# Patient Record
Sex: FEMALE | Race: WHITE | NOT HISPANIC OR LATINO | Employment: OTHER | URBAN - NONMETROPOLITAN AREA
[De-identification: names, ages, dates, MRNs, and addresses within clinical notes are randomized per-mention and may not be internally consistent; named-entity substitution may affect disease eponyms.]

---

## 2017-03-23 ENCOUNTER — OFFICE VISIT (OUTPATIENT)
Dept: FAMILY MEDICINE CLINIC | Facility: CLINIC | Age: 62
End: 2017-03-23

## 2017-03-23 VITALS
SYSTOLIC BLOOD PRESSURE: 140 MMHG | TEMPERATURE: 98.1 F | BODY MASS INDEX: 25.69 KG/M2 | WEIGHT: 145 LBS | OXYGEN SATURATION: 96 % | DIASTOLIC BLOOD PRESSURE: 70 MMHG | RESPIRATION RATE: 12 BRPM | HEART RATE: 50 BPM | HEIGHT: 63 IN

## 2017-03-23 DIAGNOSIS — I87.2 VENOUS INSUFFICIENCY, PERIPHERAL: ICD-10-CM

## 2017-03-23 DIAGNOSIS — Z72.0 DIPS TOBACCO: ICD-10-CM

## 2017-03-23 DIAGNOSIS — F41.8 DEPRESSION WITH ANXIETY: ICD-10-CM

## 2017-03-23 DIAGNOSIS — E78.2 MIXED HYPERLIPIDEMIA: ICD-10-CM

## 2017-03-23 DIAGNOSIS — K21.9 GASTROESOPHAGEAL REFLUX DISEASE WITHOUT ESOPHAGITIS: ICD-10-CM

## 2017-03-23 DIAGNOSIS — J44.9 CHRONIC OBSTRUCTIVE PULMONARY DISEASE, UNSPECIFIED COPD TYPE (HCC): ICD-10-CM

## 2017-03-23 DIAGNOSIS — J30.9 CHRONIC ALLERGIC RHINITIS: ICD-10-CM

## 2017-03-23 DIAGNOSIS — E11.9 TYPE 2 DIABETES MELLITUS WITHOUT COMPLICATION, WITHOUT LONG-TERM CURRENT USE OF INSULIN (HCC): ICD-10-CM

## 2017-03-23 DIAGNOSIS — E55.9 VITAMIN D DEFICIENCY DISEASE: ICD-10-CM

## 2017-03-23 DIAGNOSIS — I25.10 CORONARY ARTERY DISEASE INVOLVING NATIVE CORONARY ARTERY OF NATIVE HEART WITHOUT ANGINA PECTORIS: ICD-10-CM

## 2017-03-23 DIAGNOSIS — I10 ESSENTIAL HYPERTENSION: Primary | ICD-10-CM

## 2017-03-23 DIAGNOSIS — M85.80 OSTEOPENIA: ICD-10-CM

## 2017-03-23 DIAGNOSIS — Z00.00 HEALTHCARE MAINTENANCE: ICD-10-CM

## 2017-03-23 PROCEDURE — 99214 OFFICE O/P EST MOD 30 MIN: CPT | Performed by: GENERAL PRACTICE

## 2017-03-23 RX ORDER — ISOSORBIDE MONONITRATE 30 MG/1
30 TABLET, EXTENDED RELEASE ORAL EVERY MORNING
Qty: 30 TABLET | Refills: 5 | Status: SHIPPED | OUTPATIENT
Start: 2017-03-23 | End: 2017-09-25 | Stop reason: SDUPTHER

## 2017-03-23 RX ORDER — LISINOPRIL 10 MG/1
10 TABLET ORAL EVERY MORNING
Qty: 30 TABLET | Refills: 5 | Status: SHIPPED | OUTPATIENT
Start: 2017-03-23 | End: 2017-09-25 | Stop reason: SDUPTHER

## 2017-03-23 RX ORDER — ATORVASTATIN CALCIUM 80 MG/1
80 TABLET, FILM COATED ORAL EVERY EVENING
Qty: 30 TABLET | Refills: 5 | Status: SHIPPED | OUTPATIENT
Start: 2017-03-23 | End: 2017-09-25 | Stop reason: SDUPTHER

## 2017-03-23 RX ORDER — METOPROLOL SUCCINATE 25 MG/1
25 TABLET, EXTENDED RELEASE ORAL 2 TIMES DAILY
Qty: 60 TABLET | Refills: 5 | Status: SHIPPED | OUTPATIENT
Start: 2017-03-23 | End: 2017-09-25 | Stop reason: SDUPTHER

## 2017-03-23 NOTE — PROGRESS NOTES
Subjective   Ethel Trotter is a 61 y.o. female.     History of Present Illness     Coronary artery disease  She has a history of CABG and previous M.I. Recent history: Hospitalized at [facility] since last here with chest pain after discontinuing her medication for several months.  Nuclear stress testing revealed no evidence of inducible ischemia and an echocardiogram was reported as showing an ejection fraction of 55-60%.  Was discharged home on isosorbide mononitrate 30 every morning with a reduction in the dosages of lisinopril, metoprolol, and atorvastatin.  She has felt well since with no further chest pain.  She has a cardiology follow-up appointment with Dr. Green within the next month    Diabetes  Current symptoms include none. Patient denies paresthesia of the feet, visual disturbances, polydipsia, polyuria, hypoglycemia and foot ulcerations. Evaluation to date has been: hemoglobin A1C. Home sugars: patient does not check sugars. Current treatments: metformin, DPP inhibitor - Januvia and SGLT2 inhibitor - Jardiance. Last dilated eye exam more than one year ago. Most recent hemoglobin A1c   Lab Results   Component Value Date    HGBA1C 8.90 (H) 12/20/2016    HGBA1C 8.4 (H) 03/10/2016    HGBA1C 7.4 (H) 12/07/2015    HGBA1C 8.2 (H) 08/06/2015      Dyslipidemia  Compliance with treatment has been fair. The patient exercises intermittently. She is currently being prescribed the following medication for her dyslipidemia - atorvastatin, ezetimibe. Patient denies side effects associated with her medications. Most recent lipids include  Lab Results   Component Value Date    TRIG 688 (H) 12/20/2016    TRIG 439 (H) 03/10/2016    TRIG 323 (H) 12/07/2015    HDL 56 (L) 12/20/2016    HDL 39 (L) 03/10/2016    HDL 48 (L) 12/07/2015    LDLCALC  12/20/2016      Comment:      Unable to calculate    LDL No Calculation 03/10/2016     (H) 12/07/2015     Hypertension  Home blood pressure readings: not doing.  Associated signs and symptoms: none. Patient denies: chest pain, palpitations, dyspnea, orthopnea, paroxysmal nocturnal dyspnea and peripheral edema. Current antihypertensive medications includes lisinopril, metoprolol and amlodipine. Medication compliance: taking as prescribed. Most recent creatinine   Lab Results   Component Value Date    CREATININE 1.13 12/20/2016     Depression  Onset was several years ago. Symptoms have been gradually improving since last here. Current symptoms include: fatigue. Patient denies depressed mood, anhedonia, insomnia, recurrent thoughts of death and suicidal thoughts. Risk factors: negative life event mothers death and previous episode of depression. Treatment has included medication citalopram. She complains of the following side effects from the treatment: none.    The following portions of the patient's history were reviewed and updated as appropriate: allergies, current medications, past medical history, past social history, past surgical history and problem list.    Review of Systems   Constitutional: Positive for fatigue. Negative for appetite change, chills, fever and unexpected weight change.   HENT: Negative for congestion, ear pain, rhinorrhea, sneezing, sore throat and voice change.    Eyes: Negative for visual disturbance.   Respiratory: Negative for cough, shortness of breath and wheezing.    Cardiovascular: Negative for chest pain, palpitations and leg swelling.   Gastrointestinal: Negative for abdominal pain, blood in stool, constipation, diarrhea, nausea and vomiting.   Endocrine: Negative for polydipsia.   Genitourinary: Negative for difficulty urinating, dysuria, frequency, hematuria, menstrual problem, pelvic pain, urgency, vaginal bleeding and vaginal discharge.   Musculoskeletal: Negative for arthralgias, back pain, joint swelling, myalgias and neck pain.   Skin: Negative for color change.   Neurological: Negative for tremors, weakness, numbness and headaches.    Psychiatric/Behavioral: Negative for dysphoric mood, sleep disturbance and suicidal ideas. The patient is not nervous/anxious.      Objective   Physical Exam   Constitutional: She is oriented to person, place, and time. No distress.   HENT:   Head: Atraumatic.   Right Ear: Tympanic membrane, external ear and ear canal normal.   Left Ear: Tympanic membrane, external ear and ear canal normal.   Nose: Nose normal.   Mouth/Throat: Oropharynx is clear and moist. Mucous membranes are not pale and not cyanotic.   Eyes: EOM are normal. Pupils are equal, round, and reactive to light. No scleral icterus.   Neck: No JVD present. Carotid bruit is not present. No tracheal deviation present. No thyromegaly present.   Cardiovascular: Normal rate, regular rhythm, S1 normal, S2 normal and intact distal pulses.  Exam reveals no gallop, no S3 and no S4.    No murmur heard.  Pulmonary/Chest: Breath sounds normal. No stridor. No respiratory distress.   Abdominal: Soft. Normal aorta and bowel sounds are normal. She exhibits no distension, no abdominal bruit and no mass. There is no hepatosplenomegaly. There is no tenderness. No hernia.   Musculoskeletal: She exhibits no tenderness or deformity.       Vascular Status -  Her exam exhibits right foot vasculature normal. Her exam exhibits no right foot edema. Her exam exhibits left foot vasculature normal. Her exam exhibits no left foot edema.  Lymphadenopathy:        Head (right side): No submandibular adenopathy present.        Head (left side): No submandibular adenopathy present.     She has no cervical adenopathy.   Neurological: She is alert and oriented to person, place, and time. She has normal reflexes. She displays normal reflexes. No cranial nerve deficit. She exhibits normal muscle tone. Coordination normal.   Skin: Skin is warm and dry. No rash noted. She is not diaphoretic. No cyanosis. No pallor. Nails show no clubbing.   Psychiatric: She has a normal mood and affect.      Assessment/Plan   Problems Addressed this Visit        Cardiovascular and Mediastinum    Essential hypertension  Hypertension: improved. Evidence of target organ damage: angina/ prior myocardial infarction and prior coronary revascularization.  Encouraged to continue to work on diet and exercise plan.   Reminded of the importance of salt avoidance.  Continue current medication    Relevant Medications    lisinopril (PRINIVIL,ZESTRIL) 10 MG tablet    metoprolol succinate XL (TOPROL-XL) 25 MG 24 hr tablet    Mixed hyperlipidemia  As above. Continue current medication.  Updated lab work will be drawn at her return     Relevant Medications    atorvastatin (LIPITOR) 80 MG tablet    Coronary artery disease involving native coronary artery  Coronary artery disease is stable.  Reminded regarding the importance of lifestyle modification.  Continue current treatment.  Reminded to follow up with cardiology.    Relevant Medications    metoprolol succinate XL (TOPROL-XL) 25 MG 24 hr tablet    aspirin 81 MG tablet    isosorbide mononitrate (IMDUR) 30 MG 24 hr tablet    Venous insufficiency, peripheral       Respiratory    COPD (chronic obstructive pulmonary disease)    Chronic allergic rhinitis       Digestive    Vitamin D deficiency disease    Gastroesophageal reflux disease without esophagitis       Endocrine    Type 2 diabetes mellitus without complication, without long-term current use of insulin  Diabetes mellitus Type II, under inadequate control.   Encouraged to continue to pursue ADA diet  Encouraged aerobic exercise.       Musculoskeletal and Integument    Osteopenia       Other    Depression with anxiety  Stable. Supportive therapy. Will continue current medication.    Dips tobacco  Reminded of the importance of cessation     Healthcare maintenance  Due for a zostavax, mammogram, DEXA, and screening colonoscopy - patient would like to consider further

## 2017-05-03 RX ORDER — NITROGLYCERIN 0.4 MG/1
TABLET SUBLINGUAL
Qty: 25 TABLET | Refills: 5 | Status: SHIPPED | OUTPATIENT
Start: 2017-05-03 | End: 2017-09-25 | Stop reason: SDUPTHER

## 2017-06-23 ENCOUNTER — OFFICE VISIT (OUTPATIENT)
Dept: FAMILY MEDICINE CLINIC | Facility: CLINIC | Age: 62
End: 2017-06-23

## 2017-06-23 DIAGNOSIS — J44.9 CHRONIC OBSTRUCTIVE PULMONARY DISEASE, UNSPECIFIED COPD TYPE (HCC): ICD-10-CM

## 2017-06-23 DIAGNOSIS — F41.8 DEPRESSION WITH ANXIETY: ICD-10-CM

## 2017-06-23 DIAGNOSIS — M85.80 OSTEOPENIA: ICD-10-CM

## 2017-06-23 DIAGNOSIS — K21.9 GASTROESOPHAGEAL REFLUX DISEASE WITHOUT ESOPHAGITIS: ICD-10-CM

## 2017-06-23 DIAGNOSIS — J30.9 CHRONIC ALLERGIC RHINITIS: ICD-10-CM

## 2017-06-23 DIAGNOSIS — E78.2 MIXED HYPERLIPIDEMIA: ICD-10-CM

## 2017-06-23 DIAGNOSIS — Z72.0 DIPS TOBACCO: ICD-10-CM

## 2017-06-23 DIAGNOSIS — I10 ESSENTIAL HYPERTENSION: Primary | ICD-10-CM

## 2017-06-23 DIAGNOSIS — I25.10 CORONARY ARTERY DISEASE INVOLVING NATIVE CORONARY ARTERY OF NATIVE HEART WITHOUT ANGINA PECTORIS: ICD-10-CM

## 2017-06-23 DIAGNOSIS — E11.9 TYPE 2 DIABETES MELLITUS WITHOUT COMPLICATION, WITHOUT LONG-TERM CURRENT USE OF INSULIN (HCC): ICD-10-CM

## 2017-06-23 DIAGNOSIS — H66.002 ACUTE SUPPURATIVE OTITIS MEDIA OF LEFT EAR WITHOUT SPONTANEOUS RUPTURE OF TYMPANIC MEMBRANE, RECURRENCE NOT SPECIFIED: ICD-10-CM

## 2017-06-23 DIAGNOSIS — E55.9 VITAMIN D DEFICIENCY DISEASE: ICD-10-CM

## 2017-06-23 DIAGNOSIS — Z00.00 HEALTHCARE MAINTENANCE: ICD-10-CM

## 2017-06-23 PROCEDURE — 99214 OFFICE O/P EST MOD 30 MIN: CPT | Performed by: GENERAL PRACTICE

## 2017-06-23 RX ORDER — FLUTICASONE PROPIONATE 50 MCG
SPRAY, SUSPENSION (ML) NASAL
Qty: 1 BOTTLE | Refills: 5 | Status: SHIPPED | OUTPATIENT
Start: 2017-06-23 | End: 2017-09-25 | Stop reason: SDUPTHER

## 2017-06-23 RX ORDER — CIPROFLOXACIN 250 MG/1
250 TABLET, FILM COATED ORAL 2 TIMES DAILY
Qty: 20 TABLET | Refills: 0 | Status: SHIPPED | OUTPATIENT
Start: 2017-06-23 | End: 2017-07-03

## 2017-06-23 RX ORDER — NEOMYCIN SULFATE, POLYMYXIN B SULFATE AND HYDROCORTISONE 10; 3.5; 1 MG/ML; MG/ML; [USP'U]/ML
SUSPENSION/ DROPS AURICULAR (OTIC)
Qty: 10 ML | Refills: 0 | Status: SHIPPED | OUTPATIENT
Start: 2017-06-23 | End: 2017-06-30

## 2017-06-23 NOTE — PROGRESS NOTES
Subjective   Ethel Trotter is a 61 y.o. female.     History of Present Illness     Left Ear Pain  Since last here she has had increasing left ear pain. This has been associated with intermittent nasal congestion and post nasal drip. She denies any other upper respiratory symptoms and has had no fever or chills.     Coronary artery disease  She has a history of CABG and previous M.I.. Previous diagnostic testing includes: cardiac catheterization Recent history: taking medications as instructed, no medication side effects noted, no TIA's, no chest pain on exertion, no dyspnea on exertion and no swelling of ankles. Patient's symptoms have been unchanged. Medication side effects include: none. She continues to be followed by cardiology    Diabetes  Current symptoms include none. Patient denies paresthesia of the feet, visual disturbances, polydipsia, polyuria, hypoglycemia and foot ulcerations. Evaluation to date has been: hemoglobin A1C. Home sugars: patient does not check sugars. Current treatments: metformin, DPP inhibitor - Januvia and SGLT2 inhibitor - Jardiance. She ran out each over a month ago - unclear why. Last dilated eye exam more than one year ago. She has had no recent labs     Dyslipidemia  Compliance with treatment has been fair. The patient exercises intermittently. She is currently being prescribed the following medication for her dyslipidemia - atorvastatin, ezetimibe. Patient denies side effects associated with her medications.     Hypertension  Home blood pressure readings: not doing. Associated signs and symptoms: none. Patient denies: chest pain, palpitations, dyspnea, orthopnea, paroxysmal nocturnal dyspnea and peripheral edema. Current antihypertensive medications includes lisinopril, metoprolol and amlodipine. Medication compliance: taking as prescribed.     Depression  Onset was several years ago. Symptoms have been gradually improving since last here. Current symptoms include: fatigue.  Patient denies depressed mood, anhedonia, insomnia, recurrent thoughts of death and suicidal thoughts. Risk factors: negative life event mothers death and previous episode of depression. Treatment has included medication citalopram. She complains of the following side effects from the treatment: none.    The following portions of the patient's history were reviewed and updated as appropriate: allergies, current medications, past medical history, past social history, past surgical history and problem list.    Review of Systems   Constitutional: Positive for fatigue. Negative for appetite change, chills, fever and unexpected weight change.   HENT: Positive for congestion, ear pain (left), postnasal drip and rhinorrhea. Negative for sneezing, sore throat and voice change.    Eyes: Negative for visual disturbance.   Respiratory: Negative for cough, shortness of breath and wheezing.    Cardiovascular: Negative for chest pain, palpitations and leg swelling.   Gastrointestinal: Negative for abdominal pain, blood in stool, constipation, diarrhea, nausea and vomiting.   Endocrine: Negative for polydipsia.   Genitourinary: Negative for difficulty urinating, dysuria, frequency, hematuria, menstrual problem, pelvic pain, urgency, vaginal bleeding and vaginal discharge.   Musculoskeletal: Negative for arthralgias, back pain, joint swelling, myalgias and neck pain.   Skin: Negative for color change.   Neurological: Negative for tremors, weakness, numbness and headaches.   Psychiatric/Behavioral: Negative for dysphoric mood, sleep disturbance and suicidal ideas. The patient is not nervous/anxious.      Objective   Physical Exam   Constitutional: She is oriented to person, place, and time. No distress.   Bright and in fair spirits. No apparent distress. No pallor, jaundice, diaphoresis, or cyanosis.     HENT:   Head: Atraumatic.   Right Ear: Tympanic membrane, external ear and ear canal normal.   Left Ear: External ear normal.  Tympanic membrane is injected and erythematous.   Nose: Nose normal.   Mouth/Throat: Oropharynx is clear and moist. Mucous membranes are not pale and not cyanotic.   Left EAM also somewhat erythematous   Eyes: EOM are normal. Pupils are equal, round, and reactive to light. No scleral icterus.   Neck: No JVD present. Carotid bruit is not present. No tracheal deviation present. No thyromegaly present.   Cardiovascular: Normal rate, regular rhythm, S1 normal, S2 normal and intact distal pulses.  Exam reveals no gallop, no S3 and no S4.    No murmur heard.  Pulmonary/Chest: Breath sounds normal. No stridor. No respiratory distress.   Abdominal: Soft. Normal aorta and bowel sounds are normal. She exhibits no distension, no abdominal bruit and no mass. There is no hepatosplenomegaly. There is no tenderness. No hernia.   Musculoskeletal: She exhibits no tenderness or deformity.       Vascular Status -  Her exam exhibits right foot vasculature normal. Her exam exhibits no right foot edema. Her exam exhibits left foot vasculature normal. Her exam exhibits no left foot edema.  Lymphadenopathy:        Head (right side): No submandibular adenopathy present.        Head (left side): No submandibular adenopathy present.     She has no cervical adenopathy.   Neurological: She is alert and oriented to person, place, and time. She has normal reflexes. She displays normal reflexes. No cranial nerve deficit. She exhibits normal muscle tone. Coordination normal.   Skin: Skin is warm and dry. No rash noted. She is not diaphoretic. No cyanosis. No pallor. Nails show no clubbing.   Psychiatric: She has a normal mood and affect.     Assessment/Plan   Problems Addressed this Visit        Cardiovascular and Mediastinum    Essential hypertension   Hypertension: elevated today. Evidence of target organ damage: coronary artery disease and prior coronary revascularization.  Encouraged to continue to work on diet and exercise plan.   Reminded of  the importance of salt avoidance.  Continue current medication for now. If elevated at return will modify her antihypertensive regimen    Mixed hyperlipidemia  Continue current medication.    Coronary artery disease involving native coronary artery  Coronary artery disease is stable.  Reminded regarding the importance of lifestyle modification.  Continue current treatment.  Reminded to follow up with cardiology.       Respiratory    COPD (chronic obstructive pulmonary disease)    Relevant Medications    fluticasone (FLONASE) 50 MCG/ACT nasal spray    Chronic allergic rhinitis  Add prescription nasal corticosteroid - fluticasone    Relevant Medications    fluticasone (FLONASE) 50 MCG/ACT nasal spray       Digestive    Vitamin D deficiency disease    Gastroesophageal reflux disease without esophagitis       Endocrine    Type 2 diabetes mellitus without complication, without long-term current use of insulin  Diabetes mellitus Type II, under unknown control.   Encouraged to continue to pursue ADA diet  Encouraged aerobic exercise.  Will resume medication and draw labs at her return    Relevant Medications    SITagliptin-MetFORMIN HCl ER (JANUMET XR)  MG tablet sustained-release 24 hour    Empagliflozin (JARDIANCE) 25 MG tablet       Nervous and Auditory    Acute suppurative otitis media of left ear without spontaneous rupture of tympanic membrane  Advised regarding ear care  Encouraged to report if any worse, any new symptoms, or if not significantly better within the next week    Relevant Medications    ciprofloxacin (CIPRO) 250 MG tablet    neomycin-polymyxin-hydrocortisone (CORTISPORIN) 3.5-42965-6 otic suspension       Musculoskeletal and Integument    Osteopenia       Other    Depression with anxiety  Significant situational component. Supportive therapy. Will continue current medication.    Dips tobacco    Healthcare maintenance  Due for a zostavax, mammogram, DEXA, and screening colonoscopy - patient  remains uninterested but will consider

## 2017-06-24 VITALS
HEART RATE: 64 BPM | SYSTOLIC BLOOD PRESSURE: 165 MMHG | BODY MASS INDEX: 25.16 KG/M2 | RESPIRATION RATE: 12 BRPM | HEIGHT: 63 IN | WEIGHT: 142 LBS | DIASTOLIC BLOOD PRESSURE: 80 MMHG | TEMPERATURE: 97.6 F | OXYGEN SATURATION: 96 %

## 2017-06-29 ENCOUNTER — TELEPHONE (OUTPATIENT)
Dept: FAMILY MEDICINE CLINIC | Facility: CLINIC | Age: 62
End: 2017-06-29

## 2017-06-30 ENCOUNTER — TELEPHONE (OUTPATIENT)
Dept: FAMILY MEDICINE CLINIC | Facility: CLINIC | Age: 62
End: 2017-06-30

## 2017-09-25 ENCOUNTER — OFFICE VISIT (OUTPATIENT)
Dept: FAMILY MEDICINE CLINIC | Facility: CLINIC | Age: 62
End: 2017-09-25

## 2017-09-25 VITALS
BODY MASS INDEX: 23.39 KG/M2 | OXYGEN SATURATION: 96 % | RESPIRATION RATE: 12 BRPM | HEART RATE: 60 BPM | DIASTOLIC BLOOD PRESSURE: 70 MMHG | HEIGHT: 63 IN | WEIGHT: 132 LBS | TEMPERATURE: 97.4 F | SYSTOLIC BLOOD PRESSURE: 150 MMHG

## 2017-09-25 DIAGNOSIS — E78.2 MIXED HYPERLIPIDEMIA: ICD-10-CM

## 2017-09-25 DIAGNOSIS — I25.10 CORONARY ARTERY DISEASE INVOLVING NATIVE CORONARY ARTERY OF NATIVE HEART WITHOUT ANGINA PECTORIS: ICD-10-CM

## 2017-09-25 DIAGNOSIS — E11.9 TYPE 2 DIABETES MELLITUS WITHOUT COMPLICATION, WITHOUT LONG-TERM CURRENT USE OF INSULIN (HCC): ICD-10-CM

## 2017-09-25 DIAGNOSIS — E55.9 VITAMIN D DEFICIENCY DISEASE: ICD-10-CM

## 2017-09-25 DIAGNOSIS — Z00.00 HEALTHCARE MAINTENANCE: ICD-10-CM

## 2017-09-25 DIAGNOSIS — J30.9 CHRONIC ALLERGIC RHINITIS: ICD-10-CM

## 2017-09-25 DIAGNOSIS — M85.80 OSTEOPENIA: ICD-10-CM

## 2017-09-25 DIAGNOSIS — J44.9 CHRONIC OBSTRUCTIVE PULMONARY DISEASE, UNSPECIFIED COPD TYPE (HCC): ICD-10-CM

## 2017-09-25 DIAGNOSIS — I10 ESSENTIAL HYPERTENSION: Primary | ICD-10-CM

## 2017-09-25 DIAGNOSIS — I87.2 VENOUS INSUFFICIENCY, PERIPHERAL: ICD-10-CM

## 2017-09-25 DIAGNOSIS — F41.8 DEPRESSION WITH ANXIETY: ICD-10-CM

## 2017-09-25 DIAGNOSIS — Z72.0 DIPS TOBACCO: ICD-10-CM

## 2017-09-25 DIAGNOSIS — K21.9 GASTROESOPHAGEAL REFLUX DISEASE WITHOUT ESOPHAGITIS: ICD-10-CM

## 2017-09-25 DIAGNOSIS — Z23 ENCOUNTER FOR IMMUNIZATION: ICD-10-CM

## 2017-09-25 PROBLEM — H66.002 ACUTE SUPPURATIVE OTITIS MEDIA OF LEFT EAR WITHOUT SPONTANEOUS RUPTURE OF TYMPANIC MEMBRANE: Status: RESOLVED | Noted: 2017-06-23 | Resolved: 2017-09-25

## 2017-09-25 PROCEDURE — 99214 OFFICE O/P EST MOD 30 MIN: CPT | Performed by: GENERAL PRACTICE

## 2017-09-25 PROCEDURE — 90471 IMMUNIZATION ADMIN: CPT | Performed by: GENERAL PRACTICE

## 2017-09-25 RX ORDER — NITROGLYCERIN 0.4 MG/1
0.4 TABLET SUBLINGUAL
Qty: 25 TABLET | Refills: 5 | Status: SHIPPED | OUTPATIENT
Start: 2017-09-25 | End: 2018-11-05 | Stop reason: SDUPTHER

## 2017-09-25 RX ORDER — EZETIMIBE 10 MG/1
10 TABLET ORAL NIGHTLY
Qty: 30 TABLET | Refills: 5 | Status: SHIPPED | OUTPATIENT
Start: 2017-09-25 | End: 2018-11-05 | Stop reason: SDUPTHER

## 2017-09-25 RX ORDER — METOPROLOL SUCCINATE 25 MG/1
25 TABLET, EXTENDED RELEASE ORAL 2 TIMES DAILY
Qty: 60 TABLET | Refills: 5 | Status: SHIPPED | OUTPATIENT
Start: 2017-09-25 | End: 2018-07-13

## 2017-09-25 RX ORDER — ERGOCALCIFEROL 1.25 MG/1
50000 CAPSULE ORAL WEEKLY
Qty: 4 CAPSULE | Refills: 5 | Status: SHIPPED | OUTPATIENT
Start: 2017-09-25 | End: 2018-11-05 | Stop reason: SDUPTHER

## 2017-09-25 RX ORDER — ISOSORBIDE MONONITRATE 30 MG/1
30 TABLET, EXTENDED RELEASE ORAL EVERY MORNING
Qty: 30 TABLET | Refills: 5 | Status: SHIPPED | OUTPATIENT
Start: 2017-09-25 | End: 2018-11-05 | Stop reason: SDUPTHER

## 2017-09-25 RX ORDER — CITALOPRAM 20 MG/1
20 TABLET ORAL DAILY
Qty: 30 TABLET | Refills: 5 | Status: SHIPPED | OUTPATIENT
Start: 2017-09-25 | End: 2018-11-05 | Stop reason: SDUPTHER

## 2017-09-25 RX ORDER — FLUTICASONE PROPIONATE 50 MCG
SPRAY, SUSPENSION (ML) NASAL
Qty: 1 BOTTLE | Refills: 5 | Status: SHIPPED | OUTPATIENT
Start: 2017-09-25 | End: 2018-01-04 | Stop reason: SDUPTHER

## 2017-09-25 RX ORDER — ATORVASTATIN CALCIUM 80 MG/1
80 TABLET, FILM COATED ORAL EVERY EVENING
Qty: 30 TABLET | Refills: 5 | Status: SHIPPED | OUTPATIENT
Start: 2017-09-25 | End: 2018-11-05 | Stop reason: SDUPTHER

## 2017-09-25 RX ORDER — AMLODIPINE BESYLATE 10 MG/1
10 TABLET ORAL EVERY EVENING
Qty: 30 TABLET | Refills: 5 | Status: SHIPPED | OUTPATIENT
Start: 2017-09-25 | End: 2019-09-19 | Stop reason: SDUPTHER

## 2017-09-25 RX ORDER — LISINOPRIL 10 MG/1
10 TABLET ORAL EVERY MORNING
Qty: 30 TABLET | Refills: 5 | Status: SHIPPED | OUTPATIENT
Start: 2017-09-25 | End: 2018-07-13

## 2017-09-25 NOTE — PROGRESS NOTES
Subjective   Ethel Trotter is a 62 y.o. female.     History of Present Illness     Coronary artery disease  She has a history of CABG and previous M.I.. Previous diagnostic testing includes: cardiac catheterization Recent history: taking medications as instructed, no medication side effects noted, no TIA's, no chest pain on exertion, no dyspnea on exertion and no swelling of ankles. Patient's symptoms have been unchanged. Medication side effects include: none. She continues to be followed by cardiology and underwent a reassessment with Dr. Green since last here with no apparent changes made in her management    Diabetes  Current symptoms include none. Patient denies paresthesia of the feet, visual disturbances, polydipsia, polyuria, hypoglycemia and foot ulcerations. Evaluation to date has been: hemoglobin A1C. Home sugars: patient does not check sugars. Current treatments: metformin, DPP inhibitor - Januvia and SGLT2 inhibitor - Jardiance. Last dilated eye exam more than one year ago. She has had no recent labs     Dyslipidemia  Compliance with treatment has been excellent. The patient has been exercising on a daily basis through summer. She is currently being prescribed the following medication for her dyslipidemia - atorvastatin, ezetimibe. Patient denies side effects associated with her medications.     Hypertension  Home blood pressure readings: not doing. Associated signs and symptoms: none. Patient denies: chest pain, palpitations, dyspnea, orthopnea, paroxysmal nocturnal dyspnea and peripheral edema. Current antihypertensive medications includes lisinopril, metoprolol and amlodipine. Medication compliance: taking as prescribed.     Depression  Onset was several years ago. Symptoms have been gradually improving since last here. Current symptoms include: fatigue. Patient denies depressed mood, anhedonia, insomnia, recurrent thoughts of death and suicidal thoughts. Risk factors: negative life event mothers  death and previous episode of depression. Treatment has included medication citalopram. She complains of the following side effects from the treatment: none.    The following portions of the patient's history were reviewed and updated as appropriate: allergies, current medications, past medical history, past social history and problem list.    Review of Systems   Constitutional: Positive for fatigue. Negative for appetite change, chills, fever and unexpected weight change.   HENT: Positive for postnasal drip and rhinorrhea. Negative for congestion, ear pain, sneezing, sore throat and voice change.    Eyes: Negative for visual disturbance.   Respiratory: Negative for cough, shortness of breath and wheezing.    Cardiovascular: Negative for chest pain, palpitations and leg swelling.   Gastrointestinal: Negative for abdominal pain, blood in stool, constipation, diarrhea, nausea and vomiting.   Endocrine: Negative for polydipsia.   Genitourinary: Negative for difficulty urinating, dysuria, frequency, hematuria, menstrual problem, pelvic pain, urgency, vaginal bleeding and vaginal discharge.   Musculoskeletal: Negative for arthralgias, back pain, joint swelling, myalgias and neck pain.   Skin: Negative for color change.   Neurological: Negative for tremors, weakness, numbness and headaches.   Psychiatric/Behavioral: Negative for dysphoric mood, sleep disturbance and suicidal ideas. The patient is not nervous/anxious.      Objective   Physical Exam   Constitutional: She is oriented to person, place, and time. No distress.   Bright and in fair spirits. No apparent distress. No pallor, jaundice, diaphoresis, or cyanosis.     HENT:   Head: Atraumatic.   Right Ear: Tympanic membrane, external ear and ear canal normal.   Left Ear: Tympanic membrane, external ear and ear canal normal.   Nose: Nose normal.   Mouth/Throat: Oropharynx is clear and moist. Mucous membranes are not pale and not cyanotic.   Eyes: EOM are normal. Pupils  are equal, round, and reactive to light. No scleral icterus.   Neck: No JVD present. Carotid bruit is not present. No tracheal deviation present. No thyromegaly present.   Cardiovascular: Normal rate, regular rhythm, S1 normal, S2 normal and intact distal pulses.  Exam reveals no gallop, no S3 and no S4.    No murmur heard.  Pulmonary/Chest: Breath sounds normal. No stridor. No respiratory distress.   Abdominal: Soft. Normal aorta and bowel sounds are normal. She exhibits no distension, no abdominal bruit and no mass. There is no hepatosplenomegaly. There is no tenderness. No hernia.   Musculoskeletal: She exhibits no tenderness or deformity.    Ethel had a diabetic foot exam performed today.    Vascular Status -  Her exam exhibits right foot vasculature normal. Her exam exhibits no right foot edema. Her exam exhibits left foot vasculature normal. Her exam exhibits no left foot edema.  Lymphadenopathy:        Head (right side): No submandibular adenopathy present.        Head (left side): No submandibular adenopathy present.     She has no cervical adenopathy.   Neurological: She is alert and oriented to person, place, and time. She has normal reflexes. She displays normal reflexes. No cranial nerve deficit. She exhibits normal muscle tone. Coordination normal.   Skin: Skin is warm and dry. No rash noted. She is not diaphoretic. No cyanosis. No pallor. Nails show no clubbing.   Psychiatric: She has a normal mood and affect.     Assessment/Plan   Problems Addressed this Visit        Cardiovascular and Mediastinum    Essential hypertension   Hypertension: much better today. Evidence of target organ damage: coronary artery disease and prior coronary revascularization.  Encouraged to continue to work on diet and exercise plan.   Continue current medication  Updated lab work arranged    Relevant Medications    metoprolol succinate XL (TOPROL-XL) 25 MG 24 hr tablet    lisinopril (PRINIVIL,ZESTRIL) 10 MG tablet     amLODIPine (NORVASC) 10 MG tablet    Other Relevant Orders    CBC & Differential    Comprehensive Metabolic Panel    CBC Auto Differential    Mixed hyperlipidemia  As above.   Continue current medication.    Relevant Medications    ezetimibe (ZETIA) 10 MG tablet    atorvastatin (LIPITOR) 80 MG tablet    Other Relevant Orders    Lipid Panel    TSH    Coronary artery disease involving native coronary artery  Coronary artery disease is stable.  Reminded regarding the importance of lifestyle modification.  Stop tobacco.  Reminded to follow up with cardiology.    Relevant Medications    nitroglycerin (NITROSTAT) 0.4 MG SL tablet    metoprolol succinate XL (TOPROL-XL) 25 MG 24 hr tablet    isosorbide mononitrate (IMDUR) 30 MG 24 hr tablet    amLODIPine (NORVASC) 10 MG tablet    Venous insufficiency, peripheral       Respiratory    COPD (chronic obstructive pulmonary disease)    Relevant Medications    fluticasone (FLONASE) 50 MCG/ACT nasal spray    Chronic allergic rhinitis    Relevant Medications    fluticasone (FLONASE) 50 MCG/ACT nasal spray       Digestive    Vitamin D deficiency disease  Will continue to monitor    Relevant Medications    vitamin D (ERGOCALCIFEROL) 70797 units capsule capsule    Other Relevant Orders    Vitamin D 25 Hydroxy    Gastroesophageal reflux disease without esophagitis       Endocrine    Type 2 diabetes mellitus without complication, without long-term current use of insulin  Diabetes mellitus Type II, under unknown control.   Encouraged to continue to pursue ADA diet  Encouraged aerobic exercise.  Reminded to get yearly retinal exam.    Relevant Medications    SITagliptin-MetFORMIN HCl ER (JANUMET XR)  MG tablet sustained-release 24 hour    Empagliflozin (JARDIANCE) 25 MG tablet    Other Relevant Orders    Hemoglobin A1c    MicroAlbumin, Urine, Random       Musculoskeletal and Integument    Osteopenia       Other    Depression with anxiety  Significant situational component. Supportive  therapy.   Continue current medication.    Relevant Medications    citalopram (CeleXA) 20 MG tablet    Dips tobacco    Encounter for immunization    Relevant Orders    Flu Vaccine Quad PF 3YR+    Healthcare maintenance  Recommended a flu shot   Patient remains uninterested in a screening colonoscopy.  We'll discuss this along with a DEXA scan and mammogram again at her return     Relevant Orders    Flu Vaccine Quad PF 3YR+

## 2017-09-26 PROCEDURE — 90686 IIV4 VACC NO PRSV 0.5 ML IM: CPT | Performed by: GENERAL PRACTICE

## 2018-01-01 ENCOUNTER — HOSPITAL ENCOUNTER (EMERGENCY)
Facility: HOSPITAL | Age: 63
Discharge: HOME | End: 2018-01-01
Attending: EMERGENCY MEDICINE | Admitting: EMERGENCY MEDICINE

## 2018-01-01 ENCOUNTER — APPOINTMENT (OUTPATIENT)
Dept: GENERAL RADIOLOGY | Facility: HOSPITAL | Age: 63
End: 2018-01-01

## 2018-01-01 VITALS
WEIGHT: 133 LBS | RESPIRATION RATE: 20 BRPM | HEART RATE: 55 BPM | BODY MASS INDEX: 23.57 KG/M2 | TEMPERATURE: 98.7 F | DIASTOLIC BLOOD PRESSURE: 103 MMHG | SYSTOLIC BLOOD PRESSURE: 198 MMHG | OXYGEN SATURATION: 98 % | HEIGHT: 63 IN

## 2018-01-01 DIAGNOSIS — S52.501A CLOSED FRACTURE OF DISTAL END OF RIGHT RADIUS, UNSPECIFIED FRACTURE MORPHOLOGY, INITIAL ENCOUNTER: Primary | ICD-10-CM

## 2018-01-01 PROCEDURE — 73110 X-RAY EXAM OF WRIST: CPT | Performed by: RADIOLOGY

## 2018-01-01 PROCEDURE — 73110 X-RAY EXAM OF WRIST: CPT

## 2018-01-01 PROCEDURE — 99283 EMERGENCY DEPT VISIT LOW MDM: CPT

## 2018-01-01 PROCEDURE — 73120 X-RAY EXAM OF HAND: CPT

## 2018-01-01 PROCEDURE — 73130 X-RAY EXAM OF HAND: CPT | Performed by: RADIOLOGY

## 2018-01-01 RX ORDER — HYDROCODONE BITARTRATE AND ACETAMINOPHEN 5; 325 MG/1; MG/1
1 TABLET ORAL ONCE
Status: COMPLETED | OUTPATIENT
Start: 2018-01-01 | End: 2018-01-01

## 2018-01-01 RX ORDER — HYDROCODONE BITARTRATE AND ACETAMINOPHEN 5; 325 MG/1; MG/1
1 TABLET ORAL EVERY 6 HOURS PRN
Qty: 15 TABLET | Refills: 0 | Status: SHIPPED | OUTPATIENT
Start: 2018-01-01 | End: 2018-04-05

## 2018-01-01 RX ADMIN — HYDROCODONE BITARTRATE AND ACETAMINOPHEN 1 TABLET: 5; 325 TABLET ORAL at 16:39
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
## 2018-01-02 ENCOUNTER — OFFICE VISIT (OUTPATIENT)
Dept: ORTHOPEDIC SURGERY | Facility: CLINIC | Age: 63
End: 2018-01-02

## 2018-01-02 VITALS
HEIGHT: 63 IN | WEIGHT: 133 LBS | DIASTOLIC BLOOD PRESSURE: 98 MMHG | SYSTOLIC BLOOD PRESSURE: 153 MMHG | OXYGEN SATURATION: 97 % | BODY MASS INDEX: 23.57 KG/M2 | HEART RATE: 56 BPM

## 2018-01-02 DIAGNOSIS — S52.591A OTHER CLOSED FRACTURE OF DISTAL END OF RIGHT RADIUS, INITIAL ENCOUNTER: Primary | ICD-10-CM

## 2018-01-02 PROCEDURE — 25600 CLTX DST RDL FX/EPHYS SEP WO: CPT | Performed by: PHYSICIAN ASSISTANT
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
## 2018-01-04 ENCOUNTER — OFFICE VISIT (OUTPATIENT)
Dept: FAMILY MEDICINE CLINIC | Facility: CLINIC | Age: 63
End: 2018-01-04

## 2018-01-04 VITALS
TEMPERATURE: 97.8 F | BODY MASS INDEX: 23.74 KG/M2 | HEART RATE: 59 BPM | HEIGHT: 63 IN | OXYGEN SATURATION: 99 % | WEIGHT: 134 LBS | DIASTOLIC BLOOD PRESSURE: 90 MMHG | RESPIRATION RATE: 12 BRPM | SYSTOLIC BLOOD PRESSURE: 165 MMHG

## 2018-01-04 DIAGNOSIS — H66.002 ACUTE SUPPURATIVE OTITIS MEDIA OF LEFT EAR WITHOUT SPONTANEOUS RUPTURE OF TYMPANIC MEMBRANE, RECURRENCE NOT SPECIFIED: ICD-10-CM

## 2018-01-04 DIAGNOSIS — F41.8 DEPRESSION WITH ANXIETY: ICD-10-CM

## 2018-01-04 DIAGNOSIS — S52.531A CLOSED COLLES' FRACTURE OF RIGHT RADIUS, INITIAL ENCOUNTER: ICD-10-CM

## 2018-01-04 DIAGNOSIS — E11.9 TYPE 2 DIABETES MELLITUS WITHOUT COMPLICATION, WITHOUT LONG-TERM CURRENT USE OF INSULIN (HCC): ICD-10-CM

## 2018-01-04 DIAGNOSIS — Z00.00 HEALTHCARE MAINTENANCE: ICD-10-CM

## 2018-01-04 DIAGNOSIS — J30.2 CHRONIC SEASONAL ALLERGIC RHINITIS, UNSPECIFIED TRIGGER: ICD-10-CM

## 2018-01-04 DIAGNOSIS — K21.9 GASTROESOPHAGEAL REFLUX DISEASE WITHOUT ESOPHAGITIS: ICD-10-CM

## 2018-01-04 DIAGNOSIS — Z72.0 DIPS TOBACCO: ICD-10-CM

## 2018-01-04 DIAGNOSIS — J44.9 CHRONIC OBSTRUCTIVE PULMONARY DISEASE, UNSPECIFIED COPD TYPE (HCC): ICD-10-CM

## 2018-01-04 DIAGNOSIS — E78.2 MIXED HYPERLIPIDEMIA: ICD-10-CM

## 2018-01-04 DIAGNOSIS — M85.80 OSTEOPENIA, UNSPECIFIED LOCATION: ICD-10-CM

## 2018-01-04 DIAGNOSIS — I10 ESSENTIAL HYPERTENSION: Primary | ICD-10-CM

## 2018-01-04 DIAGNOSIS — E55.9 VITAMIN D DEFICIENCY DISEASE: ICD-10-CM

## 2018-01-04 DIAGNOSIS — I25.10 CORONARY ARTERY DISEASE INVOLVING NATIVE CORONARY ARTERY OF NATIVE HEART WITHOUT ANGINA PECTORIS: ICD-10-CM

## 2018-01-04 DIAGNOSIS — Z12.31 ENCOUNTER FOR SCREENING MAMMOGRAM FOR BREAST CANCER: ICD-10-CM

## 2018-01-04 LAB
25(OH)D3 SERPL-MCNC: 49 NG/ML
ALBUMIN SERPL-MCNC: 4.3 G/DL (ref 3.4–4.8)
ALBUMIN/GLOB SERPL: 1.3 G/DL (ref 1.5–2.5)
ALP SERPL-CCNC: 94 U/L (ref 35–104)
ALT SERPL W P-5'-P-CCNC: 31 U/L (ref 10–36)
ANION GAP SERPL CALCULATED.3IONS-SCNC: 4.3 MMOL/L (ref 3.6–11.2)
AST SERPL-CCNC: 41 U/L (ref 10–30)
BASOPHILS # BLD AUTO: 0.05 10*3/MM3 (ref 0–0.3)
BASOPHILS NFR BLD AUTO: 0.9 % (ref 0–2)
BILIRUB SERPL-MCNC: 0.5 MG/DL (ref 0.2–1.8)
BUN BLD-MCNC: 17 MG/DL (ref 7–21)
BUN/CREAT SERPL: 15 (ref 7–25)
CALCIUM SPEC-SCNC: 10 MG/DL (ref 7.7–10)
CHLORIDE SERPL-SCNC: 101 MMOL/L (ref 99–112)
CHOLEST SERPL-MCNC: 184 MG/DL (ref 0–200)
CO2 SERPL-SCNC: 28.7 MMOL/L (ref 24.3–31.9)
CREAT BLD-MCNC: 1.13 MG/DL (ref 0.43–1.29)
DEPRECATED RDW RBC AUTO: 44.9 FL (ref 37–54)
EOSINOPHIL # BLD AUTO: 0.4 10*3/MM3 (ref 0–0.7)
EOSINOPHIL NFR BLD AUTO: 7.3 % (ref 0–5)
ERYTHROCYTE [DISTWIDTH] IN BLOOD BY AUTOMATED COUNT: 13.7 % (ref 11.5–14.5)
GFR SERPL CREATININE-BSD FRML MDRD: 48 ML/MIN/1.73
GLOBULIN UR ELPH-MCNC: 3.2 GM/DL
GLUCOSE BLD-MCNC: 98 MG/DL (ref 70–110)
HBA1C MFR BLD: 6.8 % (ref 4.5–5.7)
HCT VFR BLD AUTO: 45 % (ref 37–47)
HDLC SERPL-MCNC: 49 MG/DL (ref 60–100)
HGB BLD-MCNC: 14.2 G/DL (ref 12–16)
IMM GRANULOCYTES # BLD: 0.01 10*3/MM3 (ref 0–0.03)
IMM GRANULOCYTES NFR BLD: 0.2 % (ref 0–0.5)
LDLC SERPL CALC-MCNC: 103 MG/DL (ref 0–100)
LDLC/HDLC SERPL: 2.09 {RATIO}
LYMPHOCYTES # BLD AUTO: 0.7 10*3/MM3 (ref 1–3)
LYMPHOCYTES NFR BLD AUTO: 12.8 % (ref 21–51)
MCH RBC QN AUTO: 29.2 PG (ref 27–33)
MCHC RBC AUTO-ENTMCNC: 31.6 G/DL (ref 33–37)
MCV RBC AUTO: 92.6 FL (ref 80–94)
MONOCYTES # BLD AUTO: 0.95 10*3/MM3 (ref 0.1–0.9)
MONOCYTES NFR BLD AUTO: 17.4 % (ref 0–10)
NEUTROPHILS # BLD AUTO: 3.34 10*3/MM3 (ref 1.4–6.5)
NEUTROPHILS NFR BLD AUTO: 61.4 % (ref 30–70)
OSMOLALITY SERPL CALC.SUM OF ELEC: 269.8 MOSM/KG (ref 273–305)
PLATELET # BLD AUTO: 120 10*3/MM3 (ref 130–400)
PMV BLD AUTO: 11.2 FL (ref 6–10)
POTASSIUM BLD-SCNC: 4.8 MMOL/L (ref 3.5–5.3)
PROT SERPL-MCNC: 7.5 G/DL (ref 6–8)
RBC # BLD AUTO: 4.86 10*6/MM3 (ref 4.2–5.4)
SODIUM BLD-SCNC: 134 MMOL/L (ref 135–153)
TRIGL SERPL-MCNC: 162 MG/DL (ref 0–150)
TSH SERPL DL<=0.05 MIU/L-ACNC: 0.59 MIU/ML (ref 0.55–4.78)
VLDLC SERPL-MCNC: 32.4 MG/DL
WBC NRBC COR # BLD: 5.45 10*3/MM3 (ref 4.5–12.5)

## 2018-01-04 PROCEDURE — 99214 OFFICE O/P EST MOD 30 MIN: CPT | Performed by: GENERAL PRACTICE

## 2018-01-04 PROCEDURE — 80061 LIPID PANEL: CPT | Performed by: GENERAL PRACTICE

## 2018-01-04 PROCEDURE — 83036 HEMOGLOBIN GLYCOSYLATED A1C: CPT | Performed by: GENERAL PRACTICE

## 2018-01-04 PROCEDURE — 82306 VITAMIN D 25 HYDROXY: CPT | Performed by: GENERAL PRACTICE

## 2018-01-04 PROCEDURE — 80050 GENERAL HEALTH PANEL: CPT | Performed by: GENERAL PRACTICE

## 2018-01-04 RX ORDER — CIPROFLOXACIN 250 MG/1
250 TABLET, FILM COATED ORAL 2 TIMES DAILY
Qty: 14 TABLET | Refills: 0 | Status: SHIPPED | OUTPATIENT
Start: 2018-01-04 | End: 2018-01-11

## 2018-01-04 RX ORDER — FLUTICASONE PROPIONATE 50 MCG
SPRAY, SUSPENSION (ML) NASAL
Qty: 1 BOTTLE | Refills: 5 | Status: SHIPPED | OUTPATIENT
Start: 2018-01-04 | End: 2019-09-19 | Stop reason: SDUPTHER

## 2018-01-04 NOTE — PROGRESS NOTES
Subjective   Ethel Trotter is a 62 y.o. female.     History of Present Illness     Right Wrist Fracture  Seen at [facility] [place] ER on 1/1/18 after a fall onto her right outstretched arm.  Plain films confirmed a comminuted fracture of the distal radius.  Underwent an orthopedic assessment the following day at which time a short arm cast was applied.  She has been more comfortable since and denies any pain with movement of her thumb or fingers nor any numbness or tingling.  She is right-hand dominant and gives no history of any previous injuries to that joint    Coronary artery disease  She has a history of CABG and previous M.I.. Previous diagnostic testing includes: cardiac catheterization Recent history: taking medications as instructed, no medication side effects noted, no TIA's, no chest pain on exertion, no dyspnea on exertion and no swelling of ankles. Patient's symptoms have been unchanged. Medication side effects include: none. She continues to be followed by cardiology     Diabetes  Current symptoms include none. Patient denies paresthesia of the feet, visual disturbances, polydipsia, polyuria, hypoglycemia and foot ulcerations. Evaluation to date has been: hemoglobin A1C. Home sugars: patient does not check sugars. Current treatments: metformin, DPP inhibitor - Januvia and SGLT2 inhibitor - Jardiance. Last dilated eye exam more than one year ago. She has yet to undergo the labs arranged at her last several visits     Dyslipidemia  Compliance with treatment has been excellent. The patient has been exercising on a daily basis through summer. She is currently being prescribed the following medication for her dyslipidemia - atorvastatin, ezetimibe. Patient denies side effects associated with her medications.     Hypertension  Home blood pressure readings: not doing. Associated signs and symptoms: none. Patient denies: chest pain, palpitations, dyspnea, orthopnea, paroxysmal nocturnal dyspnea and peripheral edema.  Current antihypertensive medications includes lisinopril, metoprolol and amlodipine. Medication compliance: taking as prescribed.     Depression  Onset was several years ago. Symptoms have been gradually improving since last here. Current symptoms include: fatigue. Patient denies depressed mood, anhedonia, insomnia, recurrent thoughts of death and suicidal thoughts. Risk factors: negative life event mothers death and previous episode of depression. Treatment has included medication citalopram. She complains of the following side effects from the treatment: none.    The following portions of the patient's history were reviewed and updated as appropriate: allergies, current medications, past medical history, past social history and problem list.    Review of Systems   Constitutional: Positive for fatigue. Negative for appetite change, chills, fever and unexpected weight change.   HENT: Negative for congestion, ear pain, rhinorrhea, sneezing, sore throat and voice change.    Eyes: Negative for visual disturbance.   Respiratory: Negative for cough, shortness of breath and wheezing.    Cardiovascular: Negative for chest pain, palpitations and leg swelling.   Gastrointestinal: Negative for abdominal pain, blood in stool, constipation, diarrhea, nausea and vomiting.   Endocrine: Negative for polydipsia.   Genitourinary: Negative for difficulty urinating, dysuria, frequency, hematuria, menstrual problem, pelvic pain, urgency, vaginal bleeding and vaginal discharge.   Musculoskeletal: Positive for arthralgias (right wrist). Negative for back pain, joint swelling, myalgias and neck pain.   Skin: Negative for color change.   Neurological: Negative for tremors, weakness, numbness and headaches.   Psychiatric/Behavioral: Negative for dysphoric mood, sleep disturbance and suicidal ideas. The patient is not nervous/anxious.      Objective   Physical Exam   Constitutional: She is oriented to person, place, and time. No distress.    Bright and in fair spirits. Right short arm cast. No apparent distress. No pallor, jaundice, diaphoresis, or cyanosis.     HENT:   Head: Atraumatic.   Right Ear: Tympanic membrane, external ear and ear canal normal.   Left Ear: External ear and ear canal normal. Tympanic membrane is erythematous.   Nose: Nose normal.   Mouth/Throat: Oropharynx is clear and moist. Mucous membranes are not pale and not cyanotic.   Eyes: EOM are normal. Pupils are equal, round, and reactive to light. No scleral icterus.   Neck: No JVD present. Carotid bruit is not present. No tracheal deviation present. No thyromegaly present.   Cardiovascular: Normal rate, regular rhythm, S1 normal, S2 normal and intact distal pulses.  Exam reveals no gallop, no S3 and no S4.    No murmur heard.  Pulmonary/Chest: Breath sounds normal. No stridor. No respiratory distress.   Abdominal: Soft. Normal aorta and bowel sounds are normal. She exhibits no distension, no abdominal bruit and no mass. There is no hepatosplenomegaly. There is no tenderness. No hernia.   Musculoskeletal: She exhibits no tenderness or deformity.   No swelling of the right thumb and fingers.  Normal range of motion with no discomfort    Ethel had a diabetic foot exam performed today.    Vascular Status -  Her exam exhibits right foot vasculature normal. Her exam exhibits no right foot edema. Her exam exhibits left foot vasculature normal. Her exam exhibits no left foot edema.  Lymphadenopathy:        Head (right side): No submandibular adenopathy present.        Head (left side): No submandibular adenopathy present.     She has no cervical adenopathy.   Neurological: She is alert and oriented to person, place, and time. She has normal reflexes. She displays normal reflexes. No cranial nerve deficit. She exhibits normal muscle tone. Coordination normal.   Skin: Skin is warm and dry. No rash noted. She is not diaphoretic. No cyanosis. No pallor. Nails show no clubbing.    Psychiatric: She has a normal mood and affect.     Assessment/Plan   Problems Addressed this Visit        Cardiovascular and Mediastinum    Essential hypertension  Hypertension: elevated today. Evidence of target organ damage: coronary artery disease and prior coronary revascularization.  Encouraged to continue to work on diet and exercise plan.   Continue current medication   Previously scheduled labs drawn     Mixed hyperlipidemia  As above.   Continue current medication.    Coronary artery disease involving native coronary artery  Coronary artery disease is stable.  Reminded regarding the importance of lifestyle modification with an emphasis on tobacco cessation.  Follow up with cardiology        Respiratory    COPD (chronic obstructive pulmonary disease)    Relevant Medications    fluticasone (FLONASE) 50 MCG/ACT nasal spray    Chronic seasonal allergic rhinitis    Relevant Medications    fluticasone (FLONASE) 50 MCG/ACT nasal spray       Digestive    Vitamin D deficiency disease    Gastroesophageal reflux disease without esophagitis       Endocrine    Type 2 diabetes mellitus without complication, without long-term current use of insulin  Diabetes mellitus Type II, under unknown control.   Encouraged to continue to pursue ADA diet  Encouraged aerobic exercise.  Reminded to get yearly retinal exam.       Nervous and Auditory    Acute suppurative otitis media of left ear without spontaneous rupture of tympanic membrane    Relevant Medications    ciprofloxacin (CIPRO) 250 MG tablet       Musculoskeletal and Integument    Osteopenia  We'll arrange an updated DEXA scan    Relevant Orders    DEXA Bone Density Axial    Closed Colles' fracture of right radius  With short arm cast  Follow up with orthopedic surgery        Other    Depression with anxiety  Significant situational component. Supportive therapy.   Continue current medication    Dips tobacco    Healthcare maintenance  We will also arrange an updated  mammogram  Colon cancer screening will be discussed again at her return     Relevant Orders    Mammo Screening Digital Tomosynthesis Bilateral With CAD    DEXA Bone Density Axial

## 2018-01-05 DIAGNOSIS — M25.531 RIGHT WRIST PAIN: Primary | ICD-10-CM

## 2018-01-09 ENCOUNTER — APPOINTMENT (OUTPATIENT)
Dept: GENERAL RADIOLOGY | Facility: HOSPITAL | Age: 63
End: 2018-01-09

## 2018-01-10 ENCOUNTER — OFFICE VISIT (OUTPATIENT)
Dept: ORTHOPEDIC SURGERY | Facility: CLINIC | Age: 63
End: 2018-01-10

## 2018-01-10 ENCOUNTER — HOSPITAL ENCOUNTER (OUTPATIENT)
Dept: GENERAL RADIOLOGY | Facility: HOSPITAL | Age: 63
Discharge: HOME | End: 2018-01-10
Admitting: PHYSICIAN ASSISTANT

## 2018-01-10 DIAGNOSIS — M25.531 RIGHT WRIST PAIN: ICD-10-CM

## 2018-01-10 DIAGNOSIS — S52.591D OTHER CLOSED FRACTURE OF DISTAL END OF RIGHT RADIUS WITH ROUTINE HEALING, SUBSEQUENT ENCOUNTER: Primary | ICD-10-CM

## 2018-01-10 PROCEDURE — 99024 POSTOP FOLLOW-UP VISIT: CPT | Performed by: PHYSICIAN ASSISTANT

## 2018-01-10 PROCEDURE — 73110 X-RAY EXAM OF WRIST: CPT | Performed by: RADIOLOGY

## 2018-01-10 PROCEDURE — 73110 X-RAY EXAM OF WRIST: CPT

## 2018-01-10 NOTE — PROGRESS NOTES
Ethel Trotter   :1955    Date of encounter:01/10/2018        HPI:  Ethel Trotter is a 62 y.o.  female who returns here today for follow-up of a right distal radius fracture.  She presents here today for 1 week x-rays to check alignment.  She states she's tolerated the cast well without any significant complaints.  She denies paresthesias.  She still complains of some mild pain in the wrist.    PMH:   Patient Active Problem List   Diagnosis   • Depression with anxiety   • COPD (chronic obstructive pulmonary disease)   • Essential hypertension   • Mixed hyperlipidemia   • Type 2 diabetes mellitus without complication, without long-term current use of insulin   • Coronary artery disease involving native coronary artery   • Vitamin D deficiency disease   • Osteopenia   • Hepatitis C   • H/O acute pancreatitis   • Gastroesophageal reflux disease without esophagitis   • Venous insufficiency, peripheral   • Chronic seasonal allergic rhinitis   • Dips tobacco   • Encounter for immunization   • Healthcare maintenance   • Acute suppurative otitis media of left ear without spontaneous rupture of tympanic membrane   • Closed Colles' fracture of right radius       Exam:  General Appearance:    62 y.o. female  cooperative, in no acute distress.  Alert and oriented x 3,                 There were no vitals filed for this visit.      Examination of the right wrist reveals short arm fiberglass cast in place.  She has no significant swelling and good mobility of the digits.  Her neurovascular status is intact.    Radiology:   3 views of the right wrist were reviewed revealing a comminuted fracture the distal radius with unchanged alignment since previous exam.    Assessment    ICD-10-CM ICD-9-CM   1. Other closed fracture of distal end of right radius with routine healing, subsequent encounter S52.591D V54.12       Plan:   A 62-year-old female with a comminuted distal radius fracture.  X-rays today reveal no change in  alignment.  We'll continue with the short arm cast.  She'll return back in one week for repeat x-rays to check alignment.    Jenni RIVERA    CC Dr. Sacha Flores

## 2018-01-19 DIAGNOSIS — M25.531 RIGHT WRIST PAIN: Primary | ICD-10-CM

## 2018-01-22 ENCOUNTER — APPOINTMENT (OUTPATIENT)
Dept: GENERAL RADIOLOGY | Facility: HOSPITAL | Age: 63
End: 2018-01-22

## 2018-01-26 ENCOUNTER — APPOINTMENT (OUTPATIENT)
Dept: GENERAL RADIOLOGY | Facility: HOSPITAL | Age: 63
End: 2018-01-26

## 2018-01-26 DIAGNOSIS — M25.531 RIGHT WRIST PAIN: Primary | ICD-10-CM

## 2018-02-01 DIAGNOSIS — S52.591D OTHER CLOSED FRACTURE OF DISTAL END OF RIGHT RADIUS WITH ROUTINE HEALING, SUBSEQUENT ENCOUNTER: Primary | ICD-10-CM

## 2018-02-02 ENCOUNTER — OFFICE VISIT (OUTPATIENT)
Dept: ORTHOPEDIC SURGERY | Facility: CLINIC | Age: 63
End: 2018-02-02

## 2018-02-02 ENCOUNTER — HOSPITAL ENCOUNTER (OUTPATIENT)
Dept: MAMMOGRAPHY | Facility: HOSPITAL | Age: 63
Discharge: HOME | End: 2018-02-02

## 2018-02-02 ENCOUNTER — HOSPITAL ENCOUNTER (OUTPATIENT)
Dept: BONE DENSITY | Facility: HOSPITAL | Age: 63
Discharge: HOME | End: 2018-02-02
Admitting: GENERAL PRACTICE

## 2018-02-02 ENCOUNTER — HOSPITAL ENCOUNTER (OUTPATIENT)
Dept: GENERAL RADIOLOGY | Facility: HOSPITAL | Age: 63
Discharge: HOME | End: 2018-02-02
Admitting: PHYSICIAN ASSISTANT

## 2018-02-02 DIAGNOSIS — Z00.00 HEALTHCARE MAINTENANCE: ICD-10-CM

## 2018-02-02 DIAGNOSIS — Z12.31 ENCOUNTER FOR SCREENING MAMMOGRAM FOR BREAST CANCER: ICD-10-CM

## 2018-02-02 DIAGNOSIS — S52.591D OTHER CLOSED FRACTURE OF DISTAL END OF RIGHT RADIUS WITH ROUTINE HEALING, SUBSEQUENT ENCOUNTER: ICD-10-CM

## 2018-02-02 DIAGNOSIS — S52.591D OTHER CLOSED FRACTURE OF DISTAL END OF RIGHT RADIUS WITH ROUTINE HEALING, SUBSEQUENT ENCOUNTER: Primary | ICD-10-CM

## 2018-02-02 DIAGNOSIS — M85.80 OSTEOPENIA, UNSPECIFIED LOCATION: ICD-10-CM

## 2018-02-02 PROCEDURE — 77063 BREAST TOMOSYNTHESIS BI: CPT

## 2018-02-02 PROCEDURE — 77067 SCR MAMMO BI INCL CAD: CPT | Performed by: RADIOLOGY

## 2018-02-02 PROCEDURE — 73110 X-RAY EXAM OF WRIST: CPT

## 2018-02-02 PROCEDURE — 77067 SCR MAMMO BI INCL CAD: CPT

## 2018-02-02 PROCEDURE — 99024 POSTOP FOLLOW-UP VISIT: CPT | Performed by: PHYSICIAN ASSISTANT

## 2018-02-02 PROCEDURE — 77080 DXA BONE DENSITY AXIAL: CPT

## 2018-02-02 PROCEDURE — 73110 X-RAY EXAM OF WRIST: CPT | Performed by: RADIOLOGY

## 2018-02-02 PROCEDURE — 77063 BREAST TOMOSYNTHESIS BI: CPT | Performed by: RADIOLOGY

## 2018-02-02 PROCEDURE — 77080 DXA BONE DENSITY AXIAL: CPT | Performed by: RADIOLOGY

## 2018-02-02 RX ORDER — ASPIRIN 81 MG/1
TABLET ORAL
COMMUNITY
Start: 2017-12-06 | End: 2020-02-11

## 2018-02-02 RX ORDER — METOPROLOL SUCCINATE 50 MG/1
TABLET, EXTENDED RELEASE ORAL
COMMUNITY
Start: 2017-12-06 | End: 2019-03-21

## 2018-02-02 NOTE — PROGRESS NOTES
Ethel Trotter   :1955    Date of encounter:2018        HPI:  Ethel Trotter is a 62 y.o. female who was returns here today for follow-up of a right distal radius fracture.  Date of injury was 18.  She's been 4 weeks post injury.  She is tolerating her cast well.  She states she has very little pain and she notices that the swelling has gone down.  She's continuing to use it in the cast.  She denies paresthesias.    PMH:   Patient Active Problem List   Diagnosis   • Depression with anxiety   • COPD (chronic obstructive pulmonary disease)   • Essential hypertension   • Mixed hyperlipidemia   • Type 2 diabetes mellitus without complication, without long-term current use of insulin   • Coronary artery disease involving native coronary artery   • Vitamin D deficiency disease   • Osteopenia   • Hepatitis C   • H/O acute pancreatitis   • Gastroesophageal reflux disease without esophagitis   • Venous insufficiency, peripheral   • Chronic seasonal allergic rhinitis   • Dips tobacco   • Encounter for immunization   • Healthcare maintenance   • Acute suppurative otitis media of left ear without spontaneous rupture of tympanic membrane   • Closed Colles' fracture of right radius       Exam:  General Appearance:    62 y.o. female  cooperative, in no acute distress.  Alert and oriented x 3,                 There were no vitals filed for this visit.       There is no height or weight on file to calculate BMI.   Examination of the right wrist reveal short arm fiberglass cast intact.  She has no swelling in the digits.  She is good mobility.  Her neurovascular status is intact.    Radiology:   3 views of the right wrist were reviewed revealing the distal radius fracture with unchanged alignment.    Assessment    ICD-10-CM ICD-9-CM   1. Other closed fracture of distal end of right radius with routine healing, subsequent encounter S52.591D V54.12       Plan:   A 62-year-old female with a right distal radius  fracture.  She is now 4 weeks post injury.  Repeat x-rays today reveal no change in alignment.  I'll continue the cast for an additional 2 weeks.  She'll return back in 2 weeks for x-rays out of cast and we'll transition him to the forearm brace.    Jenni HUSAIN          Patient's BMI is within normal parameters. No follow-up required.

## 2018-02-13 DIAGNOSIS — S52.591D OTHER CLOSED FRACTURE OF DISTAL END OF RIGHT RADIUS WITH ROUTINE HEALING, SUBSEQUENT ENCOUNTER: Primary | ICD-10-CM

## 2018-02-14 ENCOUNTER — HOSPITAL ENCOUNTER (OUTPATIENT)
Dept: GENERAL RADIOLOGY | Facility: HOSPITAL | Age: 63
Discharge: HOME | End: 2018-02-14
Admitting: PHYSICIAN ASSISTANT

## 2018-02-14 ENCOUNTER — OFFICE VISIT (OUTPATIENT)
Dept: ORTHOPEDIC SURGERY | Facility: CLINIC | Age: 63
End: 2018-02-14

## 2018-02-14 VITALS — HEIGHT: 63 IN

## 2018-02-14 DIAGNOSIS — S52.591D OTHER CLOSED FRACTURE OF DISTAL END OF RIGHT RADIUS WITH ROUTINE HEALING, SUBSEQUENT ENCOUNTER: Primary | ICD-10-CM

## 2018-02-14 DIAGNOSIS — S52.591D OTHER CLOSED FRACTURE OF DISTAL END OF RIGHT RADIUS WITH ROUTINE HEALING, SUBSEQUENT ENCOUNTER: ICD-10-CM

## 2018-02-14 PROCEDURE — 73110 X-RAY EXAM OF WRIST: CPT | Performed by: RADIOLOGY

## 2018-02-14 PROCEDURE — 73110 X-RAY EXAM OF WRIST: CPT

## 2018-02-14 PROCEDURE — 99024 POSTOP FOLLOW-UP VISIT: CPT | Performed by: PHYSICIAN ASSISTANT

## 2018-02-14 NOTE — PROGRESS NOTES
"Ethel Trotter   :1955    Date of encounter:2018        HPI:  Ethel Trotter is a 62 y.o. female who returns here today for follow-up of a right distal radius fracture.  Date of injury was 18.  She is now 6 weeks post injury.  She's been doing well and tolerating the cast.  She states she has very little pain and no longer has any swelling.  She denies paresthesias.    PMH:   Patient Active Problem List   Diagnosis   • Depression with anxiety   • COPD (chronic obstructive pulmonary disease)   • Essential hypertension   • Mixed hyperlipidemia   • Type 2 diabetes mellitus without complication, without long-term current use of insulin   • Coronary artery disease involving native coronary artery   • Vitamin D deficiency disease   • Osteopenia   • Hepatitis C   • H/O acute pancreatitis   • Gastroesophageal reflux disease without esophagitis   • Venous insufficiency, peripheral   • Chronic seasonal allergic rhinitis   • Dips tobacco   • Encounter for immunization   • Healthcare maintenance   • Acute suppurative otitis media of left ear without spontaneous rupture of tympanic membrane   • Closed Colles' fracture of right radius       Exam:  General Appearance:    62 y.o. female  cooperative, in no acute distress.  Alert and oriented x 3,                   Vitals:    18 0940   Height: 160 cm (63\")          There is no height or weight on file to calculate BMI.     Today we removed her short arm fiberglass cast.  Her skin was intact throughout.  There is no significant swelling or ecchymosis.  She still has mild tenderness along the distal radius.  She has mild stiffness.  Her neurovascular status is intact.    Radiology:   3 views of the right wrist were reviewed revealing the distal radius fracture with unchanged alignment and evidence of early callus formation    Assessment    ICD-10-CM ICD-9-CM   1. Other closed fracture of distal end of right radius with routine healing, subsequent encounter " S52.591D V54.12       Plan:   A 62-year-old female 6 weeks out from a right distal radius fracture.  X-rays today reveal no change in alignment with evidence of healing.  Clinically she is doing well with little complaints of pain.  They have provided her with a forearm brace to wear with activities.  She can remove it for bathing purposes and gentle range of motion however return back in 4 weeks for repeat x-rays and evaluation.    Jenni RIVERA            Patient's BMI is within normal parameters. No follow-up required.

## 2018-03-08 DIAGNOSIS — S52.591D OTHER CLOSED FRACTURE OF DISTAL END OF RIGHT RADIUS WITH ROUTINE HEALING, SUBSEQUENT ENCOUNTER: Primary | ICD-10-CM

## 2018-03-12 ENCOUNTER — HOSPITAL ENCOUNTER (OUTPATIENT)
Dept: GENERAL RADIOLOGY | Facility: HOSPITAL | Age: 63
Discharge: HOME | End: 2018-03-12
Admitting: PHYSICIAN ASSISTANT

## 2018-03-12 ENCOUNTER — OFFICE VISIT (OUTPATIENT)
Dept: ORTHOPEDIC SURGERY | Facility: CLINIC | Age: 63
End: 2018-03-12

## 2018-03-12 VITALS — WEIGHT: 133 LBS | HEIGHT: 63 IN | BODY MASS INDEX: 23.57 KG/M2

## 2018-03-12 DIAGNOSIS — S52.591D OTHER CLOSED FRACTURE OF DISTAL END OF RIGHT RADIUS WITH ROUTINE HEALING, SUBSEQUENT ENCOUNTER: Primary | ICD-10-CM

## 2018-03-12 DIAGNOSIS — S52.591D OTHER CLOSED FRACTURE OF DISTAL END OF RIGHT RADIUS WITH ROUTINE HEALING, SUBSEQUENT ENCOUNTER: ICD-10-CM

## 2018-03-12 PROCEDURE — 73110 X-RAY EXAM OF WRIST: CPT

## 2018-03-12 PROCEDURE — 99024 POSTOP FOLLOW-UP VISIT: CPT | Performed by: PHYSICIAN ASSISTANT

## 2018-03-12 PROCEDURE — 73110 X-RAY EXAM OF WRIST: CPT | Performed by: RADIOLOGY

## 2018-03-12 NOTE — PROGRESS NOTES
"Ethel Trotter   :1955    Date of encounter:2018        HPI:  Ethel Trotter is a 62 y.o.  female who returns here today for follow-up of a right distal radius fracture.  She is now approximately 3 months post injury.  She states she's been going without her brace the majority of the time.  She has no complaints of pain or stiffness.  She denies paresthesias.     PMH:   Patient Active Problem List   Diagnosis   • Depression with anxiety   • COPD (chronic obstructive pulmonary disease)   • Essential hypertension   • Mixed hyperlipidemia   • Type 2 diabetes mellitus without complication, without long-term current use of insulin   • Coronary artery disease involving native coronary artery   • Vitamin D deficiency disease   • Osteopenia   • Hepatitis C   • H/O acute pancreatitis   • Gastroesophageal reflux disease without esophagitis   • Venous insufficiency, peripheral   • Chronic seasonal allergic rhinitis   • Dips tobacco   • Encounter for immunization   • Healthcare maintenance   • Acute suppurative otitis media of left ear without spontaneous rupture of tympanic membrane   • Closed Colles' fracture of right radius       Exam:  General Appearance:    62 y.o. female  cooperative, in no acute distress.  Alert and oriented x 3,                   Vitals:    18 1311   Weight: 60.3 kg (133 lb)   Height: 160 cm (62.99\")          Body mass index is 23.57 kg/m².   Examination of the right wrist reveals no significant swelling or ecchymosis.  She has normal flexion and extension as well as pronation of the wrist.  She lacks still a few degrees of full supination.  There is no gross instability.  No tenderness on palpation.  Her neurovascular status is intact.    Radiology:   3 views of the right wrist were reviewed revealing the fracture through the distal radius with unchanged alignment and continued evidence of healing.    Assessment    ICD-10-CM ICD-9-CM   1. Other closed fracture of distal end of " right radius with routine healing, subsequent encounter S52.591D V54.12       Plan:   A 62-year-old female with a healing right distal radius fracture.  Clinically she is doing well without any further complaints of pain or stiffness.  Have advised that she can continue to wean out of the brace and ease back into activities as tolerated.  She'll return back here on an as-needed basis with any further difficulties.    Jenni HUSAIN            Discussed the patient's BMI with her. BMI is within normal parameters. No follow-up required.

## 2018-04-05 ENCOUNTER — OFFICE VISIT (OUTPATIENT)
Dept: FAMILY MEDICINE CLINIC | Facility: CLINIC | Age: 63
End: 2018-04-05

## 2018-04-05 VITALS
OXYGEN SATURATION: 96 % | BODY MASS INDEX: 23.74 KG/M2 | TEMPERATURE: 97.4 F | WEIGHT: 134 LBS | HEART RATE: 53 BPM | DIASTOLIC BLOOD PRESSURE: 85 MMHG | SYSTOLIC BLOOD PRESSURE: 155 MMHG | HEIGHT: 63 IN | RESPIRATION RATE: 12 BRPM

## 2018-04-05 DIAGNOSIS — I25.10 CORONARY ARTERY DISEASE INVOLVING NATIVE CORONARY ARTERY OF NATIVE HEART WITHOUT ANGINA PECTORIS: ICD-10-CM

## 2018-04-05 DIAGNOSIS — F41.8 DEPRESSION WITH ANXIETY: ICD-10-CM

## 2018-04-05 DIAGNOSIS — E55.9 VITAMIN D DEFICIENCY DISEASE: ICD-10-CM

## 2018-04-05 DIAGNOSIS — J44.9 CHRONIC OBSTRUCTIVE PULMONARY DISEASE, UNSPECIFIED COPD TYPE (HCC): ICD-10-CM

## 2018-04-05 DIAGNOSIS — S52.531S CLOSED COLLES' FRACTURE OF RIGHT RADIUS, SEQUELA: ICD-10-CM

## 2018-04-05 DIAGNOSIS — E78.2 MIXED HYPERLIPIDEMIA: ICD-10-CM

## 2018-04-05 DIAGNOSIS — I10 ESSENTIAL HYPERTENSION: Primary | ICD-10-CM

## 2018-04-05 DIAGNOSIS — Z00.00 HEALTHCARE MAINTENANCE: ICD-10-CM

## 2018-04-05 DIAGNOSIS — E11.9 TYPE 2 DIABETES MELLITUS WITHOUT COMPLICATION, WITHOUT LONG-TERM CURRENT USE OF INSULIN (HCC): ICD-10-CM

## 2018-04-05 DIAGNOSIS — J30.2 CHRONIC SEASONAL ALLERGIC RHINITIS, UNSPECIFIED TRIGGER: ICD-10-CM

## 2018-04-05 DIAGNOSIS — Z72.0 DIPS TOBACCO: ICD-10-CM

## 2018-04-05 DIAGNOSIS — M85.80 OSTEOPENIA, UNSPECIFIED LOCATION: ICD-10-CM

## 2018-04-05 PROBLEM — H66.002 ACUTE SUPPURATIVE OTITIS MEDIA OF LEFT EAR WITHOUT SPONTANEOUS RUPTURE OF TYMPANIC MEMBRANE: Status: RESOLVED | Noted: 2017-06-23 | Resolved: 2018-04-05

## 2018-04-05 PROCEDURE — 99214 OFFICE O/P EST MOD 30 MIN: CPT | Performed by: GENERAL PRACTICE

## 2018-04-05 NOTE — PROGRESS NOTES
Subjective   Ethel Trotter is a 62 y.o. female.     History of Present Illness     Right Wrist Fracture  Seen at [facility] ER on 1/1/18 after a fall onto her right outstretched arm.  Plain films confirmed a comminuted fracture of the distal radius.  Underwent an orthopedic assessment the following day at which time a short arm cast was applied.  This was removed on 2/14/18 and she has been wearing a brace as needed with activities since.  She admits to mild stiffness and occasional discomfort with movement but denies any numbness or tingling.  She is right-hand dominant and gives no history of any previous injuries to that joint    Coronary artery disease  She has a history of CABG and previous M.I.. Previous diagnostic testing includes: cardiac catheterization Recent history: taking medications as instructed, no medication side effects noted, no TIA's, no chest pain on exertion, no dyspnea on exertion and no swelling of ankles. Patient's symptoms have been unchanged. Medication side effects include: none. She continues to be followed by cardiology     Diabetes  Current symptoms include none. Patient denies paresthesia of the feet, visual disturbances, polydipsia, polyuria, hypoglycemia and foot ulcerations. Evaluation to date has been: hemoglobin A1C. Home sugars: patient does not check sugars. Current treatments: metformin, DPP inhibitor - Januvia and SGLT2 inhibitor - Jardiance. Last dilated eye exam more than one year ago.   Lab Results   Component Value Date    HGBA1C 6.80 (H) 01/04/2018      Dyslipidemia  Compliance with treatment has been excellent. The patient has been exercising on a daily basis through summer. She is currently being prescribed the following medication for her dyslipidemia - atorvastatin, ezetimibe. Patient denies side effects associated with her medications.   Lab Results   Component Value Date    CHOL 184 01/04/2018    CHLPL 247 (H) 03/10/2016    TRIG 162 (H) 01/04/2018    HDL 49 (L)  01/04/2018     (H) 01/04/2018     Hypertension  Home blood pressure readings: not doing. Associated signs and symptoms: none. Patient denies: chest pain, palpitations, dyspnea, orthopnea, paroxysmal nocturnal dyspnea and peripheral edema. Current antihypertensive medications includes lisinopril, metoprolol and amlodipine. Medication compliance: taking as prescribed.   Lab Results   Component Value Date    CREATININE 1.13 01/04/2018     Depression  Onset was several years ago. Symptoms have been gradually improving since last here. Current symptoms include: fatigue. Patient denies depressed mood, anhedonia, insomnia, recurrent thoughts of death and suicidal thoughts. Risk factors: negative life event mothers death and previous episode of depression. Treatment has included medication citalopram. She complains of the following side effects from the treatment: none.    Labs  Most recent vitamin D 49    Imaging  DEXA scan performed on 2/2/18 returned with a T score as low as -1.8 at the right femoral neck    The following portions of the patient's history were reviewed and updated as appropriate: allergies, current medications, past medical history, past social history and problem list.    Review of Systems   Constitutional: Positive for fatigue. Negative for appetite change, chills, fever and unexpected weight change.   HENT: Negative for congestion, ear pain, rhinorrhea, sneezing, sore throat and voice change.    Eyes: Negative for visual disturbance.   Respiratory: Negative for cough, shortness of breath and wheezing.    Cardiovascular: Negative for chest pain, palpitations and leg swelling.   Gastrointestinal: Negative for abdominal pain, blood in stool, constipation, diarrhea, nausea and vomiting.   Endocrine: Negative for polydipsia.   Genitourinary: Negative for difficulty urinating, dysuria, frequency, hematuria, menstrual problem, pelvic pain, urgency, vaginal bleeding and vaginal discharge.    Musculoskeletal: Positive for arthralgias (right wrist). Negative for back pain, joint swelling, myalgias and neck pain.   Skin: Negative for color change.   Neurological: Negative for tremors, weakness, numbness and headaches.   Psychiatric/Behavioral: Negative for dysphoric mood, sleep disturbance and suicidal ideas. The patient is not nervous/anxious.      Objective   Physical Exam   Constitutional: She is oriented to person, place, and time. No distress.   Bright and in fair spirits. No apparent distress. No pallor, jaundice, diaphoresis, or cyanosis.     HENT:   Head: Atraumatic.   Right Ear: Tympanic membrane, external ear and ear canal normal.   Left Ear: Tympanic membrane, external ear and ear canal normal.   Nose: Nose normal.   Mouth/Throat: Oropharynx is clear and moist. Mucous membranes are not pale and not cyanotic.   Eyes: EOM are normal. Pupils are equal, round, and reactive to light. No scleral icterus.   Neck: No JVD present. Carotid bruit is not present. No tracheal deviation present. No thyromegaly present.   Cardiovascular: Normal rate, regular rhythm, S1 normal, S2 normal and intact distal pulses.  Exam reveals no gallop, no S3 and no S4.    No murmur heard.  Pulmonary/Chest: Breath sounds normal. No stridor. No respiratory distress.   Abdominal: Soft. Normal aorta and bowel sounds are normal. She exhibits no distension, no abdominal bruit and no mass. There is no hepatosplenomegaly. There is no tenderness. No hernia.   Musculoskeletal: She exhibits no tenderness or deformity.        Right wrist: She exhibits decreased range of motion. She exhibits no tenderness, no bony tenderness, no swelling and no deformity.     Vascular Status -  Her right foot exhibits no edema. Her left foot exhibits no edema.  Lymphadenopathy:        Head (right side): No submandibular adenopathy present.        Head (left side): No submandibular adenopathy present.     She has no cervical adenopathy.   Neurological:  She is alert and oriented to person, place, and time. She has normal reflexes. She displays normal reflexes. No cranial nerve deficit. She exhibits normal muscle tone. Coordination normal.   Skin: Skin is warm and dry. No rash noted. She is not diaphoretic. No cyanosis. No pallor. Nails show no clubbing.   Psychiatric: She has a normal mood and affect.     Assessment/Plan   Problems Addressed this Visit        Cardiovascular and Mediastinum    Essential hypertension   Hypertension: marginal. Evidence of target organ damage: coronary artery disease.  Encouraged to continue to work on diet and exercise plan.   Continue current medication    Mixed hyperlipidemia  As above.   Continue current medication.    Coronary artery disease involving native coronary artery  Reminded regarding risk factor modification with an emphasis on tobacco cessation.  Follow up with cardiology        Respiratory    COPD (chronic obstructive pulmonary disease)    Chronic seasonal allergic rhinitis       Digestive    Vitamin D deficiency disease  Continue supplementation with monitoring.       Endocrine    Type 2 diabetes mellitus without complication, without long-term current use of insulin  Diabetes mellitus Type II, under excellent control.   Encouraged to continue to pursue ADA diet  Encouraged aerobic exercise.  Reminded to get yearly retinal exam.   Updated labs will be drawn at her return        Musculoskeletal and Integument    Osteopenia  Encouraged to continue to pursue weight bearing activities while exercising joint protection.    Closed Colles' fracture of right radius  Doing well  Encouraged report if this should change        Other    Depression with anxiety    Dips tobacco    Healthcare maintenance  Reviewed the potential benefits of shingrix. Prescription written.  Patient remains uninterested in a screening colonoscopy     Relevant Medications    Zoster Vac Recomb Adjuvanted (SHINGRIX) 50 MCG reconstituted suspension

## 2018-05-04 DIAGNOSIS — I25.10 CORONARY ARTERY DISEASE INVOLVING NATIVE CORONARY ARTERY OF NATIVE HEART WITHOUT ANGINA PECTORIS: ICD-10-CM

## 2018-05-04 RX ORDER — ASPIRIN 81 MG/1
TABLET ORAL
Qty: 30 TABLET | Refills: 5 | Status: SHIPPED | OUTPATIENT
Start: 2018-05-04 | End: 2020-02-11 | Stop reason: SDUPTHER

## 2018-07-13 ENCOUNTER — OFFICE VISIT (OUTPATIENT)
Dept: FAMILY MEDICINE CLINIC | Facility: CLINIC | Age: 63
End: 2018-07-13

## 2018-07-13 DIAGNOSIS — E55.9 VITAMIN D DEFICIENCY DISEASE: ICD-10-CM

## 2018-07-13 DIAGNOSIS — G47.09 INITIAL INSOMNIA: ICD-10-CM

## 2018-07-13 DIAGNOSIS — J30.2 CHRONIC SEASONAL ALLERGIC RHINITIS, UNSPECIFIED TRIGGER: ICD-10-CM

## 2018-07-13 DIAGNOSIS — H65.92 LEFT NON-SUPPURATIVE OTITIS MEDIA: ICD-10-CM

## 2018-07-13 DIAGNOSIS — F41.8 DEPRESSION WITH ANXIETY: ICD-10-CM

## 2018-07-13 DIAGNOSIS — E78.2 MIXED HYPERLIPIDEMIA: ICD-10-CM

## 2018-07-13 DIAGNOSIS — Z00.00 HEALTHCARE MAINTENANCE: ICD-10-CM

## 2018-07-13 DIAGNOSIS — Z72.0 DIPS TOBACCO: ICD-10-CM

## 2018-07-13 DIAGNOSIS — K21.9 GASTROESOPHAGEAL REFLUX DISEASE WITHOUT ESOPHAGITIS: ICD-10-CM

## 2018-07-13 DIAGNOSIS — I10 ESSENTIAL HYPERTENSION: ICD-10-CM

## 2018-07-13 DIAGNOSIS — M85.80 OSTEOPENIA, UNSPECIFIED LOCATION: ICD-10-CM

## 2018-07-13 DIAGNOSIS — E11.9 TYPE 2 DIABETES MELLITUS WITHOUT COMPLICATION, WITHOUT LONG-TERM CURRENT USE OF INSULIN (HCC): ICD-10-CM

## 2018-07-13 DIAGNOSIS — I25.10 CORONARY ARTERY DISEASE INVOLVING NATIVE CORONARY ARTERY OF NATIVE HEART WITHOUT ANGINA PECTORIS: Primary | ICD-10-CM

## 2018-07-13 DIAGNOSIS — J44.9 COPD MIXED TYPE (HCC): ICD-10-CM

## 2018-07-13 LAB
25(OH)D3 SERPL-MCNC: 47 NG/ML
ALBUMIN SERPL-MCNC: 4.1 G/DL (ref 3.4–4.8)
ALBUMIN UR-MCNC: 14.2 MG/L
ALBUMIN/GLOB SERPL: 1.5 G/DL (ref 1.5–2.5)
ALP SERPL-CCNC: 65 U/L (ref 35–104)
ALT SERPL W P-5'-P-CCNC: 19 U/L (ref 10–36)
ANION GAP SERPL CALCULATED.3IONS-SCNC: 3.4 MMOL/L (ref 3.6–11.2)
AST SERPL-CCNC: 23 U/L (ref 10–30)
BASOPHILS # BLD AUTO: 0.04 10*3/MM3 (ref 0–0.3)
BASOPHILS NFR BLD AUTO: 0.6 % (ref 0–2)
BILIRUB SERPL-MCNC: 0.3 MG/DL (ref 0.2–1.8)
BUN BLD-MCNC: 15 MG/DL (ref 7–21)
BUN/CREAT SERPL: 13.3 (ref 7–25)
CALCIUM SPEC-SCNC: 9.5 MG/DL (ref 7.7–10)
CHLORIDE SERPL-SCNC: 108 MMOL/L (ref 99–112)
CHOLEST SERPL-MCNC: 201 MG/DL (ref 0–200)
CO2 SERPL-SCNC: 29.6 MMOL/L (ref 24.3–31.9)
CREAT BLD-MCNC: 1.13 MG/DL (ref 0.43–1.29)
DEPRECATED RDW RBC AUTO: 47.5 FL (ref 37–54)
EOSINOPHIL # BLD AUTO: 0.58 10*3/MM3 (ref 0–0.7)
EOSINOPHIL NFR BLD AUTO: 8.5 % (ref 0–5)
ERYTHROCYTE [DISTWIDTH] IN BLOOD BY AUTOMATED COUNT: 14.5 % (ref 11.5–14.5)
GFR SERPL CREATININE-BSD FRML MDRD: 48 ML/MIN/1.73
GLOBULIN UR ELPH-MCNC: 2.8 GM/DL
GLUCOSE BLD-MCNC: 149 MG/DL (ref 70–110)
HBA1C MFR BLD: 6.9 % (ref 4.5–5.7)
HCT VFR BLD AUTO: 42.7 % (ref 37–47)
HDLC SERPL-MCNC: 51 MG/DL (ref 60–100)
HGB BLD-MCNC: 13.5 G/DL (ref 12–16)
IMM GRANULOCYTES # BLD: 0.03 10*3/MM3 (ref 0–0.03)
IMM GRANULOCYTES NFR BLD: 0.4 % (ref 0–0.5)
LDLC SERPL CALC-MCNC: 115 MG/DL (ref 0–100)
LDLC/HDLC SERPL: 2.25 {RATIO}
LYMPHOCYTES # BLD AUTO: 2.03 10*3/MM3 (ref 1–3)
LYMPHOCYTES NFR BLD AUTO: 29.9 % (ref 21–51)
MCH RBC QN AUTO: 29 PG (ref 27–33)
MCHC RBC AUTO-ENTMCNC: 31.6 G/DL (ref 33–37)
MCV RBC AUTO: 91.6 FL (ref 80–94)
MONOCYTES # BLD AUTO: 0.7 10*3/MM3 (ref 0.1–0.9)
MONOCYTES NFR BLD AUTO: 10.3 % (ref 0–10)
NEUTROPHILS # BLD AUTO: 3.41 10*3/MM3 (ref 1.4–6.5)
NEUTROPHILS NFR BLD AUTO: 50.3 % (ref 30–70)
OSMOLALITY SERPL CALC.SUM OF ELEC: 284.9 MOSM/KG (ref 273–305)
PLATELET # BLD AUTO: 109 10*3/MM3 (ref 130–400)
PMV BLD AUTO: 11.3 FL (ref 6–10)
POTASSIUM BLD-SCNC: 5.5 MMOL/L (ref 3.5–5.3)
PROT SERPL-MCNC: 6.9 G/DL (ref 6–8)
RBC # BLD AUTO: 4.66 10*6/MM3 (ref 4.2–5.4)
SODIUM BLD-SCNC: 141 MMOL/L (ref 135–153)
TRIGL SERPL-MCNC: 177 MG/DL (ref 0–150)
TSH SERPL DL<=0.05 MIU/L-ACNC: 1.15 MIU/ML (ref 0.55–4.78)
VLDLC SERPL-MCNC: 35.4 MG/DL
WBC NRBC COR # BLD: 6.79 10*3/MM3 (ref 4.5–12.5)

## 2018-07-13 PROCEDURE — 82306 VITAMIN D 25 HYDROXY: CPT | Performed by: GENERAL PRACTICE

## 2018-07-13 PROCEDURE — 83036 HEMOGLOBIN GLYCOSYLATED A1C: CPT | Performed by: GENERAL PRACTICE

## 2018-07-13 PROCEDURE — 80050 GENERAL HEALTH PANEL: CPT | Performed by: GENERAL PRACTICE

## 2018-07-13 PROCEDURE — 99214 OFFICE O/P EST MOD 30 MIN: CPT | Performed by: GENERAL PRACTICE

## 2018-07-13 PROCEDURE — 82043 UR ALBUMIN QUANTITATIVE: CPT | Performed by: GENERAL PRACTICE

## 2018-07-13 PROCEDURE — 80061 LIPID PANEL: CPT | Performed by: GENERAL PRACTICE

## 2018-07-13 RX ORDER — LISINOPRIL AND HYDROCHLOROTHIAZIDE 25; 20 MG/1; MG/1
1 TABLET ORAL EVERY MORNING
Qty: 30 TABLET | Refills: 5 | Status: SHIPPED | OUTPATIENT
Start: 2018-07-13 | End: 2018-11-05 | Stop reason: SDUPTHER

## 2018-07-13 RX ORDER — CHOLECALCIFEROL (VITAMIN D3) 125 MCG
5 CAPSULE ORAL NIGHTLY
Qty: 30 TABLET | Refills: 5 | Status: ON HOLD | OUTPATIENT
Start: 2018-07-13 | End: 2019-05-13

## 2018-07-13 RX ORDER — SULFAMETHOXAZOLE AND TRIMETHOPRIM 800; 160 MG/1; MG/1
1 TABLET ORAL 2 TIMES DAILY
Qty: 20 TABLET | Refills: 0 | Status: SHIPPED | OUTPATIENT
Start: 2018-07-13 | End: 2018-07-23

## 2018-07-13 NOTE — PROGRESS NOTES
Subjective   Ethel Trotter is a 62 y.o. female.     History of Present Illness     Depression  Onset was several years ago. Symptoms have been gradually improving since last here. Current symptoms include: difficulty falling asleep and fatigue. Patient denies depressed mood, anhedonia, insomnia, recurrent thoughts of death and suicidal thoughts. Risk factors: negative life event mothers death and previous episode of depression. Treatment has included medication citalopram. She complains of the following side effects from the treatment: none.    Coronary artery disease  She has a history of CABG and previous M.I.. Previous diagnostic testing includes: cardiac catheterization Recent history: taking medications as instructed, no medication side effects noted, no TIA's, no chest pain on exertion, no dyspnea on exertion and no swelling of ankles. Patient's symptoms have been unchanged. Medication side effects include: none. She continues to be followed by cardiology     Diabetes  Current symptoms include none. Patient denies paresthesia of the feet, visual disturbances, polydipsia, polyuria, hypoglycemia and foot ulcerations. Evaluation to date has been: hemoglobin A1C. Home sugars: patient does not check sugars. Current treatments: metformin, DPP inhibitor - Januvia and SGLT2 inhibitor - Jardiance. Last dilated eye exam more than one year ago. She has had no recent labs     Dyslipidemia  Compliance with treatment has been excellent. The patient has been exercising on a daily basis through summer. She is currently being prescribed the following medication for her dyslipidemia - atorvastatin, ezetimibe. Patient denies side effects associated with her medications.     Hypertension  Home blood pressure readings: not doing. Associated signs and symptoms: none. Patient denies: chest pain, palpitations, dyspnea, orthopnea, paroxysmal nocturnal dyspnea and peripheral edema. Current antihypertensive medications includes  lisinopril, metoprolol, amlodipine and HCTZ. The latter was started recently by Dr Green. Medication compliance: taking as prescribed.     The following portions of the patient's history were reviewed and updated as appropriate: allergies, current medications, past medical history, past social history and problem list.    Review of Systems   Constitutional: Positive for fatigue. Negative for appetite change, chills, fever and unexpected weight change.   HENT: Positive for ear pain (intermittent sharp left ear pain over the last week or two). Negative for congestion, rhinorrhea, sneezing, sore throat and voice change.    Eyes: Negative for visual disturbance.   Respiratory: Negative for cough, shortness of breath and wheezing.    Cardiovascular: Negative for chest pain, palpitations and leg swelling.   Gastrointestinal: Negative for abdominal pain, blood in stool, constipation, diarrhea, nausea and vomiting.   Endocrine: Negative for polydipsia.   Genitourinary: Negative for difficulty urinating, dysuria, frequency, hematuria, menstrual problem, pelvic pain, urgency, vaginal bleeding and vaginal discharge.   Musculoskeletal: Negative for arthralgias, back pain, joint swelling, myalgias and neck pain.   Skin: Negative for color change.   Neurological: Negative for tremors, weakness, numbness and headaches.   Psychiatric/Behavioral: Positive for sleep disturbance. Negative for dysphoric mood and suicidal ideas. The patient is not nervous/anxious.      Objective   Physical Exam   Constitutional: She is oriented to person, place, and time. No distress.   Bright and in fair spirits. No apparent distress. No pallor, jaundice, diaphoresis, or cyanosis.     HENT:   Head: Atraumatic.   Right Ear: Tympanic membrane, external ear and ear canal normal.   Left Ear: External ear and ear canal normal. Tympanic membrane is injected (mild) and scarred (mild).   Nose: Nose normal.   Mouth/Throat: Oropharynx is clear and moist.  Mucous membranes are not pale and not cyanotic.   Eyes: EOM are normal. Pupils are equal, round, and reactive to light. No scleral icterus.   Neck: No JVD present. Carotid bruit is not present. No tracheal deviation present. No thyromegaly present.   Cardiovascular: Normal rate, regular rhythm, S1 normal, S2 normal and intact distal pulses.  Exam reveals no gallop, no S3 and no S4.    No murmur heard.  Pulmonary/Chest: Breath sounds normal. No stridor. No respiratory distress.   Abdominal: Soft. Normal aorta and bowel sounds are normal. She exhibits no distension, no abdominal bruit and no mass. There is no hepatosplenomegaly. There is no tenderness. No hernia.   Musculoskeletal: She exhibits no tenderness or deformity.     Vascular Status -  Her right foot exhibits no edema. Her left foot exhibits no edema.  Lymphadenopathy:        Head (right side): No submandibular adenopathy present.        Head (left side): No submandibular adenopathy present.     She has no cervical adenopathy.   Neurological: She is alert and oriented to person, place, and time. She has normal reflexes. She displays normal reflexes. No cranial nerve deficit. She exhibits normal muscle tone. Coordination normal.   Skin: Skin is warm and dry. No rash noted. She is not diaphoretic. No cyanosis. No pallor. Nails show no clubbing.   Psychiatric: She has a normal mood and affect.     Assessment/Plan   Problems Addressed this Visit        Cardiovascular and Mediastinum    Essential hypertension  Hypertension: elevated today. Evidence of target organ damage: coronary artery disease.  Encouraged to continue to work on diet and exercise plan.   Continue current medication for now. Will combine lisinopril and HCTZ  Follow up with cardiology     Relevant Medications    lisinopril-hydrochlorothiazide (PRINZIDE,ZESTORETIC) 20-25 MG per tablet    Other Relevant Orders    CBC & Differential (Completed)    Comprehensive Metabolic Panel (Completed)    CBC Auto  Differential (Completed)    Osmolality, Calculated (Completed)    Mixed hyperlipidemia  As above.   Continue current medication.    Relevant Orders    Lipid Panel (Completed)    TSH (Completed)    Coronary artery disease involving native coronary artery   Reminded regarding the importance of risk factor modification.       Respiratory    COPD mixed type (CMS/HCC)    Chronic seasonal allergic rhinitis       Digestive    Vitamin D deficiency disease  Continue maintenance supplementation with monitoring.    Relevant Orders    Vitamin D 25 Hydroxy (Completed)    Gastroesophageal reflux disease without esophagitis       Endocrine    Type 2 diabetes mellitus without complication, without long-term current use of insulin (CMS/HCC)  Diabetes mellitus Type II, under unknown control.   Encouraged to continue to pursue ADA diet  Encouraged aerobic exercise.  Reminded to get yearly retinal exam.    Relevant Orders    Hemoglobin A1c (Completed)    MicroAlbumin, Urine, Random - Urine, Clean Catch (Completed)       Nervous and Auditory    Left non-suppurative otitis media  Advised regarding symptomatic treatment.  Antibiotic as per orders.  Encouraged to report if any worse or if any new symptoms.  Call in 5 days if symptoms aren't resolving.    Relevant Medications    sulfamethoxazole-trimethoprim (BACTRIM DS,SEPTRA DS) 800-160 MG per tablet       Musculoskeletal and Integument    Osteopenia  Encouraged to continue to pursue weight bearing activities while exercising joint protection.       Other    Depression with anxiety  Stable.  Supportive therapy.     Dips tobacco    Healthcare maintenance  Encouraged to follow up with shingrix.    Initial insomnia  Reviewed appropriate sleep habits  Trial of melatonin    Relevant Medications    melatonin 5 MG tablet tablet

## 2018-07-14 VITALS
RESPIRATION RATE: 12 BRPM | TEMPERATURE: 98.7 F | WEIGHT: 139 LBS | HEIGHT: 63 IN | HEART RATE: 66 BPM | DIASTOLIC BLOOD PRESSURE: 80 MMHG | SYSTOLIC BLOOD PRESSURE: 165 MMHG | BODY MASS INDEX: 24.63 KG/M2 | OXYGEN SATURATION: 98 %

## 2018-10-19 ENCOUNTER — OFFICE VISIT (OUTPATIENT)
Dept: FAMILY MEDICINE CLINIC | Facility: CLINIC | Age: 63
End: 2018-10-19

## 2018-10-19 ENCOUNTER — PRIOR AUTHORIZATION (OUTPATIENT)
Dept: FAMILY MEDICINE CLINIC | Facility: CLINIC | Age: 63
End: 2018-10-19

## 2018-10-19 DIAGNOSIS — M25.552 LEFT HIP PAIN: ICD-10-CM

## 2018-10-19 DIAGNOSIS — M85.80 OSTEOPENIA, UNSPECIFIED LOCATION: ICD-10-CM

## 2018-10-19 DIAGNOSIS — E78.2 MIXED HYPERLIPIDEMIA: ICD-10-CM

## 2018-10-19 DIAGNOSIS — E55.9 VITAMIN D DEFICIENCY DISEASE: ICD-10-CM

## 2018-10-19 DIAGNOSIS — Z23 ENCOUNTER FOR IMMUNIZATION: ICD-10-CM

## 2018-10-19 DIAGNOSIS — M54.59 MECHANICAL LOW BACK PAIN: ICD-10-CM

## 2018-10-19 DIAGNOSIS — E11.9 TYPE 2 DIABETES MELLITUS WITHOUT COMPLICATION, WITHOUT LONG-TERM CURRENT USE OF INSULIN (HCC): ICD-10-CM

## 2018-10-19 DIAGNOSIS — J30.2 CHRONIC SEASONAL ALLERGIC RHINITIS: ICD-10-CM

## 2018-10-19 DIAGNOSIS — K21.9 GASTROESOPHAGEAL REFLUX DISEASE WITHOUT ESOPHAGITIS: ICD-10-CM

## 2018-10-19 DIAGNOSIS — N30.00 ACUTE CYSTITIS WITHOUT HEMATURIA: ICD-10-CM

## 2018-10-19 DIAGNOSIS — Z72.0 DIPS TOBACCO: ICD-10-CM

## 2018-10-19 DIAGNOSIS — R30.0 DYSURIA: Primary | ICD-10-CM

## 2018-10-19 DIAGNOSIS — I25.10 CORONARY ARTERY DISEASE INVOLVING NATIVE CORONARY ARTERY OF NATIVE HEART WITHOUT ANGINA PECTORIS: ICD-10-CM

## 2018-10-19 DIAGNOSIS — I10 ESSENTIAL HYPERTENSION: ICD-10-CM

## 2018-10-19 DIAGNOSIS — F41.8 DEPRESSION WITH ANXIETY: ICD-10-CM

## 2018-10-19 DIAGNOSIS — Z00.00 HEALTHCARE MAINTENANCE: ICD-10-CM

## 2018-10-19 DIAGNOSIS — J44.9 COPD MIXED TYPE (HCC): ICD-10-CM

## 2018-10-19 LAB
BACTERIA UR QL AUTO: ABNORMAL /HPF
BILIRUB BLD-MCNC: NEGATIVE MG/DL
CLARITY, POC: ABNORMAL
COLOR UR: YELLOW
GLUCOSE UR STRIP-MCNC: NEGATIVE MG/DL
HYALINE CASTS UR QL AUTO: ABNORMAL /LPF
KETONES UR QL: NEGATIVE
LEUKOCYTE EST, POC: ABNORMAL
NITRITE UR-MCNC: POSITIVE MG/ML
PH UR: 7 [PH] (ref 5–8)
PROT UR STRIP-MCNC: ABNORMAL MG/DL
RBC # UR STRIP: ABNORMAL /UL
RBC # UR: ABNORMAL /HPF
REF LAB TEST METHOD: ABNORMAL
SP GR UR: 1.01 (ref 1–1.03)
SQUAMOUS #/AREA URNS HPF: ABNORMAL /HPF
UROBILINOGEN UR QL: NORMAL
WBC UR QL AUTO: ABNORMAL /HPF

## 2018-10-19 PROCEDURE — 81003 URINALYSIS AUTO W/O SCOPE: CPT | Performed by: GENERAL PRACTICE

## 2018-10-19 PROCEDURE — 87086 URINE CULTURE/COLONY COUNT: CPT | Performed by: GENERAL PRACTICE

## 2018-10-19 PROCEDURE — G0008 ADMIN INFLUENZA VIRUS VAC: HCPCS | Performed by: GENERAL PRACTICE

## 2018-10-19 PROCEDURE — 99214 OFFICE O/P EST MOD 30 MIN: CPT | Performed by: GENERAL PRACTICE

## 2018-10-19 PROCEDURE — 81015 MICROSCOPIC EXAM OF URINE: CPT | Performed by: GENERAL PRACTICE

## 2018-10-19 PROCEDURE — 90686 IIV4 VACC NO PRSV 0.5 ML IM: CPT | Performed by: GENERAL PRACTICE

## 2018-10-19 PROCEDURE — 87186 SC STD MICRODIL/AGAR DIL: CPT | Performed by: GENERAL PRACTICE

## 2018-10-19 PROCEDURE — 96372 THER/PROPH/DIAG INJ SC/IM: CPT | Performed by: GENERAL PRACTICE

## 2018-10-19 PROCEDURE — 87077 CULTURE AEROBIC IDENTIFY: CPT | Performed by: GENERAL PRACTICE

## 2018-10-19 RX ORDER — CIPROFLOXACIN 500 MG/1
500 TABLET, FILM COATED ORAL 2 TIMES DAILY
Qty: 20 TABLET | Refills: 0 | Status: SHIPPED | OUTPATIENT
Start: 2018-10-19 | End: 2018-10-29

## 2018-10-19 RX ORDER — CEFTRIAXONE 1 G/1
1 INJECTION, POWDER, FOR SOLUTION INTRAMUSCULAR; INTRAVENOUS ONCE
Status: COMPLETED | OUTPATIENT
Start: 2018-10-19 | End: 2018-10-19

## 2018-10-19 RX ADMIN — CEFTRIAXONE 1 G: 1 INJECTION, POWDER, FOR SOLUTION INTRAMUSCULAR; INTRAVENOUS at 10:34

## 2018-10-19 NOTE — PROGRESS NOTES
Subjective   Ethel Trotter is a 63 y.o. female.     History of Present Illness     Urinary Tract Symptoms  Patient complains of dysuria, frequency, nocturia, urgency and suprapubic pressure. She has had symptoms for 4 days. Patient also complains of chills. Patient denies nausea, vomiting, diarrhea, vaginal pruritus, vaginal discharge, vaginal bleeding and fever. Patient does not have a history of recurrent UTI. Patient does not have a history of pyelonephritis.     Low Back Pain   While worse over the last 4 days, she gives an approximate one year history of low back pain. There's no history of any strain or trauma. The pain is described as a sharp left lower ache. This radiates to the left lateral and anterior hip. There have been no associated symptoms and she denies any stiffness, swelling, weakness, numbness or tingling. The pain is worse with weight bearing and improves with rest.     Depression  Onset was several years ago. Symptoms have been unchanged since last here. Current symptoms include: difficulty falling asleep and fatigue. Patient denies depressed mood, anhedonia, insomnia, recurrent thoughts of death and suicidal thoughts. Risk factors: negative life event mothers death and previous episode of depression. Treatment has included medication citalopram. She complains of the following side effects from the treatment: none.    Coronary artery disease  She has a history of CABG and previous M.I.. Previous diagnostic testing includes: cardiac catheterization Recent history: taking medications as instructed, no medication side effects noted, no TIA's, no chest pain on exertion, no dyspnea on exertion and no swelling of ankles. Patient's symptoms have been unchanged. Medication side effects include: none. She continues to be followed by cardiology     Diabetes  Current symptoms include none. Patient denies paresthesia of the feet, visual disturbances, polydipsia, polyuria, hypoglycemia and foot  ulcerations. Evaluation to date has been: hemoglobin A1C. Home sugars: patient does not check sugars. Current treatments: metformin, DPP inhibitor - Januvia and SGLT2 inhibitor - Jardiance. Last dilated eye exam more than one year ago.   Lab Results   Component Value Date    HGBA1C 6.90 (H) 07/13/2018      Dyslipidemia  Compliance with treatment has been excellent. The patient exercise daily. She is currently being prescribed the following medication for her dyslipidemia - atorvastatin, ezetimibe. Patient denies side effects associated with her medications.   Lab Results   Component Value Date    CHOL 201 (H) 07/13/2018    CHLPL 247 (H) 03/10/2016    TRIG 177 (H) 07/13/2018    HDL 51 (L) 07/13/2018     (H) 07/13/2018     Hypertension  Home blood pressure readings: not doing. Associated signs and symptoms: none. Patient denies: chest pain, palpitations, dyspnea, orthopnea, paroxysmal nocturnal dyspnea and peripheral edema. Current antihypertensive medications includes lisinopril, metoprolol, amlodipine and HCTZ. The latter was started recently by Dr Green. Medication compliance: taking as prescribed.   Lab Results   Component Value Date    CREATININE 1.13 07/13/2018     Labs  Most recent vitamin D 47    The following portions of the patient's history were reviewed and updated as appropriate: allergies, current medications, past medical history, past social history and problem list.    Review of Systems   Constitutional: Positive for chills and fatigue. Negative for appetite change, fever and unexpected weight change.   HENT: Negative for congestion, ear pain, rhinorrhea, sneezing, sore throat and voice change.    Eyes: Negative for visual disturbance.   Respiratory: Negative for cough, shortness of breath and wheezing.    Cardiovascular: Negative for chest pain, palpitations and leg swelling.   Gastrointestinal: Negative for abdominal pain, blood in stool, constipation, diarrhea, nausea and vomiting.    Endocrine: Negative for polydipsia.   Genitourinary: Positive for dysuria, frequency and urgency. Negative for difficulty urinating, hematuria, menstrual problem, pelvic pain, vaginal bleeding and vaginal discharge.   Musculoskeletal: Positive for arthralgias and back pain. Negative for joint swelling, myalgias and neck pain.   Skin: Negative for color change.   Neurological: Negative for tremors, weakness, numbness and headaches.   Psychiatric/Behavioral: Negative for dysphoric mood, sleep disturbance and suicidal ideas. The patient is not nervous/anxious.      Objective   Physical Exam   Constitutional: She is oriented to person, place, and time. No distress.   Bright and in fair spirits. No apparent distress. No pallor, jaundice, diaphoresis, or cyanosis.     HENT:   Head: Atraumatic.   Right Ear: Tympanic membrane, external ear and ear canal normal.   Left Ear: Tympanic membrane, external ear and ear canal normal.   Nose: Nose normal.   Mouth/Throat: Oropharynx is clear and moist. Mucous membranes are not pale and not cyanotic.   Eyes: Pupils are equal, round, and reactive to light. EOM are normal. No scleral icterus.   Neck: No JVD present. Carotid bruit is not present. No tracheal deviation present. No thyromegaly present.   Cardiovascular: Normal rate, regular rhythm, S1 normal, S2 normal and intact distal pulses.  Exam reveals no gallop, no S3 and no S4.    No murmur heard.  Pulmonary/Chest: Breath sounds normal. No stridor. No respiratory distress.   Abdominal: Soft. Normal aorta and bowel sounds are normal. She exhibits no distension, no abdominal bruit and no mass. There is no hepatosplenomegaly. There is no tenderness. No hernia.   Musculoskeletal: She exhibits no deformity.        Left hip: She exhibits decreased range of motion (internal rotation slightly limited with pain on both this and full external rotation). She exhibits no tenderness and no deformity.        Lumbar back: She exhibits  tenderness (left lumbar paraspinal muscle tenderness). She exhibits normal range of motion, no bony tenderness and no deformity.   Negative straight leg raise.     Vascular Status -  Her right foot exhibits no edema. Her left foot exhibits no edema.  Lymphadenopathy:        Head (right side): No submandibular adenopathy present.        Head (left side): No submandibular adenopathy present.     She has no cervical adenopathy.   Neurological: She is alert and oriented to person, place, and time. She has normal reflexes. She displays normal reflexes. No cranial nerve deficit. She exhibits normal muscle tone. Coordination normal.   Skin: Skin is warm and dry. No rash noted. She is not diaphoretic. No cyanosis. No pallor. Nails show no clubbing.   Psychiatric: She has a normal mood and affect.     Assessment/Plan   Problems Addressed this Visit        Cardiovascular and Mediastinum    Essential hypertension   Hypertension: elevated today. Evidence of target organ damage: coronary artery disease.  Encouraged to continue to work on diet and exercise plan.   Continue current medication    Mixed hyperlipidemia  As above.   Reviewed the potential benefits and risks of PSK9 inhibitors. Patient interested and insurance coverage will be investigated    Coronary artery disease involving native coronary artery  Reminded regarding the importance of risk factor modification.  Continue current medication  Follow up with cardiology        Respiratory    COPD mixed type (CMS/HCC)    Chronic seasonal allergic rhinitis       Digestive    Vitamin D deficiency disease    Gastroesophageal reflux disease without esophagitis       Endocrine    Type 2 diabetes mellitus without complication, without long-term current use of insulin (CMS/HCC)  Diabetes mellitus Type II, under excellent control.   Encouraged to continue to pursue ADA diet  Encouraged aerobic exercise.       Nervous and Auditory    Mechanical low back pain  Advised regarding  symptomatic treatment.  Plain films of the L spine arranged    Relevant Orders    XR Spine Lumbar 2 or 3 View    Left hip pain  As above    Relevant Orders    XR Hip With or Without Pelvis 2 - 3 View Left       Musculoskeletal and Integument    Osteopenia       Genitourinary    Acute cystitis without hematuria  IM ceftriaxone injection administered  Will start on a course of ciprofloxacin while awaiting the results of her urine culture  Encouraged to report if any worse, any new symptoms, or if not resolving over the next week  Relevant Orders   POC Urinalysis Dipstick, Automated (Completed)   Urine Culture - Urine, Urine, Clean Catch (Completed)   Urinalysis, Microscopic Only - Urine, Clean Catch (Completed)       Relevant Medications    ciprofloxacin (CIPRO) 500 MG tablet    cefTRIAXone (ROCEPHIN) injection 1 g (Completed)       Other    Depression with anxiety  Significant situational component.   Supportive therapy.   Continue current medication.    Dips tobacco    Encounter for immunization    Relevant Orders    Fluarix/Fluzone/Afluria Quad>6 Months (Completed)    Healthcare maintenance  Recommended a flu shot  Will updated pneumovax 23 at her return    Relevant Orders    Fluarix/Fluzone/Afluria Quad>6 Months (Completed)

## 2018-10-20 VITALS
HEART RATE: 90 BPM | WEIGHT: 136 LBS | HEIGHT: 63 IN | OXYGEN SATURATION: 96 % | TEMPERATURE: 99.6 F | DIASTOLIC BLOOD PRESSURE: 95 MMHG | SYSTOLIC BLOOD PRESSURE: 165 MMHG | RESPIRATION RATE: 12 BRPM | BODY MASS INDEX: 24.1 KG/M2

## 2018-10-20 PROBLEM — H65.92 LEFT NON-SUPPURATIVE OTITIS MEDIA: Status: RESOLVED | Noted: 2018-07-13 | Resolved: 2018-10-20

## 2018-10-20 PROBLEM — G47.09 INITIAL INSOMNIA: Status: RESOLVED | Noted: 2018-07-13 | Resolved: 2018-10-20

## 2018-10-20 PROBLEM — S52.531A CLOSED COLLES' FRACTURE OF RIGHT RADIUS: Status: RESOLVED | Noted: 2018-01-04 | Resolved: 2018-10-20

## 2018-10-21 LAB — BACTERIA SPEC AEROBE CULT: ABNORMAL

## 2018-10-22 ENCOUNTER — HOSPITAL ENCOUNTER (OUTPATIENT)
Dept: GENERAL RADIOLOGY | Facility: HOSPITAL | Age: 63
Discharge: HOME | End: 2018-10-22
Admitting: GENERAL PRACTICE

## 2018-10-22 PROCEDURE — 72100 X-RAY EXAM L-S SPINE 2/3 VWS: CPT

## 2018-10-22 PROCEDURE — 73502 X-RAY EXAM HIP UNI 2-3 VIEWS: CPT | Performed by: RADIOLOGY

## 2018-10-22 PROCEDURE — 73502 X-RAY EXAM HIP UNI 2-3 VIEWS: CPT

## 2018-10-22 PROCEDURE — 72100 X-RAY EXAM L-S SPINE 2/3 VWS: CPT | Performed by: RADIOLOGY

## 2018-10-23 NOTE — PROGRESS NOTES
She was okay with the repatha... Go ahead an send that to the pharmacy. I told her to come by here and I would give it to her. She did not want PT at this time.

## 2018-10-24 ENCOUNTER — TELEPHONE (OUTPATIENT)
Dept: FAMILY MEDICINE CLINIC | Facility: CLINIC | Age: 63
End: 2018-10-24

## 2018-10-24 NOTE — TELEPHONE ENCOUNTER
----- Message from Sacha Montez MD sent at 10/24/2018  8:41 AM EDT -----  Perfect - leonidas emailed script to her pharm. Thanks!    ----- Message -----  From: Nadia Wagoner MA  Sent: 10/22/2018   8:11 AM  To: Sacha Montez MD    I got repatha approved!      ----- Message -----  From: Sacha Montez MD  Sent: 10/19/2018  10:25 AM  To: Nadia Wagoner MA    Need to try to pa praluent or repatha  Dx - CAD with LDL above goal on atorvastatin 80 qd and ezetimibe 10 qd

## 2018-11-05 DIAGNOSIS — I25.10 CORONARY ARTERY DISEASE INVOLVING NATIVE CORONARY ARTERY OF NATIVE HEART WITHOUT ANGINA PECTORIS: ICD-10-CM

## 2018-11-05 DIAGNOSIS — F41.8 DEPRESSION WITH ANXIETY: ICD-10-CM

## 2018-11-05 DIAGNOSIS — I10 ESSENTIAL HYPERTENSION: ICD-10-CM

## 2018-11-05 DIAGNOSIS — E11.9 TYPE 2 DIABETES MELLITUS WITHOUT COMPLICATION, WITHOUT LONG-TERM CURRENT USE OF INSULIN (HCC): ICD-10-CM

## 2018-11-05 DIAGNOSIS — E78.2 MIXED HYPERLIPIDEMIA: ICD-10-CM

## 2018-11-05 DIAGNOSIS — E55.9 VITAMIN D DEFICIENCY DISEASE: ICD-10-CM

## 2018-11-05 RX ORDER — SITAGLIPTIN AND METFORMIN HYDROCHLORIDE 50; 500 MG/1; MG/1
TABLET, FILM COATED, EXTENDED RELEASE ORAL
Qty: 60 TABLET | Refills: 5 | Status: SHIPPED | OUTPATIENT
Start: 2018-11-05 | End: 2019-08-30 | Stop reason: SDUPTHER

## 2018-11-05 RX ORDER — ERGOCALCIFEROL 1.25 MG/1
CAPSULE ORAL
Qty: 4 CAPSULE | Refills: 5 | Status: SHIPPED | OUTPATIENT
Start: 2018-11-05 | End: 2019-08-30 | Stop reason: SDUPTHER

## 2018-11-05 RX ORDER — EZETIMIBE 10 MG/1
10 TABLET ORAL NIGHTLY
Qty: 30 TABLET | Refills: 5 | Status: SHIPPED | OUTPATIENT
Start: 2018-11-05 | End: 2019-03-21

## 2018-11-05 RX ORDER — ATORVASTATIN CALCIUM 80 MG/1
80 TABLET, FILM COATED ORAL EVERY EVENING
Qty: 30 TABLET | Refills: 5 | Status: SHIPPED | OUTPATIENT
Start: 2018-11-05 | End: 2019-08-30 | Stop reason: SDUPTHER

## 2018-11-05 RX ORDER — ISOSORBIDE MONONITRATE 30 MG/1
30 TABLET, EXTENDED RELEASE ORAL EVERY MORNING
Qty: 30 TABLET | Refills: 5 | Status: SHIPPED | OUTPATIENT
Start: 2018-11-05 | End: 2019-08-30 | Stop reason: SDUPTHER

## 2018-11-05 RX ORDER — EMPAGLIFLOZIN 25 MG/1
1 TABLET, FILM COATED ORAL EVERY MORNING
Qty: 30 TABLET | Refills: 5 | Status: SHIPPED | OUTPATIENT
Start: 2018-11-05 | End: 2019-08-21 | Stop reason: SDUPTHER

## 2018-11-05 RX ORDER — NITROGLYCERIN 0.4 MG/1
TABLET SUBLINGUAL
Qty: 25 TABLET | Refills: 5 | Status: SHIPPED | OUTPATIENT
Start: 2018-11-05 | End: 2020-02-11 | Stop reason: SDUPTHER

## 2018-11-05 RX ORDER — LISINOPRIL 10 MG/1
10 TABLET ORAL EVERY MORNING
Qty: 30 TABLET | Refills: 5 | OUTPATIENT
Start: 2018-11-05

## 2018-11-05 RX ORDER — METOPROLOL SUCCINATE 50 MG/1
50 TABLET, EXTENDED RELEASE ORAL DAILY
Qty: 30 TABLET | Refills: 5 | Status: SHIPPED | OUTPATIENT
Start: 2018-11-05 | End: 2019-03-21

## 2018-11-05 RX ORDER — LISINOPRIL AND HYDROCHLOROTHIAZIDE 25; 20 MG/1; MG/1
1 TABLET ORAL EVERY MORNING
Qty: 30 TABLET | Refills: 5 | Status: SHIPPED | OUTPATIENT
Start: 2018-11-05 | End: 2019-09-19

## 2018-11-05 RX ORDER — CITALOPRAM 20 MG/1
TABLET ORAL
Qty: 30 TABLET | Refills: 5 | Status: SHIPPED | OUTPATIENT
Start: 2018-11-05 | End: 2019-08-30 | Stop reason: SDUPTHER

## 2019-01-07 ENCOUNTER — PRIOR AUTHORIZATION (OUTPATIENT)
Dept: FAMILY MEDICINE CLINIC | Facility: CLINIC | Age: 64
End: 2019-01-07

## 2019-01-21 ENCOUNTER — OFFICE VISIT (OUTPATIENT)
Dept: FAMILY MEDICINE CLINIC | Facility: CLINIC | Age: 64
End: 2019-01-21

## 2019-01-21 VITALS
SYSTOLIC BLOOD PRESSURE: 160 MMHG | TEMPERATURE: 97.7 F | HEART RATE: 63 BPM | RESPIRATION RATE: 12 BRPM | WEIGHT: 143 LBS | HEIGHT: 63 IN | DIASTOLIC BLOOD PRESSURE: 85 MMHG | OXYGEN SATURATION: 99 % | BODY MASS INDEX: 25.34 KG/M2

## 2019-01-21 DIAGNOSIS — Z72.0 DIPS TOBACCO: ICD-10-CM

## 2019-01-21 DIAGNOSIS — Z23 ENCOUNTER FOR IMMUNIZATION: ICD-10-CM

## 2019-01-21 DIAGNOSIS — E55.9 VITAMIN D DEFICIENCY DISEASE: ICD-10-CM

## 2019-01-21 DIAGNOSIS — E11.9 TYPE 2 DIABETES MELLITUS WITHOUT COMPLICATION, WITHOUT LONG-TERM CURRENT USE OF INSULIN (HCC): ICD-10-CM

## 2019-01-21 DIAGNOSIS — N30.00 ACUTE CYSTITIS WITHOUT HEMATURIA: ICD-10-CM

## 2019-01-21 DIAGNOSIS — R30.0 DYSURIA: ICD-10-CM

## 2019-01-21 DIAGNOSIS — M85.80 OSTEOPENIA, UNSPECIFIED LOCATION: ICD-10-CM

## 2019-01-21 DIAGNOSIS — K21.9 GASTROESOPHAGEAL REFLUX DISEASE WITHOUT ESOPHAGITIS: ICD-10-CM

## 2019-01-21 DIAGNOSIS — M54.59 MECHANICAL LOW BACK PAIN: ICD-10-CM

## 2019-01-21 DIAGNOSIS — I25.10 CORONARY ARTERY DISEASE INVOLVING NATIVE CORONARY ARTERY OF NATIVE HEART WITHOUT ANGINA PECTORIS: ICD-10-CM

## 2019-01-21 DIAGNOSIS — J30.2 CHRONIC SEASONAL ALLERGIC RHINITIS: ICD-10-CM

## 2019-01-21 DIAGNOSIS — F41.8 DEPRESSION WITH ANXIETY: ICD-10-CM

## 2019-01-21 DIAGNOSIS — M25.552 LEFT HIP PAIN: ICD-10-CM

## 2019-01-21 DIAGNOSIS — J44.9 COPD MIXED TYPE (HCC): ICD-10-CM

## 2019-01-21 DIAGNOSIS — Z12.31 ENCOUNTER FOR SCREENING MAMMOGRAM FOR BREAST CANCER: ICD-10-CM

## 2019-01-21 DIAGNOSIS — I10 ESSENTIAL HYPERTENSION: ICD-10-CM

## 2019-01-21 DIAGNOSIS — E78.2 MIXED HYPERLIPIDEMIA: Primary | ICD-10-CM

## 2019-01-21 DIAGNOSIS — Z00.00 HEALTHCARE MAINTENANCE: ICD-10-CM

## 2019-01-21 LAB
25(OH)D3 SERPL-MCNC: 61 NG/ML
ALBUMIN SERPL-MCNC: 4.4 G/DL (ref 3.4–4.8)
ALBUMIN/GLOB SERPL: 1.4 G/DL (ref 1.5–2.5)
ALP SERPL-CCNC: 92 U/L (ref 35–104)
ALT SERPL W P-5'-P-CCNC: 25 U/L (ref 10–36)
ANION GAP SERPL CALCULATED.3IONS-SCNC: 3.9 MMOL/L (ref 3.6–11.2)
AST SERPL-CCNC: 26 U/L (ref 10–30)
BACTERIA UR QL AUTO: ABNORMAL /HPF
BASOPHILS # BLD AUTO: 0.05 10*3/MM3 (ref 0–0.3)
BASOPHILS NFR BLD AUTO: 0.6 % (ref 0–2)
BILIRUB BLD-MCNC: NEGATIVE MG/DL
BILIRUB SERPL-MCNC: 0.4 MG/DL (ref 0.2–1.8)
BUN BLD-MCNC: 16 MG/DL (ref 7–21)
BUN/CREAT SERPL: 13.2 (ref 7–25)
CALCIUM SPEC-SCNC: 10.1 MG/DL (ref 7.7–10)
CHLORIDE SERPL-SCNC: 107 MMOL/L (ref 99–112)
CHOLEST SERPL-MCNC: 86 MG/DL (ref 0–200)
CLARITY, POC: ABNORMAL
CO2 SERPL-SCNC: 29.1 MMOL/L (ref 24.3–31.9)
COLOR UR: YELLOW
CREAT BLD-MCNC: 1.21 MG/DL (ref 0.43–1.29)
DEPRECATED RDW RBC AUTO: 45.7 FL (ref 37–54)
EOSINOPHIL # BLD AUTO: 0.75 10*3/MM3 (ref 0–0.7)
EOSINOPHIL NFR BLD AUTO: 9.5 % (ref 0–7)
ERYTHROCYTE [DISTWIDTH] IN BLOOD BY AUTOMATED COUNT: 14 % (ref 11.5–14.5)
GFR SERPL CREATININE-BSD FRML MDRD: 45 ML/MIN/1.73
GLOBULIN UR ELPH-MCNC: 3.2 GM/DL
GLUCOSE BLD-MCNC: 115 MG/DL (ref 70–110)
GLUCOSE UR STRIP-MCNC: NEGATIVE MG/DL
HBA1C MFR BLD: 7.6 % (ref 4.5–5.7)
HCT VFR BLD AUTO: 46 % (ref 37–47)
HDLC SERPL-MCNC: 52 MG/DL (ref 60–100)
HGB BLD-MCNC: 14.5 G/DL (ref 12–16)
HYALINE CASTS UR QL AUTO: ABNORMAL /LPF
IMM GRANULOCYTES # BLD AUTO: 0.02 10*3/MM3 (ref 0–0.03)
IMM GRANULOCYTES NFR BLD AUTO: 0.3 % (ref 0–0.5)
KETONES UR QL: NEGATIVE
LDLC SERPL CALC-MCNC: 5 MG/DL (ref 0–100)
LDLC/HDLC SERPL: 0.09 {RATIO}
LEUKOCYTE EST, POC: ABNORMAL
LYMPHOCYTES # BLD AUTO: 1.89 10*3/MM3 (ref 1–3)
LYMPHOCYTES NFR BLD AUTO: 23.9 % (ref 16–46)
MCH RBC QN AUTO: 28.7 PG (ref 27–33)
MCHC RBC AUTO-ENTMCNC: 31.5 G/DL (ref 33–37)
MCV RBC AUTO: 91.1 FL (ref 80–94)
MONOCYTES # BLD AUTO: 0.74 10*3/MM3 (ref 0.1–0.9)
MONOCYTES NFR BLD AUTO: 9.4 % (ref 0–12)
NEUTROPHILS # BLD AUTO: 4.46 10*3/MM3 (ref 1.4–6.5)
NEUTROPHILS NFR BLD AUTO: 56.3 % (ref 40–75)
NITRITE UR-MCNC: POSITIVE MG/ML
OSMOLALITY SERPL CALC.SUM OF ELEC: 281.5 MOSM/KG (ref 273–305)
PH UR: 7.5 [PH] (ref 5–8)
PLATELET # BLD AUTO: 172 10*3/MM3 (ref 130–400)
PMV BLD AUTO: 10.8 FL (ref 6–10)
POTASSIUM BLD-SCNC: 4.9 MMOL/L (ref 3.5–5.3)
PROT SERPL-MCNC: 7.6 G/DL (ref 6–8)
PROT UR STRIP-MCNC: ABNORMAL MG/DL
RBC # BLD AUTO: 5.05 10*6/MM3 (ref 4.2–5.4)
RBC # UR STRIP: ABNORMAL /UL
RBC # UR: ABNORMAL /HPF
REF LAB TEST METHOD: ABNORMAL
SODIUM BLD-SCNC: 140 MMOL/L (ref 135–153)
SP GR UR: 1.01 (ref 1–1.03)
SQUAMOUS #/AREA URNS HPF: ABNORMAL /HPF
TRIGL SERPL-MCNC: 147 MG/DL (ref 0–150)
UROBILINOGEN UR QL: NORMAL
VLDLC SERPL-MCNC: 29.4 MG/DL
WBC NRBC COR # BLD: 7.91 10*3/MM3 (ref 4.5–12.5)
WBC UR QL AUTO: ABNORMAL /HPF

## 2019-01-21 PROCEDURE — 83036 HEMOGLOBIN GLYCOSYLATED A1C: CPT | Performed by: GENERAL PRACTICE

## 2019-01-21 PROCEDURE — 87186 SC STD MICRODIL/AGAR DIL: CPT | Performed by: GENERAL PRACTICE

## 2019-01-21 PROCEDURE — 80053 COMPREHEN METABOLIC PANEL: CPT | Performed by: GENERAL PRACTICE

## 2019-01-21 PROCEDURE — 87077 CULTURE AEROBIC IDENTIFY: CPT | Performed by: GENERAL PRACTICE

## 2019-01-21 PROCEDURE — 90732 PPSV23 VACC 2 YRS+ SUBQ/IM: CPT | Performed by: GENERAL PRACTICE

## 2019-01-21 PROCEDURE — 80061 LIPID PANEL: CPT | Performed by: GENERAL PRACTICE

## 2019-01-21 PROCEDURE — 90636 HEP A/HEP B VACC ADULT IM: CPT | Performed by: GENERAL PRACTICE

## 2019-01-21 PROCEDURE — 99214 OFFICE O/P EST MOD 30 MIN: CPT | Performed by: GENERAL PRACTICE

## 2019-01-21 PROCEDURE — 81015 MICROSCOPIC EXAM OF URINE: CPT | Performed by: GENERAL PRACTICE

## 2019-01-21 PROCEDURE — 90471 IMMUNIZATION ADMIN: CPT | Performed by: GENERAL PRACTICE

## 2019-01-21 PROCEDURE — 81003 URINALYSIS AUTO W/O SCOPE: CPT | Performed by: GENERAL PRACTICE

## 2019-01-21 PROCEDURE — 87086 URINE CULTURE/COLONY COUNT: CPT | Performed by: GENERAL PRACTICE

## 2019-01-21 PROCEDURE — 36415 COLL VENOUS BLD VENIPUNCTURE: CPT | Performed by: GENERAL PRACTICE

## 2019-01-21 PROCEDURE — G0009 ADMIN PNEUMOCOCCAL VACCINE: HCPCS | Performed by: GENERAL PRACTICE

## 2019-01-21 PROCEDURE — 85025 COMPLETE CBC W/AUTO DIFF WBC: CPT | Performed by: GENERAL PRACTICE

## 2019-01-21 PROCEDURE — 82306 VITAMIN D 25 HYDROXY: CPT | Performed by: GENERAL PRACTICE

## 2019-01-21 RX ORDER — SULFAMETHOXAZOLE AND TRIMETHOPRIM 800; 160 MG/1; MG/1
1 TABLET ORAL 2 TIMES DAILY
Qty: 20 TABLET | Refills: 0 | Status: SHIPPED | OUTPATIENT
Start: 2019-01-21 | End: 2019-01-31

## 2019-01-21 NOTE — PROGRESS NOTES
Subjective   Ethel Trotter is a 63 y.o. female.     History of Present Illness     Urinary Tract Symptoms  Patient complains of recurrent frequency, nocturia, urgency, dysuria and suprapubic pressure. She has had these symptoms again for about a week. Patient denies nausea, vomiting, diarrhea, vaginal pruritus, vaginal discharge, vaginal bleeding,fever or chills.  Urine culture performed on 10/19/18 grew Escherichia coli sensitive to all antibiotics tested.  Her symptoms resolved promptly with the prescribed ciprofloxacin only to recur recently.    Low Back Pain   She continues to have intermittent low back pain. There's no history of any strain or trauma. The pain is described as a sharp left lower ache. This radiates to the left lateral and anterior hip. There have been no associated symptoms and she denies any stiffness, swelling, weakness, numbness or tingling. The pain is worse with weight bearing and improves with rest.  Plain films of the lumbar spine performed on 10/22/18 were reported as showing degenerative disc disease at L4-5.  Those done the same day of the left hip were reported as showing changes consistent with mild osteoarthritis    Depression  Onset was several years ago. Symptoms have remnained unchanged since last here. Current symptoms include: difficulty falling asleep and fatigue. Patient denies depressed mood, anhedonia, insomnia, recurrent thoughts of death and suicidal thoughts. Risk factors: negative life event mothers death and previous episode of depression. Treatment has included medication citalopram. She complains of the following side effects from the treatment: none.    Coronary artery disease  She has a history of CABG and previous M.I.. Previous diagnostic testing includes: cardiac catheterization Recent history: taking medications as instructed, no medication side effects noted, no TIA's, no chest pain on exertion, no dyspnea on exertion and no swelling of ankles. Patient's  symptoms have been unchanged. Medication side effects include: none. She continues to be followed by cardiology     Diabetes  Current symptoms include none. Patient denies paresthesia of the feet, visual disturbances, polydipsia, polyuria, hypoglycemia and foot ulcerations. Evaluation to date has been: hemoglobin A1C. Home sugars: patient does not check sugars. Current treatments: metformin, DPP inhibitor - Januvia and SGLT2 inhibitor - Jardiance. Last dilated eye exam more than one year ago.      Dyslipidemia  Compliance with treatment has been excellent. The patient exercise daily. She is currently being prescribed the following medication for her dyslipidemia - atorvastatin, ezetimibe, and repatha.  She denies any side effects with the latter.. Patient denies side effects associated with her medications.     Hypertension  Home blood pressure readings: not doing. Associated signs and symptoms: none. Patient denies: chest pain, palpitations, dyspnea, orthopnea, paroxysmal nocturnal dyspnea and peripheral edema. Current antihypertensive medications includes lisinopril, metoprolol, amlodipine and HCTZ. Medication compliance: taking as prescribed.     The following portions of the patient's history were reviewed and updated as appropriate: allergies, current medications, past medical history, past social history and problem list.    Review of Systems   Constitutional: Positive for fatigue. Negative for appetite change, chills, fever and unexpected weight change.   HENT: Negative for congestion, ear pain, rhinorrhea, sneezing, sore throat and voice change.    Eyes: Negative for visual disturbance.   Respiratory: Negative for cough, shortness of breath and wheezing.    Cardiovascular: Negative for chest pain, palpitations and leg swelling.   Gastrointestinal: Negative for abdominal pain, blood in stool, constipation, diarrhea, nausea and vomiting.   Endocrine: Negative for polydipsia.   Genitourinary: Positive for  dysuria, frequency and urgency. Negative for difficulty urinating, hematuria, menstrual problem, pelvic pain, vaginal bleeding and vaginal discharge.   Musculoskeletal: Positive for arthralgias and back pain. Negative for joint swelling, myalgias and neck pain.   Skin: Negative for color change.   Neurological: Negative for tremors, weakness, numbness and headaches.   Psychiatric/Behavioral: Negative for dysphoric mood, sleep disturbance and suicidal ideas. The patient is not nervous/anxious.      Objective   Physical Exam   Constitutional: She is oriented to person, place, and time. No distress.   Bright and in fair spirits. No apparent distress. No pallor, jaundice, diaphoresis, or cyanosis.     HENT:   Head: Atraumatic.   Right Ear: Tympanic membrane, external ear and ear canal normal.   Left Ear: Tympanic membrane, external ear and ear canal normal.   Nose: Nose normal.   Mouth/Throat: Oropharynx is clear and moist. Mucous membranes are not pale and not cyanotic.   Eyes: EOM are normal. Pupils are equal, round, and reactive to light. No scleral icterus.   Neck: No JVD present. Carotid bruit is not present. No tracheal deviation present. No thyromegaly present.   Cardiovascular: Normal rate, regular rhythm, S1 normal, S2 normal and intact distal pulses. Exam reveals no gallop, no S3 and no S4.   No murmur heard.  Pulmonary/Chest: Breath sounds normal. No stridor. No respiratory distress.   Abdominal: Soft. Normal aorta and bowel sounds are normal. She exhibits no distension, no abdominal bruit and no mass. There is no hepatosplenomegaly. There is no tenderness. No hernia.   Musculoskeletal: She exhibits no deformity.        Left hip: She exhibits decreased range of motion (internal rotation slightly limited with pain on both this and full external rotation). She exhibits no tenderness and no deformity.        Lumbar back: She exhibits tenderness (left lumbar paraspinal muscle tenderness). She exhibits normal  range of motion, no bony tenderness and no deformity.   Negative straight leg raise.     Vascular Status -  Her right foot exhibits no edema. Her left foot exhibits no edema.  Lymphadenopathy:        Head (right side): No submandibular adenopathy present.        Head (left side): No submandibular adenopathy present.     She has no cervical adenopathy.   Neurological: She is alert and oriented to person, place, and time. She has normal reflexes. She displays normal reflexes. No cranial nerve deficit. She exhibits normal muscle tone. Coordination normal.   Reflex Scores:       Patellar reflexes are 2+ on the right side and 2+ on the left side.       Achilles reflexes are 2+ on the right side and 2+ on the left side.  Skin: Skin is warm and dry. No rash noted. She is not diaphoretic. No cyanosis. No pallor. Nails show no clubbing.   Psychiatric: She has a normal mood and affect.     Assessment/Plan   Problems Addressed this Visit        Cardiovascular and Mediastinum    Essential hypertension   Hypertension: elevated today. Evidence of target organ damage: coronary artery disease.  Encouraged to continue to work on diet and exercise plan.   Continue current medication  Updated labs drawn.    Relevant Orders    CBC & Differential (Completed)    Comprehensive Metabolic Panel (Completed)    CBC Auto Differential (Completed)    Osmolality, Calculated (Completed)    Mixed hyperlipidemia   As above.   Continue current medication.    Relevant Orders    Lipid Panel (Completed)    Coronary artery disease involving native coronary artery  Reminded regarding risk factor modification with an emphasis on tobacco cessation.  Continue current medication  Follow up with cardiology        Respiratory    COPD mixed type (CMS/HCC)    Chronic seasonal allergic rhinitis       Digestive    Vitamin D deficiency disease  Continue  supplementation with monitoring.    Relevant Orders    Vitamin D 25 Hydroxy (Completed)    Gastroesophageal  reflux disease without esophagitis       Endocrine    Type 2 diabetes mellitus without complication, without long-term current use of insulin (CMS/[facility])  Diabetes mellitus Type II, under unknown control.   Encouraged to continue to pursue ADA diet  Encouraged aerobic exercise.  Reminded to get yearly retinal exam.    Relevant Orders    Hemoglobin A1c       Nervous and Auditory    Mechanical low back pain  Associated with mild degenerative disc disease  Reminded regarding symptomatic treatment.   Continue current medication    Left hip pain    With mild osteoarthritis on plain films  As above.     Musculoskeletal and Integument   Osteopenia      Genitourinary   Acute cystitis without hematuria  We'll start on trimethoprim/sulfa empirically while awaiting the results of today's urine culture  Encouraged to report if any worse, any new symptoms, or if not resolving over the next week    Relevant Medications   sulfamethoxazole-trimethoprim (BACTRIM DS,SEPTRA DS) 800-160 MG per tablet      Other   Depression with anxiety  Stable.  Supportive therapy.   Continue current medication.   Dips tobacco   Encounter for immunization   Relevant Orders   Pneumococcal Polysaccharide Vaccine 23-Valent Greater Than or Equal To 1yo Subcutaneous / IM (Completed)   Hepatitis A Hepatitis B Combined Vaccine IM (Completed)   Healthcare maintenance  Recommended a pneumovax 23 along with a hep A/B  Will arrange an updated mammogram   Reminded that she is due for a screening colonoscopy.  Patient would like to defer to her return  Hep B #2 will be administered at her return in 2 months    Relevant Orders   Pneumococcal Polysaccharide Vaccine 23-Valent Greater Than or Equal To 1yo Subcutaneous / IM (Completed)   Hepatitis A Hepatitis B Combined Vaccine IM (Completed)   Mammo Screening Digital Tomosynthesis Bilateral With CAD   Encounter for screening mammogram for breast cancer   Relevant Orders   Mammo Screening Digital Tomosynthesis Bilateral  With CAD

## 2019-01-22 ENCOUNTER — TELEPHONE (OUTPATIENT)
Dept: FAMILY MEDICINE CLINIC | Facility: CLINIC | Age: 64
End: 2019-01-22

## 2019-01-22 NOTE — PROGRESS NOTES
Spoke with pt about the following per Dr. Montez. VH    -- Please let patient know that the repatha is working really well   Her LDL is 5

## 2019-01-22 NOTE — TELEPHONE ENCOUNTER
Spoke with pt about the following per Dr. Montez.       ----- Message from Sacha Montez MD sent at 1/21/2019  4:36 PM EST -----  Please let patient know that leonidas emailed a script for bactrim for her UTI to her pharm

## 2019-01-24 LAB — BACTERIA SPEC AEROBE CULT: ABNORMAL

## 2019-03-21 ENCOUNTER — OFFICE VISIT (OUTPATIENT)
Dept: FAMILY MEDICINE CLINIC | Facility: CLINIC | Age: 64
End: 2019-03-21

## 2019-03-21 VITALS
RESPIRATION RATE: 12 BRPM | DIASTOLIC BLOOD PRESSURE: 100 MMHG | OXYGEN SATURATION: 98 % | SYSTOLIC BLOOD PRESSURE: 170 MMHG | HEIGHT: 63 IN | WEIGHT: 144 LBS | BODY MASS INDEX: 25.52 KG/M2 | HEART RATE: 64 BPM | TEMPERATURE: 98.5 F

## 2019-03-21 DIAGNOSIS — E78.2 MIXED HYPERLIPIDEMIA: ICD-10-CM

## 2019-03-21 DIAGNOSIS — G45.3 AMAUROSIS FUGAX: ICD-10-CM

## 2019-03-21 DIAGNOSIS — E55.9 VITAMIN D DEFICIENCY DISEASE: ICD-10-CM

## 2019-03-21 DIAGNOSIS — M85.80 OSTEOPENIA, UNSPECIFIED LOCATION: ICD-10-CM

## 2019-03-21 DIAGNOSIS — G45.9 TIA (TRANSIENT ISCHEMIC ATTACK): ICD-10-CM

## 2019-03-21 DIAGNOSIS — K21.9 GASTROESOPHAGEAL REFLUX DISEASE WITHOUT ESOPHAGITIS: ICD-10-CM

## 2019-03-21 DIAGNOSIS — K59.09 CHRONIC CONSTIPATION: ICD-10-CM

## 2019-03-21 DIAGNOSIS — I10 ESSENTIAL HYPERTENSION: ICD-10-CM

## 2019-03-21 DIAGNOSIS — J44.9 COPD MIXED TYPE (HCC): ICD-10-CM

## 2019-03-21 DIAGNOSIS — R60.1 GENERALIZED EDEMA: ICD-10-CM

## 2019-03-21 DIAGNOSIS — H66.005 RECURRENT ACUTE SUPPURATIVE OTITIS MEDIA WITHOUT SPONTANEOUS RUPTURE OF LEFT TYMPANIC MEMBRANE: ICD-10-CM

## 2019-03-21 DIAGNOSIS — F41.8 DEPRESSION WITH ANXIETY: ICD-10-CM

## 2019-03-21 DIAGNOSIS — R76.8 HEPATITIS C ANTIBODY TEST POSITIVE: ICD-10-CM

## 2019-03-21 DIAGNOSIS — I25.10 CORONARY ARTERY DISEASE INVOLVING NATIVE CORONARY ARTERY OF NATIVE HEART WITHOUT ANGINA PECTORIS: ICD-10-CM

## 2019-03-21 DIAGNOSIS — E11.9 TYPE 2 DIABETES MELLITUS WITHOUT COMPLICATION, WITHOUT LONG-TERM CURRENT USE OF INSULIN (HCC): ICD-10-CM

## 2019-03-21 DIAGNOSIS — Z72.0 DIPS TOBACCO: ICD-10-CM

## 2019-03-21 DIAGNOSIS — Z00.00 HEALTHCARE MAINTENANCE: ICD-10-CM

## 2019-03-21 DIAGNOSIS — J30.2 CHRONIC SEASONAL ALLERGIC RHINITIS: ICD-10-CM

## 2019-03-21 DIAGNOSIS — I87.2 VENOUS INSUFFICIENCY, PERIPHERAL: Primary | ICD-10-CM

## 2019-03-21 DIAGNOSIS — Z23 ENCOUNTER FOR IMMUNIZATION: ICD-10-CM

## 2019-03-21 PROBLEM — N30.00 ACUTE CYSTITIS WITHOUT HEMATURIA: Status: RESOLVED | Noted: 2018-10-19 | Resolved: 2019-03-21

## 2019-03-21 PROCEDURE — 99215 OFFICE O/P EST HI 40 MIN: CPT | Performed by: GENERAL PRACTICE

## 2019-03-21 PROCEDURE — G0010 ADMIN HEPATITIS B VACCINE: HCPCS | Performed by: GENERAL PRACTICE

## 2019-03-21 PROCEDURE — 90746 HEPB VACCINE 3 DOSE ADULT IM: CPT | Performed by: GENERAL PRACTICE

## 2019-03-21 RX ORDER — CIPROFLOXACIN 250 MG/1
250 TABLET, FILM COATED ORAL 2 TIMES DAILY
Qty: 20 TABLET | Refills: 0 | Status: SHIPPED | OUTPATIENT
Start: 2019-03-21 | End: 2019-03-31

## 2019-03-21 RX ORDER — METOPROLOL SUCCINATE 100 MG/1
100 TABLET, EXTENDED RELEASE ORAL DAILY
Qty: 30 TABLET | Refills: 5 | Status: SHIPPED | OUTPATIENT
Start: 2019-03-21 | End: 2019-09-19 | Stop reason: SDUPTHER

## 2019-03-21 NOTE — PROGRESS NOTES
Subjective   Ethel Trotter is a 63 y.o. female.     History of Present Illness     Episode of Visual Loss  Several weeks ago she experienced a 4-5 second episode of complete vision loss in her right eye.  This occurred during a period of anxiety and was associated with a generalized headache.  She denies any further episodes and has had no weakness, numbness, tingling, or difficulty talking or understanding what is said to her.  She denies any new muscle aches and has had no rash, fever, or chills.  She remains on ASA 81 daily    Coronary artery disease  She has a history of CABG and previous M.I.. Previous diagnostic testing includes: cardiac catheterization Recent history: taking medications as instructed, no medication side effects noted, no chest pain on exertion, no dyspnea on exertion and no swelling of ankles. Patient's symptoms have been unchanged. Medication side effects include: none. She continues to be followed by cardiology     Diabetes  Current symptoms include none. Patient denies paresthesia of the feet, visual disturbances, polydipsia, polyuria, hypoglycemia and foot ulcerations. Evaluation to date has been: hemoglobin A1C. Home sugars: patient does not check sugars. Current treatments: metformin, DPP inhibitor - Januvia and SGLT2 inhibitor - Jardiance. Last dilated eye exam more than one year ago.   Lab Results   Component Value Date    HGBA1C 7.60 (H) 01/21/2019      Dyslipidemia  Compliance with treatment has been excellent. The patient exercise daily. She is currently being prescribed the following medication for her dyslipidemia - atorvastatin, ezetimibe, and repatha.  She denies any side effects. Patient denies side effects associated with her medications.   Lab Results   Component Value Date    CHOL 86 01/21/2019    CHLPL 247 (H) 03/10/2016    TRIG 147 01/21/2019    HDL 52 (L) 01/21/2019    LDL 5 01/21/2019     Hypertension  Home blood pressure readings: not doing. Associated signs and  symptoms: none. Patient denies: chest pain, palpitations, dyspnea, orthopnea, paroxysmal nocturnal dyspnea and peripheral edema. Current antihypertensive medications includes lisinopril, metoprolol, amlodipine and HCTZ. Medication compliance: taking as prescribed.   Lab Results   Component Value Date    CREATININE 1.21 01/21/2019     Constipation  She gives a more than 20-year history of constipation.  This has been worse over the last year and has been associated with intermittent abdominal cramping and occasional bright red blood when wiping if she strains.  There is no history of any nausea or vomiting and she denies any diarrhea or melena.  There is no history of any night sweats or weight loss.  She has tried a number of over-the-counter stool softeners and laxatives with limited effect.  She has never undergone a colonoscopy    Low Back Pain   She continues to have intermittent low back pain. There's no history of any strain or trauma. The pain is described as a sharp left lower ache. This radiates to the left lateral and anterior hip. There have been no associated symptoms and she denies any stiffness, swelling, weakness, numbness or tingling. The pain is worse with weight bearing and improves with rest.  Plain films of the lumbar spine performed on 10/22/18 were reported as showing degenerative disc disease at L4-5.  Those done the same day of the left hip were reported as showing changes consistent with mild osteoarthritis    Depression  Onset was several years ago. Symptoms have remnained unchanged since last here. Current symptoms include: difficulty falling asleep and fatigue. Patient denies depressed mood, anhedonia, insomnia, recurrent thoughts of death and suicidal thoughts. Risk factors: negative life event mothers death and previous episode of depression. Treatment has included medication citalopram. She complains of the following side effects from the treatment: none.    Labs  Most recent vitamin D  61    The following portions of the patient's history were reviewed and updated as appropriate: allergies, current medications, past family history, past medical history, past social history, past surgical history and problem list.    Review of Systems   Constitutional: Positive for fatigue. Negative for appetite change, chills, fever and unexpected weight change.   HENT: Positive for ear pain (recurrent sharp left ear pain over the last week). Negative for congestion, rhinorrhea, sneezing, sore throat and voice change.    Eyes: Positive for visual disturbance.   Respiratory: Negative for cough, shortness of breath and wheezing.    Cardiovascular: Negative for chest pain, palpitations and leg swelling.   Gastrointestinal: Positive for abdominal pain, anal bleeding and constipation. Negative for blood in stool, diarrhea, nausea and vomiting.   Endocrine: Negative for polydipsia.   Genitourinary: Negative for difficulty urinating, dysuria, frequency, hematuria, menstrual problem, pelvic pain, urgency, vaginal bleeding and vaginal discharge.   Musculoskeletal: Negative for arthralgias, back pain, joint swelling, myalgias and neck pain.   Skin: Negative for color change.   Neurological: Negative for tremors, weakness, numbness and headaches.   Psychiatric/Behavioral: Positive for sleep disturbance. Negative for dysphoric mood and suicidal ideas. The patient is not nervous/anxious.      Objective   Physical Exam   Constitutional: She is oriented to person, place, and time. No distress.   Bright and in fair spirits. Right short arm cast. No apparent distress. No pallor, jaundice, diaphoresis, or cyanosis.     HENT:   Head: Atraumatic.   Right Ear: Tympanic membrane, external ear and ear canal normal.   Left Ear: External ear normal. Tympanic membrane is scarred and erythematous. A middle ear effusion is present.   Nose: Nose normal.   Mouth/Throat: Oropharynx is clear and moist. Mucous membranes are not pale and not  cyanotic.   Eyes: EOM are normal. Pupils are equal, round, and reactive to light. No scleral icterus.   Neck: No JVD present. Carotid bruit is not present. No tracheal deviation present. No thyromegaly present.   Cardiovascular: Normal rate, regular rhythm, S1 normal, S2 normal and intact distal pulses. Exam reveals no gallop, no S3 and no S4.   No murmur heard.  Pulmonary/Chest: Breath sounds normal. No stridor. No respiratory distress.   Abdominal: Soft. Normal aorta and bowel sounds are normal. She exhibits no distension, no abdominal bruit and no mass. There is no hepatosplenomegaly. There is no tenderness. No hernia.   Musculoskeletal: She exhibits no tenderness or deformity.    Ethel had a diabetic foot exam performed today.  Vascular Status -  Her right foot exhibits no edema. Her left foot exhibits no edema.  Lymphadenopathy:        Head (right side): No submandibular adenopathy present.        Head (left side): No submandibular adenopathy present.     She has no cervical adenopathy.   Neurological: She is alert and oriented to person, place, and time. She displays normal reflexes. No cranial nerve deficit. She exhibits normal muscle tone. Coordination normal.   Reflex Scores:       Bicep reflexes are 2+ on the right side and 2+ on the left side.       Patellar reflexes are 2+ on the right side and 2+ on the left side.       Achilles reflexes are 1+ on the right side and 1+ on the left side.  Skin: Skin is warm and dry. No rash noted. She is not diaphoretic. No cyanosis. No pallor. Nails show no clubbing.   Psychiatric: She has a normal mood and affect.     Assessment/Plan   Problems Addressed this Visit        Cardiovascular and Mediastinum    Essential hypertension   Hypertension: elevated today. Evidence of target organ damage: coronary artery disease and possible TIA.  Encouraged to continue to work on diet and exercise plan.   Metoprolol ER will be titrated to 100 daily    Relevant Medications     metoprolol succinate XL (TOPROL-XL) 100 MG 24 hr tablet    Mixed hyperlipidemia  As above.   Continue current medication.    Coronary artery disease involving native coronary artery  Reminded regarding symptomatic treatment.   Will continue current treatment.  Continue current medication  Follow up with cardiology     Relevant Medications    metoprolol succinate XL (TOPROL-XL) 100 MG 24 hr tablet    Venous insufficiency, peripheral     TIA (transient ischemic attack)  Possible episode of amaurosis fugax right eye  As above.   Continue current medication.  Duplex Doppler ultrasound of the carotid arteries will be arranged  Encouraged to seek immediate assessment if any further visual loss or if any other neurologic deficit    Relevant Orders    Duplex Carotid Ultrasound CAR    US venous doppler lower extremity bilateral (duplex)       Respiratory    COPD mixed type (CMS/HCC)    Chronic seasonal allergic rhinitis       Digestive    Vitamin D deficiency disease    Gastroesophageal reflux disease without esophagitis    Chronic constipation  Reminded regarding dietary modification  Trial of Linzess 145 daily  Patient agrees today to referral for a colonoscopy  Encouraged to report if any worse or if any new symptoms or concerns.    Relevant Medications    linaclotide (LINZESS) 145 MCG capsule capsule    Other Relevant Orders    Ambulatory Referral to General Surgery       Endocrine    Type 2 diabetes mellitus without complication, without long-term current use of insulin (CMS/HCC)  Diabetes mellitus Type II, under fair control.   Encouraged to continue to pursue ADA diet  Encouraged aerobic exercise.  Continue current medication  Updated labs will be drawn at her return.       Nervous and Auditory    Acute suppurative otitis media of left ear without spontaneous rupture of tympanic membrane  Empiric antibiotics  We will discuss an ENT assessment at her return    Relevant Medications    ciprofloxacin (CIPRO) 250 MG tablet        Musculoskeletal and Integument    Osteopenia       Other    Depression with anxiety  Significant situational component.   Supportive therapy.   Continue current medication.    Hepatitis C antibody test positive    Dips tobacco    Encounter for immunization    Relevant Orders    Hepatitis B Vaccine Adult IM (Completed)    Healthcare maintenance  Hepatitis B #2 administered    Relevant Orders    Hepatitis B Vaccine Adult IM (Completed)    Ambulatory Referral to General Surgery

## 2019-04-10 ENCOUNTER — HOSPITAL ENCOUNTER (OUTPATIENT)
Facility: HOSPITAL | Age: 64
Setting detail: HOSPITAL OUTPATIENT SURGERY
End: 2019-04-10
Attending: SURGERY | Admitting: SURGERY

## 2019-04-10 ENCOUNTER — OFFICE VISIT (OUTPATIENT)
Dept: SURGERY | Facility: CLINIC | Age: 64
End: 2019-04-10

## 2019-04-10 VITALS
BODY MASS INDEX: 25.52 KG/M2 | WEIGHT: 144 LBS | DIASTOLIC BLOOD PRESSURE: 74 MMHG | SYSTOLIC BLOOD PRESSURE: 137 MMHG | HEART RATE: 85 BPM | HEIGHT: 63 IN

## 2019-04-10 DIAGNOSIS — K59.09 CHRONIC CONSTIPATION: ICD-10-CM

## 2019-04-10 DIAGNOSIS — K21.9 GASTROESOPHAGEAL REFLUX DISEASE WITHOUT ESOPHAGITIS: Primary | ICD-10-CM

## 2019-04-10 PROCEDURE — 99203 OFFICE O/P NEW LOW 30 MIN: CPT | Performed by: SURGERY

## 2019-04-10 RX ORDER — AMOXICILLIN 250 MG
2 CAPSULE ORAL DAILY
Qty: 120 TABLET | Refills: 0 | Status: SHIPPED | OUTPATIENT
Start: 2019-04-10 | End: 2019-06-09

## 2019-04-10 NOTE — PROGRESS NOTES
Subjective   Ethel Trotter is a 63 y.o. female is being seen for consultation today at the request of Sacha Montez MD    Ethel Trotter is a 63 y.o. female With abdominal pain.  Pain in the LLQ and is crampy in nature.  She has constipation with obstipation.  She has recently started Linzess.  She has diabetes and CAD with history of CABG.  She uses smokeless tobacco.  No previous colonoscopy.  No blood per rectum.  Pain improves after bowel movements.  She is Hep C positive.  Previous episode of pancreatitis of unknown etiology.  No family history of colon cancer.        Past Medical History:   Diagnosis Date   • CAD (coronary artery disease)    • Diabetes mellitus (CMS/HCC)    • Fracture of wrist    • GERD (gastroesophageal reflux disease)    • Hepatitis C    • History of EKG 03/25/2016    ABNORMAL   • Hyperlipidemia    • Hypertension    • Myocardial infarction (CMS/HCC)    • Osteopenia    • Pancreatitis        Family History   Problem Relation Age of Onset   • Breast cancer Neg Hx        Social History     Socioeconomic History   • Marital status:      Spouse name: Not on file   • Number of children: Not on file   • Years of education: Not on file   • Highest education level: Not on file   Tobacco Use   • Smoking status: Never Smoker   • Smokeless tobacco: Current User     Types: Chew   Substance and Sexual Activity   • Alcohol use: No   • Drug use: Yes     Types: Marijuana     Comment: OCCASIONAL   • Sexual activity: Defer       Past Surgical History:   Procedure Laterality Date   • CORONARY ARTERY BYPASS GRAFT     • HYSTERECTOMY      1998       Review of Systems   Constitutional: Negative for activity change, appetite change, chills and fever.   HENT: Negative for sore throat and trouble swallowing.    Eyes: Negative for visual disturbance.   Respiratory: Negative for cough and shortness of breath.    Cardiovascular: Negative for chest pain and palpitations.   Gastrointestinal: Positive  "for abdominal pain, constipation and diarrhea. Negative for abdominal distention, blood in stool, nausea and vomiting.   Endocrine: Negative for cold intolerance and heat intolerance.   Genitourinary: Negative for dysuria.   Musculoskeletal: Negative for joint swelling.   Skin: Negative for color change, rash and wound.   Allergic/Immunologic: Negative for immunocompromised state.   Neurological: Negative for dizziness, seizures, weakness and headaches.   Hematological: Negative for adenopathy. Does not bruise/bleed easily.   Psychiatric/Behavioral: Negative for agitation and confusion.         /74   Pulse 85   Ht 160 cm (63\")   Wt 65.3 kg (144 lb)   LMP  (LMP Unknown)   BMI 25.51 kg/m²   Objective   Physical Exam   Constitutional: She is oriented to person, place, and time. She appears well-developed.   HENT:   Head: Normocephalic and atraumatic.   Mouth/Throat: Mucous membranes are normal.   Eyes: Conjunctivae are normal. Pupils are equal, round, and reactive to light.   Neck: Neck supple. No JVD present. No tracheal deviation present. No thyromegaly present.   Cardiovascular: Normal rate and regular rhythm. Exam reveals no gallop and no friction rub.   No murmur heard.  Pulmonary/Chest: Effort normal and breath sounds normal.   Abdominal: Soft. She exhibits no distension. There is no splenomegaly or hepatomegaly. There is no tenderness. No hernia.   Musculoskeletal: Normal range of motion. She exhibits no deformity.   Neurological: She is alert and oriented to person, place, and time.   Skin: Skin is warm and dry.   Psychiatric: She has a normal mood and affect.             Assessment   Ethel was seen today for chronic constipation and colonoscopy consult.    Diagnoses and all orders for this visit:    Gastroesophageal reflux disease without esophagitis    Chronic constipation      Ethel Trotter is a 63 y.o. female with history of CABG presenting with abdominal pain and constipation.  Patient " without previous colonoscopy.  We will obtain cardiac clearance prior to procedure.  She understands the risks and benefits of surgery and will undergo colonoscopy.  She will also add stool softeners and fiber to her bowel regimen.    Patient's Body mass index is 25.51 kg/m². BMI is within normal parameters. No follow-up required..    I advised Ethel of the risks of continuing to use tobacco, and I provided her with tobacco cessation educational materials in the After Visit Summary.     During this visit, I spent 5 minutes counseling the patient regarding tobacco cessation.

## 2019-04-26 RX ORDER — EVOLOCUMAB 140 MG/ML
INJECTION, SOLUTION SUBCUTANEOUS
Qty: 2 ML | Refills: 5 | Status: SHIPPED | OUTPATIENT
Start: 2019-04-26 | End: 2019-12-09 | Stop reason: SDUPTHER

## 2019-05-07 ENCOUNTER — TELEPHONE (OUTPATIENT)
Dept: SURGERY | Facility: CLINIC | Age: 64
End: 2019-05-07

## 2019-05-07 NOTE — TELEPHONE ENCOUNTER
Patient states cardiologist did not clear her at this time due to palpitations. She states she will just contact us after she obtains clearance. She states her brother was diagnosed with cancer and she will have to tend to him.    BC 5/7/19 @ 10:33am

## 2019-05-10 DIAGNOSIS — Z12.11 ENCOUNTER FOR SCREENING COLONOSCOPY: Primary | ICD-10-CM

## 2019-05-13 ENCOUNTER — ANESTHESIA (OUTPATIENT)
Dept: PERIOP | Facility: HOSPITAL | Age: 64
End: 2019-05-13

## 2019-05-13 ENCOUNTER — ANESTHESIA EVENT (OUTPATIENT)
Dept: PERIOP | Facility: HOSPITAL | Age: 64
End: 2019-05-13

## 2019-05-13 ENCOUNTER — HOSPITAL ENCOUNTER (OUTPATIENT)
Facility: HOSPITAL | Age: 64
Setting detail: HOSPITAL OUTPATIENT SURGERY
Discharge: HOME | End: 2019-05-13
Attending: SURGERY | Admitting: SURGERY

## 2019-05-13 VITALS
WEIGHT: 141 LBS | TEMPERATURE: 97.2 F | OXYGEN SATURATION: 97 % | HEIGHT: 63 IN | BODY MASS INDEX: 24.98 KG/M2 | SYSTOLIC BLOOD PRESSURE: 144 MMHG | DIASTOLIC BLOOD PRESSURE: 84 MMHG | RESPIRATION RATE: 18 BRPM | HEART RATE: 49 BPM

## 2019-05-13 DIAGNOSIS — Z12.11 ENCOUNTER FOR SCREENING COLONOSCOPY: ICD-10-CM

## 2019-05-13 PROCEDURE — 45380 COLONOSCOPY AND BIOPSY: CPT | Performed by: SURGERY

## 2019-05-13 PROCEDURE — S0260 H&P FOR SURGERY: HCPCS | Performed by: SURGERY

## 2019-05-13 PROCEDURE — 45385 COLONOSCOPY W/LESION REMOVAL: CPT | Performed by: SURGERY

## 2019-05-13 PROCEDURE — 25010000002 PROPOFOL 1000 MG/ML EMULSION: Performed by: NURSE ANESTHETIST, CERTIFIED REGISTERED

## 2019-05-13 PROCEDURE — 25010000002 PROPOFOL 10 MG/ML EMULSION: Performed by: NURSE ANESTHETIST, CERTIFIED REGISTERED

## 2019-05-13 RX ORDER — IPRATROPIUM BROMIDE AND ALBUTEROL SULFATE 2.5; .5 MG/3ML; MG/3ML
3 SOLUTION RESPIRATORY (INHALATION) ONCE AS NEEDED
Status: DISCONTINUED | OUTPATIENT
Start: 2019-05-13 | End: 2019-05-13 | Stop reason: HOSPADM

## 2019-05-13 RX ORDER — PROPOFOL 10 MG/ML
VIAL (ML) INTRAVENOUS AS NEEDED
Status: DISCONTINUED | OUTPATIENT
Start: 2019-05-13 | End: 2019-05-13 | Stop reason: SURG

## 2019-05-13 RX ORDER — SODIUM CHLORIDE, SODIUM LACTATE, POTASSIUM CHLORIDE, CALCIUM CHLORIDE 600; 310; 30; 20 MG/100ML; MG/100ML; MG/100ML; MG/100ML
125 INJECTION, SOLUTION INTRAVENOUS CONTINUOUS
Status: DISCONTINUED | OUTPATIENT
Start: 2019-05-13 | End: 2019-05-13 | Stop reason: HOSPADM

## 2019-05-13 RX ORDER — SODIUM CHLORIDE 0.9 % (FLUSH) 0.9 %
3-10 SYRINGE (ML) INJECTION AS NEEDED
Status: DISCONTINUED | OUTPATIENT
Start: 2019-05-13 | End: 2019-05-13 | Stop reason: HOSPADM

## 2019-05-13 RX ORDER — ONDANSETRON 2 MG/ML
4 INJECTION INTRAMUSCULAR; INTRAVENOUS ONCE AS NEEDED
Status: DISCONTINUED | OUTPATIENT
Start: 2019-05-13 | End: 2019-05-13 | Stop reason: HOSPADM

## 2019-05-13 RX ORDER — SODIUM CHLORIDE 0.9 % (FLUSH) 0.9 %
3 SYRINGE (ML) INJECTION EVERY 12 HOURS SCHEDULED
Status: DISCONTINUED | OUTPATIENT
Start: 2019-05-13 | End: 2019-05-13 | Stop reason: HOSPADM

## 2019-05-13 RX ADMIN — SODIUM CHLORIDE, POTASSIUM CHLORIDE, SODIUM LACTATE AND CALCIUM CHLORIDE 125 ML/HR: 600; 310; 30; 20 INJECTION, SOLUTION INTRAVENOUS at 10:04

## 2019-05-13 RX ADMIN — PROPOFOL 100 MG: 10 INJECTION, EMULSION INTRAVENOUS at 10:10

## 2019-05-13 RX ADMIN — PROPOFOL 120 MCG/KG/MIN: 10 INJECTION, EMULSION INTRAVENOUS at 10:10

## 2019-05-13 NOTE — ANESTHESIA POSTPROCEDURE EVALUATION
Patient: Ethel Trotter    Procedure Summary     Date:  05/13/19 Room / Location:  [facility] OR  /  [facility] OR    Anesthesia Start:  1007 Anesthesia Stop:  1036    Procedure:  COLONOSCOPY (N/A ) Diagnosis:       Encounter for screening colonoscopy      (Encounter for screening colonoscopy [Z12.11])    Surgeon:  Arnav Reyes MD Provider:  Jimbo Garcia MD    Anesthesia Type:  general ASA Status:  3          Anesthesia Type: general  Last vitals  BP   (!) 182/98 (05/13/19 0957)   Temp   97.3 °F (36.3 °C) (05/13/19 0957)   Pulse   (!) 49 (05/13/19 0957)   Resp   20 (05/13/19 0957)     SpO2   98 % (05/13/19 0957)     Post Anesthesia Care and Evaluation    Patient location during evaluation: PHASE II  Patient participation: complete - patient participated  Level of consciousness: awake and alert  Pain score: 0  Pain management: adequate  Airway patency: patent  Anesthetic complications: No anesthetic complications    Cardiovascular status: acceptable  Respiratory status: acceptable  Hydration status: acceptable

## 2019-05-13 NOTE — OP NOTE
COLONOSCOPY  Procedure Note    Ethel Trotter  5/13/2019    Pre-op Diagnosis:   Encounter for screening colonoscopy [Z12.11]    Post-op Diagnosis:   Cecal and ascending colon polyps, diverticulosis  Indications: see above    Procedure(s):  COLONOSCOPY WITH PELVIC    Surgeon(s):  Arnav Reyes MD    Anesthesia: General    Staff:   Circulator: Lina Combs RN  Endo Technician: Suzi Rowe    Findings: Benign-appearing cecal polyp, sessile benign-appearing descending colon polyp    Operative Procedure: The patient was taken to the operating suite and placed in left lateral decubitus position.  Bilateral sequential compression devices were in place and IV anesthesia was administered.  Timeout procedure was performed.  Digital rectal examination was negative.  The colonoscope was inserted and advanced to the cecum as evidenced by the ileocecal valve and appendiceal orifice.  The bowel prep was very good.  The colonoscope was slowly removed and the entirety of the colonic mucosa was evaluated.  Colonoscopic findings included colon polyps.  The patient had a single benign-appearing colon polyp directly opposite the ileocecal valve in the cecum that was removed with hot snare polypectomy.  Biopsy forceps were then used to remove portions of a sessile polyp that appeared benign of the ascending colon.  The patient also had mild diverticular disease of the sigmoid colon without diverticulitis.  The colonoscope was then removed and the patient was awakened from anesthesia and taken recovery.  They tolerated the procedure well.    Estimated Blood Loss: minimal    Specimens: Ascending colon polyp, cecal polyp                  Drains: none    Grafts or Implants: none    Complications: None    Recommendations: screening colonoscopy in 3-5 years pending pathology    Arnav Reyes MD     Date: 5/13/2019  Time: 10:38 AM

## 2019-05-13 NOTE — ANESTHESIA POSTPROCEDURE EVALUATION
Patient: Ethel Trotter    Procedure Summary     Date:  05/13/19 Room / Location:  [facility] OR  /  [facility] OR    Anesthesia Start:  1007 Anesthesia Stop:  1036    Procedure:  COLONOSCOPY (N/A ) Diagnosis:       Encounter for screening colonoscopy      (Encounter for screening colonoscopy [Z12.11])    Surgeon:  Arnav Reyes MD Provider:  Jimbo Garcia MD    Anesthesia Type:  general ASA Status:  3          Anesthesia Type: general  Last vitals  BP   (!) 182/98 (05/13/19 0957)   Temp   97.3 °F (36.3 °C) (05/13/19 0957)   Pulse   (!) 49 (05/13/19 0957)   Resp   20 (05/13/19 0957)     SpO2   98 % (05/13/19 0957)     Post Anesthesia Care and Evaluation    Patient location during evaluation: PHASE II  Patient participation: complete - patient participated  Level of consciousness: awake and alert  Pain score: 1  Pain management: adequate  Airway patency: patent  Anesthetic complications: No anesthetic complications  PONV Status: controlled  Cardiovascular status: acceptable  Respiratory status: acceptable  Hydration status: acceptable

## 2019-05-13 NOTE — H&P
Subjective   Ethel Trotter is a 63 y.o. female here as consultation from Sacha Montez MD    63 y.o. female here for screening for colon cancer There is no family history of colon neoplasia. No family hx of gastric, ovarian, or uterine cancer.  No previous colonoscopy reported.  Patient is not on anticoagulation.  Patient denies weight loss.  The patient states she feels a bulge when having a bowel movement but may represent rectocele but this is not reducible on examination today.  No constipation diarrhea or blood per rectum.  No change in stool caliber or consistency.    The following portions of the patient's history were reviewed and updated as appropriate: allergies, current medications, past family history, past medical history, past social history, past surgical history and problem list.    Past Medical History:   Diagnosis Date   • Arthritis    • CAD (coronary artery disease)    • COPD (chronic obstructive pulmonary disease) (CMS/HCC)    • Diabetes mellitus (CMS/HCC)    • Elevated cholesterol    • Fracture of wrist    • GERD (gastroesophageal reflux disease)    • Hepatitis C    • History of EKG 03/25/2016    ABNORMAL   • History of transfusion    • Hyperlipidemia    • Hypertension    • Myocardial infarction (CMS/HCC)     x2 last one 5 years ago   • Osteopenia    • Pancreatitis    • Stroke (CMS/HCC)        Family History   Problem Relation Age of Onset   • Breast cancer Neg Hx        Social History     Socioeconomic History   • Marital status:      Spouse name: Not on file   • Number of children: Not on file   • Years of education: Not on file   • Highest education level: Not on file   Tobacco Use   • Smoking status: Never Smoker   • Smokeless tobacco: Current User     Types: Chew   Substance and Sexual Activity   • Alcohol use: No   • Drug use: Yes     Types: Marijuana     Comment: OCCASIONAL   • Sexual activity: Defer       Past Surgical History:   Procedure Laterality Date   • CORONARY  "ARTERY BYPASS GRAFT     • HYSTERECTOMY      1998   • TONSILLECTOMY           Review of Systems   Constitutional: Negative for activity change, appetite change, chills and fever.   HENT: Negative for sore throat and trouble swallowing.    Eyes: Negative for visual disturbance.   Respiratory: Negative for cough and shortness of breath.    Cardiovascular: Negative for chest pain and palpitations.   Gastrointestinal: Negative for abdominal distention, abdominal pain, blood in stool, constipation, diarrhea, nausea and vomiting.   Endocrine: Negative for cold intolerance and heat intolerance.   Genitourinary: Negative for dysuria.   Musculoskeletal: Negative for joint swelling.   Skin: Negative for color change, rash and wound.   Allergic/Immunologic: Negative for immunocompromised state.   Neurological: Negative for dizziness, seizures, weakness and headaches.   Hematological: Negative for adenopathy. Does not bruise/bleed easily.   Psychiatric/Behavioral: Negative for agitation and confusion.         BP (!) 182/98 (BP Location: Right arm, Patient Position: Lying)   Pulse (!) 49   Temp 97.3 °F (36.3 °C) (Temporal)   Resp 20   Ht 160 cm (63\")   Wt 64 kg (141 lb)   LMP  (LMP Unknown)   SpO2 98%   BMI 24.98 kg/m²   Objective   Physical Exam   Constitutional: She is oriented to person, place, and time. She appears well-developed.   HENT:   Head: Normocephalic and atraumatic.   Mouth/Throat: Mucous membranes are normal.   Eyes: Conjunctivae are normal. Pupils are equal, round, and reactive to light.   Neck: Neck supple. No JVD present. No tracheal deviation present. No thyromegaly present.   Cardiovascular: Normal rate and regular rhythm. Exam reveals no gallop and no friction rub.   No murmur heard.  Pulmonary/Chest: Effort normal and breath sounds normal.   Abdominal: Soft. She exhibits no distension. There is no splenomegaly or hepatomegaly. There is no tenderness. No hernia.   Musculoskeletal: Normal range of " motion. She exhibits no deformity.   Neurological: She is alert and oriented to person, place, and time.   Skin: Skin is warm and dry.   Psychiatric: She has a normal mood and affect.             Assessment     Ethel Trotter is a 63 y.o. female with history of CABG presenting with need for screening colonoscopy.  No previous colonoscopy reported.  She states she has what symptomatically appears to be rectocele but this is not demonstrable today.  She will undergo colonoscopy and understands risks and benefits of the procedure.  Patient's Body mass index is 24.98 kg/m². BMI is above normal parameters. Recommendations include: educational material.    I advised Ethel of the risks of continuing to use tobacco, and I provided her with tobacco cessation educational materials in the After Visit Summary.     During this visit, I spent 5 minutes counseling the patient regarding tobacco cessation.

## 2019-05-13 NOTE — ANESTHESIA PREPROCEDURE EVALUATION
Anesthesia Evaluation     no history of anesthetic complications:  NPO Solid Status: > 8 hours  NPO Liquid Status: > 8 hours           Airway   Mallampati: II  TM distance: >3 FB  Neck ROM: full  No difficulty expected  Dental    (+) edentulous    Pulmonary - normal exam   (+) COPD,   Cardiovascular - normal exam    (+) hypertension, past MI  >12 months, CAD, CABG >6 Months, PVD, hyperlipidemia,       Neuro/Psych  (+) TIA, CVA,     GI/Hepatic/Renal/Endo    (+)  GERD,  hepatitis C, liver disease, diabetes mellitus,     Musculoskeletal     Abdominal  - normal exam   Substance History      OB/GYN          Other                        Anesthesia Plan    ASA 3     general     Anesthetic plan, all risks, benefits, and alternatives have been provided, discussed and informed consent has been obtained with: patient.

## 2019-05-15 LAB
LAB AP CASE REPORT: NORMAL
PATH REPORT.FINAL DX SPEC: NORMAL

## 2019-05-29 ENCOUNTER — OFFICE VISIT (OUTPATIENT)
Dept: SURGERY | Facility: CLINIC | Age: 64
End: 2019-05-29

## 2019-05-29 VITALS — HEIGHT: 63 IN | WEIGHT: 141 LBS | BODY MASS INDEX: 24.98 KG/M2

## 2019-05-29 DIAGNOSIS — K59.09 CHRONIC CONSTIPATION: Primary | ICD-10-CM

## 2019-05-29 DIAGNOSIS — Z00.00 HEALTHCARE MAINTENANCE: ICD-10-CM

## 2019-05-29 PROCEDURE — 99212 OFFICE O/P EST SF 10 MIN: CPT | Performed by: SURGERY

## 2019-05-30 ENCOUNTER — TELEPHONE (OUTPATIENT)
Dept: SURGERY | Facility: CLINIC | Age: 64
End: 2019-05-30

## 2019-05-30 NOTE — TELEPHONE ENCOUNTER
Patient spouse is aware of patient's referral to [facility]. Patient has been scheduled on 6/12/19 @ 9:30am with . Patient's spouse was given address along with phone number and the provider she would be seeing.    BC 5/30/19 @ 10:56am

## 2019-05-31 NOTE — PROGRESS NOTES
Subjective   Ethel Trotter is a 63 y.o. female  is here today for follow-up.         Ethel Trotter is a 63 y.o. female here for follow up after screening colonoscopy that revealed a benign hyperplastic polyp.  The patient is doing well without complaint.  On examination her abdomen is soft nontender nondistended        Assessment     Ethel was seen today for s/p colonoscopy.    Diagnoses and all orders for this visit:    Chronic constipation    Healthcare maintenance      Ethel Trotter is a 63 y.o. female doing well after screening colonoscopy.  Benign polyp was identified and she will require repeat colonoscopy in 10 years.

## 2019-06-21 ENCOUNTER — OFFICE VISIT (OUTPATIENT)
Dept: FAMILY MEDICINE CLINIC | Facility: CLINIC | Age: 64
End: 2019-06-21

## 2019-06-21 DIAGNOSIS — E55.9 VITAMIN D DEFICIENCY DISEASE: ICD-10-CM

## 2019-06-21 DIAGNOSIS — J30.2 CHRONIC SEASONAL ALLERGIC RHINITIS: ICD-10-CM

## 2019-06-21 DIAGNOSIS — I10 ESSENTIAL HYPERTENSION: ICD-10-CM

## 2019-06-21 DIAGNOSIS — I25.10 CORONARY ARTERY DISEASE INVOLVING NATIVE CORONARY ARTERY OF NATIVE HEART WITHOUT ANGINA PECTORIS: ICD-10-CM

## 2019-06-21 DIAGNOSIS — G45.9 TIA (TRANSIENT ISCHEMIC ATTACK): Primary | ICD-10-CM

## 2019-06-21 DIAGNOSIS — F41.8 DEPRESSION WITH ANXIETY: ICD-10-CM

## 2019-06-21 DIAGNOSIS — M54.59 MECHANICAL LOW BACK PAIN: ICD-10-CM

## 2019-06-21 DIAGNOSIS — K59.09 CHRONIC CONSTIPATION: ICD-10-CM

## 2019-06-21 DIAGNOSIS — E11.9 TYPE 2 DIABETES MELLITUS WITHOUT COMPLICATION, WITHOUT LONG-TERM CURRENT USE OF INSULIN (HCC): ICD-10-CM

## 2019-06-21 DIAGNOSIS — J44.9 COPD MIXED TYPE (HCC): ICD-10-CM

## 2019-06-21 DIAGNOSIS — M85.80 OSTEOPENIA, UNSPECIFIED LOCATION: ICD-10-CM

## 2019-06-21 DIAGNOSIS — Z00.00 HEALTHCARE MAINTENANCE: ICD-10-CM

## 2019-06-21 DIAGNOSIS — B35.3 TINEA PEDIS OF BOTH FEET: ICD-10-CM

## 2019-06-21 DIAGNOSIS — Z72.0 DIPS TOBACCO: ICD-10-CM

## 2019-06-21 DIAGNOSIS — K21.9 GASTROESOPHAGEAL REFLUX DISEASE WITHOUT ESOPHAGITIS: ICD-10-CM

## 2019-06-21 DIAGNOSIS — E78.2 MIXED HYPERLIPIDEMIA: ICD-10-CM

## 2019-06-21 PROBLEM — H66.002 ACUTE SUPPURATIVE OTITIS MEDIA OF LEFT EAR WITHOUT SPONTANEOUS RUPTURE OF TYMPANIC MEMBRANE: Status: RESOLVED | Noted: 2017-06-23 | Resolved: 2019-06-21

## 2019-06-21 LAB
ALBUMIN SERPL-MCNC: 4.3 G/DL (ref 3.5–5.2)
ALBUMIN UR-MCNC: 3.6 MG/L
ALBUMIN/GLOB SERPL: 1.4 G/DL
ALP SERPL-CCNC: 91 U/L (ref 39–117)
ALT SERPL W P-5'-P-CCNC: 25 U/L (ref 1–33)
ANION GAP SERPL CALCULATED.3IONS-SCNC: 11.4 MMOL/L
AST SERPL-CCNC: 26 U/L (ref 1–32)
BASOPHILS # BLD AUTO: 0.05 10*3/MM3 (ref 0–0.2)
BASOPHILS NFR BLD AUTO: 0.7 % (ref 0–1.5)
BILIRUB SERPL-MCNC: 0.4 MG/DL (ref 0.2–1.2)
BUN BLD-MCNC: 13 MG/DL (ref 8–23)
BUN/CREAT SERPL: 11.9 (ref 7–25)
CALCIUM SPEC-SCNC: 9.9 MG/DL (ref 8.6–10.5)
CHLORIDE SERPL-SCNC: 101 MMOL/L (ref 98–107)
CHOLEST SERPL-MCNC: 95 MG/DL (ref 0–200)
CO2 SERPL-SCNC: 27.6 MMOL/L (ref 22–29)
CREAT BLD-MCNC: 1.09 MG/DL (ref 0.57–1)
DEPRECATED RDW RBC AUTO: 46.8 FL (ref 37–54)
EOSINOPHIL # BLD AUTO: 0.32 10*3/MM3 (ref 0–0.4)
EOSINOPHIL NFR BLD AUTO: 4.7 % (ref 0.3–6.2)
ERYTHROCYTE [DISTWIDTH] IN BLOOD BY AUTOMATED COUNT: 13.3 % (ref 12.3–15.4)
GFR SERPL CREATININE-BSD FRML MDRD: 50 ML/MIN/1.73
GLOBULIN UR ELPH-MCNC: 3 GM/DL
GLUCOSE BLD-MCNC: 201 MG/DL (ref 65–99)
HBA1C MFR BLD: 7.5 % (ref 4.8–5.6)
HCT VFR BLD AUTO: 48.2 % (ref 34–46.6)
HDLC SERPL-MCNC: 49 MG/DL (ref 40–60)
HGB BLD-MCNC: 14.7 G/DL (ref 12–15.9)
IMM GRANULOCYTES # BLD AUTO: 0.02 10*3/MM3 (ref 0–0.05)
IMM GRANULOCYTES NFR BLD AUTO: 0.3 % (ref 0–0.5)
LDLC SERPL CALC-MCNC: 8 MG/DL (ref 0–100)
LDLC/HDLC SERPL: 0.16 {RATIO}
LYMPHOCYTES # BLD AUTO: 1.87 10*3/MM3 (ref 0.7–3.1)
LYMPHOCYTES NFR BLD AUTO: 27.4 % (ref 19.6–45.3)
MCH RBC QN AUTO: 29.2 PG (ref 26.6–33)
MCHC RBC AUTO-ENTMCNC: 30.5 G/DL (ref 31.5–35.7)
MCV RBC AUTO: 95.6 FL (ref 79–97)
MONOCYTES # BLD AUTO: 0.72 10*3/MM3 (ref 0.1–0.9)
MONOCYTES NFR BLD AUTO: 10.5 % (ref 5–12)
NEUTROPHILS # BLD AUTO: 3.85 10*3/MM3 (ref 1.7–7)
NEUTROPHILS NFR BLD AUTO: 56.4 % (ref 42.7–76)
NRBC BLD AUTO-RTO: 0 /100 WBC (ref 0–0.2)
PLATELET # BLD AUTO: 141 10*3/MM3 (ref 140–450)
PMV BLD AUTO: 10.7 FL (ref 6–12)
POTASSIUM BLD-SCNC: 5.2 MMOL/L (ref 3.5–5.2)
PROT SERPL-MCNC: 7.3 G/DL (ref 6–8.5)
RBC # BLD AUTO: 5.04 10*6/MM3 (ref 3.77–5.28)
SODIUM BLD-SCNC: 140 MMOL/L (ref 136–145)
TRIGL SERPL-MCNC: 192 MG/DL (ref 0–150)
TSH SERPL DL<=0.05 MIU/L-ACNC: 2.5 MIU/ML (ref 0.27–4.2)
VLDLC SERPL-MCNC: 38.4 MG/DL (ref 5–40)
WBC NRBC COR # BLD: 6.83 10*3/MM3 (ref 3.4–10.8)

## 2019-06-21 PROCEDURE — 84443 ASSAY THYROID STIM HORMONE: CPT | Performed by: GENERAL PRACTICE

## 2019-06-21 PROCEDURE — 99214 OFFICE O/P EST MOD 30 MIN: CPT | Performed by: GENERAL PRACTICE

## 2019-06-21 PROCEDURE — 83036 HEMOGLOBIN GLYCOSYLATED A1C: CPT | Performed by: GENERAL PRACTICE

## 2019-06-21 PROCEDURE — 80061 LIPID PANEL: CPT | Performed by: GENERAL PRACTICE

## 2019-06-21 PROCEDURE — 36415 COLL VENOUS BLD VENIPUNCTURE: CPT | Performed by: GENERAL PRACTICE

## 2019-06-21 PROCEDURE — 85025 COMPLETE CBC W/AUTO DIFF WBC: CPT | Performed by: GENERAL PRACTICE

## 2019-06-21 PROCEDURE — 82043 UR ALBUMIN QUANTITATIVE: CPT | Performed by: GENERAL PRACTICE

## 2019-06-21 PROCEDURE — 80053 COMPREHEN METABOLIC PANEL: CPT | Performed by: GENERAL PRACTICE

## 2019-06-21 RX ORDER — KETOCONAZOLE 20 MG/G
CREAM TOPICAL DAILY
Qty: 30 G | Refills: 1 | Status: SHIPPED | OUTPATIENT
Start: 2019-06-21 | End: 2020-02-11

## 2019-06-21 RX ORDER — EZETIMIBE 10 MG/1
10 TABLET ORAL NIGHTLY
Refills: 5 | COMMUNITY
Start: 2019-05-13 | End: 2020-02-11

## 2019-06-21 NOTE — PROGRESS NOTES
Subjective   Ethel Trotter is a 63 y.o. female.     History of Present Illness     Episode of Visual Loss  She has a history of a 4 to 5 second episode of complete vision loss in her right eye.  This occurred during a period of anxiety and was associated with a generalized headache.  She denies any further episodes and has had no weakness, numbness, tingling, or difficulty talking or understanding what is said to her.  She denies any new muscle aches and has had no rash, fever, or chills.  She remains on ASA 81 daily.  A duplex doppler ultrasound of the carotids was ordered at her last visit but apparently never scheduled    Coronary artery disease  She has a history of CABG and previous M.I.. Previous diagnostic testing includes: cardiac catheterization Recent history: taking medications as instructed, no medication side effects noted, no chest pain on exertion, no dyspnea on exertion and no swelling of ankles. Patient's symptoms have been unchanged. Medication side effects include: none. She continues to be followed by cardiology and underwent a reassessment with Dr. Green since last here with no apparent changes made in her management    Diabetes  Current symptoms include none. Patient denies paresthesia of the feet, visual disturbances, polydipsia, polyuria, hypoglycemia and foot ulcerations. Evaluation to date has been: hemoglobin A1C. Home sugars: patient does not check sugars. Current treatments: metformin, DPP inhibitor - mehuluvia and SGLT2 inhibitor - jardiance. Last dilated eye exam more than one year ago.      Dyslipidemia  Compliance with treatment has been excellent. The patient exercise daily. She is currently being prescribed the following medication for her dyslipidemia - atorvastatin, ezetimibe, and repatha.  She denies any side effects. Patient denies side effects associated with her medications.     Hypertension  Home blood pressure readings: not doing. Associated signs and symptoms: none.  Patient denies: chest pain, palpitations, dyspnea, orthopnea, paroxysmal nocturnal dyspnea and peripheral edema. Current antihypertensive medications includes lisinopril, metoprolol, amlodipine and HCTZ. Medication compliance: unclear whether she is taking these as prescribed.     Constipation  She gives a more than 20-year history of constipation.  This has been associated with intermittent abdominal cramping and occasional bright red blood when wiping if she strains.  There is no history of any nausea or vomiting and she denies any diarrhea or melena.  There is no history of any night sweats or weight loss.  She has tried a number of over-the-counter stool softeners and laxatives with limited effect.  She feels Linzess has helped and denies any apparent side effects.  She underwent a colonoscopy on 5/13/2019 by Dr. Reyes with removal of several tubular adenomas.      Low Back Pain   She continues to have intermittent low back pain. There's no history of any strain or trauma. The pain is described as a sharp left lower ache. This radiates to the left lateral and anterior hip. There have been no associated symptoms and she denies any stiffness, swelling, weakness, numbness or tingling. The pain is worse with weight bearing and improves with rest.  Plain films of the lumbar spine performed on 10/22/18 were reported as showing degenerative disc disease at L4-5.  Those done the same day of the left hip were reported as showing changes consistent with mild osteoarthritis    Depression  Onset was several years ago. Symptoms have remnained unchanged since last here. Current symptoms include: difficulty falling asleep and fatigue. Patient denies depressed mood, anhedonia, insomnia, recurrent thoughts of death and suicidal thoughts. Risk factors: negative life event mothers death and previous episode of depression. Treatment has included medication citalopram. She complains of the following side effects from the treatment:  none.    The following portions of the patient's history were reviewed and updated as appropriate: allergies, current medications, past medical history, past social history and problem list.    Review of Systems   Constitutional: Positive for fatigue. Negative for appetite change, chills, fever and unexpected weight change.   HENT: Positive for congestion, ear pain (intermittent left ear pain), postnasal drip and rhinorrhea. Negative for sneezing, sore throat and voice change.    Eyes: Positive for visual disturbance.   Respiratory: Negative for cough, shortness of breath and wheezing.    Cardiovascular: Negative for chest pain, palpitations and leg swelling.   Gastrointestinal: Positive for abdominal pain, anal bleeding and constipation. Negative for blood in stool, diarrhea, nausea and vomiting.   Endocrine: Negative for polydipsia.   Genitourinary: Negative for difficulty urinating, dysuria, frequency, hematuria, menstrual problem, pelvic pain, urgency, vaginal bleeding and vaginal discharge.   Musculoskeletal: Positive for arthralgias and back pain. Negative for joint swelling, myalgias and neck pain.   Skin: Negative for color change.   Neurological: Negative for tremors, weakness, numbness and headaches.   Psychiatric/Behavioral: Positive for sleep disturbance. Negative for dysphoric mood and suicidal ideas. The patient is not nervous/anxious.      Objective   Physical Exam   Constitutional: She is oriented to person, place, and time. No distress.   Bright and in fair spirits. Right short arm cast. No apparent distress. No pallor, jaundice, diaphoresis, or cyanosis.     HENT:   Head: Atraumatic.   Right Ear: Tympanic membrane, external ear and ear canal normal.   Left Ear: External ear and ear canal normal. Tympanic membrane is scarred.   Nose: Nose normal.   Mouth/Throat: Oropharynx is clear and moist. Mucous membranes are not pale and not cyanotic.   Eyes: EOM are normal. Pupils are equal, round, and  reactive to light. No scleral icterus.   Neck: No JVD present. Carotid bruit is not present. No tracheal deviation present. No thyromegaly present.   Cardiovascular: Normal rate, regular rhythm, S1 normal, S2 normal and intact distal pulses. Exam reveals no gallop, no S3 and no S4.   No murmur heard.  Pulmonary/Chest: Breath sounds normal. No stridor. No respiratory distress.   Abdominal: Soft. Normal aorta and bowel sounds are normal. She exhibits no distension, no abdominal bruit and no mass. There is no hepatosplenomegaly. There is no tenderness. No hernia.   Musculoskeletal: She exhibits no tenderness or deformity.    Ethel had a diabetic foot exam performed (mild edema and slight scaling webspaces both feet) today.  Vascular Status -  Her right foot exhibits no edema. Her left foot exhibits no edema.  Lymphadenopathy:        Head (right side): No submandibular adenopathy present.        Head (left side): No submandibular adenopathy present.     She has no cervical adenopathy.   Neurological: She is alert and oriented to person, place, and time. She displays normal reflexes. A sensory deficit (decreased vibration sense toes of both feet) is present. No cranial nerve deficit. She exhibits normal muscle tone. Coordination normal.   Reflex Scores:       Bicep reflexes are 2+ on the right side and 2+ on the left side.       Patellar reflexes are 2+ on the right side and 2+ on the left side.       Achilles reflexes are 1+ on the right side and 1+ on the left side.  Skin: Skin is warm and dry. No rash noted. She is not diaphoretic. No cyanosis. No pallor. Nails show no clubbing.   Psychiatric: She has a normal mood and affect.     Assessment/Plan   Problems Addressed this Visit        Cardiovascular and Mediastinum    Essential hypertension   Hypertension: poorly controlled. Evidence of target organ damage: coronary artery disease and possible TIA.  Encouraged to continue to work on diet and exercise plan.    Reminded of the importance of taking her medication as prescribed    Relevant Orders    CBC & Differential (Completed)    Comprehensive Metabolic Panel (Completed)    CBC Auto Differential (Completed)    Mixed hyperlipidemia  Doing well  As above.   Continue current medication.    Relevant Medications    ezetimibe (ZETIA) 10 MG tablet    Other Relevant Orders    Lipid Panel (Completed)    TSH (Completed)    Coronary artery disease involving native coronary artery  Reminded regarding risk factor modification with an emphasis on tobacco cessation.  Continue current medication  Follow up with cardiology     TIA (transient ischemic attack)  Possible  As above.  Will schedule duplex Doppler ultrasound of the carotids  Patient will seek immediate assessment if any neurologic deficit whatsoever       Respiratory    COPD mixed type (CMS/HCC)    Chronic seasonal allergic rhinitis  Reminded regarding allergen avoidance.  Encouraged to resume her allergy medicine       Digestive    Vitamin D deficiency disease    Gastroesophageal reflux disease without esophagitis    Chronic constipation       Endocrine    Type 2 diabetes mellitus without complication, without long-term current use of insulin (CMS/HCC)  Diabetes mellitus Type II, under fair control.   Encouraged to continue to pursue ADA diet  Encouraged aerobic exercise.  Continue current medication  Updated labs drawn.    Relevant Orders    Hemoglobin A1c (Completed)    MicroAlbumin, Urine, Random - Urine, Clean Catch (Completed)       Nervous and Auditory    Mechanical low back pain  Reminded regarding symptomatic treatment.   Continue current medication  Encouraged to report if any worse or if any new symptoms or concerns.       Musculoskeletal and Integument    Osteopenia    Tinea pedis of both feet  Advised regarding skin care  Topical antifungal  Encouraged to report if any worse, any new symptoms, or if not resolving over the next month    Relevant Medications     ketoconazole (NIZORAL) 2 % cream       Other    Depression with anxiety  Stable.  Supportive therapy.   Continue current medication.    Dips tobacco    Healthcare maintenance  We will reschedule a mammogram   Hepatitis a #2/b #3 will be administered at return along with a flu shot.

## 2019-06-22 VITALS
TEMPERATURE: 98.9 F | OXYGEN SATURATION: 97 % | DIASTOLIC BLOOD PRESSURE: 90 MMHG | WEIGHT: 142 LBS | SYSTOLIC BLOOD PRESSURE: 170 MMHG | HEIGHT: 63 IN | BODY MASS INDEX: 25.16 KG/M2 | HEART RATE: 60 BPM | RESPIRATION RATE: 12 BRPM

## 2019-06-24 DIAGNOSIS — G45.9 TIA (TRANSIENT ISCHEMIC ATTACK): ICD-10-CM

## 2019-06-24 DIAGNOSIS — G45.3 AMAUROSIS FUGAX: ICD-10-CM

## 2019-06-24 DIAGNOSIS — R60.1 GENERALIZED EDEMA: Primary | ICD-10-CM

## 2019-07-02 ENCOUNTER — HOSPITAL ENCOUNTER (OUTPATIENT)
Dept: CARDIOLOGY | Facility: HOSPITAL | Age: 64
Discharge: HOME | End: 2019-07-02
Admitting: GENERAL PRACTICE

## 2019-07-02 DIAGNOSIS — G45.9 TIA (TRANSIENT ISCHEMIC ATTACK): ICD-10-CM

## 2019-07-02 DIAGNOSIS — R60.1 GENERALIZED EDEMA: ICD-10-CM

## 2019-07-02 DIAGNOSIS — G45.3 AMAUROSIS FUGAX: ICD-10-CM

## 2019-07-02 PROCEDURE — 93970 EXTREMITY STUDY: CPT

## 2019-07-02 PROCEDURE — 93970 EXTREMITY STUDY: CPT | Performed by: RADIOLOGY

## 2019-08-21 ENCOUNTER — OFFICE VISIT (OUTPATIENT)
Dept: FAMILY MEDICINE CLINIC | Facility: CLINIC | Age: 64
End: 2019-08-21

## 2019-08-21 VITALS
SYSTOLIC BLOOD PRESSURE: 140 MMHG | OXYGEN SATURATION: 97 % | HEIGHT: 63 IN | DIASTOLIC BLOOD PRESSURE: 98 MMHG | TEMPERATURE: 97.7 F | WEIGHT: 147.6 LBS | HEART RATE: 56 BPM | BODY MASS INDEX: 26.15 KG/M2

## 2019-08-21 DIAGNOSIS — H66.90 EAR INFECTION: ICD-10-CM

## 2019-08-21 DIAGNOSIS — E78.2 MIXED HYPERLIPIDEMIA: Chronic | ICD-10-CM

## 2019-08-21 DIAGNOSIS — I10 ESSENTIAL HYPERTENSION: Chronic | ICD-10-CM

## 2019-08-21 DIAGNOSIS — E11.9 TYPE 2 DIABETES MELLITUS WITHOUT COMPLICATION, WITHOUT LONG-TERM CURRENT USE OF INSULIN (HCC): Primary | Chronic | ICD-10-CM

## 2019-08-21 PROCEDURE — 99214 OFFICE O/P EST MOD 30 MIN: CPT | Performed by: NURSE PRACTITIONER

## 2019-08-21 RX ORDER — AMOXICILLIN AND CLAVULANATE POTASSIUM 875; 125 MG/1; MG/1
1 TABLET, FILM COATED ORAL 2 TIMES DAILY
Qty: 20 TABLET | Refills: 0 | Status: SHIPPED | OUTPATIENT
Start: 2019-08-21 | End: 2019-09-19

## 2019-08-21 NOTE — PROGRESS NOTES
Ethel Trotter is a 63 y.o. female. who presents to the clinic today c/o elevated blood sugars which started weeks ago but is worsening. Associated symptoms include fatigue, headache and polydipsia. She reports she had a blood sugar of over 500 mg/dL five days ago. Since that time, it has come down and this morning it was 148 mg/dL. Ethel also has HTN ,Dyslipidemia and previous Pancreatitis.    Ethel is also c/o earache which started five days ago. Associated symptoms include congestion, PND, and occasional cough. She has tried no medications or home remedies.     Diabetes   She presents for her initial diabetic visit. She has type 2 diabetes mellitus. MedicAlert identification noted. Her disease course has been worsening. Hypoglycemia symptoms include headaches. Associated symptoms include fatigue and polydipsia. Pertinent negatives for diabetes include no chest pain, no foot paresthesias (Burning), no polyphagia, no polyuria, no weakness and no weight loss. There are no hypoglycemic complications. Symptoms are worsening. Risk factors for coronary artery disease include diabetes mellitus, dyslipidemia, family history and hypertension. Current diabetic treatment includes oral agent (dual therapy) (Has not had Jardiance). Diabetic current diet: Eat whatever. Meal planning includes avoidance of concentrated sweets. An ACE inhibitor/angiotensin II receptor blocker is being taken.     Lab Results   Component Value Date    HGBA1C 7.50 (H) 06/21/2019     Dyslipidemia  Compliance with treatment has been excellent. She exercise regularly. She is currently being prescribed the following medication for her dyslipidemia - atorvastatin, ezetimibe, and repatha.  She denies any side effects.   Lab Results   Component Value Date    CHOL 95 06/21/2019    CHLPL 247 (H) 03/10/2016    TRIG 192 (H) 06/21/2019    HDL 49 06/21/2019    LDL 8 06/21/2019      Hypertension  Home blood pressure readings: not doing. Associated signs and  "symptoms: none. She denies: chest pain, palpitations, dyspnea, orthopnea, paroxysmal nocturnal dyspnea and peripheral edema. Current antihypertensive medications includes lisinopril, metoprolol, amlodipine and HCTZ.Refer to ROS for additional information.  Lab Results   Component Value Date    CREATININE 1.09 (H) 06/21/2019     Vitals:    08/21/19 1608   BP: 140/98   Pulse: 56   Temp: 97.7 °F (36.5 °C)   TempSrc: Oral   SpO2: 97%   Weight: 67 kg (147 lb 9.6 oz)   Height: 160 cm (63\")      The following portions of the patient's history were reviewed and updated as appropriate: allergies, current medications, past family history, past medical history, past social history, past surgical history and problem list.    Review of Systems   Constitutional: Positive for fatigue. Negative for activity change, appetite change, chills, fever, unexpected weight change and weight loss.   Eyes: Negative for visual disturbance.   Respiratory: Negative for cough, shortness of breath and wheezing.    Cardiovascular: Positive for leg swelling. Negative for chest pain and palpitations.   Gastrointestinal: Negative for abdominal pain, constipation, diarrhea, nausea and vomiting.   Endocrine: Positive for polydipsia. Negative for cold intolerance, heat intolerance, polyphagia and polyuria.   Musculoskeletal: Positive for arthralgias.   Skin: Negative for color change and rash.   Neurological: Positive for headaches. Negative for weakness and light-headedness.   Hematological: Negative for adenopathy.   Psychiatric/Behavioral: Positive for sleep disturbance. Negative for decreased concentration and suicidal ideas.   All other systems reviewed and are negative.    Physical Exam   Constitutional: She is oriented to person, place, and time. She appears well-developed and well-nourished. No distress.   HENT:   Head: Normocephalic.   Right Ear: Ear canal normal. No tenderness. Tympanic membrane is erythematous and bulging. A middle ear " effusion is present.   Left Ear: Ear canal normal. There is tenderness. Tympanic membrane is erythematous and bulging. A middle ear effusion is present.   Nose: Mucosal edema present. Right sinus exhibits no maxillary sinus tenderness and no frontal sinus tenderness. Left sinus exhibits no maxillary sinus tenderness and no frontal sinus tenderness.   Mouth/Throat: Oropharynx is clear and moist. No oropharyngeal exudate.   Eyes: Conjunctivae are normal. Pupils are equal, round, and reactive to light. Right eye exhibits no discharge. Left eye exhibits no discharge. No scleral icterus.   Neck: Neck supple. No tracheal tenderness present.   Cardiovascular: Normal rate, regular rhythm, normal heart sounds and intact distal pulses. Exam reveals no friction rub.   No murmur heard.  Pulmonary/Chest: Effort normal. No respiratory distress. She has no wheezes. She has no rales.   Musculoskeletal: She exhibits no edema.   Lymphadenopathy:        Head (right side): No preauricular adenopathy present.        Head (left side): No preauricular adenopathy present.     She has no cervical adenopathy.   Neurological: She is alert and oriented to person, place, and time.   Skin: Skin is warm and dry. Capillary refill takes less than 2 seconds. No rash noted. No erythema.   Vitals reviewed.      Assessment/Plan     Problems Addressed this Visit        Cardiovascular and Mediastinum    Essential hypertension    Mixed hyperlipidemia       Endocrine    Type 2 diabetes mellitus without complication, without long-term current use of insulin (CMS/[facility]) - Primary    Relevant Medications    Empagliflozin (JARDIANCE) 25 MG tablet      Other Visit Diagnoses     Ear infection        Treatment options reviewed. Counseled regarding supportive care measures.     Relevant Medications    amoxicillin-clavulanate (AUGMENTIN) 875-125 MG per tablet        Treatment options reviewed. She reports she has not been taking the Jardiance. Refill sent into her  pharmacy.  She is to continue Amlodipine, Imdur,Toprol and Zestoretic for her HTN. Avoid NSAIDs.   She is to continue Atorvastatin, Zetia for Dyslipidemia.   Treatment options reviewed to treat her ear infection. Counseled regarding supportive care measures.   Encouraged her to seek further medical evaluation if symptoms worsen or do not improve within 48-72 hours.       This document has been electronically signed by RAY Culver, [id number], HENRI

## 2019-08-21 NOTE — PATIENT INSTRUCTIONS
Otitis Media, Adult    Otitis media means that the middle ear is red and swollen (inflamed) and full of fluid. The condition usually goes away on its own.  Follow these instructions at home:  · Take over-the-counter and prescription medicines only as told by your doctor.  · If you were prescribed an antibiotic medicine, take it as told by your doctor. Do not stop taking the antibiotic even if you start to feel better.  · Keep all follow-up visits as told by your doctor. This is important.  Contact a doctor if:  · You have bleeding from your nose.  · There is a lump on your neck.  · You are not getting better in 5 days.  · You feel worse instead of better.  Get help right away if:  · You have pain that is not helped with medicine.  · You have swelling, redness, or pain around your ear.  · You get a stiff neck.  · You cannot move part of your face (paralyzed).  · You notice that the bone behind your ear hurts when you touch it.  · You get a very bad headache.  Summary  · Otitis media means that the middle ear is red, swollen, and full of fluid.  · This condition usually goes away on its own. In some cases, treatment may be needed.  · If you were prescribed an antibiotic medicine, take it as told by your doctor.  This information is not intended to replace advice given to you by your health care provider. Make sure you discuss any questions you have with your health care provider.  Document Released: 06/05/2009 Document Revised: 01/08/2018 Document Reviewed: 01/08/2018  [employer] Interactive Patient Education © 2019 [employer]  Type 2 Diabetes Mellitus, Self Care, Adult  Caring for yourself after you have been diagnosed with type 2 diabetes (type 2 diabetes mellitus) means keeping your blood sugar (glucose) under control with a balance of:  · Nutrition.  · Exercise.  · Lifestyle changes.  · Medicines or insulin, if necessary.  · Support from your team of health care providers and others.  The following information  explains what you need to know to manage your diabetes at home.  What are the risks?  Having diabetes can put you at risk for other long-term (chronic) conditions, such as heart disease and kidney disease. Your health care provider may prescribe medicines to help prevent complications from diabetes. These medicines may include:  · Aspirin.  · Medicine to lower cholesterol.  · Medicine to control blood pressure.  How to monitor blood glucose    · Check your blood glucose every day, as often as told by your health care provider.  · Have your A1c (hemoglobin A1c) level checked two or more times a year, or as often as told by your health care provider.  Your health care provider will set individualized treatment goals for you. Generally, the goal of treatment is to maintain the following blood glucose levels:  · Before meals (preprandial):  mg/dL (4.4-7.2 mmol/L).  · After meals (postprandial): below 180 mg/dL (10 mmol/L).  · A1c level: less than 7%.  How to manage hyperglycemia and hypoglycemia  Hyperglycemia symptoms  Hyperglycemia, also called high blood glucose, occurs when blood glucose is too high. Make sure you know the early signs of hyperglycemia, such as:  · Increased thirst.  · Hunger.  · Feeling very tired.  · Needing to urinate more often than usual.  · Blurry vision.  Hypoglycemia symptoms  Hypoglycemia, also called low blood glucose, occurs with a blood glucose level at or below 70 mg/dL (3.9 mmol/L). The risk for hypoglycemia increases during or after exercise, during sleep, during illness, and when skipping meals or not eating for a long time (fasting).  It is important to know the symptoms of hypoglycemia and treat it right away. Always have a 15-gram rapid-acting carbohydrate snack with you to treat low blood glucose. Family members and close friends should also know the symptoms and should understand how to treat hypoglycemia, in case you are not able to treat yourself. Symptoms may  include:  · Hunger.  · Anxiety.  · Sweating and feeling clammy.  · Confusion.  · Dizziness or feeling light-headed.  · Sleepiness.  · Nausea.  · Increased heart rate.  · Headache.  · Blurry vision.  · Irritability.  · A change in coordination.  · Tingling or numbness around the mouth, lips, or tongue.  · Restless sleep.  · Fainting.  · Seizure.  Treating hypoglycemia  If you are alert and able to swallow safely, follow the 15:15 rule:  · Take 15 grams of a rapid-acting carbohydrate. Rapid-acting options include:  ? 1 tube of glucose gel.  ? 3 glucose pills.  ? 6-8 pieces of hard candy.  ? 4 oz (120 mL) of fruit juice.  ? 4 oz (120 mL) of regular (not diet) soda.  · Check your blood glucose 15 minutes after you take the carbohydrate.  · If the repeat blood glucose level is still at or below 70 mg/dL (3.9 mmol/L), take 15 grams of a carbohydrate again.  · If your blood glucose level does not increase above 70 mg/dL (3.9 mmol/L) after 3 tries, seek emergency medical care.  · After your blood glucose level returns to normal, eat a meal or a snack within 1 hour.  Treating severe hypoglycemia  Severe hypoglycemia is when your blood glucose level is at or below 54 mg/dL (3 mmol/L). Severe hypoglycemia is an emergency. Do not wait to see if the symptoms will go away. Get medical help right away. Call your local emergency services (911 in the [place]  If you have severe hypoglycemia and you cannot eat or drink, you may need an injection of glucagon. A family member or close friend should learn how to check your blood glucose and how to give you a glucagon injection. Ask your health care provider if you need to have an emergency glucagon injection kit available.  Severe hypoglycemia may need to be treated in a hospital. The treatment may include getting glucose through an IV. You may also need treatment for the cause of your hypoglycemia.  Follow these instructions at home:  Take diabetes medicines as told  · If your health  care provider prescribed insulin or diabetes medicines, take them every day.  · Do not run out of insulin or other diabetes medicines that you take. Plan ahead so you always have these available.  · If you use insulin, adjust your dosage based on how physically active you are and what foods you eat. Your health care provider will tell you how to adjust your dosage.  Make healthy food choices    The things that you eat and drink affect your blood glucose and your insulin dosage. Making good choices helps to control your diabetes and prevent other health problems. A healthy meal plan includes eating lean proteins, complex carbohydrates, fresh fruits and vegetables, low-fat dairy products, and healthy fats.  Make an appointment to see a diet and nutrition specialist (registered dietitian) to help you create an eating plan that is right for you. Make sure that you:  · Follow instructions from your health care provider about eating or drinking restrictions.  · Drink enough fluid to keep your urine pale yellow.  · Keep a record of the carbohydrates that you eat. Do this by reading food labels and learning the standard serving sizes of foods.  · Follow your sick day plan whenever you cannot eat or drink as usual. Make this plan in advance with your health care provider.    Stay active  Exercise regularly, as told by your health care provider. This may include:  · Stretching and doing strength exercises, such as yoga or weightlifting, 2 or more times a week.  · Doing 150 minutes or more of moderate-intensity or vigorous-intensity exercise each week. This could be brisk walking, biking, or water aerobics.  ? Spread out your activity over 3 or more days of the week.  ? Do not go more than 2 days in a row without doing some kind of physical activity.  When you start a new exercise or activity, work with your health care provider to adjust your insulin, medicines, or food intake as needed.  Make healthy lifestyle choices  · Do  not use any tobacco products, such as cigarettes, chewing tobacco, and e-cigarettes. If you need help quitting, ask your health care provider.  · If your health care provider says that alcohol is safe for you, limit alcohol intake to no more than 1 drink per day for nonpregnant women and 2 drinks per day for men. One drink equals 12 oz of beer, 5 oz of wine, or 1½ oz of hard liquor.  · Learn to manage stress. If you need help with this, ask your health care provider.  Care for your body    · Keep your immunizations up to date. In addition to getting vaccinations as told by your health care provider, it is recommended that you get vaccinated against the following illnesses:  ? The flu (influenza). Get a flu shot every year.  ? Pneumonia.  ? Hepatitis B.  · Schedule an eye exam soon after your diagnosis, and then one time every year after that.  · Check your skin and feet every day for cuts, bruises, redness, blisters, or sores. Schedule a foot exam with your health care provider once every year.  · Brush your teeth and gums two times a day, and floss one or more times a day. Visit your dentist one or more times every 6 months.  · Maintain a healthy weight.  General instructions  · Take over-the-counter and prescription medicines only as told by your health care provider.  · Share your diabetes management plan with people in your workplace, school, and household.  · Carry a medical alert card or wear medical alert jewelry.  · Keep all follow-up visits as told by your health care provider. This is important.  Questions to ask your health care provider  · Do I need to meet with a diabetes educator?  · Where can I find a support group for people with diabetes?  Where to find more information  For more information about diabetes, visit:  · American Diabetes Association (ADA): www.diabetes.org  · American Association of Diabetes Educators (AADE): www.diabeteseducator.org  Summary  · Caring for yourself after you have been  diagnosed with (type 2 diabetes mellitus) means keeping your blood sugar (glucose) under control with a balance of nutrition, exercise, lifestyle changes, and medicine.  · Check your blood glucose every day, as often as told by your health care provider.  · Having diabetes can put you at risk for other long-term (chronic) conditions, such as heart disease and kidney disease. Your health care provider may prescribe medicines to help prevent complications from diabetes.  · Keep all follow-up visits as told by your health care provider. This is important.  This information is not intended to replace advice given to you by your health care provider. Make sure you discuss any questions you have with your health care provider.  Document Released: 04/10/2017 Document Revised: 07/20/2018 Document Reviewed: 01/20/2017  [employer] Interactive Patient Education © 2019 [employer]

## 2019-08-30 DIAGNOSIS — F41.8 DEPRESSION WITH ANXIETY: ICD-10-CM

## 2019-08-30 DIAGNOSIS — E78.2 MIXED HYPERLIPIDEMIA: ICD-10-CM

## 2019-08-30 DIAGNOSIS — I25.10 CORONARY ARTERY DISEASE INVOLVING NATIVE CORONARY ARTERY OF NATIVE HEART WITHOUT ANGINA PECTORIS: ICD-10-CM

## 2019-08-30 DIAGNOSIS — E11.9 TYPE 2 DIABETES MELLITUS WITHOUT COMPLICATION, WITHOUT LONG-TERM CURRENT USE OF INSULIN (HCC): ICD-10-CM

## 2019-08-30 DIAGNOSIS — E55.9 VITAMIN D DEFICIENCY DISEASE: ICD-10-CM

## 2019-08-30 RX ORDER — ERGOCALCIFEROL 1.25 MG/1
CAPSULE ORAL
Qty: 4 CAPSULE | Refills: 5 | Status: SHIPPED | OUTPATIENT
Start: 2019-08-30 | End: 2020-02-06

## 2019-08-30 RX ORDER — CITALOPRAM 20 MG/1
TABLET ORAL
Qty: 30 TABLET | Refills: 5 | Status: SHIPPED | OUTPATIENT
Start: 2019-08-30 | End: 2020-02-06

## 2019-08-30 RX ORDER — ISOSORBIDE MONONITRATE 30 MG/1
30 TABLET, EXTENDED RELEASE ORAL EVERY MORNING
Qty: 30 TABLET | Refills: 5 | Status: SHIPPED | OUTPATIENT
Start: 2019-08-30 | End: 2020-02-06

## 2019-08-30 RX ORDER — ATORVASTATIN CALCIUM 80 MG/1
80 TABLET, FILM COATED ORAL EVERY EVENING
Qty: 30 TABLET | Refills: 5 | Status: SHIPPED | OUTPATIENT
Start: 2019-08-30 | End: 2020-02-06

## 2019-08-30 RX ORDER — SITAGLIPTIN AND METFORMIN HYDROCHLORIDE 50; 500 MG/1; MG/1
TABLET, FILM COATED, EXTENDED RELEASE ORAL
Qty: 60 TABLET | Refills: 5 | Status: SHIPPED | OUTPATIENT
Start: 2019-08-30 | End: 2020-02-06

## 2019-08-30 RX ORDER — EZETIMIBE 10 MG/1
10 TABLET ORAL NIGHTLY
Qty: 30 TABLET | Refills: 5 | Status: SHIPPED | OUTPATIENT
Start: 2019-08-30 | End: 2020-02-06

## 2019-09-19 ENCOUNTER — OFFICE VISIT (OUTPATIENT)
Dept: FAMILY MEDICINE CLINIC | Facility: CLINIC | Age: 64
End: 2019-09-19

## 2019-09-19 VITALS
RESPIRATION RATE: 12 BRPM | DIASTOLIC BLOOD PRESSURE: 70 MMHG | WEIGHT: 142 LBS | HEART RATE: 57 BPM | BODY MASS INDEX: 25.16 KG/M2 | HEIGHT: 63 IN | SYSTOLIC BLOOD PRESSURE: 140 MMHG | TEMPERATURE: 98.6 F | OXYGEN SATURATION: 97 %

## 2019-09-19 DIAGNOSIS — Z00.00 HEALTHCARE MAINTENANCE: ICD-10-CM

## 2019-09-19 DIAGNOSIS — K21.9 GASTROESOPHAGEAL REFLUX DISEASE WITHOUT ESOPHAGITIS: ICD-10-CM

## 2019-09-19 DIAGNOSIS — I25.10 CORONARY ARTERY DISEASE INVOLVING NATIVE CORONARY ARTERY OF NATIVE HEART WITHOUT ANGINA PECTORIS: ICD-10-CM

## 2019-09-19 DIAGNOSIS — E55.9 VITAMIN D DEFICIENCY DISEASE: ICD-10-CM

## 2019-09-19 DIAGNOSIS — I10 ESSENTIAL HYPERTENSION: ICD-10-CM

## 2019-09-19 DIAGNOSIS — J30.2 CHRONIC SEASONAL ALLERGIC RHINITIS: ICD-10-CM

## 2019-09-19 DIAGNOSIS — I87.2 VENOUS INSUFFICIENCY, PERIPHERAL: ICD-10-CM

## 2019-09-19 DIAGNOSIS — R30.0 DYSURIA: Primary | ICD-10-CM

## 2019-09-19 DIAGNOSIS — M85.80 OSTEOPENIA, UNSPECIFIED LOCATION: ICD-10-CM

## 2019-09-19 DIAGNOSIS — J30.9 CHRONIC ALLERGIC RHINITIS: ICD-10-CM

## 2019-09-19 DIAGNOSIS — E78.2 MIXED HYPERLIPIDEMIA: ICD-10-CM

## 2019-09-19 DIAGNOSIS — K59.09 CHRONIC CONSTIPATION: ICD-10-CM

## 2019-09-19 DIAGNOSIS — G45.9 TIA (TRANSIENT ISCHEMIC ATTACK): ICD-10-CM

## 2019-09-19 DIAGNOSIS — M54.59 MECHANICAL LOW BACK PAIN: ICD-10-CM

## 2019-09-19 DIAGNOSIS — Z23 ENCOUNTER FOR IMMUNIZATION: ICD-10-CM

## 2019-09-19 DIAGNOSIS — Z72.0 DIPS TOBACCO: ICD-10-CM

## 2019-09-19 DIAGNOSIS — B37.41 CANDIDAL URETHRITIS: ICD-10-CM

## 2019-09-19 DIAGNOSIS — J44.9 COPD MIXED TYPE (HCC): ICD-10-CM

## 2019-09-19 DIAGNOSIS — E11.9 TYPE 2 DIABETES MELLITUS WITHOUT COMPLICATION, WITHOUT LONG-TERM CURRENT USE OF INSULIN (HCC): ICD-10-CM

## 2019-09-19 DIAGNOSIS — F41.8 DEPRESSION WITH ANXIETY: ICD-10-CM

## 2019-09-19 PROBLEM — Z12.11 ENCOUNTER FOR SCREENING COLONOSCOPY: Status: RESOLVED | Noted: 2019-05-10 | Resolved: 2019-09-19

## 2019-09-19 LAB
BILIRUB BLD-MCNC: ABNORMAL MG/DL
CLARITY, POC: CLEAR
COLOR UR: YELLOW
GLUCOSE UR STRIP-MCNC: NEGATIVE MG/DL
KETONES UR QL: NEGATIVE
LEUKOCYTE EST, POC: NEGATIVE
NITRITE UR-MCNC: NEGATIVE MG/ML
PH UR: 5.5 [PH] (ref 5–8)
PROT UR STRIP-MCNC: NEGATIVE MG/DL
RBC # UR STRIP: NEGATIVE /UL
SP GR UR: 1.02 (ref 1–1.03)
UROBILINOGEN UR QL: NORMAL

## 2019-09-19 PROCEDURE — 90674 CCIIV4 VAC NO PRSV 0.5 ML IM: CPT | Performed by: GENERAL PRACTICE

## 2019-09-19 PROCEDURE — 81003 URINALYSIS AUTO W/O SCOPE: CPT | Performed by: GENERAL PRACTICE

## 2019-09-19 PROCEDURE — G0008 ADMIN INFLUENZA VIRUS VAC: HCPCS | Performed by: GENERAL PRACTICE

## 2019-09-19 PROCEDURE — 99214 OFFICE O/P EST MOD 30 MIN: CPT | Performed by: GENERAL PRACTICE

## 2019-09-19 RX ORDER — METOPROLOL SUCCINATE 100 MG/1
100 TABLET, EXTENDED RELEASE ORAL DAILY
Qty: 30 TABLET | Refills: 5 | Status: SHIPPED | OUTPATIENT
Start: 2019-09-19 | End: 2020-02-11 | Stop reason: SDUPTHER

## 2019-09-19 RX ORDER — LISINOPRIL 10 MG/1
10 TABLET ORAL DAILY
Qty: 30 TABLET | Refills: 5 | Status: SHIPPED | OUTPATIENT
Start: 2019-09-19 | End: 2020-02-11 | Stop reason: SDUPTHER

## 2019-09-19 RX ORDER — AMLODIPINE BESYLATE 10 MG/1
10 TABLET ORAL EVERY EVENING
Qty: 30 TABLET | Refills: 5 | Status: SHIPPED | OUTPATIENT
Start: 2019-09-19 | End: 2020-02-11 | Stop reason: SDUPTHER

## 2019-09-19 RX ORDER — FLUTICASONE PROPIONATE 50 MCG
SPRAY, SUSPENSION (ML) NASAL
Qty: 1 BOTTLE | Refills: 5 | Status: SHIPPED | OUTPATIENT
Start: 2019-09-19 | End: 2020-02-11

## 2019-09-19 NOTE — PROGRESS NOTES
Subjective   Ethel Trotter is a 63 y.o. female.     History of Present Illness     Episode of Visual Loss  She experienced a 4 to 5 second episode of complete vision loss in her right eye 6 months ago.  This occurred during a period of anxiety and was associated with a generalized headache.  She continues to deny any further episodes and has had no weakness, numbness, tingling, or difficulty talking or understanding what is said to her.  She denies any new muscle aches and has had no rash, fever, or chills.  She remains on ASA 81 daily.  A duplex doppler ultrasound of the carotids was ordered at her initial visit but apparently never scheduled.  Unclear why but a duplex doppler ultrasound of the veins of both legs was performed on 7/2/2019. This was reported as normal.    Coronary artery disease  She has a history of CABG and previous M.I.. Previous diagnostic testing includes: cardiac catheterization Recent history: taking medications as instructed, no medication side effects noted, no chest pain on exertion, no dyspnea on exertion and no swelling of ankles. Patient's symptoms have been unchanged. Medication side effects include: none. She continues to be followed by cardiology   Lab Results   Component Value Date    WBC 6.83 06/21/2019    HGB 14.7 06/21/2019    HCT 48.2 (H) 06/21/2019    MCV 95.6 06/21/2019     06/21/2019     Diabetes  Current symptoms include none. Patient denies paresthesia of the feet, visual disturbances, polydipsia, polyuria, hypoglycemia and foot ulcerations. Evaluation to date has been: hemoglobin A1C. Home sugars: patient does not check sugars. Current treatments: metformin, DPP inhibitor - januvia and SGLT2 inhibitor - jardiance. Last dilated eye exam more than one year ago.   Lab Results   Component Value Date    HGBA1C 7.50 (H) 06/21/2019      Lab Results   Component Value Date    MICROALBUR 3.6 06/21/2019     Dyslipidemia  Compliance with treatment has been excellent. The  patient exercise daily. She is currently being prescribed the following medication for her dyslipidemia - atorvastatin, ezetimibe, and repatha.  She denies any side effects. Patient denies side effects associated with her medications.   Lab Results   Component Value Date    CHOL 95 06/21/2019    CHLPL 247 (H) 03/10/2016    TRIG 192 (H) 06/21/2019    HDL 49 06/21/2019    LDL 8 06/21/2019     Hypertension  Home blood pressure readings: not doing. Associated signs and symptoms: none. Patient denies: chest pain, palpitations, dyspnea, orthopnea, paroxysmal nocturnal dyspnea and peripheral edema. Current antihypertensive medications includes lisinopril, metoprolol, amlodipine and HCTZ. Medication compliance: unclear whether she is taking these as prescribed.   Lab Results   Component Value Date    CREATININE 1.09 (H) 06/21/2019     Lab Results   Component Value Date    K 5.2 06/21/2019     Constipation  She gives a more than 20-year history of constipation.  This has been associated with intermittent abdominal cramping and occasional bright red blood when wiping if she strains.  There is no history of any nausea or vomiting and she denies any diarrhea or melena.  There is no history of any night sweats or weight loss.  She has tried a number of over-the-counter stool softeners and laxatives with limited effect.  She feels Linzess has helped but she has experienced intermittent diarrhea.  She underwent a colonoscopy on 5/13/2019 by Dr. Reyes with removal of several tubular adenomas.      Low Back Pain   She continues to have intermittent low back pain. There's no history of any strain or trauma. The pain is described as a sharp left lower ache. This radiates to the left lateral and anterior hip. There have been no associated symptoms and she denies any stiffness, swelling, weakness, numbness or tingling. The pain is worse with weight bearing and improves with rest.  Plain films of the lumbar spine performed on 10/22/18  were reported as showing degenerative disc disease at L4-5.  Those done the same day of the left hip were reported as showing changes consistent with mild osteoarthritis    Depression  Onset was several years ago. Symptoms have remnained unchanged since last here. Current symptoms include: difficulty falling asleep and fatigue. Patient denies depressed mood, anhedonia, insomnia, recurrent thoughts of death and suicidal thoughts. Risk factors: negative life event mothers death and previous episode of depression. Treatment has included medication citalopram. She complains of the following side effects from the treatment: none.    The following portions of the patient's history were reviewed and updated as appropriate: allergies, current medications, past medical history, past social history and problem list.    Review of Systems   Constitutional: Positive for fatigue. Negative for appetite change, chills, fever and unexpected weight change.   HENT: Positive for congestion, ear pain (intermittent left ear fullness), postnasal drip and rhinorrhea. Negative for sneezing, sore throat and voice change.    Eyes: Negative for visual disturbance.   Respiratory: Negative for cough, shortness of breath and wheezing.    Cardiovascular: Negative for chest pain, palpitations and leg swelling.   Gastrointestinal: Negative for abdominal pain, anal bleeding, blood in stool, constipation, diarrhea, nausea and vomiting.   Endocrine: Negative for polydipsia.   Genitourinary: Positive for dysuria (initial). Negative for difficulty urinating, frequency, hematuria, menstrual problem, pelvic pain, urgency, vaginal bleeding and vaginal discharge.   Musculoskeletal: Positive for arthralgias and back pain. Negative for joint swelling, myalgias and neck pain.   Skin: Negative for color change.   Neurological: Negative for tremors, weakness, numbness and headaches.   Psychiatric/Behavioral: Positive for sleep disturbance. Negative for dysphoric  mood and suicidal ideas. The patient is not nervous/anxious.      Objective   Physical Exam   Constitutional: She is oriented to person, place, and time. No distress.   Bright and in fair spirits. No apparent distress. No pallor, jaundice, diaphoresis, or cyanosis.     HENT:   Head: Atraumatic.   Right Ear: Tympanic membrane, external ear and ear canal normal.   Left Ear: External ear and ear canal normal. Tympanic membrane is scarred.   Nose: Nose normal.   Mouth/Throat: Oropharynx is clear and moist. Mucous membranes are not pale and not cyanotic.   Eyes: EOM are normal. Pupils are equal, round, and reactive to light. No scleral icterus.   Neck: No JVD present. Carotid bruit is not present. No tracheal deviation present. No thyromegaly present.   Cardiovascular: Normal rate, regular rhythm, S1 normal, S2 normal and intact distal pulses. Exam reveals no gallop, no S3 and no S4.   No murmur heard.  Pulmonary/Chest: Breath sounds normal. No stridor. No respiratory distress.   Abdominal: Soft. Normal aorta and bowel sounds are normal. She exhibits no distension, no abdominal bruit and no mass. There is no hepatosplenomegaly. There is no tenderness. No hernia.   Musculoskeletal: She exhibits no tenderness or deformity.     Vascular Status -  Her right foot exhibits no edema. Her left foot exhibits no edema.  Lymphadenopathy:        Head (right side): No submandibular adenopathy present.        Head (left side): No submandibular adenopathy present.     She has no cervical adenopathy.   Neurological: She is alert and oriented to person, place, and time. A sensory deficit (decreased vibration sense toes of both feet) is present. No cranial nerve deficit. She exhibits normal muscle tone. Coordination normal.   Skin: Skin is warm and dry. No rash noted. She is not diaphoretic. No cyanosis. No pallor. Nails show no clubbing.   Psychiatric: She has a normal mood and affect.     Assessment/Plan   Problems Addressed this Visit         Cardiovascular and Mediastinum    Essential hypertension   Hypertension: close to goal. Evidence of target organ damage: coronary artery disease and possible TIA.  Encouraged to continue to work on diet and exercise plan.   Continue current medication    Relevant Medications    lisinopril (PRINIVIL,ZESTRIL) 10 MG tablet    metoprolol succinate XL (TOPROL-XL) 100 MG 24 hr tablet    amLODIPine (NORVASC) 10 MG tablet    Hyperlipidemia  As above.   Continue current medication.    Coronary artery disease involving native coronary artery  Reminded regarding risk factor modification with an emphasis on tobacco cessation.  Continue current medication  Follow up with cardiology     Relevant Medications    metoprolol succinate XL (TOPROL-XL) 100 MG 24 hr tablet    amLODIPine (NORVASC) 10 MG tablet    Venous insufficiency, peripheral    TIA (transient ischemic attack)  Patient uninterested in pursuing this further at present but will seek immediate assessment any further visual loss or any other neurologic deficit       Respiratory    COPD mixed type (CMS/HCC)    Relevant Medications    fluticasone (FLONASE) 50 MCG/ACT nasal spray    Chronic seasonal allergic rhinitis    Relevant Medications    fluticasone (FLONASE) 50 MCG/ACT nasal spray       Digestive    Vitamin D deficiency disease  Continue supplementation with monitoring.    Gastroesophageal reflux disease without esophagitis    Chronic constipation  Reminded regarding lifestyle modification  We will reduce the dose of linzess  Relevant Medications   linzess 72 mcg capsule          Endocrine    Type 2 diabetes mellitus without complication, without long-term current use of insulin (CMS/[facility])  Diabetes mellitus Type II, under fair control.   Encouraged to continue to pursue ADA diet  Encouraged aerobic exercise.  Continue current medication  Updated labs will be drawn at her return.       Nervous and Auditory    Mechanical low back pain       Musculoskeletal and  Integument    Osteopenia       Genitourinary    Candidal urethritis  Oral fluconazole  Encouraged to report if any worse, any new symptoms, or if not resolving over the next 3 to 4 days  Relevant Orders   POCT urinalysis dipstick, automated (Completed)  Relevant Medications   Fluconazole             Other    Depression with anxiety    Dips tobacco    Encounter for immunization    Relevant Orders    Flucelvax Quad=>4Years (PFS) (Completed)    Healthcare maintenance  Recommended a flu shot    Relevant Orders    Flucelvax Quad=>4Years (PFS) (Completed)

## 2019-12-09 RX ORDER — EVOLOCUMAB 140 MG/ML
INJECTION, SOLUTION SUBCUTANEOUS
Qty: 2 ML | Refills: 5 | Status: SHIPPED | OUTPATIENT
Start: 2019-12-09 | End: 2020-01-06

## 2020-01-02 ENCOUNTER — PRIOR AUTHORIZATION (OUTPATIENT)
Dept: FAMILY MEDICINE CLINIC | Facility: CLINIC | Age: 65
End: 2020-01-02

## 2020-01-06 RX ORDER — EVOLOCUMAB 140 MG/ML
INJECTION, SOLUTION SUBCUTANEOUS
Qty: 2 ML | Refills: 5 | Status: SHIPPED | OUTPATIENT
Start: 2020-01-06 | End: 2020-02-11 | Stop reason: SDUPTHER

## 2020-02-06 DIAGNOSIS — E55.9 VITAMIN D DEFICIENCY DISEASE: ICD-10-CM

## 2020-02-06 DIAGNOSIS — F41.8 DEPRESSION WITH ANXIETY: ICD-10-CM

## 2020-02-06 DIAGNOSIS — E11.9 TYPE 2 DIABETES MELLITUS WITHOUT COMPLICATION, WITHOUT LONG-TERM CURRENT USE OF INSULIN (HCC): ICD-10-CM

## 2020-02-06 DIAGNOSIS — E78.2 MIXED HYPERLIPIDEMIA: ICD-10-CM

## 2020-02-06 DIAGNOSIS — I25.10 CORONARY ARTERY DISEASE INVOLVING NATIVE CORONARY ARTERY OF NATIVE HEART WITHOUT ANGINA PECTORIS: ICD-10-CM

## 2020-02-06 RX ORDER — ATORVASTATIN CALCIUM 80 MG/1
80 TABLET, FILM COATED ORAL EVERY EVENING
Qty: 30 TABLET | Refills: 5 | Status: SHIPPED | OUTPATIENT
Start: 2020-02-06 | End: 2020-02-11 | Stop reason: SDUPTHER

## 2020-02-06 RX ORDER — CITALOPRAM 20 MG/1
TABLET ORAL
Qty: 30 TABLET | Refills: 5 | Status: SHIPPED | OUTPATIENT
Start: 2020-02-06 | End: 2020-02-11 | Stop reason: SDUPTHER

## 2020-02-06 RX ORDER — EZETIMIBE 10 MG/1
10 TABLET ORAL NIGHTLY
Qty: 30 TABLET | Refills: 5 | Status: SHIPPED | OUTPATIENT
Start: 2020-02-06 | End: 2020-02-11

## 2020-02-06 RX ORDER — ISOSORBIDE MONONITRATE 30 MG/1
30 TABLET, EXTENDED RELEASE ORAL EVERY MORNING
Qty: 30 TABLET | Refills: 5 | Status: SHIPPED | OUTPATIENT
Start: 2020-02-06 | End: 2020-06-09 | Stop reason: SDUPTHER

## 2020-02-06 RX ORDER — ERGOCALCIFEROL 1.25 MG/1
CAPSULE ORAL
Qty: 4 CAPSULE | Refills: 5 | Status: SHIPPED | OUTPATIENT
Start: 2020-02-06 | End: 2020-02-11 | Stop reason: SDUPTHER

## 2020-02-06 RX ORDER — SITAGLIPTIN AND METFORMIN HYDROCHLORIDE 50; 500 MG/1; MG/1
TABLET, FILM COATED, EXTENDED RELEASE ORAL
Qty: 60 TABLET | Refills: 5 | Status: SHIPPED | OUTPATIENT
Start: 2020-02-06 | End: 2020-02-11 | Stop reason: SDUPTHER

## 2020-02-11 ENCOUNTER — OFFICE VISIT (OUTPATIENT)
Dept: FAMILY MEDICINE CLINIC | Facility: CLINIC | Age: 65
End: 2020-02-11

## 2020-02-11 DIAGNOSIS — F41.8 DEPRESSION WITH ANXIETY: ICD-10-CM

## 2020-02-11 DIAGNOSIS — J30.2 CHRONIC SEASONAL ALLERGIC RHINITIS: ICD-10-CM

## 2020-02-11 DIAGNOSIS — E11.9 TYPE 2 DIABETES MELLITUS WITHOUT COMPLICATION, WITHOUT LONG-TERM CURRENT USE OF INSULIN (HCC): ICD-10-CM

## 2020-02-11 DIAGNOSIS — Z12.31 ENCOUNTER FOR SCREENING MAMMOGRAM FOR BREAST CANCER: ICD-10-CM

## 2020-02-11 DIAGNOSIS — Z72.0 DIPS TOBACCO: ICD-10-CM

## 2020-02-11 DIAGNOSIS — K21.9 GASTROESOPHAGEAL REFLUX DISEASE WITHOUT ESOPHAGITIS: ICD-10-CM

## 2020-02-11 DIAGNOSIS — Z00.00 HEALTHCARE MAINTENANCE: ICD-10-CM

## 2020-02-11 DIAGNOSIS — G45.9 TIA (TRANSIENT ISCHEMIC ATTACK): ICD-10-CM

## 2020-02-11 DIAGNOSIS — M25.552 LEFT HIP PAIN: ICD-10-CM

## 2020-02-11 DIAGNOSIS — E78.2 MIXED HYPERLIPIDEMIA: ICD-10-CM

## 2020-02-11 DIAGNOSIS — J44.9 COPD MIXED TYPE (HCC): ICD-10-CM

## 2020-02-11 DIAGNOSIS — M85.80 OSTEOPENIA, UNSPECIFIED LOCATION: ICD-10-CM

## 2020-02-11 DIAGNOSIS — Z23 ENCOUNTER FOR IMMUNIZATION: ICD-10-CM

## 2020-02-11 DIAGNOSIS — E55.9 VITAMIN D DEFICIENCY DISEASE: ICD-10-CM

## 2020-02-11 DIAGNOSIS — I10 ESSENTIAL HYPERTENSION: ICD-10-CM

## 2020-02-11 DIAGNOSIS — I25.10 CORONARY ARTERY DISEASE INVOLVING NATIVE CORONARY ARTERY OF NATIVE HEART WITHOUT ANGINA PECTORIS: ICD-10-CM

## 2020-02-11 DIAGNOSIS — K59.09 CHRONIC CONSTIPATION: ICD-10-CM

## 2020-02-11 DIAGNOSIS — I87.2 VENOUS INSUFFICIENCY, PERIPHERAL: Primary | ICD-10-CM

## 2020-02-11 DIAGNOSIS — M81.0 AGE-RELATED OSTEOPOROSIS WITHOUT CURRENT PATHOLOGICAL FRACTURE: ICD-10-CM

## 2020-02-11 PROCEDURE — 83036 HEMOGLOBIN GLYCOSYLATED A1C: CPT | Performed by: GENERAL PRACTICE

## 2020-02-11 PROCEDURE — 36415 COLL VENOUS BLD VENIPUNCTURE: CPT | Performed by: GENERAL PRACTICE

## 2020-02-11 PROCEDURE — 82306 VITAMIN D 25 HYDROXY: CPT | Performed by: GENERAL PRACTICE

## 2020-02-11 PROCEDURE — 90636 HEP A/HEP B VACC ADULT IM: CPT | Performed by: GENERAL PRACTICE

## 2020-02-11 PROCEDURE — 80061 LIPID PANEL: CPT | Performed by: GENERAL PRACTICE

## 2020-02-11 PROCEDURE — 90471 IMMUNIZATION ADMIN: CPT | Performed by: GENERAL PRACTICE

## 2020-02-11 PROCEDURE — 82043 UR ALBUMIN QUANTITATIVE: CPT | Performed by: GENERAL PRACTICE

## 2020-02-11 PROCEDURE — 99214 OFFICE O/P EST MOD 30 MIN: CPT | Performed by: GENERAL PRACTICE

## 2020-02-11 PROCEDURE — 85025 COMPLETE CBC W/AUTO DIFF WBC: CPT | Performed by: GENERAL PRACTICE

## 2020-02-11 PROCEDURE — 80053 COMPREHEN METABOLIC PANEL: CPT | Performed by: GENERAL PRACTICE

## 2020-02-11 RX ORDER — METOPROLOL SUCCINATE 100 MG/1
100 TABLET, EXTENDED RELEASE ORAL DAILY
Qty: 30 TABLET | Refills: 5 | Status: SHIPPED | OUTPATIENT
Start: 2020-02-11 | End: 2020-06-09 | Stop reason: SDUPTHER

## 2020-02-11 RX ORDER — ATORVASTATIN CALCIUM 80 MG/1
80 TABLET, FILM COATED ORAL EVERY EVENING
Qty: 30 TABLET | Refills: 5 | Status: SHIPPED | OUTPATIENT
Start: 2020-02-11 | End: 2020-06-09 | Stop reason: SDUPTHER

## 2020-02-11 RX ORDER — CITALOPRAM 20 MG/1
20 TABLET ORAL DAILY
Qty: 30 TABLET | Refills: 5 | Status: SHIPPED | OUTPATIENT
Start: 2020-02-11 | End: 2020-06-09 | Stop reason: SDUPTHER

## 2020-02-11 RX ORDER — ERGOCALCIFEROL 1.25 MG/1
50000 CAPSULE ORAL WEEKLY
Qty: 4 CAPSULE | Refills: 5 | Status: SHIPPED | OUTPATIENT
Start: 2020-02-11 | End: 2020-06-09 | Stop reason: SDUPTHER

## 2020-02-11 RX ORDER — EZETIMIBE 10 MG/1
10 TABLET ORAL NIGHTLY
Qty: 30 TABLET | Refills: 5 | Status: CANCELLED | OUTPATIENT
Start: 2020-02-11

## 2020-02-11 RX ORDER — NITROGLYCERIN 0.4 MG/1
0.4 TABLET SUBLINGUAL AS NEEDED
Qty: 25 TABLET | Refills: 5 | Status: SHIPPED | OUTPATIENT
Start: 2020-02-11 | End: 2020-06-09 | Stop reason: SDUPTHER

## 2020-02-11 RX ORDER — LISINOPRIL 10 MG/1
10 TABLET ORAL DAILY
Qty: 30 TABLET | Refills: 5 | Status: SHIPPED | OUTPATIENT
Start: 2020-02-11 | End: 2020-06-09 | Stop reason: SDUPTHER

## 2020-02-11 RX ORDER — EZETIMIBE 10 MG/1
10 TABLET ORAL NIGHTLY
Qty: 30 TABLET | Refills: 5 | Status: SHIPPED | OUTPATIENT
Start: 2020-02-11 | End: 2020-06-09 | Stop reason: SDUPTHER

## 2020-02-11 RX ORDER — ASPIRIN 81 MG/1
81 TABLET ORAL DAILY
Qty: 30 TABLET | Refills: 5 | Status: SHIPPED | OUTPATIENT
Start: 2020-02-11 | End: 2020-06-09 | Stop reason: SDUPTHER

## 2020-02-11 RX ORDER — AMLODIPINE BESYLATE 10 MG/1
10 TABLET ORAL EVERY EVENING
Qty: 30 TABLET | Refills: 5 | Status: SHIPPED | OUTPATIENT
Start: 2020-02-11 | End: 2020-06-09 | Stop reason: SDUPTHER

## 2020-02-11 NOTE — PROGRESS NOTES
Subjective   Ethel Trotter is a 64 y.o. female.     History of Present Illness     Left Hip Pain  She returns with an approximate 1 month history of left hip pain.  There is no history of any strain or trauma nor any change in her activities.  The pain is described as sharp anterior lateral ache.  When severe this radiates to the lateral thigh.  The pain has not radiated elsewhere and is been unassociated with any other symptoms.  There is no history of any stiffness or swelling and she denies any changes in her strength, sensation, or bowel/bladder control.  The pain increases with activity particularly stepping up or down stairs.  She has had problems with that hip in the past.  Plain films performed on 10/22/2018 were reported as showing changes consistent with mild osteoarthritis    Low Back Pain   She continues to have intermittent low back pain. There's no history of any strain or trauma. The pain is described as a sharp left lower ache. This radiates to the left lateral and anterior hip. There have been no associated symptoms and she denies any stiffness, swelling, weakness, numbness or tingling. The pain is worse with weight bearing and improves with rest.  Plain films of the lumbar spine performed on 10/22/18 were reported as showing degenerative disc disease at L4-5.     Episode of Visual Loss  She experienced a 4 to 5 second episode of complete vision loss in her right eye last year.  This occurred during a period of anxiety and was associated with a generalized headache.  She continues to deny any further episodes and has had no weakness, numbness, tingling, or difficulty talking or understanding what is said to her.  She denies any new muscle aches and has had no rash, fever, or chills.  She remains on ASA 81 daily.  A duplex doppler ultrasound of the carotids was ordered but never scheduled.  Unclear why but a duplex doppler ultrasound of the veins of both legs was performed on 7/2/2019. This was  reported as normal.    Coronary artery disease  She has a history of CABG and previous M.I.. Previous diagnostic testing includes: cardiac catheterization Recent history: taking medications as instructed, no medication side effects noted, no chest pain on exertion, no dyspnea on exertion and no swelling of ankles. Patient's symptoms have been unchanged. Medication side effects include: none. She continues to be followed by cardiology     Diabetes  Current symptoms include none. Patient denies paresthesia of the feet, visual disturbances, polydipsia, polyuria, hypoglycemia and foot ulcerations. Evaluation to date has been: hemoglobin A1C. Home sugars: patient does not check sugars. Current treatments: metformin, DPP inhibitor - januvia.  She stopped jardiance sometime ago due to recurrent yeast infections.  Last dilated eye exam more than one year ago.     Dyslipidemia  Compliance with treatment has been excellent. The patient exercise daily. She is currently being prescribed the following medication for her dyslipidemia - atorvastatin, ezetimibe, and repatha.  She denies any side effects. Patient denies side effects associated with her medications.     Hypertension  Home blood pressure readings: not doing. Associated signs and symptoms: none. Patient denies: chest pain, palpitations, dyspnea, orthopnea, paroxysmal nocturnal dyspnea and peripheral edema. Current antihypertensive medications includes lisinopril, metoprolol, amlodipine.  She has apparently been off lisinopril and amlodipine since last here. She states that she has not received them at her pharmacy    Depression  Onset was several years ago. Symptoms have remnained unchanged since last here. Current symptoms include: difficulty falling asleep and fatigue. Patient denies depressed mood, anhedonia, insomnia, recurrent thoughts of death and suicidal thoughts. Risk factors: negative life event mothers death and previous episode of depression. Treatment has  included medication citalopram. She complains of the following side effects from the treatment: none.    The following portions of the patient's history were reviewed and updated as appropriate: allergies, current medications, past medical history, past social history and problem list.    Review of Systems   Constitutional: Positive for fatigue. Negative for appetite change, chills, fever and unexpected weight change.   HENT: Positive for congestion, ear pain (intermittent left ear fullness) and rhinorrhea. Negative for postnasal drip, sneezing, sore throat and voice change.    Eyes: Negative for visual disturbance.   Respiratory: Negative for cough, shortness of breath and wheezing.    Cardiovascular: Negative for chest pain, palpitations and leg swelling.   Gastrointestinal: Negative for abdominal pain, anal bleeding, blood in stool, constipation, diarrhea, nausea and vomiting.   Endocrine: Negative for polydipsia.   Genitourinary: Negative for difficulty urinating, dysuria, frequency, hematuria, urgency and vaginal discharge.   Musculoskeletal: Positive for arthralgias and back pain. Negative for joint swelling, myalgias and neck pain.   Skin: Negative for rash.   Neurological: Negative for weakness, numbness and headaches.   Psychiatric/Behavioral: Positive for sleep disturbance. Negative for dysphoric mood and suicidal ideas. The patient is not nervous/anxious.      Objective   Physical Exam   Constitutional: She is oriented to person, place, and time. No distress.   Bright and in fair spirits. No apparent distress. No pallor, jaundice, diaphoresis, or cyanosis.     HENT:   Head: Atraumatic.   Right Ear: Tympanic membrane, external ear and ear canal normal.   Left Ear: External ear and ear canal normal. Tympanic membrane is scarred.   Nose: Nose normal.   Mouth/Throat: Oropharynx is clear and moist. Mucous membranes are not pale and not cyanotic.   Eyes: Pupils are equal, round, and reactive to light. EOM are  normal. No scleral icterus.   Neck: No JVD present. Carotid bruit is not present. No tracheal deviation present. No thyromegaly present.   Cardiovascular: Normal rate, regular rhythm, S1 normal, S2 normal and intact distal pulses. Exam reveals no gallop, no S3 and no S4.   No murmur heard.  Pulmonary/Chest: Breath sounds normal. No stridor. No respiratory distress.   Musculoskeletal: She exhibits no tenderness or deformity.        Left hip: She exhibits normal range of motion (mild discomfort with full IR), no tenderness and no crepitus.        Lumbar back: She exhibits no tenderness.   Negative straight leg raise.     Vascular Status -  Her right foot exhibits no edema. Her left foot exhibits no edema.  Lymphadenopathy:        Head (right side): No submandibular adenopathy present.        Head (left side): No submandibular adenopathy present.     She has no cervical adenopathy.   Neurological: She is alert and oriented to person, place, and time. A sensory deficit (decreased vibration sense toes of both feet) is present. No cranial nerve deficit. She exhibits normal muscle tone. Coordination normal.   Reflex Scores:       Patellar reflexes are 2+ on the right side and 2+ on the left side.       Achilles reflexes are 1+ on the right side and 1+ on the left side.  Skin: Skin is warm and dry. No rash noted. She is not diaphoretic. No cyanosis. No pallor. Nails show no clubbing.   Psychiatric: She has a normal mood and affect.     Assessment/Plan   Problems Addressed this Visit        Cardiovascular and Mediastinum    Essential hypertension   Hypertension: elevated today. Evidence of target organ damage: coronary artery disease and possible TIA.  Encouraged to continue to work on diet and exercise plan.   Lisinopril and amlodipine will be resumed  Updated labs drawn.    Relevant Medications    lisinopril (PRINIVIL,ZESTRIL) 10 MG tablet    metoprolol succinate XL (TOPROL-XL) 100 MG 24 hr tablet    amLODIPine (NORVASC)  10 MG tablet    Other Relevant Orders    CBC & Differential (Completed)    Comprehensive Metabolic Panel (Completed)    CBC Auto Differential (Completed)    Mixed hyperlipidemia  As above.   Continue current medication.    Relevant Medications    Evolocumab (REPATHA SURECLICK) 140 MG/ML solution auto-injector    atorvastatin (LIPITOR) 80 MG tablet    ezetimibe (ZETIA) 10 MG tablet    Other Relevant Orders    Lipid Panel (Completed)    Coronary artery disease involving native coronary artery  Reminded regarding risk factor modification with an emphasis on tobacco cessation.  Continue current medication    Relevant Medications    aspirin (ASPIR-LOW) 81 MG EC tablet    nitroglycerin (NITROSTAT) 0.4 MG SL tablet    metoprolol succinate XL (TOPROL-XL) 100 MG 24 hr tablet    amLODIPine (NORVASC) 10 MG tablet    Venous insufficiency, peripheral     TIA (transient ischemic attack)  As above.   Continue current medication.       Respiratory    COPD mixed type (CMS/HCC)    Chronic seasonal allergic rhinitis       Digestive    Vitamin D deficiency disease  Continue supplementation with monitoring.    Relevant Medications    vitamin D (ERGOCALCIFEROL) 1.25 MG (43281 UT) capsule capsule    Other Relevant Orders    Vitamin D 25 Hydroxy (Completed)    Gastroesophageal reflux disease without esophagitis    Chronic constipation    Relevant Medications    linaclotide (LINZESS) 72 MCG capsule capsule       Endocrine    Type 2 diabetes mellitus without complication, without long-term current use of insulin (CMS/HCC)  Diabetes mellitus Type II, under fair control.   Encouraged to continue to pursue ADA diet  Encouraged aerobic exercise.  Continue current medication  If A1c significantly above goal will discuss adding a GLP agonist    Relevant Medications    SITagliptin-metFORMIN HCl ER (JANUMET XR)  MG tablet    Other Relevant Orders    Hemoglobin A1c (Completed)    MicroAlbumin, Urine, Random - Urine, Clean Catch (Completed)        Nervous and Auditory    Left hip pain  Reminded regarding symptomatic treatment.   Updated films arranged  Encouraged to report if any worse or if any new symptoms or concerns.    Relevant Orders    XR Hip With or Without Pelvis 2 - 3 View Left       Musculoskeletal and Integument    Osteopenia  We will also arrange an updated DEXA scan    Relevant Orders    DEXA Bone Density Axial       Other    Depression with anxiety  Significant situational component.   Supportive therapy.   Continue current medication.    Relevant Medications    citalopram (CeleXA) 20 MG tablet    Dips tobacco    Encounter for immunization    Relevant Orders    Hepatitis A Hepatitis B Combined Vaccine IM (Completed)    Healthcare maintenance  Hepatitis A #2/hepatitis B #3 administered  Encouraged to follow-up with [facility]  We will arrange an updated mammogram    Relevant Medications    Zoster Vac Recomb Adjuvanted (SHINGRIX) 50 MCG/0.5ML reconstituted suspension    Other Relevant Orders    Hepatitis A Hepatitis B Combined Vaccine IM (Completed)    Mammo Screening Digital Tomosynthesis Bilateral With CAD    DEXA Bone Density Axial

## 2020-02-12 VITALS
OXYGEN SATURATION: 96 % | HEART RATE: 61 BPM | HEIGHT: 63 IN | SYSTOLIC BLOOD PRESSURE: 170 MMHG | RESPIRATION RATE: 12 BRPM | DIASTOLIC BLOOD PRESSURE: 92 MMHG | WEIGHT: 139 LBS | TEMPERATURE: 98.9 F | BODY MASS INDEX: 24.63 KG/M2

## 2020-02-12 DIAGNOSIS — N28.9 RENAL INSUFFICIENCY: Primary | ICD-10-CM

## 2020-02-12 PROBLEM — B37.41: Status: RESOLVED | Noted: 2019-09-19 | Resolved: 2020-02-12

## 2020-02-12 LAB
25(OH)D3 SERPL-MCNC: 54.2 NG/ML (ref 30–100)
ALBUMIN SERPL-MCNC: 4.1 G/DL (ref 3.5–5.2)
ALBUMIN UR-MCNC: 4.6 MG/DL
ALBUMIN/GLOB SERPL: 1.2 G/DL
ALP SERPL-CCNC: 91 U/L (ref 39–117)
ALT SERPL W P-5'-P-CCNC: 16 U/L (ref 1–33)
ANION GAP SERPL CALCULATED.3IONS-SCNC: 12.6 MMOL/L (ref 5–15)
AST SERPL-CCNC: 19 U/L (ref 1–32)
BASOPHILS # BLD AUTO: 0.06 10*3/MM3 (ref 0–0.2)
BASOPHILS NFR BLD AUTO: 0.8 % (ref 0–1.5)
BILIRUB SERPL-MCNC: 0.4 MG/DL (ref 0.2–1.2)
BUN BLD-MCNC: 17 MG/DL (ref 8–23)
BUN/CREAT SERPL: 11.6 (ref 7–25)
CALCIUM SPEC-SCNC: 10.1 MG/DL (ref 8.6–10.5)
CHLORIDE SERPL-SCNC: 98 MMOL/L (ref 98–107)
CHOLEST SERPL-MCNC: 105 MG/DL (ref 0–200)
CO2 SERPL-SCNC: 25.4 MMOL/L (ref 22–29)
CREAT BLD-MCNC: 1.47 MG/DL (ref 0.57–1)
DEPRECATED RDW RBC AUTO: 41.8 FL (ref 37–54)
EOSINOPHIL # BLD AUTO: 0.73 10*3/MM3 (ref 0–0.4)
EOSINOPHIL NFR BLD AUTO: 9.1 % (ref 0.3–6.2)
ERYTHROCYTE [DISTWIDTH] IN BLOOD BY AUTOMATED COUNT: 13.3 % (ref 12.3–15.4)
GFR SERPL CREATININE-BSD FRML MDRD: 36 ML/MIN/1.73
GLOBULIN UR ELPH-MCNC: 3.4 GM/DL
GLUCOSE BLD-MCNC: 185 MG/DL (ref 65–99)
HBA1C MFR BLD: 7.55 % (ref 4.8–5.6)
HCT VFR BLD AUTO: 41.6 % (ref 34–46.6)
HDLC SERPL-MCNC: 46 MG/DL (ref 40–60)
HGB BLD-MCNC: 13.2 G/DL (ref 12–15.9)
IMM GRANULOCYTES # BLD AUTO: 0.02 10*3/MM3 (ref 0–0.05)
IMM GRANULOCYTES NFR BLD AUTO: 0.3 % (ref 0–0.5)
LDLC SERPL CALC-MCNC: 34 MG/DL (ref 0–100)
LDLC/HDLC SERPL: 0.73 {RATIO}
LYMPHOCYTES # BLD AUTO: 1.7 10*3/MM3 (ref 0.7–3.1)
LYMPHOCYTES NFR BLD AUTO: 21.3 % (ref 19.6–45.3)
MCH RBC QN AUTO: 27.5 PG (ref 26.6–33)
MCHC RBC AUTO-ENTMCNC: 31.7 G/DL (ref 31.5–35.7)
MCV RBC AUTO: 86.7 FL (ref 79–97)
MONOCYTES # BLD AUTO: 0.57 10*3/MM3 (ref 0.1–0.9)
MONOCYTES NFR BLD AUTO: 7.1 % (ref 5–12)
NEUTROPHILS # BLD AUTO: 4.92 10*3/MM3 (ref 1.7–7)
NEUTROPHILS NFR BLD AUTO: 61.4 % (ref 42.7–76)
NRBC BLD AUTO-RTO: 0 /100 WBC (ref 0–0.2)
PLATELET # BLD AUTO: 183 10*3/MM3 (ref 140–450)
PMV BLD AUTO: 10.7 FL (ref 6–12)
POTASSIUM BLD-SCNC: 5.5 MMOL/L (ref 3.5–5.2)
PROT SERPL-MCNC: 7.5 G/DL (ref 6–8.5)
RBC # BLD AUTO: 4.8 10*6/MM3 (ref 3.77–5.28)
SODIUM BLD-SCNC: 136 MMOL/L (ref 136–145)
TRIGL SERPL-MCNC: 126 MG/DL (ref 0–150)
VLDLC SERPL-MCNC: 25.2 MG/DL (ref 5–40)
WBC NRBC COR # BLD: 8 10*3/MM3 (ref 3.4–10.8)

## 2020-02-14 NOTE — PROGRESS NOTES
Spoke with pt about the following per Dr. Montez.     -- Please let patient know that her kidney function is abnormal.   I am going to arrange for an ultrasound of the kidneys at the time of her mammogram and her to drop by for updated labs later next week   Orders have been placed in epic

## 2020-02-18 ENCOUNTER — LAB (OUTPATIENT)
Dept: FAMILY MEDICINE CLINIC | Facility: CLINIC | Age: 65
End: 2020-02-18

## 2020-02-18 DIAGNOSIS — N28.9 RENAL INSUFFICIENCY: ICD-10-CM

## 2020-02-18 PROCEDURE — 85652 RBC SED RATE AUTOMATED: CPT | Performed by: GENERAL PRACTICE

## 2020-02-18 PROCEDURE — 36415 COLL VENOUS BLD VENIPUNCTURE: CPT | Performed by: GENERAL PRACTICE

## 2020-02-18 PROCEDURE — 80053 COMPREHEN METABOLIC PANEL: CPT | Performed by: GENERAL PRACTICE

## 2020-02-19 LAB
ALBUMIN SERPL-MCNC: 4.1 G/DL (ref 3.5–5.2)
ALBUMIN/GLOB SERPL: 1.2 G/DL
ALP SERPL-CCNC: 83 U/L (ref 39–117)
ALT SERPL W P-5'-P-CCNC: 13 U/L (ref 1–33)
ANION GAP SERPL CALCULATED.3IONS-SCNC: 11.5 MMOL/L (ref 5–15)
AST SERPL-CCNC: 16 U/L (ref 1–32)
BILIRUB SERPL-MCNC: 0.2 MG/DL (ref 0.2–1.2)
BUN BLD-MCNC: 26 MG/DL (ref 8–23)
BUN/CREAT SERPL: 20.8 (ref 7–25)
CALCIUM SPEC-SCNC: 10.6 MG/DL (ref 8.6–10.5)
CHLORIDE SERPL-SCNC: 98 MMOL/L (ref 98–107)
CO2 SERPL-SCNC: 26.5 MMOL/L (ref 22–29)
CREAT BLD-MCNC: 1.25 MG/DL (ref 0.57–1)
ERYTHROCYTE [SEDIMENTATION RATE] IN BLOOD: 7 MM/HR (ref 0–30)
GFR SERPL CREATININE-BSD FRML MDRD: 43 ML/MIN/1.73
GLOBULIN UR ELPH-MCNC: 3.5 GM/DL
GLUCOSE BLD-MCNC: 110 MG/DL (ref 65–99)
POTASSIUM BLD-SCNC: 5.3 MMOL/L (ref 3.5–5.2)
PROT SERPL-MCNC: 7.6 G/DL (ref 6–8.5)
SODIUM BLD-SCNC: 136 MMOL/L (ref 136–145)

## 2020-02-21 ENCOUNTER — HOSPITAL ENCOUNTER (OUTPATIENT)
Dept: ULTRASOUND IMAGING | Facility: HOSPITAL | Age: 65
End: 2020-02-21

## 2020-02-26 ENCOUNTER — HOSPITAL ENCOUNTER (OUTPATIENT)
Dept: GENERAL RADIOLOGY | Facility: HOSPITAL | Age: 65
Discharge: HOME | End: 2020-02-26

## 2020-02-26 ENCOUNTER — HOSPITAL ENCOUNTER (OUTPATIENT)
Dept: ULTRASOUND IMAGING | Facility: HOSPITAL | Age: 65
Discharge: HOME | End: 2020-02-26
Admitting: GENERAL PRACTICE

## 2020-02-26 DIAGNOSIS — N28.9 RENAL INSUFFICIENCY: ICD-10-CM

## 2020-02-26 PROCEDURE — 73502 X-RAY EXAM HIP UNI 2-3 VIEWS: CPT

## 2020-02-26 PROCEDURE — 73502 X-RAY EXAM HIP UNI 2-3 VIEWS: CPT | Performed by: RADIOLOGY

## 2020-02-26 PROCEDURE — 76700 US EXAM ABDOM COMPLETE: CPT

## 2020-02-26 PROCEDURE — 76700 US EXAM ABDOM COMPLETE: CPT | Performed by: RADIOLOGY

## 2020-02-28 ENCOUNTER — TELEPHONE (OUTPATIENT)
Dept: FAMILY MEDICINE CLINIC | Facility: CLINIC | Age: 65
End: 2020-02-28

## 2020-02-28 DIAGNOSIS — N28.1 RENAL CYST: Primary | ICD-10-CM

## 2020-02-28 NOTE — TELEPHONE ENCOUNTER
Spoke with pt about the following per Dr. Montez.       ----- Message from Sacha Montez MD sent at 2/25/2020 12:30 PM EST -----  Her kidney test was better - close to where it was a year ago  She should follow up with her U/S and then we will call her with how it looks as well    ----- Message -----  From: Nadia Wagoner MA  Sent: 2/25/2020   9:38 AM EST  To: Sacha Montez MD    Lab results? Thanks!

## 2020-02-28 NOTE — PROGRESS NOTES
She agreed to see Lexi.     -- please let Mrs Trotter know that her U/S shows a fairly large cyst on the left kidney   She should probably see a urologist sometime - if she would like to go ahead let me know and I will put in the referral to Dr Givens when in [place]

## 2020-02-28 NOTE — PROGRESS NOTES
She said she would pass on this for now.     -- Please let patient know that the x-ray of her hip actually looks quite good   Would she be interested in an orthopedic assessment?

## 2020-03-16 ENCOUNTER — APPOINTMENT (OUTPATIENT)
Dept: ULTRASOUND IMAGING | Facility: HOSPITAL | Age: 65
End: 2020-03-16

## 2020-05-28 ENCOUNTER — OFFICE VISIT (OUTPATIENT)
Dept: UROLOGY | Facility: CLINIC | Age: 65
End: 2020-05-28

## 2020-05-28 VITALS — BODY MASS INDEX: 24.61 KG/M2 | TEMPERATURE: 98.6 F | HEIGHT: 63 IN | WEIGHT: 138.89 LBS

## 2020-05-28 DIAGNOSIS — N28.1 RENAL CYST: ICD-10-CM

## 2020-05-28 DIAGNOSIS — N28.89 RENAL MASS: Primary | ICD-10-CM

## 2020-05-28 PROCEDURE — 99203 OFFICE O/P NEW LOW 30 MIN: CPT | Performed by: UROLOGY

## 2020-05-28 NOTE — PROGRESS NOTES
Chief Complaint:          Chief Complaint   Patient presents with   • RENAL MASS       HPI:   64 y.o. female referred for evaluation of a left-sided mass she has flank pain.  No nausea, no vomiting, no real early satiety, she has a brother who has a cyst and lung cancer.  She has no allergies she has had no other significant complaints she has no blood in the urine but she does chew tobacco.  She is a  2 para 1 abortus 1.  She has no other significant symptomatology.  I am to go ahead and get imaging and follow-up with her based on this.  This is never been characterized.  She has no absolute indicators for intervention.  This is in all likelihood a Bosniak 1 renal cyst we will get to the bottom of it.      Past Medical History:        Past Medical History:   Diagnosis Date   • Arthritis    • CAD (coronary artery disease)    • COPD (chronic obstructive pulmonary disease) (CMS/HCC)    • Diabetes mellitus (CMS/HCC)    • Elevated cholesterol    • Fracture of wrist    • GERD (gastroesophageal reflux disease)    • Hepatitis C    • History of EKG 2016    ABNORMAL   • History of transfusion    • Hyperlipidemia    • Hypertension    • Myocardial infarction (CMS/HCC)     x2 last one 5 years ago   • Osteopenia    • Pancreatitis    • Stroke (CMS/HCC)          Current Meds:     Current Outpatient Medications   Medication Sig Dispense Refill   • amLODIPine (NORVASC) 10 MG tablet Take 1 tablet by mouth Every Evening. 30 tablet 5   • aspirin (ASPIR-LOW) 81 MG EC tablet Take 1 tablet by mouth Daily. 30 tablet 5   • atorvastatin (LIPITOR) 80 MG tablet Take 1 tablet by mouth Every Evening. 30 tablet 5   • citalopram (CeleXA) 20 MG tablet Take 1 tablet by mouth Daily. 30 tablet 5   • Evolocumab (REPATHA SURECLICK) 140 MG/ML solution auto-injector Inject 1 mL under the skin into the appropriate area as directed Every 14 (Fourteen) Days. 2 mL 5   • ezetimibe (ZETIA) 10 MG tablet Take 1 tablet by mouth Every Night. 30 tablet  5   • isosorbide mononitrate (IMDUR) 30 MG 24 hr tablet TAKE 1 TABLET BY MOUTH EVERY MORNING. 30 tablet 5   • linaclotide (LINZESS) 72 MCG capsule capsule Take 1 capsule by mouth Every Morning Before Breakfast. 30 capsule 5   • lisinopril (PRINIVIL,ZESTRIL) 10 MG tablet Take 1 tablet by mouth Daily. 30 tablet 5   • metoprolol succinate XL (TOPROL-XL) 100 MG 24 hr tablet Take 1 tablet by mouth Daily. 30 tablet 5   • nitroglycerin (NITROSTAT) 0.4 MG SL tablet Place 1 tablet under the tongue As Needed for Chest Pain. for chest pain, may repeat 3 doses then go to ER 25 tablet 5   • SITagliptin-metFORMIN HCl ER (JANUMET XR)  MG tablet Take 1 tablet by mouth 2 (Two) Times a Day. 60 tablet 5   • vitamin D (ERGOCALCIFEROL) 1.25 MG (39686 UT) capsule capsule Take 1 capsule by mouth 1 (One) Time Per Week. 4 capsule 5   • Zoster Vac Recomb Adjuvanted (SHINGRIX) 50 MCG/0.5ML reconstituted suspension Inject 0.5 mL into the appropriate muscle as directed by prescriber See Admin Instructions. Repeat in 2-6 months 1 each 1     No current facility-administered medications for this visit.         Allergies:      No Known Allergies     Past Surgical History:     Past Surgical History:   Procedure Laterality Date   • COLONOSCOPY N/A 5/13/2019    Procedure: COLONOSCOPY;  Surgeon: Arnav Reyes MD;  Location: [facility];  Service: Gastroenterology   • CORONARY ARTERY BYPASS GRAFT     • HYSTERECTOMY      1998   • TONSILLECTOMY           Social History:     Social History     Socioeconomic History   • Marital status:      Spouse name: Not on file   • Number of children: Not on file   • Years of education: Not on file   • Highest education level: Not on file   Tobacco Use   • Smoking status: Never Smoker   • Smokeless tobacco: Current User     Types: Chew   Substance and Sexual Activity   • Alcohol use: No   • Drug use: Yes     Types: Marijuana     Comment: OCCASIONAL   • Sexual activity: Defer       Family History:          Family History   Problem Relation Age of Onset   • Breast cancer Neg Hx        Review of Systems:     Review of Systems   Constitutional: Negative.  Negative for activity change, appetite change, chills, diaphoresis, fatigue and unexpected weight change.   HENT: Negative for congestion, dental problem, drooling, ear discharge, ear pain, facial swelling, hearing loss, mouth sores, nosebleeds, postnasal drip, rhinorrhea, sinus pressure, sneezing, sore throat, tinnitus, trouble swallowing and voice change.    Eyes: Negative.  Negative for photophobia, pain, discharge, redness, itching and visual disturbance.   Respiratory: Negative.  Negative for apnea, cough, choking, chest tightness, shortness of breath, wheezing and stridor.    Cardiovascular: Negative.  Negative for chest pain, palpitations and leg swelling.   Gastrointestinal: Negative.  Negative for abdominal distention, abdominal pain, anal bleeding, blood in stool, constipation, diarrhea, nausea, rectal pain and vomiting.   Endocrine: Negative.  Negative for cold intolerance, heat intolerance, polydipsia, polyphagia and polyuria.   Musculoskeletal: Negative.  Negative for arthralgias, back pain, gait problem, joint swelling, myalgias, neck pain and neck stiffness.   Skin: Negative.  Negative for color change, pallor, rash and wound.   Allergic/Immunologic: Negative.  Negative for environmental allergies, food allergies and immunocompromised state.   Neurological: Negative.  Negative for dizziness, tremors, seizures, syncope, facial asymmetry, speech difficulty, weakness, light-headedness, numbness and headaches.   Hematological: Negative.  Negative for adenopathy. Does not bruise/bleed easily.   Psychiatric/Behavioral: Negative for agitation, behavioral problems, confusion, decreased concentration, dysphoric mood, hallucinations, self-injury, sleep disturbance and suicidal ideas. The patient is not nervous/anxious and is not hyperactive.    All other  systems reviewed and are negative.      Physical Exam:     Physical Exam   Constitutional: She appears well-developed and well-nourished.   HENT:   Head: Normocephalic and atraumatic.   Right Ear: External ear normal.   Left Ear: External ear normal.   Mouth/Throat: Oropharynx is clear and moist.   Eyes: Pupils are equal, round, and reactive to light. Conjunctivae are normal.   Cardiovascular: Normal rate, regular rhythm, normal heart sounds and intact distal pulses.   Pulmonary/Chest: Effort normal and breath sounds normal.   Abdominal: Soft. Bowel sounds are normal. She exhibits no distension and no mass. There is no tenderness. There is no rebound and no guarding.   Genitourinary: No vaginal discharge found.   Musculoskeletal: Normal range of motion.   Neurological: She is alert. She has normal reflexes.   Skin: Skin is warm and dry.   Psychiatric: She has a normal mood and affect. Her behavior is normal. Judgment and thought content normal.       I have reviewed the following portions of the patient's history: allergies, current medications, past family history, past medical history, past social history, past surgical history, problem list and ROS and confirm it's accurate.      Procedure:       Assessment/Plan:   Renal mass-likely renal cyst  Renal cyst-I discussed the Bosniak classification of renal cysts.  I discussed the strict criteria involved with making a decision regarding intervention.  We discussed the fact that this is likely a Bosniak type I cyst which has sharp distinct borders and a thin wall and no internal echoes versus a Bosniak 2 with a thicker wall and faint striations.  We discussed the risks of malignancy in these situations is essentially 0 and requires no further intervention however we discussed the fact that a Bosniak 3 has about a 28% chance of malignancy and finally a Bosniak for probably is representative of a renal cell carcinoma variant.  He discussed the fact that these are  generally asymptomatic and do not require intervention and that the appropriate surgical management is a radiographic cyst puncture when causing pain or problems particularly on the left with an early satiety      Patient reports that she is not currently experiencing any symptoms of urinary incontinence.      Patient's Body mass index is 24.61 kg/m². BMI is within normal parameters. No follow-up required..              This document has been electronically signed by TIAGO STOCK MD May 28, 2020 11:27

## 2020-05-29 PROBLEM — N28.1 RENAL CYST: Status: ACTIVE | Noted: 2020-05-29

## 2020-06-09 ENCOUNTER — OFFICE VISIT (OUTPATIENT)
Dept: FAMILY MEDICINE CLINIC | Facility: CLINIC | Age: 65
End: 2020-06-09

## 2020-06-09 DIAGNOSIS — F41.8 DEPRESSION WITH ANXIETY: ICD-10-CM

## 2020-06-09 DIAGNOSIS — N28.1 RENAL CYST: ICD-10-CM

## 2020-06-09 DIAGNOSIS — E78.2 MIXED HYPERLIPIDEMIA: ICD-10-CM

## 2020-06-09 DIAGNOSIS — M54.59 MECHANICAL LOW BACK PAIN: ICD-10-CM

## 2020-06-09 DIAGNOSIS — M85.80 OSTEOPENIA, UNSPECIFIED LOCATION: ICD-10-CM

## 2020-06-09 DIAGNOSIS — E55.9 VITAMIN D DEFICIENCY DISEASE: ICD-10-CM

## 2020-06-09 DIAGNOSIS — K21.9 GASTROESOPHAGEAL REFLUX DISEASE WITHOUT ESOPHAGITIS: ICD-10-CM

## 2020-06-09 DIAGNOSIS — Z00.00 HEALTHCARE MAINTENANCE: ICD-10-CM

## 2020-06-09 DIAGNOSIS — N28.9 RENAL INSUFFICIENCY: ICD-10-CM

## 2020-06-09 DIAGNOSIS — I25.10 CORONARY ARTERY DISEASE INVOLVING NATIVE CORONARY ARTERY OF NATIVE HEART WITHOUT ANGINA PECTORIS: ICD-10-CM

## 2020-06-09 DIAGNOSIS — E11.9 TYPE 2 DIABETES MELLITUS WITHOUT COMPLICATION, WITHOUT LONG-TERM CURRENT USE OF INSULIN (HCC): ICD-10-CM

## 2020-06-09 DIAGNOSIS — I10 ESSENTIAL HYPERTENSION: ICD-10-CM

## 2020-06-09 DIAGNOSIS — I87.2 VENOUS INSUFFICIENCY, PERIPHERAL: Primary | ICD-10-CM

## 2020-06-09 DIAGNOSIS — M25.552 LEFT HIP PAIN: ICD-10-CM

## 2020-06-09 DIAGNOSIS — K59.09 CHRONIC CONSTIPATION: ICD-10-CM

## 2020-06-09 DIAGNOSIS — Z72.0 DIPS TOBACCO: ICD-10-CM

## 2020-06-09 DIAGNOSIS — J44.9 COPD MIXED TYPE (HCC): ICD-10-CM

## 2020-06-09 DIAGNOSIS — J30.2 CHRONIC SEASONAL ALLERGIC RHINITIS: ICD-10-CM

## 2020-06-09 DIAGNOSIS — G45.9 TIA (TRANSIENT ISCHEMIC ATTACK): ICD-10-CM

## 2020-06-09 PROCEDURE — G2025 DIS SITE TELE SVCS RHC/FQHC: HCPCS | Performed by: GENERAL PRACTICE

## 2020-06-09 RX ORDER — METOPROLOL SUCCINATE 100 MG/1
100 TABLET, EXTENDED RELEASE ORAL DAILY
Qty: 30 TABLET | Refills: 5 | Status: SHIPPED | OUTPATIENT
Start: 2020-06-09 | End: 2021-03-12 | Stop reason: SDUPTHER

## 2020-06-09 RX ORDER — ATORVASTATIN CALCIUM 80 MG/1
80 TABLET, FILM COATED ORAL EVERY EVENING
Qty: 30 TABLET | Refills: 5 | Status: SHIPPED | OUTPATIENT
Start: 2020-06-09 | End: 2021-03-12 | Stop reason: SDUPTHER

## 2020-06-09 RX ORDER — LISINOPRIL 10 MG/1
10 TABLET ORAL DAILY
Qty: 30 TABLET | Refills: 5 | Status: SHIPPED | OUTPATIENT
Start: 2020-06-09 | End: 2021-03-12 | Stop reason: SDUPTHER

## 2020-06-09 RX ORDER — CITALOPRAM 20 MG/1
20 TABLET ORAL DAILY
Qty: 30 TABLET | Refills: 5 | Status: SHIPPED | OUTPATIENT
Start: 2020-06-09 | End: 2021-03-12 | Stop reason: SDUPTHER

## 2020-06-09 RX ORDER — ASPIRIN 81 MG/1
81 TABLET ORAL DAILY
Qty: 30 TABLET | Refills: 5 | Status: SHIPPED | OUTPATIENT
Start: 2020-06-09 | End: 2021-03-12 | Stop reason: SDUPTHER

## 2020-06-09 RX ORDER — ISOSORBIDE MONONITRATE 30 MG/1
30 TABLET, EXTENDED RELEASE ORAL EVERY MORNING
Qty: 30 TABLET | Refills: 5 | Status: SHIPPED | OUTPATIENT
Start: 2020-06-09 | End: 2021-03-12 | Stop reason: SDUPTHER

## 2020-06-09 RX ORDER — ERGOCALCIFEROL 1.25 MG/1
50000 CAPSULE ORAL WEEKLY
Qty: 4 CAPSULE | Refills: 5 | Status: SHIPPED | OUTPATIENT
Start: 2020-06-09 | End: 2021-03-12 | Stop reason: SDUPTHER

## 2020-06-09 RX ORDER — AMLODIPINE BESYLATE 10 MG/1
10 TABLET ORAL EVERY EVENING
Qty: 30 TABLET | Refills: 5 | Status: SHIPPED | OUTPATIENT
Start: 2020-06-09 | End: 2021-03-12 | Stop reason: SDUPTHER

## 2020-06-09 RX ORDER — EZETIMIBE 10 MG/1
10 TABLET ORAL NIGHTLY
Qty: 30 TABLET | Refills: 5 | Status: SHIPPED | OUTPATIENT
Start: 2020-06-09 | End: 2021-03-12 | Stop reason: SDUPTHER

## 2020-06-09 RX ORDER — NITROGLYCERIN 0.4 MG/1
0.4 TABLET SUBLINGUAL AS NEEDED
Qty: 25 TABLET | Refills: 5 | Status: SHIPPED | OUTPATIENT
Start: 2020-06-09 | End: 2021-07-12

## 2020-06-09 NOTE — PROGRESS NOTES
Subjective   Ethel Trotter is a 64 y.o. female.     History of Present Illness     This visit has been rescheduled as a phone visit to comply with patient safety concerns in accordance with CDC recommendations. Total time of discussion was 13 minutes.    You have chosen to receive care through a telephone visit. Do you consent to use a telephone visit for your medical care today? Yes    Left Hip Pain  She complains of persistent left hip pain.  There is no history of any strain or trauma nor any change in her activities.  There has been no change in the quality nor any new associated symptoms.  The pain is described as sharp anterior lateral ache.  When severe this radiates to the lateral thigh.  The pain has not radiated elsewhere and is been unassociated with any other symptoms.  There is no history of any stiffness or swelling and she denies any changes in her strength, sensation, or bowel/bladder control.  The pain increases with activity particularly stepping up or down stairs.  She has had problems with that hip in the past.  Plain films performed on 2/26/2020 were unremarkable.  She was offered but decided against an orthopedic assessment    Low Back Pain   She continues to have intermittent low back pain. There's no history of any strain or trauma. The pain is described as a sharp left lower ache. This radiates to the left lateral and anterior hip. There have been no associated symptoms and she denies any stiffness, swelling, weakness, numbness or tingling. The pain is worse with weight bearing and improves with rest.  Plain films of the lumbar spine performed on 10/22/18 were reported as showing degenerative disc disease at L4-5.     Episode of Visual Loss  She experienced a 4 to 5 second episode of complete vision loss in her right eye last year.  This occurred during a period of anxiety and was associated with a generalized headache.  She continues to deny any further episodes and has had no weakness,  numbness, tingling, or difficulty talking or understanding what is said to her.  She denies any new muscle aches and has had no rash, fever, or chills.  She remains on ASA 81 daily.  A duplex doppler ultrasound of the carotids was ordered but never scheduled.   Lab Results   Component Value Date    WBC 8.00 02/11/2020    HGB 13.2 02/11/2020    HCT 41.6 02/11/2020    MCV 86.7 02/11/2020     02/11/2020     Coronary artery disease  She has a history of CABG and previous M.I.. Previous diagnostic testing includes: cardiac catheterization Recent history: taking medications as instructed, no medication side effects noted, no chest pain on exertion, no dyspnea on exertion and no swelling of ankles. Patient's symptoms have been unchanged. Medication side effects include: none. She has decided not to return to cardiology for the time being    Diabetes  Current symptoms include none. Patient denies paresthesia of the feet, visual disturbances, polydipsia, polyuria, hypoglycemia and foot ulcerations. Evaluation to date has been: hemoglobin A1C. Home sugars: patient does not check sugars. Current treatments: metformin, DPP inhibitor - januvia.  She stopped jardiance sometime ago due to recurrent yeast infections.  Last dilated eye exam more than one year ago.   Lab Results   Component Value Date    HGBA1C 7.55 (H) 02/11/2020     Lab Results   Component Value Date    MICROALBUR 4.6 02/11/2020     Dyslipidemia  Compliance with treatment has been excellent. The patient exercise daily. She is currently being prescribed the following medication for her dyslipidemia - atorvastatin, ezetimibe, and repatha.  She denies any side effects. Patient denies side effects associated with her medications.   Lab Results   Component Value Date    CHOL 105 02/11/2020    CHLPL 247 (H) 03/10/2016    TRIG 126 02/11/2020    HDL 46 02/11/2020    LDL 34 02/11/2020     Hypertension  Home blood pressure readings: not doing. Associated signs and  symptoms: none. Patient denies: chest pain, palpitations, dyspnea, orthopnea, paroxysmal nocturnal dyspnea and peripheral edema. Current antihypertensive medications includes lisinopril, metoprolol, amlodipine.    Lab Results   Component Value Date    GLUCOSE 110 (H) 02/18/2020    BUN 26 (H) 02/18/2020    CREATININE 1.25 (H) 02/18/2020    EGFRIFNONA 43 (L) 02/18/2020    BCR 20.8 02/18/2020    K 5.3 (H) 02/18/2020    CO2 26.5 02/18/2020    CALCIUM 10.6 (H) 02/18/2020    ALBUMIN 4.10 02/18/2020    LABIL2 1.4 (L) 03/10/2016    AST 16 02/18/2020    ALT 13 02/18/2020     Depression  Onset was several years ago. Symptoms have remnained unchanged since last here. Current symptoms include: difficulty falling asleep and fatigue. Patient denies depressed mood, anhedonia, insomnia, recurrent thoughts of death and suicidal thoughts. Risk factors: negative life event mothers death and previous episode of depression. Treatment has included medication citalopram. She complains of the following side effects from the treatment: none.    Labs  Most recent vitamin D 54.2    Imaging  U/S of the abdomen performed on 2/26/20 revealed a 7.44 cm left renal cyst.  She underwent a urology assessment with Dr. Givens on 5/29/2020 who felt that it was a Bosniak type I.  She has been scheduled for a CT of the abdomen and pelvis tomorrow and anticipates undergoing a reassessment with him afterward    The following portions of the patient's history were reviewed and updated as appropriate: allergies, current medications, past medical history, past social history and problem list.    Review of Systems   Constitutional: Positive for fatigue. Negative for appetite change, chills, fever and unexpected weight change.   HENT: Positive for congestion, ear pain (intermittent left ear fullness) and rhinorrhea. Negative for postnasal drip, sneezing, sore throat and voice change.    Eyes: Negative for visual disturbance.   Respiratory: Negative for cough,  shortness of breath and wheezing.    Cardiovascular: Negative for chest pain, palpitations and leg swelling.   Gastrointestinal: Negative for abdominal pain, anal bleeding, blood in stool, constipation, diarrhea, nausea and vomiting.   Endocrine: Negative for polydipsia and polyuria.   Genitourinary: Negative for difficulty urinating, dysuria, frequency, hematuria, urgency and vaginal discharge.   Musculoskeletal: Positive for arthralgias and back pain. Negative for myalgias.   Skin: Negative for rash.   Neurological: Negative for weakness, numbness and headaches.   Psychiatric/Behavioral: Positive for sleep disturbance. Negative for dysphoric mood and suicidal ideas. The patient is not nervous/anxious.      Objective   Physical Exam   Constitutional:   Alert and oriented.  Bright and in good spirits.  No apparent distress     Assessment/Plan   Problems Addressed this Visit        Cardiovascular and Mediastinum    Coronary artery disease involving native coronary artery  Reminded regarding the importance of risk factor modification.  Continue current medication    Relevant Medications    amLODIPine (NORVASC) 10 MG tablet    aspirin (Aspir-Low) 81 MG EC tablet    isosorbide mononitrate (IMDUR) 30 MG 24 hr tablet    metoprolol succinate XL (TOPROL-XL) 100 MG 24 hr tablet    nitroglycerin (NITROSTAT) 0.4 MG SL tablet    Essential hypertension  Encouraged to continue to work on her diet and exercise plan.  Continue current medication    Relevant Medications    amLODIPine (NORVASC) 10 MG tablet    lisinopril (PRINIVIL,ZESTRIL) 10 MG tablet    metoprolol succinate XL (TOPROL-XL) 100 MG 24 hr tablet    Other Relevant Orders    CBC & Differential    Comprehensive Metabolic Panel    Mixed hyperlipidemia  As above.   Continue current medication.    Relevant Medications    atorvastatin (LIPITOR) 80 MG tablet    Evolocumab (Repatha SureClick) 140 MG/ML solution auto-injector    ezetimibe (ZETIA) 10 MG tablet    Other Relevant  Orders    Lipid Panel    TIA (transient ischemic attack)  As above.    Venous insufficiency, peripheral       Respiratory    Chronic seasonal allergic rhinitis    COPD mixed type (CMS/HCC)       Digestive    Chronic constipation    Relevant Medications    linaclotide (Linzess) 72 MCG capsule capsule    Gastroesophageal reflux disease without esophagitis    Vitamin D deficiency disease  Continue supplementation with monitoring.    Relevant Medications    vitamin D (ERGOCALCIFEROL) 1.25 MG (91914 UT) capsule capsule       Endocrine    Type 2 diabetes mellitus without complication, without long-term current use of insulin (CMS/HCC)  Diabetes mellitus Type II, under fair control.   Encouraged to continue to pursue ADA diet  Encouraged aerobic exercise.  Continue current medication  Scheduled for updated labs    Relevant Medications    SITagliptin-metFORMIN HCl ER (Janumet XR)  MG tablet    Other Relevant Orders    Hemoglobin A1c    Vitamin B12       Nervous and Auditory    Left hip pain  Reminded regarding symptomatic treatment.   Continue current medication  Patient remains uninterested in an orthopedic assessment but will let us know if she should change her mind    Mechanical low back pain  As above.       Musculoskeletal and Integument    Osteopenia  We will reschedule her DEXA scan       Genitourinary    Renal cyst  Follow up with urology    Renal insufficiency  Reminded to avoid any NSAIDs prescription or OTC  Will continue to monitor       Other    Depression with anxiety  Stable.  Supportive therapy.   Continue current medication.    Relevant Medications    citalopram (CeleXA) 20 MG tablet    Dips tobacco    Healthcare maintenance  We will reschedule her mammogram

## 2020-06-10 ENCOUNTER — HOSPITAL ENCOUNTER (OUTPATIENT)
Dept: CT IMAGING | Facility: HOSPITAL | Age: 65
Discharge: HOME | End: 2020-06-10
Admitting: UROLOGY

## 2020-06-10 DIAGNOSIS — N28.89 RENAL MASS: ICD-10-CM

## 2020-06-10 LAB — CREAT BLDA-MCNC: 1.5 MG/DL (ref 0.6–1.3)

## 2020-06-10 PROCEDURE — 74178 CT ABD&PLV WO CNTR FLWD CNTR: CPT | Performed by: RADIOLOGY

## 2020-06-10 PROCEDURE — 74178 CT ABD&PLV WO CNTR FLWD CNTR: CPT

## 2020-06-10 PROCEDURE — 0 IOVERSOL 68 % SOLUTION: Performed by: UROLOGY

## 2020-06-10 PROCEDURE — 82565 ASSAY OF CREATININE: CPT

## 2020-06-10 RX ADMIN — IOVERSOL 40 ML: 678 INJECTION INTRA-ARTERIAL; INTRAVENOUS at 14:44

## 2020-06-16 ENCOUNTER — OFFICE VISIT (OUTPATIENT)
Dept: FAMILY MEDICINE CLINIC | Facility: CLINIC | Age: 65
End: 2020-06-16

## 2020-06-16 DIAGNOSIS — H66.015 RECURRENT ACUTE SUPPURATIVE OTITIS MEDIA WITH SPONTANEOUS RUPTURE OF LEFT TYMPANIC MEMBRANE: ICD-10-CM

## 2020-06-16 DIAGNOSIS — H10.32 ACUTE BACTERIAL CONJUNCTIVITIS OF LEFT EYE: ICD-10-CM

## 2020-06-16 DIAGNOSIS — J30.2 CHRONIC SEASONAL ALLERGIC RHINITIS: Primary | ICD-10-CM

## 2020-06-16 PROCEDURE — G2025 DIS SITE TELE SVCS RHC/FQHC: HCPCS | Performed by: GENERAL PRACTICE

## 2020-06-16 RX ORDER — CIPROFLOXACIN HYDROCHLORIDE 3.5 MG/ML
1 SOLUTION/ DROPS TOPICAL EVERY 4 HOURS
Qty: 5 ML | Refills: 0 | Status: SHIPPED | OUTPATIENT
Start: 2020-06-16 | End: 2020-07-02

## 2020-06-16 RX ORDER — AMOXICILLIN AND CLAVULANATE POTASSIUM 500; 125 MG/1; MG/1
1 TABLET, FILM COATED ORAL 3 TIMES DAILY
Qty: 30 TABLET | Refills: 0 | Status: SHIPPED | OUTPATIENT
Start: 2020-06-16 | End: 2020-07-02

## 2020-06-16 RX ORDER — CIPROFLOXACIN AND DEXAMETHASONE 3; 1 MG/ML; MG/ML
4 SUSPENSION/ DROPS AURICULAR (OTIC) 2 TIMES DAILY
Qty: 7.5 ML | Refills: 0 | Status: SHIPPED | OUTPATIENT
Start: 2020-06-16 | End: 2020-07-02

## 2020-06-16 NOTE — PROGRESS NOTES
Subjective   Ethel Trotter is a 64 y.o. female.     History of Present Illness     This visit has been rescheduled as a phone visit to comply with patient safety concerns in accordance with CDC recommendations. Total time of discussion was 11 minutes.    You have chosen to receive care through a telephone visit. Do you consent to use a telephone visit for your medical care today? Yes    Respiratory Tract Infection  She gives a several day history of increased left ear pain.  Over this time she has also experienced nasal congestion, postnasal drip, and watering of her left eye with crusting in the morning.  There is no history of any other upper respiratory tract symptoms and she denies any eye pain or visual blurring.  There is no history of any fever, chills, or night sweats.    The following portions of the patient's history were reviewed and updated as appropriate: allergies, current medications, past medical history and problem list.    Review of Systems   Constitutional: Positive for fatigue. Negative for appetite change, chills, fever and unexpected weight change.   HENT: Positive for congestion, ear pain (left), postnasal drip and rhinorrhea. Negative for sneezing and sore throat.    Eyes: Positive for discharge (left). Negative for pain and visual disturbance.   Respiratory: Negative for cough, shortness of breath and wheezing.    Cardiovascular: Negative for chest pain, palpitations and leg swelling.   Gastrointestinal: Negative for abdominal pain, anal bleeding, blood in stool, constipation, diarrhea, nausea and vomiting.   Endocrine: Negative for polydipsia and polyuria.   Musculoskeletal: Positive for arthralgias and back pain. Negative for myalgias.   Skin: Negative for rash.   Neurological: Negative for weakness, numbness and headaches.   Psychiatric/Behavioral: Positive for sleep disturbance. Negative for dysphoric mood and suicidal ideas. The patient is not nervous/anxious.      Objective    Physical Exam   Constitutional:   Alert and oriented.  Bright and in fair spirits.  No apparent distress     Assessment/Plan   Problems Addressed this Visit        Respiratory    Chronic seasonal allergic rhinitis  Reminded regarding allergen avoidance.  Continue current medication       Nervous and Auditory    Recurrent acute suppurative otitis media with spontaneous rupture of left tympanic membrane  Advised regarding symptomatic treatment  We will start on both oral and topical antibiotics  Encouraged to report if any worse, any new symptoms, or if not resolving over the next week    Relevant Medications    amoxicillin-clavulanate (Augmentin) 500-125 MG per tablet    ciprofloxacin-dexamethasone (CIPRODEX) 0.3-0.1 % otic suspension       Other    Acute bacterial conjunctivitis of left eye  As above.    Relevant Medications    amoxicillin-clavulanate (Augmentin) 500-125 MG per tablet    ciprofloxacin (CILOXAN) 0.3 % ophthalmic solution

## 2020-06-30 ENCOUNTER — HOSPITAL ENCOUNTER (EMERGENCY)
Facility: HOSPITAL | Age: 65
Discharge: HOME | End: 2020-07-01
Attending: FAMILY MEDICINE | Admitting: FAMILY MEDICINE

## 2020-06-30 ENCOUNTER — APPOINTMENT (OUTPATIENT)
Dept: GENERAL RADIOLOGY | Facility: HOSPITAL | Age: 65
End: 2020-06-30

## 2020-06-30 DIAGNOSIS — R73.9 HYPERGLYCEMIA: ICD-10-CM

## 2020-06-30 DIAGNOSIS — R50.9 FEBRILE ILLNESS, ACUTE: ICD-10-CM

## 2020-06-30 DIAGNOSIS — I10 ESSENTIAL HYPERTENSION: Primary | ICD-10-CM

## 2020-06-30 LAB
A-A DO2: 12.4 MMHG (ref 0–300)
ARTERIAL PATENCY WRIST A: ABNORMAL
ATMOSPHERIC PRESS: 726 MMHG
BASE EXCESS BLDA CALC-SCNC: 0.6 MMOL/L (ref 0–2)
BASOPHILS # BLD AUTO: 0.04 10*3/MM3 (ref 0–0.2)
BASOPHILS NFR BLD AUTO: 0.4 % (ref 0–1.5)
BDY SITE: ABNORMAL
BODY TEMPERATURE: 0 C
CO2 BLDA-SCNC: 27.2 MMOL/L (ref 22–33)
COHGB MFR BLD: 0.7 % (ref 0–5)
DEPRECATED RDW RBC AUTO: 45.5 FL (ref 37–54)
EOSINOPHIL # BLD AUTO: 0.31 10*3/MM3 (ref 0–0.4)
EOSINOPHIL NFR BLD AUTO: 3.2 % (ref 0.3–6.2)
ERYTHROCYTE [DISTWIDTH] IN BLOOD BY AUTOMATED COUNT: 13.2 % (ref 12.3–15.4)
GLUCOSE BLDC GLUCOMTR-MCNC: 439 MG/DL (ref 70–130)
HCO3 BLDA-SCNC: 25.8 MMOL/L (ref 20–26)
HCT VFR BLD AUTO: 45.1 % (ref 34–46.6)
HCT VFR BLD CALC: 42.6 % (ref 38–51)
HGB BLD-MCNC: 13.8 G/DL (ref 12–15.9)
HGB BLDA-MCNC: 13.9 G/DL (ref 13.5–17.5)
IMM GRANULOCYTES # BLD AUTO: 0.02 10*3/MM3 (ref 0–0.05)
IMM GRANULOCYTES NFR BLD AUTO: 0.2 % (ref 0–0.5)
INHALED O2 CONCENTRATION: 21 %
LYMPHOCYTES # BLD AUTO: 1.3 10*3/MM3 (ref 0.7–3.1)
LYMPHOCYTES NFR BLD AUTO: 13.4 % (ref 19.6–45.3)
Lab: ABNORMAL
MCH RBC QN AUTO: 28.8 PG (ref 26.6–33)
MCHC RBC AUTO-ENTMCNC: 30.6 G/DL (ref 31.5–35.7)
MCV RBC AUTO: 94 FL (ref 79–97)
METHGB BLD QL: 0.1 % (ref 0–3)
MODALITY: ABNORMAL
MONOCYTES # BLD AUTO: 0.69 10*3/MM3 (ref 0.1–0.9)
MONOCYTES NFR BLD AUTO: 7.1 % (ref 5–12)
NEUTROPHILS NFR BLD AUTO: 7.33 10*3/MM3 (ref 1.7–7)
NEUTROPHILS NFR BLD AUTO: 75.7 % (ref 42.7–76)
NOTE: ABNORMAL
NRBC BLD AUTO-RTO: 0 /100 WBC (ref 0–0.2)
OXYHGB MFR BLDV: 95.9 % (ref 94–99)
PCO2 BLDA: 42.6 MM HG (ref 35–45)
PCO2 TEMP ADJ BLD: ABNORMAL MM[HG]
PH BLDA: 7.39 PH UNITS (ref 7.35–7.45)
PH, TEMP CORRECTED: ABNORMAL
PLATELET # BLD AUTO: 164 10*3/MM3 (ref 140–450)
PMV BLD AUTO: 10.4 FL (ref 6–12)
PO2 BLDA: 82 MM HG (ref 83–108)
PO2 TEMP ADJ BLD: ABNORMAL MM[HG]
RBC # BLD AUTO: 4.8 10*6/MM3 (ref 3.77–5.28)
SAO2 % BLDCOA: 96.7 % (ref 94–99)
VENTILATOR MODE: ABNORMAL
WBC # BLD AUTO: 9.69 10*3/MM3 (ref 3.4–10.8)

## 2020-06-30 PROCEDURE — 82805 BLOOD GASES W/O2 SATURATION: CPT

## 2020-06-30 PROCEDURE — 96374 THER/PROPH/DIAG INJ IV PUSH: CPT

## 2020-06-30 PROCEDURE — 82375 ASSAY CARBOXYHB QUANT: CPT

## 2020-06-30 PROCEDURE — 82962 GLUCOSE BLOOD TEST: CPT

## 2020-06-30 PROCEDURE — 82728 ASSAY OF FERRITIN: CPT | Performed by: PHYSICIAN ASSISTANT

## 2020-06-30 PROCEDURE — 83605 ASSAY OF LACTIC ACID: CPT | Performed by: PHYSICIAN ASSISTANT

## 2020-06-30 PROCEDURE — 83615 LACTATE (LD) (LDH) ENZYME: CPT | Performed by: PHYSICIAN ASSISTANT

## 2020-06-30 PROCEDURE — 25010000002 HYDRALAZINE PER 20 MG: Performed by: PHYSICIAN ASSISTANT

## 2020-06-30 PROCEDURE — 71045 X-RAY EXAM CHEST 1 VIEW: CPT

## 2020-06-30 PROCEDURE — 86140 C-REACTIVE PROTEIN: CPT | Performed by: PHYSICIAN ASSISTANT

## 2020-06-30 PROCEDURE — 93010 ELECTROCARDIOGRAM REPORT: CPT | Performed by: INTERNAL MEDICINE

## 2020-06-30 PROCEDURE — 85025 COMPLETE CBC W/AUTO DIFF WBC: CPT | Performed by: PHYSICIAN ASSISTANT

## 2020-06-30 PROCEDURE — 93005 ELECTROCARDIOGRAM TRACING: CPT | Performed by: PHYSICIAN ASSISTANT

## 2020-06-30 PROCEDURE — 83050 HGB METHEMOGLOBIN QUAN: CPT

## 2020-06-30 PROCEDURE — 99284 EMERGENCY DEPT VISIT MOD MDM: CPT

## 2020-06-30 PROCEDURE — 83735 ASSAY OF MAGNESIUM: CPT | Performed by: PHYSICIAN ASSISTANT

## 2020-06-30 PROCEDURE — 83880 ASSAY OF NATRIURETIC PEPTIDE: CPT | Performed by: PHYSICIAN ASSISTANT

## 2020-06-30 PROCEDURE — 36600 WITHDRAWAL OF ARTERIAL BLOOD: CPT

## 2020-06-30 PROCEDURE — 80053 COMPREHEN METABOLIC PANEL: CPT | Performed by: PHYSICIAN ASSISTANT

## 2020-06-30 PROCEDURE — 82550 ASSAY OF CK (CPK): CPT | Performed by: PHYSICIAN ASSISTANT

## 2020-06-30 PROCEDURE — 84484 ASSAY OF TROPONIN QUANT: CPT | Performed by: PHYSICIAN ASSISTANT

## 2020-06-30 PROCEDURE — 84145 PROCALCITONIN (PCT): CPT | Performed by: PHYSICIAN ASSISTANT

## 2020-06-30 PROCEDURE — 87040 BLOOD CULTURE FOR BACTERIA: CPT | Performed by: PHYSICIAN ASSISTANT

## 2020-06-30 PROCEDURE — 87086 URINE CULTURE/COLONY COUNT: CPT | Performed by: PHYSICIAN ASSISTANT

## 2020-06-30 PROCEDURE — 81001 URINALYSIS AUTO W/SCOPE: CPT | Performed by: PHYSICIAN ASSISTANT

## 2020-06-30 RX ORDER — SODIUM CHLORIDE 0.9 % (FLUSH) 0.9 %
10 SYRINGE (ML) INJECTION AS NEEDED
Status: DISCONTINUED | OUTPATIENT
Start: 2020-06-30 | End: 2020-07-01 | Stop reason: HOSPADM

## 2020-06-30 RX ORDER — HYDRALAZINE HYDROCHLORIDE 20 MG/ML
10 INJECTION INTRAMUSCULAR; INTRAVENOUS ONCE
Status: COMPLETED | OUTPATIENT
Start: 2020-06-30 | End: 2020-06-30

## 2020-06-30 RX ORDER — ACETAMINOPHEN 325 MG/1
975 TABLET ORAL ONCE
Status: COMPLETED | OUTPATIENT
Start: 2020-06-30 | End: 2020-06-30

## 2020-06-30 RX ADMIN — ACETAMINOPHEN 975 MG: 325 TABLET ORAL at 23:10

## 2020-06-30 RX ADMIN — HYDRALAZINE HYDROCHLORIDE 10 MG: 20 INJECTION INTRAMUSCULAR; INTRAVENOUS at 23:39

## 2020-07-01 VITALS
RESPIRATION RATE: 16 BRPM | TEMPERATURE: 98.6 F | DIASTOLIC BLOOD PRESSURE: 102 MMHG | HEIGHT: 63 IN | OXYGEN SATURATION: 100 % | SYSTOLIC BLOOD PRESSURE: 191 MMHG | HEART RATE: 65 BPM | WEIGHT: 159 LBS | BODY MASS INDEX: 28.17 KG/M2

## 2020-07-01 LAB
ALBUMIN SERPL-MCNC: 4.07 G/DL (ref 3.5–5.2)
ALBUMIN/GLOB SERPL: 1.2 G/DL
ALP SERPL-CCNC: 140 U/L (ref 39–117)
ALT SERPL W P-5'-P-CCNC: 104 U/L (ref 1–33)
ANION GAP SERPL CALCULATED.3IONS-SCNC: 11 MMOL/L (ref 5–15)
AST SERPL-CCNC: 174 U/L (ref 1–32)
BACTERIA UR QL AUTO: ABNORMAL /HPF
BILIRUB SERPL-MCNC: 0.3 MG/DL (ref 0.2–1.2)
BILIRUB UR QL STRIP: NEGATIVE
BUN SERPL-MCNC: 21 MG/DL (ref 8–23)
BUN/CREAT SERPL: 14.2 (ref 7–25)
CALCIUM SPEC-SCNC: 10.1 MG/DL (ref 8.6–10.5)
CHLORIDE SERPL-SCNC: 99 MMOL/L (ref 98–107)
CK SERPL-CCNC: 63 U/L (ref 20–180)
CLARITY UR: CLEAR
CO2 SERPL-SCNC: 26 MMOL/L (ref 22–29)
COLOR UR: YELLOW
CREAT SERPL-MCNC: 1.48 MG/DL (ref 0.57–1)
CRP SERPL-MCNC: 0.65 MG/DL (ref 0–0.5)
D-LACTATE SERPL-SCNC: 1.7 MMOL/L (ref 0.5–2)
FERRITIN SERPL-MCNC: 66.22 NG/ML (ref 13–150)
GFR SERPL CREATININE-BSD FRML MDRD: 35 ML/MIN/1.73
GLOBULIN UR ELPH-MCNC: 3.3 GM/DL
GLUCOSE SERPL-MCNC: 419 MG/DL (ref 65–99)
GLUCOSE UR STRIP-MCNC: ABNORMAL MG/DL
HGB UR QL STRIP.AUTO: NEGATIVE
HOLD SPECIMEN: NORMAL
HOLD SPECIMEN: NORMAL
HYALINE CASTS UR QL AUTO: ABNORMAL /LPF
KETONES UR QL STRIP: NEGATIVE
LDH SERPL-CCNC: 333 U/L (ref 135–214)
LEUKOCYTE ESTERASE UR QL STRIP.AUTO: ABNORMAL
MAGNESIUM SERPL-MCNC: 2.1 MG/DL (ref 1.6–2.4)
NITRITE UR QL STRIP: NEGATIVE
NT-PROBNP SERPL-MCNC: 1296 PG/ML (ref 5–900)
PH UR STRIP.AUTO: 5.5 [PH] (ref 5–8)
POTASSIUM SERPL-SCNC: 4.7 MMOL/L (ref 3.5–5.2)
PROCALCITONIN SERPL-MCNC: 0.34 NG/ML (ref 0.1–0.25)
PROT SERPL-MCNC: 7.4 G/DL (ref 6–8.5)
PROT UR QL STRIP: ABNORMAL
RBC # UR: ABNORMAL /HPF
REF LAB TEST METHOD: ABNORMAL
SARS-COV-2 N GENE NPH QL NAA+PROBE: NOT DETECTED
SODIUM SERPL-SCNC: 136 MMOL/L (ref 136–145)
SP GR UR STRIP: 1.02 (ref 1–1.03)
SQUAMOUS #/AREA URNS HPF: ABNORMAL /HPF
TROPONIN T SERPL-MCNC: <0.01 NG/ML (ref 0–0.03)
UROBILINOGEN UR QL STRIP: ABNORMAL
WBC UR QL AUTO: ABNORMAL /HPF
WHOLE BLOOD HOLD SPECIMEN: NORMAL
WHOLE BLOOD HOLD SPECIMEN: NORMAL

## 2020-07-01 PROCEDURE — 87635 SARS-COV-2 COVID-19 AMP PRB: CPT | Performed by: PHYSICIAN ASSISTANT

## 2020-07-01 NOTE — ED PROVIDER NOTES
Subjective   64-year-old female presents to the ER with complaints of fever and cough.  Patient also states that she feels that her glucose is high.  Past medical history is positive for diabetes as well as COPD.  Patient denies any known exposure to COVID-19.          Review of Systems   Constitutional: Positive for fever.   Respiratory: Positive for cough and shortness of breath.    Cardiovascular: Negative.  Negative for chest pain.   Gastrointestinal: Negative.  Negative for abdominal pain.   Endocrine: Negative.    Genitourinary: Negative.  Negative for dysuria.   Skin: Negative.    Neurological: Positive for headaches.   Psychiatric/Behavioral: Negative.    All other systems reviewed and are negative.      Past Medical History:   Diagnosis Date   • Arthritis    • CAD (coronary artery disease)    • COPD (chronic obstructive pulmonary disease) (CMS/HCC)    • Diabetes mellitus (CMS/HCC)    • Elevated cholesterol    • Fracture of wrist    • GERD (gastroesophageal reflux disease)    • Hepatitis C    • History of EKG 03/25/2016    ABNORMAL   • History of transfusion    • Hyperlipidemia    • Hypertension    • Myocardial infarction (CMS/HCC)     x2 last one 5 years ago   • Osteopenia    • Pancreatitis    • Stroke (CMS/HCC)        No Known Allergies    Past Surgical History:   Procedure Laterality Date   • COLONOSCOPY N/A 5/13/2019    Procedure: COLONOSCOPY;  Surgeon: Arnav Reyes MD;  Location: [facility];  Service: Gastroenterology   • CORONARY ARTERY BYPASS GRAFT     • HYSTERECTOMY      1998   • TONSILLECTOMY         Family History   Problem Relation Age of Onset   • Breast cancer Neg Hx        Social History     Socioeconomic History   • Marital status:      Spouse name: Not on file   • Number of children: Not on file   • Years of education: Not on file   • Highest education level: Not on file   Tobacco Use   • Smoking status: Never Smoker   • Smokeless tobacco: Current User     Types: Chew    Substance and Sexual Activity   • Alcohol use: No   • Drug use: Yes     Types: Marijuana     Comment: OCCASIONAL   • Sexual activity: Defer           Objective   Physical Exam   Constitutional: She is oriented to person, place, and time. She appears well-developed and well-nourished. No distress.   HENT:   Head: Normocephalic and atraumatic.   Right Ear: External ear normal.   Left Ear: External ear normal.   Nose: Nose normal.   Eyes: Conjunctivae are normal.   Neck: Normal range of motion. Neck supple. No JVD present. No tracheal deviation present.   Cardiovascular: Normal rate, regular rhythm and normal heart sounds.   No murmur heard.  Pulmonary/Chest: Effort normal. No respiratory distress. She has no wheezes.   Decreased in bases   Abdominal: Soft. There is no tenderness.   Musculoskeletal: Normal range of motion. She exhibits no edema or deformity.   Neurological: She is alert and oriented to person, place, and time. No cranial nerve deficit.   Skin: Skin is warm and dry. No rash noted. She is not diaphoretic. No erythema. No pallor.   Psychiatric: She has a normal mood and affect. Her behavior is normal. Thought content normal.   Nursing note and vitals reviewed.      Procedures           ED Course  ED Course as of Jul 01 0158 Wed Jul 01, 2020   0000 CXR rad interpreted:  No acute cardiopulmonary findings.    [RB]   0009 EKG interpreted by Dr. Lacey:  Normal sinus rhythm  Voltage criteria for left ventricular hypertrophy  Cannot rule out Inferior infarct , age undetermined  T wave abnormality, consider lateral ischemia  Abnormal ECG  No previous ECGs available    [RB]      ED Course User Index  [RB] Jovanny Higginbotham II, PA                                           MDM  Number of Diagnoses or Management Options  Essential hypertension: new and requires workup  Febrile illness, acute: new and requires workup  Hyperglycemia: new and requires workup     Amount and/or Complexity of Data Reviewed  Clinical lab  tests: ordered and reviewed  Tests in the radiology section of CPT®: ordered and reviewed    Risk of Complications, Morbidity, and/or Mortality  Presenting problems: moderate  Diagnostic procedures: moderate  Management options: low    Patient Progress  Patient progress: stable      Final diagnoses:   Essential hypertension   Hyperglycemia   Febrile illness, acute            Jovanny Higginbotham II, PA  07/01/20 0158

## 2020-07-02 ENCOUNTER — PATIENT OUTREACH (OUTPATIENT)
Dept: CASE MANAGEMENT | Facility: OTHER | Age: 65
End: 2020-07-02

## 2020-07-02 ENCOUNTER — OFFICE VISIT (OUTPATIENT)
Dept: FAMILY MEDICINE CLINIC | Facility: CLINIC | Age: 65
End: 2020-07-02

## 2020-07-02 DIAGNOSIS — Z00.00 HEALTHCARE MAINTENANCE: ICD-10-CM

## 2020-07-02 DIAGNOSIS — G45.9 TIA (TRANSIENT ISCHEMIC ATTACK): Primary | ICD-10-CM

## 2020-07-02 DIAGNOSIS — E78.2 MIXED HYPERLIPIDEMIA: ICD-10-CM

## 2020-07-02 DIAGNOSIS — Z72.0 DIPS TOBACCO: ICD-10-CM

## 2020-07-02 DIAGNOSIS — J44.9 COPD MIXED TYPE (HCC): ICD-10-CM

## 2020-07-02 DIAGNOSIS — M54.59 MECHANICAL LOW BACK PAIN: ICD-10-CM

## 2020-07-02 DIAGNOSIS — I10 ESSENTIAL HYPERTENSION: ICD-10-CM

## 2020-07-02 DIAGNOSIS — I25.10 CORONARY ARTERY DISEASE INVOLVING NATIVE CORONARY ARTERY OF NATIVE HEART WITHOUT ANGINA PECTORIS: ICD-10-CM

## 2020-07-02 DIAGNOSIS — N28.9 RENAL INSUFFICIENCY: ICD-10-CM

## 2020-07-02 DIAGNOSIS — E11.9 TYPE 2 DIABETES MELLITUS WITHOUT COMPLICATION, WITHOUT LONG-TERM CURRENT USE OF INSULIN (HCC): ICD-10-CM

## 2020-07-02 DIAGNOSIS — N28.1 RENAL CYST: ICD-10-CM

## 2020-07-02 DIAGNOSIS — K59.09 CHRONIC CONSTIPATION: ICD-10-CM

## 2020-07-02 DIAGNOSIS — J30.2 CHRONIC SEASONAL ALLERGIC RHINITIS: ICD-10-CM

## 2020-07-02 DIAGNOSIS — M85.80 OSTEOPENIA, UNSPECIFIED LOCATION: ICD-10-CM

## 2020-07-02 DIAGNOSIS — F41.8 DEPRESSION WITH ANXIETY: ICD-10-CM

## 2020-07-02 DIAGNOSIS — K21.9 GASTROESOPHAGEAL REFLUX DISEASE WITHOUT ESOPHAGITIS: ICD-10-CM

## 2020-07-02 DIAGNOSIS — E55.9 VITAMIN D DEFICIENCY DISEASE: ICD-10-CM

## 2020-07-02 LAB — BACTERIA SPEC AEROBE CULT: NO GROWTH

## 2020-07-02 PROCEDURE — G2025 DIS SITE TELE SVCS RHC/FQHC: HCPCS | Performed by: GENERAL PRACTICE

## 2020-07-02 RX ORDER — GLUCOSAMINE HCL/CHONDROITIN SU 500-400 MG
CAPSULE ORAL
Qty: 60 EACH | Refills: 5 | Status: SHIPPED | OUTPATIENT
Start: 2020-07-02 | End: 2021-01-11

## 2020-07-02 RX ORDER — BLOOD-GLUCOSE METER
1 KIT MISCELLANEOUS ONCE
Qty: 1 EACH | Refills: 0 | Status: SHIPPED | OUTPATIENT
Start: 2020-07-02 | End: 2020-07-02

## 2020-07-02 RX ORDER — LANCETS 30 GAUGE
EACH MISCELLANEOUS
Qty: 60 EACH | Refills: 5 | Status: SHIPPED | OUTPATIENT
Start: 2020-07-02 | End: 2022-04-01 | Stop reason: SDUPTHER

## 2020-07-02 NOTE — OUTREACH NOTE
ED Potential Covid Discharge Follow-up    Patient was seen in the ED 6/30-7/01 with cough and fever; patient was tested for Covid-19 with negative results. Patient reports feeling much better now than when she was in the ED, no fever at present. Patient's blood glucose and blood pressure were both elevated in the ED; ACM discussed diabetes and HTN mgmt with patient. Pt reports she takes all her medications as prescribed but states she ate a couple of pieces of cake at a birthday party and that this is why her sugar went up so high. ACM reminded pt of her apt with Dr. Montez today at 4; pt states she hadn't known about it, she thought it was yesterday but states it is a phone visit and will be available for it. ACM discussed AVS including 24/7 nurse line, pt voiced understanding, denies questions/concerns at this time.    Neha Diamond RN  Ambulatory     7/2/2020, 11:29

## 2020-07-02 NOTE — PROGRESS NOTES
Subjective   Ethel Trotter is a 64 y.o. female.     History of Present Illness     This visit has been rescheduled as a phone visit to comply with patient safety concerns in accordance with CDC recommendations. Total time of discussion was 12 minutes.    You have chosen to receive care through a telephone visit. Do you consent to use a telephone visit for your medical care today? Yes    Diabetes  Seen at [facility] ER on 7/1/2020 with a history of cough and fever.  She had eaten a large piece of cake that day and was also concerned that her glucose might be high.  She has been unable to check her glucose at home as her glucometer stopped working sometime ago.  Patient denies paresthesia of the feet, visual disturbances, polydipsia, polyuria, hypoglycemia and foot ulcerations. Evaluation to date has been: hemoglobin A1C. Current treatments: metformin, DPP inhibitor - januvia.  She stopped jardiance due to recurrent yeast infections.  Chest x-ray done in the ER was unremarkable  Lab Results   Component Value Date    GLUCOSE 419 (C) 06/30/2020    BUN 21 06/30/2020    CREATININE 1.48 (H) 06/30/2020    EGFRIFNONA 35 (L) 06/30/2020    BCR 14.2 06/30/2020    K 4.7 06/30/2020    CO2 26.0 06/30/2020    CALCIUM 10.1 06/30/2020    ALBUMIN 4.07 06/30/2020    LABIL2 1.4 (L) 03/10/2016     (H) 06/30/2020     (H) 06/30/2020     Lab Results   Component Value Date    WBC 9.69 06/30/2020    HGB 13.8 06/30/2020    HCT 45.1 06/30/2020    MCV 94.0 06/30/2020     06/30/2020     Dyslipidemia  Compliance with treatment has been good. The patient exercise daily. She is currently being prescribed the following medication for her dyslipidemia - atorvastatin, ezetimibe, and repatha.  She denies any side effects. Patient denies side effects associated with her medications.     Hypertension  Home blood pressure readings: not doing. Associated signs and symptoms: none. Patient denies: chest pain, palpitations, dyspnea, orthopnea,  paroxysmal nocturnal dyspnea and peripheral edema. Current antihypertensive medications includes lisinopril, metoprolol, amlodipine.      Coronary artery disease  She has a history of CABG and previous M.I.. Previous diagnostic testing includes: cardiac catheterization Recent history: taking medications as instructed, no medication side effects noted, no chest pain on exertion, no dyspnea on exertion and no swelling of ankles. Patient's symptoms have been unchanged. Medication side effects include: none. She has decided not to return to cardiology for the time being    Episode of Visual Loss  She experienced a 4 to 5 second episode of complete vision loss in her right eye last year.  This occurred during a period of anxiety and was associated with a generalized headache.  She continues to deny any further episodes and has had no weakness, numbness, tingling, or difficulty talking or understanding what is said to her.  She denies any new muscle aches and has had no rash, fever, or chills.  She remains on ASA 81 daily.  A duplex doppler ultrasound of the carotids was ordered but never scheduled.     Imaging  U/S of the abdomen performed on 2/26/20 revealed a 7.44 cm left renal cyst.  She underwent a urology assessment with Dr. Givens on 5/29/2020 who felt that it was a Bosniak type I.  CT of the abdomen and pelvis performed on 6/10/2020 was reported as showing bilateral renal cysts the largest of which was in the posterior left kidney and measured 7 cm.  Atherosclerotic vascular disease and arthropathy arthritic changes of the spine were also noted    The following portions of the patient's history were reviewed and updated as appropriate: allergies, current medications, past medical history, past social history and problem list.    Review of Systems   Constitutional: Positive for fatigue. Negative for appetite change, chills, fever and unexpected weight change.   HENT: Positive for congestion, ear pain (intermittent  left ear fullness) and rhinorrhea. Negative for postnasal drip, sneezing, sore throat and voice change.    Eyes: Negative for visual disturbance.   Respiratory: Negative for cough, shortness of breath and wheezing.    Cardiovascular: Negative for chest pain, palpitations and leg swelling.   Gastrointestinal: Negative for abdominal pain, anal bleeding, blood in stool, constipation, diarrhea, nausea and vomiting.   Endocrine: Negative for polydipsia and polyuria.   Genitourinary: Negative for dysuria, frequency, hematuria and urgency.   Musculoskeletal: Positive for arthralgias and back pain. Negative for myalgias.   Skin: Negative for rash.   Neurological: Negative for weakness, numbness and headaches.   Psychiatric/Behavioral: Positive for sleep disturbance. Negative for dysphoric mood and suicidal ideas. The patient is not nervous/anxious.      Objective   Physical Exam   Constitutional:   Alert and oriented.  Bright and in good spirits.  No apparent distress or shortness of breath.     Assessment/Plan   Problems Addressed this Visit        Cardiovascular and Mediastinum    Coronary artery disease involving native coronary artery  Reminded regarding the importance of risk factor modification.  Continue current medication    Essential hypertension  Encouraged to continue to work on her diet and exercise plan.  Continue current medication    Mixed hyperlipidemia  As above.   Continue current medication.    TIA (transient ischemic attack)   As above.       Respiratory    Chronic seasonal allergic rhinitis    COPD mixed type (CMS/HCC)       Digestive    Chronic constipation    Gastroesophageal reflux disease without esophagitis    Vitamin D deficiency disease       Endocrine    Type 2 diabetes mellitus without complication, without long-term current use of insulin (CMS/HCC)  Diabetes mellitus Type II, under inadequate control.   Encouraged to continue to pursue ADA diet  Encouraged aerobic exercise.  Continue current  medication for now  Prescription written for a glucometer, lancets, and strips.  Patient will check her glucose before breakfast and 1 hour after supper daily and record the results.  She will report if her glucose is consistently above 300.  We will reassess in 2 weeks otherwise    Relevant Medications    Lancets misc    Glucose Blood (BLOOD GLUCOSE TEST) strip       Nervous and Auditory    Mechanical low back pain       Musculoskeletal and Integument    Osteopenia       Genitourinary    Renal cyst  Follow up with urology    Renal insufficiency  Reminded to avoid any NSAIDs prescription or OTC  Will continue to monitor       Other    Depression with anxiety    Dips tobacco    Healthcare maintenance  Encouraged to follow-up with her mammogram and DEXA scan

## 2020-07-03 PROBLEM — H10.32 ACUTE BACTERIAL CONJUNCTIVITIS OF LEFT EYE: Status: RESOLVED | Noted: 2020-06-16 | Resolved: 2020-07-03

## 2020-07-03 PROBLEM — H66.015 RECURRENT ACUTE SUPPURATIVE OTITIS MEDIA WITH SPONTANEOUS RUPTURE OF LEFT TYMPANIC MEMBRANE: Status: RESOLVED | Noted: 2020-06-16 | Resolved: 2020-07-03

## 2020-07-06 LAB
BACTERIA SPEC AEROBE CULT: NORMAL
BACTERIA SPEC AEROBE CULT: NORMAL

## 2020-07-17 ENCOUNTER — OFFICE VISIT (OUTPATIENT)
Dept: FAMILY MEDICINE CLINIC | Facility: CLINIC | Age: 65
End: 2020-07-17

## 2020-07-17 DIAGNOSIS — E78.2 MIXED HYPERLIPIDEMIA: ICD-10-CM

## 2020-07-17 DIAGNOSIS — M85.80 OSTEOPENIA, UNSPECIFIED LOCATION: ICD-10-CM

## 2020-07-17 DIAGNOSIS — I25.10 CORONARY ARTERY DISEASE INVOLVING NATIVE CORONARY ARTERY OF NATIVE HEART WITHOUT ANGINA PECTORIS: ICD-10-CM

## 2020-07-17 DIAGNOSIS — Z87.19 H/O ACUTE PANCREATITIS: ICD-10-CM

## 2020-07-17 DIAGNOSIS — J44.9 COPD MIXED TYPE (HCC): ICD-10-CM

## 2020-07-17 DIAGNOSIS — J30.2 CHRONIC SEASONAL ALLERGIC RHINITIS: ICD-10-CM

## 2020-07-17 DIAGNOSIS — K21.9 GASTROESOPHAGEAL REFLUX DISEASE WITHOUT ESOPHAGITIS: ICD-10-CM

## 2020-07-17 DIAGNOSIS — Z72.0 DIPS TOBACCO: ICD-10-CM

## 2020-07-17 DIAGNOSIS — E55.9 VITAMIN D DEFICIENCY DISEASE: ICD-10-CM

## 2020-07-17 DIAGNOSIS — F41.8 DEPRESSION WITH ANXIETY: ICD-10-CM

## 2020-07-17 DIAGNOSIS — N28.1 RENAL CYST: ICD-10-CM

## 2020-07-17 DIAGNOSIS — I10 ESSENTIAL HYPERTENSION: ICD-10-CM

## 2020-07-17 DIAGNOSIS — Z00.00 HEALTHCARE MAINTENANCE: ICD-10-CM

## 2020-07-17 DIAGNOSIS — N28.9 RENAL INSUFFICIENCY: ICD-10-CM

## 2020-07-17 DIAGNOSIS — G45.9 TIA (TRANSIENT ISCHEMIC ATTACK): Primary | ICD-10-CM

## 2020-07-17 DIAGNOSIS — E11.9 TYPE 2 DIABETES MELLITUS WITHOUT COMPLICATION, WITHOUT LONG-TERM CURRENT USE OF INSULIN (HCC): ICD-10-CM

## 2020-07-17 PROCEDURE — G2025 DIS SITE TELE SVCS RHC/FQHC: HCPCS | Performed by: GENERAL PRACTICE

## 2020-07-17 NOTE — PROGRESS NOTES
Subjective   Ethel Trotter is a 64 y.o. female.     History of Present Illness     This visit has been rescheduled as a phone visit to comply with patient safety concerns in accordance with CDC recommendations. Total time of discussion was 14 minutes.    You have chosen to receive care through a telephone visit. Do you consent to use a telephone visit for your medical care today? Yes    Diabetes  While better her glucose continues to run high at home. She is generally in the high 100s to low 200s both fasting and following meals. She denies paresthesia of the feet, visual disturbances, polydipsia, polyuria, hypoglycemia or foot ulcerations. Evaluation to date has been: hemoglobin A1C. Current treatments: metformin, DPP inhibitor -sitagliptin (together as janumet). She stopped jardiance sometime ago due to recurrent yeast infections.  When she last refilled her medication she was given jardiance but not janumet and is taking it with no apparent side effects at present.     Dyslipidemia  Compliance with treatment has been good. The patient exercise daily. She is currently being prescribed the following medication for her dyslipidemia - atorvastatin, ezetimibe, and repatha.  She denies any side effects. Patient denies side effects associated with her medications.     Hypertension  Home blood pressure readings: not doing. Associated signs and symptoms: none. Patient denies: chest pain, palpitations, dyspnea, orthopnea, paroxysmal nocturnal dyspnea and peripheral edema. Current antihypertensive medications includes lisinopril, metoprolol, amlodipine.      Coronary artery disease  She has a history of CABG and previous M.I.. Previous diagnostic testing includes: cardiac catheterization Recent history: taking medications as instructed, no medication side effects noted, no chest pain on exertion, no dyspnea on exertion and no swelling of ankles. Patient's symptoms have been unchanged. Medication side effects include:  none. She is not followed by cardiology at present    Episode of Visual Loss  She experienced a 4 to 5 second episode of complete vision loss in her right eye last year.  This occurred during a period of anxiety and was associated with a generalized headache.  She continues to deny any further episodes and has had no weakness, numbness, tingling, or difficulty talking or understanding what is said to her.  She denies any new muscle aches and has had no rash, fever, or chills.  She remains on ASA 81 daily.  A duplex doppler ultrasound of the carotids was ordered but never scheduled.     Imaging  U/S of the abdomen performed on 2/26/20 revealed a 7.44 cm left renal cyst.  She underwent a urology assessment with Dr. Givens on 5/29/2020 who felt that it was a Bosniak type I.  CT of the abdomen and pelvis performed on 6/10/2020 was reported as showing bilateral renal cysts the largest of which was in the posterior left kidney and measured 7 cm.  Atherosclerotic vascular disease and arthropathy arthritic changes of the spine were also noted    The following portions of the patient's history were reviewed and updated as appropriate: allergies, current medications, past medical history, past social history and problem list.    Review of Systems   Constitutional: Positive for fatigue. Negative for appetite change, chills, fever and unexpected weight change.   HENT: Positive for congestion, ear pain (intermittent left ear fullness) and rhinorrhea. Negative for postnasal drip, sneezing, sore throat and voice change.    Eyes: Negative for visual disturbance.   Respiratory: Negative for cough, shortness of breath and wheezing.    Cardiovascular: Negative for chest pain, palpitations and leg swelling.   Gastrointestinal: Negative for abdominal pain, anal bleeding, blood in stool, constipation, diarrhea, nausea and vomiting.   Endocrine: Negative for polydipsia and polyuria.   Genitourinary: Negative for dysuria and hematuria.    Musculoskeletal: Positive for arthralgias and back pain. Negative for myalgias.   Skin: Negative for rash.   Neurological: Negative for weakness, numbness and headaches.   Psychiatric/Behavioral: Positive for sleep disturbance. Negative for dysphoric mood and suicidal ideas. The patient is not nervous/anxious.      Objective   Physical Exam   Constitutional:   Alert and oriented.  Bright and in good spirits.  No apparent distress or shortness of breath.     Assessment/Plan   Problems Addressed this Visit        Cardiovascular and Mediastinum    Coronary artery disease involving native coronary artery  Reminded regarding risk factor modification with an emphasis on tobacco cessation.  Continue current medication    Essential hypertension  Encouraged to continue to work on her diet and exercise plan.  Continue current medication    Mixed hyperlipidemia  As above.   Continue current medication.    TIA (transient ischemic attack)   As above.       Respiratory    Chronic seasonal allergic rhinitis    COPD mixed type (CMS/HCC)       Digestive    Gastroesophageal reflux disease without esophagitis    Vitamin D deficiency disease       Endocrine    Type 2 diabetes mellitus without complication, without long-term current use of insulin (CMS/HCC)  Diabetes mellitus Type II, under inadequate control.   Encouraged to continue to pursue ADA diet  Encouraged aerobic exercise.  Reviewed medication options and agreed on a trial of dulaglutide.  Advised of the potential side effects including the risk of pancreatitis.  Will resume metformin-sitagliptin    Relevant Medications    Dulaglutide (Trulicity) 0.75 MG/0.5ML solution pen-injector    SITagliptin-metFORMIN HCl ER (Janumet XR)  MG tablet    Empagliflozin (Jardiance) 25 MG tablet       Musculoskeletal and Integument    Osteopenia       Genitourinary    Renal cyst    Renal insufficiency  Reminded to avoid any NSAIDs prescription or OTC  Will continue to monitor        Other    Depression with anxiety    Dips tobacco    H/O acute pancreatitis    Healthcare maintenance  Reminded to get a flu shot when available.

## 2020-09-11 ENCOUNTER — OFFICE VISIT (OUTPATIENT)
Dept: FAMILY MEDICINE CLINIC | Facility: CLINIC | Age: 65
End: 2020-09-11

## 2020-09-11 DIAGNOSIS — I87.2 VENOUS INSUFFICIENCY, PERIPHERAL: Primary | ICD-10-CM

## 2020-09-11 DIAGNOSIS — M85.80 OSTEOPENIA, UNSPECIFIED LOCATION: ICD-10-CM

## 2020-09-11 DIAGNOSIS — E11.9 TYPE 2 DIABETES MELLITUS WITHOUT COMPLICATION, WITHOUT LONG-TERM CURRENT USE OF INSULIN (HCC): ICD-10-CM

## 2020-09-11 DIAGNOSIS — R30.0 DYSURIA: ICD-10-CM

## 2020-09-11 DIAGNOSIS — Z00.00 HEALTHCARE MAINTENANCE: ICD-10-CM

## 2020-09-11 DIAGNOSIS — K62.5 RECTAL BLEEDING: ICD-10-CM

## 2020-09-11 DIAGNOSIS — E55.9 VITAMIN D DEFICIENCY DISEASE: ICD-10-CM

## 2020-09-11 DIAGNOSIS — K21.9 GASTROESOPHAGEAL REFLUX DISEASE WITHOUT ESOPHAGITIS: ICD-10-CM

## 2020-09-11 DIAGNOSIS — E78.2 MIXED HYPERLIPIDEMIA: ICD-10-CM

## 2020-09-11 DIAGNOSIS — N28.1 RENAL CYST: ICD-10-CM

## 2020-09-11 DIAGNOSIS — J30.2 CHRONIC SEASONAL ALLERGIC RHINITIS: ICD-10-CM

## 2020-09-11 DIAGNOSIS — K59.09 CHRONIC CONSTIPATION: ICD-10-CM

## 2020-09-11 DIAGNOSIS — N28.9 RENAL INSUFFICIENCY: ICD-10-CM

## 2020-09-11 DIAGNOSIS — Z72.0 DIPS TOBACCO: ICD-10-CM

## 2020-09-11 DIAGNOSIS — M54.59 MECHANICAL LOW BACK PAIN: ICD-10-CM

## 2020-09-11 DIAGNOSIS — F41.8 DEPRESSION WITH ANXIETY: ICD-10-CM

## 2020-09-11 DIAGNOSIS — M25.552 LEFT HIP PAIN: ICD-10-CM

## 2020-09-11 DIAGNOSIS — I25.10 CORONARY ARTERY DISEASE INVOLVING NATIVE CORONARY ARTERY OF NATIVE HEART WITHOUT ANGINA PECTORIS: ICD-10-CM

## 2020-09-11 DIAGNOSIS — Z87.19 H/O ACUTE PANCREATITIS: ICD-10-CM

## 2020-09-11 DIAGNOSIS — G45.9 TIA (TRANSIENT ISCHEMIC ATTACK): ICD-10-CM

## 2020-09-11 DIAGNOSIS — I10 ESSENTIAL HYPERTENSION: ICD-10-CM

## 2020-09-11 DIAGNOSIS — Z23 ENCOUNTER FOR IMMUNIZATION: ICD-10-CM

## 2020-09-11 DIAGNOSIS — J44.9 COPD MIXED TYPE (HCC): ICD-10-CM

## 2020-09-11 LAB
BILIRUB BLD-MCNC: NEGATIVE MG/DL
CLARITY, POC: ABNORMAL
COLOR UR: YELLOW
GLUCOSE UR STRIP-MCNC: ABNORMAL MG/DL
KETONES UR QL: NEGATIVE
LEUKOCYTE EST, POC: NEGATIVE
NITRITE UR-MCNC: POSITIVE MG/ML
PH UR: 6 [PH] (ref 5–8)
PROT UR STRIP-MCNC: NEGATIVE MG/DL
RBC # UR STRIP: ABNORMAL /UL
SP GR UR: 1.02 (ref 1–1.03)
UROBILINOGEN UR QL: NORMAL

## 2020-09-11 PROCEDURE — 87086 URINE CULTURE/COLONY COUNT: CPT | Performed by: GENERAL PRACTICE

## 2020-09-11 PROCEDURE — 87088 URINE BACTERIA CULTURE: CPT | Performed by: GENERAL PRACTICE

## 2020-09-11 PROCEDURE — 87186 SC STD MICRODIL/AGAR DIL: CPT | Performed by: GENERAL PRACTICE

## 2020-09-11 PROCEDURE — 81003 URINALYSIS AUTO W/O SCOPE: CPT | Performed by: GENERAL PRACTICE

## 2020-09-11 PROCEDURE — G0008 ADMIN INFLUENZA VIRUS VAC: HCPCS | Performed by: GENERAL PRACTICE

## 2020-09-11 PROCEDURE — 82043 UR ALBUMIN QUANTITATIVE: CPT | Performed by: GENERAL PRACTICE

## 2020-09-11 PROCEDURE — 99214 OFFICE O/P EST MOD 30 MIN: CPT | Performed by: GENERAL PRACTICE

## 2020-09-11 PROCEDURE — 81015 MICROSCOPIC EXAM OF URINE: CPT | Performed by: GENERAL PRACTICE

## 2020-09-11 PROCEDURE — 90686 IIV4 VACC NO PRSV 0.5 ML IM: CPT | Performed by: GENERAL PRACTICE

## 2020-09-11 NOTE — PROGRESS NOTES
Subjective   Ethel Trotter is a 64 y.o. female.     History of Present Illness     Low Back Pain  She has a long history of low back pain worse since last here.  There is no history of any strain or trauma nor any change in her activities.  The pain is described as an intermittent left lower ache.  The pain radiates to her left flank, left lateral hip, and left posterior thigh.  The pain in her back is worse than that elsewhere.  She has been unable to identify any precipitating, exacerbating, or relieving factors.  She denies any changes in her strength, sensation, or bowel/bladder control.  She had a single episode of bright red rectal bleeding several weeks ago but this has not recurred and she denies any changes in her bowel habits or any melena.  She admits to urinary frequency, nocturia, and intermittent dysuria but denies any hematuria.  She denies any vaginal bleeding and there is no history of any fever, chills, or night sweats.  Plain films of the lumbar spine performed on 10/22/2018 were reported as showing disc space narrowing and osteophyte formation worse at L4-5.  X-rays of the left hip performed on 2/26/2020 were unremarkable.  Colonoscopy performed on 5/13/2019 by Dr. Reyes was reported as showing mild diverticulosis and several tubular adenomas were removed.  Ultrasound of the abdomen performed on 2/26/2020 revealed a 7.44 cm left renal cyst.  She underwent a urology assessment with Dr. Givens on 5/29/2020 who felt that it was a Bosniak type I.  CT of the abdomen and pelvis performed on 6/10/2020 was reported as showing bilateral renal cysts the largest of which was in the posterior left kidney and measured 7 cm.  Atherosclerotic vascular disease and osteoarthritic changes of the spine were also noted.    Diabetes  She denies paresthesias of the feet, visual disturbances, polydipsia, polyuria, hypoglycemia or foot ulcerations.  Evaluation to date has been a hemoglobin A1c.  Current treatments  include metformin and sitagliptin together as janumet, empagliflozin, and dulaglutide.  She denies any apparent side effects.    Dyslipidemia  Compliance with treatment has been good. The patient exercise daily. She is currently being prescribed the following medication for her dyslipidemia - atorvastatin, ezetimibe, and repatha.  She denies any side effects. Patient denies side effects associated with her medications.     Hypertension  Home blood pressure readings: not doing. Associated signs and symptoms: none. Patient denies: chest pain, palpitations, dyspnea, orthopnea, paroxysmal nocturnal dyspnea and peripheral edema. Current antihypertensive medications includes lisinopril, metoprolol, amlodipine.      Coronary artery disease  She has a history of CABG and previous M.I.. Previous diagnostic testing includes: cardiac catheterization Recent history: taking medications as instructed, no medication side effects noted, no chest pain on exertion, no dyspnea on exertion and no swelling of ankles. Patient's symptoms have been unchanged. Medication side effects include: none. She is not followed by cardiology at present    Episode of Visual Loss  She experienced a 4 to 5 second episode of complete vision loss in her right eye last year.  This occurred during a period of anxiety and was associated with a generalized headache.  She continues to deny any further episodes and has had no weakness, numbness, tingling, or difficulty talking or understanding what is said to her.  She denies any new muscle aches and has had no rash, fever, or chills.  She remains on ASA 81 daily.  A duplex doppler ultrasound of the carotids was ordered but never scheduled.     The following portions of the patient's history were reviewed and updated as appropriate: allergies, current medications, past medical history, past social history and problem list.    Review of Systems   Constitutional: Positive for fatigue. Negative for appetite  change, chills, fever and unexpected weight change.   HENT: Positive for congestion, ear pain (intermittent left ear fullness) and rhinorrhea. Negative for postnasal drip, sneezing, sore throat and voice change.    Eyes: Negative for visual disturbance.   Respiratory: Negative for cough, shortness of breath and wheezing.    Cardiovascular: Negative for chest pain, palpitations and leg swelling.   Gastrointestinal: Positive for blood in stool. Negative for abdominal pain, anal bleeding, constipation, diarrhea, nausea and vomiting.   Endocrine: Negative for polydipsia and polyuria.   Genitourinary: Positive for dysuria and frequency. Negative for hematuria and urgency.   Musculoskeletal: Positive for arthralgias and back pain. Negative for myalgias.   Skin: Negative for rash.   Neurological: Negative for weakness, numbness and headaches.   Psychiatric/Behavioral: Positive for sleep disturbance. Negative for dysphoric mood and suicidal ideas. The patient is not nervous/anxious.      Objective   Physical Exam   Constitutional: She is oriented to person, place, and time. No distress.   Accompanied by her .  Bright and in fair spirits. No apparent distress. No pallor, jaundice, diaphoresis, or cyanosis.     HENT:   Head: Atraumatic.   Right Ear: Tympanic membrane, external ear and ear canal normal.   Left Ear: External ear and ear canal normal. Tympanic membrane is scarred.   Mouth/Throat: Mucous membranes are not pale and not cyanotic.   Eyes: No scleral icterus.   Neck: No JVD present. Carotid bruit is not present. No tracheal deviation present. No thyromegaly present.   Cardiovascular: Normal rate, regular rhythm, S1 normal and S2 normal. Exam reveals no gallop, no S3 and no S4.   No murmur heard.  Pulmonary/Chest: Breath sounds normal. No stridor. No respiratory distress.   Abdominal: Soft. Bowel sounds are normal. She exhibits no distension. There is no splenomegaly or hepatomegaly. There is abdominal  tenderness in the left upper quadrant and left lower quadrant. There is no rebound and no guarding.   Musculoskeletal: No deformity.      Left hip: She exhibits normal range of motion (mild discomfort with full IR), no tenderness and no crepitus.      Lumbar back: She exhibits tenderness (left lumbar paraspinal muscle tenderness). She exhibits no bony tenderness, no swelling and no deformity.      Comments: Negative straight leg raise.     Vascular Status -  Her right foot exhibits no edema. Her left foot exhibits no edema.  Lymphadenopathy:        Head (right side): No submandibular adenopathy present.        Head (left side): No submandibular adenopathy present.     She has no cervical adenopathy.   Neurological: She is alert and oriented to person, place, and time. A sensory deficit (decreased vibration sense toes of both feet) is present. No cranial nerve deficit. She exhibits normal muscle tone. Coordination normal.   Reflex Scores:       Patellar reflexes are 2+ on the right side and 2+ on the left side.       Achilles reflexes are 1+ on the right side and 1+ on the left side.  Skin: Skin is warm and dry. No rash noted. She is not diaphoretic. No pallor. Nails show no clubbing.     Assessment/Plan   Problems Addressed this Visit        Cardiovascular and Mediastinum    Coronary artery disease involving native coronary artery\  Reminded regarding risk factor modification with an emphasis on tobacco cessation.  Continue current medication    Essential hypertension   Hypertension: remains quite labile. Evidence of target organ damage: coronary artery disease and possible TIA.  Encouraged to continue to work on diet and exercise plan.   Continue current medication    Relevant Orders    CBC & Differential    Comprehensive Metabolic Panel    Mixed hyperlipidemia  As above.   Continue current medication.    Relevant Orders    Lipid Panel    TIA (transient ischemic attack)  As above.    Venous insufficiency,  peripheral        Respiratory    Chronic seasonal allergic rhinitis    COPD mixed type (CMS/[facility])       Digestive    Chronic constipation    Gastroesophageal reflux disease without esophagitis    Rectal bleeding  Patient remains uninterested in a GI or general surgery assessment but will report if any further bleeding whatsoever    Vitamin D deficiency disease       Endocrine    Type 2 diabetes mellitus without complication, without long-term current use of insulin (CMS/[facility])  Diabetes mellitus Type II, under unknown control.   Encouraged to continue to pursue ADA diet  Encouraged aerobic exercise.  Continue current medication  Updated labs drawn.    Relevant Orders    Hemoglobin A1c    MicroAlbumin, Urine, Random - Urine, Clean Catch (Completed)       Nervous and Auditory    Dysuria    Relevant Orders    POC Urinalysis Dipstick, Automated (Completed)    Urine Culture - Urine, Urine, Clean Catch (Completed)    Urinalysis, Microscopic Only - Urine, Clean Catch (Completed)    Left hip pain    Relevant Orders    CT lumbar spine w contrast    Mechanical low back pain  Reminded regarding symptomatic treatment.   We will arrange a CT of the lumbar spine.  Patient will be notified of the results and any further action to be taken.  She will report if any worse or if any new symptoms in the meantime    Relevant Orders    CT lumbar spine w contrast       Musculoskeletal and Integument    Osteopenia       Genitourinary    Renal cyst  Follow up with urology    Renal insufficiency       Other    Depression with anxiety    Dips tobacco    Encounter for immunization    Relevant Orders    Fluarix/Fluzone/Afluria Quad>6 Months (Completed)    H/O acute pancreatitis    Healthcare maintenance  Recommended a flu shot  We will reschedule her mammogram and DEXA scan    Relevant Orders    Fluarix/Fluzone/Afluria Quad>6 Months (Completed)

## 2020-09-12 VITALS
HEIGHT: 63 IN | TEMPERATURE: 97.1 F | DIASTOLIC BLOOD PRESSURE: 105 MMHG | RESPIRATION RATE: 14 BRPM | HEART RATE: 77 BPM | BODY MASS INDEX: 24.1 KG/M2 | WEIGHT: 136 LBS | SYSTOLIC BLOOD PRESSURE: 172 MMHG | OXYGEN SATURATION: 96 %

## 2020-09-12 PROBLEM — K62.5 RECTAL BLEEDING: Status: ACTIVE | Noted: 2020-09-12

## 2020-09-12 LAB
ALBUMIN UR-MCNC: 7.4 MG/DL
BACTERIA UR QL AUTO: ABNORMAL /HPF
HYALINE CASTS UR QL AUTO: ABNORMAL /LPF
RBC # UR: ABNORMAL /HPF
REF LAB TEST METHOD: ABNORMAL
SQUAMOUS #/AREA URNS HPF: ABNORMAL /HPF
WBC UR QL AUTO: ABNORMAL /HPF

## 2020-09-13 LAB — BACTERIA SPEC AEROBE CULT: ABNORMAL

## 2020-09-14 RX ORDER — SULFAMETHOXAZOLE AND TRIMETHOPRIM 800; 160 MG/1; MG/1
1 TABLET ORAL 2 TIMES DAILY
Qty: 14 TABLET | Refills: 0 | Status: SHIPPED | OUTPATIENT
Start: 2020-09-14 | End: 2020-09-21

## 2020-09-28 ENCOUNTER — HOSPITAL ENCOUNTER (OUTPATIENT)
Dept: CT IMAGING | Facility: HOSPITAL | Age: 65
Discharge: HOME | End: 2020-09-28
Admitting: GENERAL PRACTICE

## 2020-09-28 DIAGNOSIS — M54.59 MECHANICAL LOW BACK PAIN: ICD-10-CM

## 2020-09-28 DIAGNOSIS — M25.552 LEFT HIP PAIN: ICD-10-CM

## 2020-09-28 PROCEDURE — 72131 CT LUMBAR SPINE W/O DYE: CPT | Performed by: RADIOLOGY

## 2020-09-28 PROCEDURE — 72131 CT LUMBAR SPINE W/O DYE: CPT

## 2020-10-06 DIAGNOSIS — N20.0 NEPHROLITHIASIS: ICD-10-CM

## 2020-10-06 DIAGNOSIS — M25.552 LEFT HIP PAIN: Primary | ICD-10-CM

## 2020-10-06 DIAGNOSIS — M54.59 MECHANICAL LOW BACK PAIN: ICD-10-CM

## 2020-10-06 DIAGNOSIS — N28.1 RENAL CYST: ICD-10-CM

## 2020-10-09 ENCOUNTER — OFFICE VISIT (OUTPATIENT)
Dept: UROLOGY | Facility: CLINIC | Age: 65
End: 2020-10-09

## 2020-10-09 VITALS — WEIGHT: 143.8 LBS | BODY MASS INDEX: 25.48 KG/M2 | HEIGHT: 63 IN | TEMPERATURE: 98.5 F

## 2020-10-09 DIAGNOSIS — R35.0 FREQUENCY OF URINATION: ICD-10-CM

## 2020-10-09 DIAGNOSIS — N20.0 RENAL CALCULUS, LEFT: ICD-10-CM

## 2020-10-09 DIAGNOSIS — R10.9 BILATERAL FLANK PAIN: ICD-10-CM

## 2020-10-09 DIAGNOSIS — N28.1 BILATERAL RENAL CYSTS: Primary | ICD-10-CM

## 2020-10-09 LAB
BILIRUB BLD-MCNC: NEGATIVE MG/DL
CLARITY, POC: CLEAR
COLOR UR: YELLOW
GLUCOSE UR STRIP-MCNC: NEGATIVE MG/DL
KETONES UR QL: NEGATIVE
LEUKOCYTE EST, POC: NEGATIVE
NITRITE UR-MCNC: NEGATIVE MG/ML
PH UR: 5.5 [PH] (ref 5–8)
PROT UR STRIP-MCNC: NEGATIVE MG/DL
RBC # UR STRIP: NEGATIVE /UL
SP GR UR: 1.01 (ref 1–1.03)
UROBILINOGEN UR QL: NORMAL

## 2020-10-09 PROCEDURE — 51798 US URINE CAPACITY MEASURE: CPT | Performed by: NURSE PRACTITIONER

## 2020-10-09 PROCEDURE — 99214 OFFICE O/P EST MOD 30 MIN: CPT | Performed by: NURSE PRACTITIONER

## 2020-10-09 PROCEDURE — 81003 URINALYSIS AUTO W/O SCOPE: CPT | Performed by: NURSE PRACTITIONER

## 2020-10-09 NOTE — PROGRESS NOTES
Chief Complaint:          Chief Complaint   Patient presents with   • renal cyst     follow up        HPI:   65 y.o. female.  Returns to clinic today for follow up on bilateral renal cysts. She reports flank pain that has been ongoing for over One year now, worsening the last 4 weeks. She reports a repeat CT-lumbar spine by her primary care which showed  Bilateral Renal Cysts and Non Obstructing Left Kidney Stones.    She denies any urinary symptoms of frequency, urgency, dysuria or nocturia. She denies any episodes of gross Hematuria, abdominal pain, pelvic pain or discomfort. She denies She denies  nausea, no vomiting, no real early satiety. She has back pain, flank pain, but denies CVA tenderness.     Her urine dipstick is completely negative for any infection, it is negative for gross/microscopic hematuria. She continues to chew tobacco products daily.  She has no other symptomatology besides flank pain. She has no other absolute indicators for intervention besides a significant family history of Simple renal cysts: her mom and brother who also had lung CA.    The rest of her Medical history as listed below.      Past Medical History:        Past Medical History:   Diagnosis Date   • Arthritis    • CAD (coronary artery disease)    • COPD (chronic obstructive pulmonary disease) (CMS/HCC)    • Diabetes mellitus (CMS/HCC)    • Elevated cholesterol    • Fracture of wrist    • GERD (gastroesophageal reflux disease)    • Hepatitis C    • History of EKG 03/25/2016    ABNORMAL   • History of transfusion    • Hyperlipidemia    • Hypertension    • Myocardial infarction (CMS/HCC)     x2 last one 5 years ago   • Osteopenia    • Pancreatitis    • Stroke (CMS/HCC)      The following portions of the patient's history were reviewed and updated as appropriate: allergies, current medications, past family history, past medical history, past social history, past surgical history and problem list.    Current Meds:     Current  Outpatient Medications   Medication Sig Dispense Refill   • amLODIPine (NORVASC) 10 MG tablet Take 1 tablet by mouth Every Evening. 30 tablet 5   • aspirin (Aspir-Low) 81 MG EC tablet Take 1 tablet by mouth Daily. 30 tablet 5   • atorvastatin (LIPITOR) 80 MG tablet Take 1 tablet by mouth Every Evening. 30 tablet 5   • citalopram (CeleXA) 20 MG tablet Take 1 tablet by mouth Daily. 30 tablet 5   • Dulaglutide (Trulicity) 0.75 MG/0.5ML solution pen-injector Inject 0.75 mg under the skin into the appropriate area as directed 1 (One) Time Per Week. 4 pen 5   • Empagliflozin (Jardiance) 25 MG tablet Take 25 mg by mouth Every Morning. 30 tablet 5   • Evolocumab (Repatha SureClick) 140 MG/ML solution auto-injector Inject 1 mL under the skin into the appropriate area as directed Every 14 (Fourteen) Days. 2 mL 5   • ezetimibe (ZETIA) 10 MG tablet Take 1 tablet by mouth Every Night. 30 tablet 5   • Glucose Blood (BLOOD GLUCOSE TEST) strip 1 twice daily 60 each 5   • isosorbide mononitrate (IMDUR) 30 MG 24 hr tablet Take 1 tablet by mouth Every Morning. 30 tablet 5   • Lancets misc 1 twice daily 60 each 5   • linaclotide (Linzess) 72 MCG capsule capsule Take 1 capsule by mouth Every Morning Before Breakfast. 30 capsule 5   • lisinopril (PRINIVIL,ZESTRIL) 10 MG tablet Take 1 tablet by mouth Daily. 30 tablet 5   • metoprolol succinate XL (TOPROL-XL) 100 MG 24 hr tablet Take 1 tablet by mouth Daily. 30 tablet 5   • nitroglycerin (NITROSTAT) 0.4 MG SL tablet Place 1 tablet under the tongue As Needed for Chest Pain. for chest pain, may repeat 3 doses then go to ER 25 tablet 5   • SITagliptin-metFORMIN HCl ER (Janumet XR)  MG tablet Take 1 tablet by mouth 2 (Two) Times a Day. 60 tablet 5   • vitamin D (ERGOCALCIFEROL) 1.25 MG (61756 UT) capsule capsule Take 1 capsule by mouth 1 (One) Time Per Week. 4 capsule 5   • Zoster Vac Recomb Adjuvanted (SHINGRIX) 50 MCG/0.5ML reconstituted suspension Inject 0.5 mL into the appropriate  muscle as directed by prescriber See Admin Instructions. Repeat in 2-6 months 1 each 1     No current facility-administered medications for this visit.         Allergies:      No Known Allergies     Past Surgical History:     Past Surgical History:   Procedure Laterality Date   • COLONOSCOPY N/A 5/13/2019    Procedure: COLONOSCOPY;  Surgeon: Arnav Reyes MD;  Location: [facility];  Service: Gastroenterology   • CORONARY ARTERY BYPASS GRAFT     • HYSTERECTOMY      1998   • TONSILLECTOMY           Social History:     Social History     Socioeconomic History   • Marital status:      Spouse name: Not on file   • Number of children: Not on file   • Years of education: Not on file   • Highest education level: Not on file   Tobacco Use   • Smoking status: Never Smoker   • Smokeless tobacco: Current User     Types: Chew   Substance and Sexual Activity   • Alcohol use: No   • Drug use: Yes     Types: Marijuana     Comment: OCCASIONAL   • Sexual activity: Defer       Family History:     Family History   Problem Relation Age of Onset   • No Known Problems Father    • No Known Problems Mother    • Breast cancer Neg Hx        Review of Systems:     Review of Systems   Constitutional: Positive for activity change and fatigue. Negative for chills and fever.   HENT: Negative for congestion.    Eyes: Negative for blurred vision.   Gastrointestinal: Negative for abdominal pain, nausea and vomiting.   Genitourinary: Positive for flank pain. Negative for difficulty urinating, dyspareunia, dysuria, frequency, genital sores, hematuria, pelvic pain, pelvic pressure, urgency, urinary incontinence and vaginal discharge.   Musculoskeletal: Positive for back pain and gait problem.   Neurological: Negative for dizziness, headache and confusion.   Psychiatric/Behavioral: Negative for behavioral problems and decreased concentration.        Physical Exam:     Physical Exam  Constitutional:       General: She is not in acute  distress.     Appearance: She is well-developed. She is ill-appearing.   HENT:      Head: Normocephalic and atraumatic.   Eyes:      Pupils: Pupils are equal, round, and reactive to light.   Neck:      Musculoskeletal: Normal range of motion.      Thyroid: No thyromegaly.      Trachea: No tracheal deviation.   Cardiovascular:      Rate and Rhythm: Normal rate and regular rhythm.      Heart sounds: No murmur.   Pulmonary:      Effort: Pulmonary effort is normal. No respiratory distress.      Breath sounds: Normal breath sounds. No stridor. No wheezing.   Abdominal:      General: Bowel sounds are normal.      Palpations: Abdomen is soft.      Tenderness: There is abdominal tenderness.   Genitourinary:     Labia:         Right: No tenderness.         Left: No tenderness.       Vagina: Normal. No vaginal discharge.   Musculoskeletal: Normal range of motion.         General: Tenderness present. No deformity.   Skin:     General: Skin is warm and dry.      Capillary Refill: Capillary refill takes less than 2 seconds.      Coloration: Skin is not pale.      Findings: No erythema or rash.   Neurological:      Mental Status: She is alert and oriented to person, place, and time.      Cranial Nerves: No cranial nerve deficit.      Sensory: No sensory deficit.      Motor: Weakness present.      Coordination: Coordination normal.   Psychiatric:         Behavior: Behavior normal.         Thought Content: Thought content normal.         Judgment: Judgment normal.         Procedure:       Assessment/Plan:     Bilateral Renal Cysts/FlanK Pain /Left Nephrocalcinosis: The patient returns to clinic today to follow up on her simple cysts, and for bilateral flank pain L>right side.    We both reviewed / Discussed her recent CT scan which showed Bilateral renal cysts. No solid mass or hydronephrosis is noted .  Also noted are Nonobstructing left kidney stones.      Right Renal Calculus: The Patient has been diagnosed with a right renal  calculus.  He presents with right colicky pain that has been intermittent occasionally sharp with pelvic pressure and discomfort ongoing for 2 weeks.     We have discussed the various parameters regarding spontaneous passage including the notion that a tiny 1-3 mm stone has a high likelihood of spontaneous passage versus a larger stone  being caught up in the upper areas of the urinary tract. We also discussed the medical management of stone disease and the use of medical expulsive therapy in the form of Flomax.    Renal cyst-I discussed the Bosniak classification of renal cysts.  I discussed the strict criteria involved with making a decision regarding intervention.  We discussed the fact that this is likely a Bosniak type I cyst which has sharp distinct borders and a thin wall and no internal echoes versus a Bosniak 2 with a thicker wall and faint striations.      We discussed the risks of malignancy in these situations is essentially 0 and requires no further intervention however we discussed the fact that a Bosniak 3 has about a 28% chance of malignancy and finally a Bosniak for probably is representative of a renal cell carcinoma variant.    Finally, we discussed the fact that these are generally asymptomatic and do not require intervention and that the appropriate surgical management is a radiographic cyst puncture when causing pain or problems particularly on the left with an early satiety.    Will follow up again in ONE YEAR with a repeat Renal Ultrasound,    Patient is agreeable with plan of care    Patient reports that she is not currently experiencing any symptoms of urinary incontinence.    Patient's Body mass index is 25.47 kg/m². BMI is within normal parameters. No follow-up required..    Smoking Cessation Counseling:  Former smoker. Pt chews tobacco  I advised patient to quit tobacco use and offered support.  I provided patient with tobacco cessation educational material printed in the patient's After  Visit Summary.     Counseling was given to patient for the following topics diagnostic results including: Renal cysts, Flank Pain, Left Nephrocalcinosis, instructions for management as follows: warm compresses to flank area for pain, increase po fluids, increased activity, ROM exercises and risk factor reductions including: Tobacco cessation, safety, . The interim medical history and current results were reviewed.  A treatment plan with follow-up was made for flank pain, left renal stones, Bilateral renal cysts [N28.1].          This document has been electronically signed by Griselda Cheng-Akwa, APRN October 12, 2020 23:13 EDT

## 2020-10-13 NOTE — PATIENT INSTRUCTIONS
"Dietary Guidelines to Help Prevent Kidney Stones  Kidney stones are deposits of minerals and salts that form inside your kidneys. Your risk of developing kidney stones may be greater depending on your diet, your lifestyle, the medicines you take, and whether you have certain medical conditions. Most people can reduce their chances of developing kidney stones by following the instructions below. Depending on your overall health and the type of kidney stones you tend to develop, your dietitian may give you more specific instructions.  What are tips for following this plan?  Reading food labels  · Choose foods with \"no salt added\" or \"low-salt\" labels. Limit your sodium intake to less than 1500 mg per day.  · Choose foods with calcium for each meal and snack. Try to eat about 300 mg of calcium at each meal. Foods that contain 200-500 mg of calcium per serving include:  ? 8 oz (237 ml) of milk, fortified nondairy milk, and fortified fruit juice.  ? 8 oz (237 ml) of kefir, yogurt, and soy yogurt.  ? 4 oz (118 ml) of tofu.  ? 1 oz of cheese.  ? 1 cup (300 g) of dried figs.  ? 1 cup (91 g) of cooked broccoli.  ? 1-3 oz can of sardines or mackerel.  · Most people need 1000 to 1500 mg of calcium each day. Talk to your dietitian about how much calcium is recommended for you.  Shopping  · Buy plenty of fresh fruits and vegetables. Most people do not need to avoid fruits and vegetables, even if they contain nutrients that may contribute to kidney stones.  · When shopping for convenience foods, choose:  ? Whole pieces of fruit.  ? Premade salads with dressing on the side.  ? Low-fat fruit and yogurt smoothies.  · Avoid buying frozen meals or prepared deli foods.  · Look for foods with live cultures, such as yogurt and kefir.  Cooking  · Do not add salt to food when cooking. Place a salt shaker on the table and allow each person to add his or her own salt to taste.  · Use vegetable protein, such as beans, textured vegetable " protein (TVP), or tofu instead of meat in pasta, casseroles, and soups.  Meal planning    · Eat less salt, if told by your dietitian. To do this:  ? Avoid eating processed or premade food.  ? Avoid eating fast food.  · Eat less animal protein, including cheese, meat, poultry, or fish, if told by your dietitian. To do this:  ? Limit the number of times you have meat, poultry, fish, or cheese each week. Eat a diet free of meat at least 2 days a week.  ? Eat only one serving each day of meat, poultry, fish, or seafood.  ? When you prepare animal protein, cut pieces into small portion sizes. For most meat and fish, one serving is about the size of one deck of cards.  · Eat at least 5 servings of fresh fruits and vegetables each day. To do this:  ? Keep fruits and vegetables on hand for snacks.  ? Eat 1 piece of fruit or a handful of berries with breakfast.  ? Have a salad and fruit at lunch.  ? Have two kinds of vegetables at dinner.  · Limit foods that are high in a substance called oxalate. These include:  ? Spinach.  ? Rhubarb.  ? Beets.  ? Potato chips and french fries.  ? Nuts.  · If you regularly take a diuretic medicine, make sure to eat at least 1-2 fruits or vegetables high in potassium each day. These include:  ? Avocado.  ? Banana.  ? Orange, prune, carrot, or tomato juice.  ? Baked potato.  ? Cabbage.  ? Beans and split peas.  General instructions    · Drink enough fluid to keep your urine clear or pale yellow. This is the most important thing you can do.  · Talk to your health care provider and dietitian about taking daily supplements. Depending on your health and the cause of your kidney stones, you may be advised:  ? Not to take supplements with vitamin C.  ? To take a calcium supplement.  ? To take a daily probiotic supplement.  ? To take other supplements such as magnesium, fish oil, or vitamin B6.  · Take all medicines and supplements as told by your health care provider.  · Limit alcohol intake to no  more than 1 drink a day for nonpregnant women and 2 drinks a day for men. One drink equals 12 oz of beer, 5 oz of wine, or 1½ oz of hard liquor.  · Lose weight if told by your health care provider. Work with your dietitian to find strategies and an eating plan that works best for you.  What foods are not recommended?  Limit your intake of the following foods, or as told by your dietitian. Talk to your dietitian about specific foods you should avoid based on the type of kidney stones and your overall health.  Grains  Breads. Bagels. Rolls. Baked goods. Salted crackers. Cereal. Pasta.  Vegetables  Spinach. Rhubarb. Beets. Canned vegetables. Pickles. Olives.  Meats and other protein foods  Nuts. Nut butters. Large portions of meat, poultry, or fish. Salted or cured meats. Deli meats. Hot dogs. Sausages.  Dairy  Cheese.  Beverages  Regular soft drinks. Regular vegetable juice.  Seasonings and other foods  Seasoning blends with salt. Salad dressings. Canned soups. Soy sauce. Ketchup. Barbecue sauce. Canned pasta sauce. Casseroles. Pizza. Lasagna. Frozen meals. Potato chips. French fries.  Summary  · You can reduce your risk of kidney stones by making changes to your diet.  · The most important thing you can do is drink enough fluid. You should drink enough fluid to keep your urine clear or pale yellow.  · Ask your health care provider or dietitian how much protein from animal sources you should eat each day, and also how much salt and calcium you should have each day.  This information is not intended to replace advice given to you by your health care provider. Make sure you discuss any questions you have with your health care provider.  Document Released: 04/13/2012 Document Revised: 04/08/2020 Document Reviewed: 11/28/2017  [employer] Patient Education © 2020 [employer]    Discussed a kidney stone prevention diet to include increasing p.o. fluid intake, to at least 1 to 2 L of water daily.  She is to avoid caffeine  products such as cola, coffee, and to avoid soft or soda drinks.  She is to decrease her sodium consumption as in  Fast foods, dejesus, salted nuts, canned foods, and smoked/cured foods. She is also to decrease her oxalate consumption, as in spinach, Marybel greens, and Rhubarb.  Also important is to decrease protein intake, as in red meats, peanut butter, and also avoid nuts.    Flank Pain, Adult  Flank pain is pain in your side. The flank is the area of your side between your upper belly (abdomen) and your back. The pain may occur over a short time (acute), or it may be long-term or come back often (chronic). It may be mild or very bad. Pain in this area can be caused by many different things.  Follow these instructions at home:    · Drink enough fluid to keep your pee (urine) clear or pale yellow.  · Rest as told by your doctor.  · Take over-the-counter and prescription medicines only as told by your doctor.  · Keep a journal to keep track of:  ? What has caused your flank pain.  ? What has made it feel better.  · Keep all follow-up visits as told by your doctor. This is important.  Contact a doctor if:  · Medicine does not help your pain.  · You have new symptoms.  · Your pain gets worse.  · You have a fever.  · Your symptoms last longer than 2-3 days.  · You have trouble peeing.  · You are peeing more often than normal.  Get help right away if:  · You have trouble breathing.  · You are short of breath.  · Your belly hurts, or it is swollen or red.  · You feel sick to your stomach (nauseous).  · You throw up (vomit).  · You feel like you will pass out, or you do pass out (faint).  · You have blood in your pee.  Summary  · Flank pain is pain in your side. The flank is the area of your side between your upper belly (abdomen) and your back.  · Flank pain may occur over a short time (acute), or it may be long-term or come back often (chronic). It may be mild or very bad.  · Pain in this area can be caused by many  different things.  · Contact your doctor if your symptoms get worse or they last longer than 2-3 days.  This information is not intended to replace advice given to you by your health care provider. Make sure you discuss any questions you have with your health care provider.  Document Released: 09/26/2009 Document Revised: 11/30/2018 Document Reviewed: 04/09/2018  [employer] Patient Education © 2020 [employer]

## 2020-12-11 ENCOUNTER — OFFICE VISIT (OUTPATIENT)
Dept: FAMILY MEDICINE CLINIC | Facility: CLINIC | Age: 65
End: 2020-12-11

## 2020-12-11 DIAGNOSIS — K59.09 CHRONIC CONSTIPATION: ICD-10-CM

## 2020-12-11 DIAGNOSIS — J30.2 CHRONIC SEASONAL ALLERGIC RHINITIS: ICD-10-CM

## 2020-12-11 DIAGNOSIS — I25.10 CORONARY ARTERY DISEASE INVOLVING NATIVE CORONARY ARTERY OF NATIVE HEART WITHOUT ANGINA PECTORIS: Primary | ICD-10-CM

## 2020-12-11 DIAGNOSIS — M25.552 LEFT HIP PAIN: ICD-10-CM

## 2020-12-11 DIAGNOSIS — N28.1 RENAL CYST: ICD-10-CM

## 2020-12-11 DIAGNOSIS — F41.8 DEPRESSION WITH ANXIETY: ICD-10-CM

## 2020-12-11 DIAGNOSIS — Z72.0 DIPS TOBACCO: ICD-10-CM

## 2020-12-11 DIAGNOSIS — I10 ESSENTIAL HYPERTENSION: ICD-10-CM

## 2020-12-11 DIAGNOSIS — I87.2 VENOUS INSUFFICIENCY, PERIPHERAL: ICD-10-CM

## 2020-12-11 DIAGNOSIS — J44.9 COPD MIXED TYPE (HCC): ICD-10-CM

## 2020-12-11 DIAGNOSIS — K62.5 RECTAL BLEEDING: ICD-10-CM

## 2020-12-11 DIAGNOSIS — N20.0 NEPHROLITHIASIS: ICD-10-CM

## 2020-12-11 DIAGNOSIS — K21.9 GASTROESOPHAGEAL REFLUX DISEASE WITHOUT ESOPHAGITIS: ICD-10-CM

## 2020-12-11 DIAGNOSIS — E11.9 TYPE 2 DIABETES MELLITUS WITHOUT COMPLICATION, WITHOUT LONG-TERM CURRENT USE OF INSULIN (HCC): ICD-10-CM

## 2020-12-11 DIAGNOSIS — E55.9 VITAMIN D DEFICIENCY DISEASE: ICD-10-CM

## 2020-12-11 DIAGNOSIS — G45.9 TIA (TRANSIENT ISCHEMIC ATTACK): ICD-10-CM

## 2020-12-11 DIAGNOSIS — Z00.00 HEALTHCARE MAINTENANCE: ICD-10-CM

## 2020-12-11 DIAGNOSIS — M54.59 MECHANICAL LOW BACK PAIN: ICD-10-CM

## 2020-12-11 DIAGNOSIS — M85.80 OSTEOPENIA, UNSPECIFIED LOCATION: ICD-10-CM

## 2020-12-11 DIAGNOSIS — N28.9 RENAL INSUFFICIENCY: ICD-10-CM

## 2020-12-11 DIAGNOSIS — E78.2 MIXED HYPERLIPIDEMIA: ICD-10-CM

## 2020-12-11 PROCEDURE — 83036 HEMOGLOBIN GLYCOSYLATED A1C: CPT | Performed by: GENERAL PRACTICE

## 2020-12-11 PROCEDURE — 80053 COMPREHEN METABOLIC PANEL: CPT | Performed by: GENERAL PRACTICE

## 2020-12-11 PROCEDURE — 80061 LIPID PANEL: CPT | Performed by: GENERAL PRACTICE

## 2020-12-11 PROCEDURE — G0439 PPPS, SUBSEQ VISIT: HCPCS | Performed by: GENERAL PRACTICE

## 2020-12-11 PROCEDURE — 84443 ASSAY THYROID STIM HORMONE: CPT | Performed by: GENERAL PRACTICE

## 2020-12-11 PROCEDURE — 36415 COLL VENOUS BLD VENIPUNCTURE: CPT | Performed by: GENERAL PRACTICE

## 2020-12-11 PROCEDURE — 82306 VITAMIN D 25 HYDROXY: CPT | Performed by: GENERAL PRACTICE

## 2020-12-11 PROCEDURE — 82607 VITAMIN B-12: CPT | Performed by: GENERAL PRACTICE

## 2020-12-11 PROCEDURE — 82043 UR ALBUMIN QUANTITATIVE: CPT | Performed by: GENERAL PRACTICE

## 2020-12-11 PROCEDURE — 85025 COMPLETE CBC W/AUTO DIFF WBC: CPT | Performed by: GENERAL PRACTICE

## 2020-12-11 NOTE — PROGRESS NOTES
The ABCs of the Annual Wellness Visit  Subsequent Medicare Wellness Visit    Subjective   History of Present Illness:  Ethel Trotter is a 65 y.o. female who presents for a Subsequent Medicare Wellness Visit.    Low Back Pain   She has a long history of low back pain worse over the last 6 months.  There is no history of any strain or trauma nor any change in her activities.  The pain is described as an intermittent left lower ache.  The pain radiates to her left flank, left lateral hip, and left posterior thigh.  The pain in her back is worse than that elsewhere.  She has been unable to identify any precipitating, exacerbating, or relieving factors.  She denies any changes in her strength, sensation, or bowel/bladder control.  She denies any further rectal bleeding has had no changes in her bowel habits or any melena.  She admits to urinary frequency, nocturia, and intermittent dysuria but denies any hematuria.  She denies any vaginal bleeding and there is no history of any fever, chills, or night sweats. Colonoscopy performed on 5/13/2019 by Dr. Reyes was reported as showing mild diverticulosis and several tubular adenomas were removed.  Ultrasound of the abdomen performed on 2/26/2020 revealed a 7.44 cm left renal cyst.  She underwent a urology assessment with Dr. Givens on 5/29/2020 who felt that it was a Bosniak type I.  CT of the abdomen and pelvis performed on 6/10/2020 was reported as showing bilateral renal cysts the largest of which was in the posterior left kidney and measured 7 cm.  Atherosclerotic vascular disease and osteoarthritic changes of the spine were also noted. X-rays of the left hip performed on 2/26/2020 were unremarkable.  MRI of the lumbar spine performed on 11/18/2020 was reported as showing degenerative disc disease with moderate to severe right neuroforaminal narrowing at L4-5 along with multiple bilateral renal cysts the largest of which was 7 cm.  While records have yet to be received  she apparently underwent an orthopedic assessment since last here and was recommended referral to pain management.  She is uninterested in pursuing this at present    Diabetes  She denies paresthesias of the feet, visual disturbances, polydipsia, polyuria, hypoglycemia or foot ulcerations.  Evaluation to date has been a hemoglobin A1c.  Current treatments include metformin and sitagliptin (together as janumet) empagliflozin, and dulaglutide.  She denies any apparent side effects at present.  She has had no recent labs    Dyslipidemia  Compliance with treatment has been quite good.  She remains quite active despite her back and leg pain.  She remains on atorvastatin, ezetimibe, and repatha with no apparent effects.     Hypertension  Home blood pressure readings: not doing. Associated signs and symptoms: none. Patient denies: chest pain, palpitations, dyspnea, orthopnea, paroxysmal nocturnal dyspnea and peripheral edema. Current antihypertensive medications includes lisinopril, metoprolol, amlodipine.      Coronary artery disease  She has a history of CABG and previous M.I.. Previous diagnostic testing includes: cardiac catheterization Recent history: taking medications as instructed, no medication side effects noted, no chest pain on exertion, no dyspnea on exertion and no swelling of ankles. Patient's symptoms have been unchanged. Medication side effects include: none. She is not followed by cardiology at present    Episode of Visual Loss  She experienced a 4 to 5 second episode of complete vision loss in her right eye last year.  This occurred during a period of anxiety and was associated with a generalized headache.  She continues to deny any further episodes and has had no weakness, numbness, tingling, or difficulty talking or understanding what is said to her.  She remains on ASA 81 daily.  A duplex doppler ultrasound of the carotids was ordered but never scheduled.     HEALTH RISK ASSESSMENT    Recent  Hospitalizations:  No hospitalization(s) within the last year.    Current Medical Providers:  Patient Care Team:  Sacha Montez MD as PCP - General  TcSacha jeong MD as PCP - Family Medicine    Smoking Status:  Social History     Tobacco Use   Smoking Status Never Smoker   Smokeless Tobacco Current User   • Types: Chew     Alcohol Consumption:  Social History     Substance and Sexual Activity   Alcohol Use No     Depression Screen:   PHQ-2/PHQ-9 Depression Screening 12/11/2020   Little interest or pleasure in doing things 1   Feeling down, depressed, or hopeless 1   Trouble falling or staying asleep, or sleeping too much 1   Feeling tired or having little energy 0   Poor appetite or overeating 0   Feeling bad about yourself - or that you are a failure or have let yourself or your family down 0   Trouble concentrating on things, such as reading the newspaper or watching television 0   Moving or speaking so slowly that other people could have noticed. Or the opposite - being so fidgety or restless that you have been moving around a lot more than usual 0   Thoughts that you would be better off dead, or of hurting yourself in some way 0   Total Score 3   If you checked off any problems, how difficult have these problems made it for you to do your work, take care of things at home, or get along with other people? Not difficult at all       Fall Risk Screen:  STEADI Fall Risk Assessment was completed, and patient is at LOW risk for falls.Assessment completed on:12/11/2020    Health Habits and Functional and Cognitive Screening:  Functional & Cognitive Status 12/11/2020   Do you have difficulty preparing food and eating? No   Do you have difficulty bathing yourself, getting dressed or grooming yourself? No   Do you have difficulty using the toilet? No   Do you have difficulty moving around from place to place? No   Do you have trouble with steps or getting out of a bed or a chair? No   Current Diet  Well Balanced Diet   Dental Exam Not up to date   Eye Exam Not up to date   Exercise (times per week) 4 times per week   Current Exercises Include Walking   Do you need help using the phone?  No   Are you deaf or do you have serious difficulty hearing?  Yes   Do you need help with transportation? Yes   Do you need help shopping? No   Do you need help preparing meals?  No   Do you need help with housework?  No   Do you need help with laundry? No   Do you need help taking your medications? No   Do you need help managing money? No   Do you ever drive or ride in a car without wearing a seat belt? No   Have you felt unusual stress, anger or loneliness in the last month? Yes   Who do you live with? Spouse   If you need help, do you have trouble finding someone available to you? No   Have you been bothered in the last four weeks by sexual problems? Yes   Do you have difficulty concentrating, remembering or making decisions? No         Does the patient have evidence of cognitive impairment? No    Asprin use counseling:Taking ASA appropriately as indicated    Age-appropriate Screening Schedule:  Refer to the list below for future screening recommendations based on patient's age, sex and/or medical conditions. Orders for these recommended tests are listed in the plan section. The patient has been provided with a written plan.    Health Maintenance   Topic Date Due   • ZOSTER VACCINE (1 of 2) 09/23/2003   • DIABETIC EYE EXAM  09/16/2016   • MAMMOGRAM  02/02/2020   • DXA SCAN  02/02/2020   • DIABETIC FOOT EXAM  06/22/2020   • HEMOGLOBIN A1C  08/11/2020   • LIPID PANEL  02/11/2021   • URINE MICROALBUMIN  09/11/2021   • COLONOSCOPY  05/13/2024   • TDAP/TD VACCINES (4 - Td) 10/22/2024   • INFLUENZA VACCINE  Completed   • PAP SMEAR  Discontinued          The following portions of the patient's history were reviewed and updated as appropriate: allergies, current medications, past family history, past medical history, past social  history, past surgical history and problem list.    Outpatient Medications Prior to Visit   Medication Sig Dispense Refill   • amLODIPine (NORVASC) 10 MG tablet Take 1 tablet by mouth Every Evening. 30 tablet 5   • aspirin (Aspir-Low) 81 MG EC tablet Take 1 tablet by mouth Daily. 30 tablet 5   • atorvastatin (LIPITOR) 80 MG tablet Take 1 tablet by mouth Every Evening. 30 tablet 5   • citalopram (CeleXA) 20 MG tablet Take 1 tablet by mouth Daily. 30 tablet 5   • Dulaglutide (Trulicity) 0.75 MG/0.5ML solution pen-injector Inject 0.75 mg under the skin into the appropriate area as directed 1 (One) Time Per Week. 4 pen 5   • Empagliflozin (Jardiance) 25 MG tablet Take 25 mg by mouth Every Morning. 30 tablet 5   • Evolocumab (Repatha SureClick) 140 MG/ML solution auto-injector Inject 1 mL under the skin into the appropriate area as directed Every 14 (Fourteen) Days. 2 mL 5   • ezetimibe (ZETIA) 10 MG tablet Take 1 tablet by mouth Every Night. 30 tablet 5   • Glucose Blood (BLOOD GLUCOSE TEST) strip 1 twice daily 60 each 5   • isosorbide mononitrate (IMDUR) 30 MG 24 hr tablet Take 1 tablet by mouth Every Morning. 30 tablet 5   • Lancets misc 1 twice daily 60 each 5   • linaclotide (Linzess) 72 MCG capsule capsule Take 1 capsule by mouth Every Morning Before Breakfast. 30 capsule 5   • lisinopril (PRINIVIL,ZESTRIL) 10 MG tablet Take 1 tablet by mouth Daily. 30 tablet 5   • metoprolol succinate XL (TOPROL-XL) 100 MG 24 hr tablet Take 1 tablet by mouth Daily. 30 tablet 5   • nitroglycerin (NITROSTAT) 0.4 MG SL tablet Place 1 tablet under the tongue As Needed for Chest Pain. for chest pain, may repeat 3 doses then go to ER 25 tablet 5   • SITagliptin-metFORMIN HCl ER (Janumet XR)  MG tablet Take 1 tablet by mouth 2 (Two) Times a Day. 60 tablet 5   • vitamin D (ERGOCALCIFEROL) 1.25 MG (60260 UT) capsule capsule Take 1 capsule by mouth 1 (One) Time Per Week. 4 capsule 5   • Zoster Vac Recomb Adjuvanted (SHINGRIX) 50  MCG/0.5ML reconstituted suspension Inject 0.5 mL into the appropriate muscle as directed by prescriber See Admin Instructions. Repeat in 2-6 months 1 each 1     No facility-administered medications prior to visit.      Patient Active Problem List   Diagnosis   • Depression with anxiety   • COPD mixed type (CMS/HCC)   • Essential hypertension   • Mixed hyperlipidemia   • Type 2 diabetes mellitus without complication, without long-term current use of insulin (CMS/HCC)   • Coronary artery disease involving native coronary artery   • Vitamin D deficiency disease   • Osteopenia   • Hepatitis C antibody test positive   • H/O acute pancreatitis   • Gastroesophageal reflux disease without esophagitis   • Venous insufficiency, peripheral   • Chronic seasonal allergic rhinitis   • Dips tobacco   • Encounter for immunization   • Healthcare maintenance   • Mechanical low back pain   • Left hip pain   • Encounter for screening mammogram for breast cancer   • Chronic constipation   • TIA (transient ischemic attack)   • Renal insufficiency   • Renal cyst   • Rectal bleeding   • Nephrolithiasis     Advanced Care Planning:  ACP discussion was held with the patient during this visit. Patient does not have an advance directive, information provided.    Review of Systems   Constitutional: Positive for fatigue. Negative for appetite change, chills, fever and unexpected weight change.   HENT: Positive for congestion, ear pain (intermittent left ear fullness) and rhinorrhea. Negative for postnasal drip, sneezing and sore throat.    Eyes: Negative for visual disturbance.   Respiratory: Negative for cough, shortness of breath and wheezing.    Cardiovascular: Negative for chest pain, palpitations and leg swelling.   Gastrointestinal: Positive for blood in stool. Negative for abdominal pain, anal bleeding, constipation, diarrhea, nausea and vomiting.   Endocrine: Negative for polydipsia and polyuria.   Genitourinary: Positive for dysuria and  "frequency. Negative for hematuria and urgency.   Musculoskeletal: Positive for arthralgias and back pain. Negative for myalgias.   Skin: Negative for rash.   Neurological: Negative for weakness, numbness and headaches.   Psychiatric/Behavioral: Positive for sleep disturbance. Negative for dysphoric mood and suicidal ideas. The patient is not nervous/anxious.      Compared to one year ago, the patient feels her physical health is worse.  Compared to one year ago, the patient feels her mental health is the same.    Reviewed chart for potential of high risk medication in the elderly: yes  Reviewed chart for potential of harmful drug interactions in the elderly:yes    Objective         Vitals:    12/11/20 1154   BP: 140/76   Pulse: 74   Resp: 14   Temp: 97.1 °F (36.2 °C)   TempSrc: Tympanic   SpO2: 97%   Weight: 62.6 kg (138 lb)   Height: 160 cm (63\")       Body mass index is 24.45 kg/m².  Discussed the patient's BMI with her. The BMI is in the acceptable range.    Physical Exam  Constitutional:       General: She is not in acute distress.     Appearance: Normal appearance. She is well-developed. She is not diaphoretic.   HENT:      Head: Atraumatic.      Right Ear: Ear canal and external ear normal. Tympanic membrane is scarred.      Left Ear: Ear canal and external ear normal. Tympanic membrane is scarred.   Eyes:      Conjunctiva/sclera: Conjunctivae normal.   Neck:      Thyroid: No thyroid mass or thyromegaly.      Vascular: No carotid bruit or JVD.      Trachea: Trachea normal. No tracheal deviation.   Cardiovascular:      Rate and Rhythm: Normal rate and regular rhythm.      Heart sounds: Normal heart sounds, S1 normal and S2 normal. No murmur. No gallop.    Pulmonary:      Effort: Pulmonary effort is normal.      Breath sounds: Normal breath sounds.   Abdominal:      General: Bowel sounds are normal. There is no distension or abdominal bruit.      Palpations: Abdomen is soft. There is no hepatomegaly, " splenomegaly or mass.      Tenderness: There is no abdominal tenderness.      Hernia: No hernia is present.   Musculoskeletal:      Lumbar back: She exhibits decreased range of motion and tenderness (left lumbar paraspinal muscle tenderness). She exhibits no deformity and no spasm.      Right lower leg: No edema.      Left lower leg: No edema.      Comments: Negative straight leg raise. No peripheral joint redness or warmth.   Lymphadenopathy:      Head:      Right side of head: No submental, submandibular, tonsillar, preauricular, posterior auricular or occipital adenopathy.      Left side of head: No submental, submandibular, tonsillar, preauricular, posterior auricular or occipital adenopathy.      Cervical: No cervical adenopathy.      Upper Body:      Right upper body: No supraclavicular adenopathy.      Left upper body: No supraclavicular adenopathy.   Skin:     General: Skin is warm.      Coloration: Skin is not cyanotic, jaundiced or pale.      Findings: No rash.      Nails: There is no clubbing.     Neurological:      Mental Status: She is alert and oriented to person, place, and time.      Cranial Nerves: No cranial nerve deficit.      Motor: No tremor.      Coordination: Coordination normal.      Gait: Gait normal.      Deep Tendon Reflexes:      Reflex Scores:       Patellar reflexes are 2+ on the right side and 2+ on the left side.       Achilles reflexes are 1+ on the right side and 1+ on the left side.  Psychiatric:         Attention and Perception: Attention normal.         Mood and Affect: Mood normal.         Speech: Speech normal.         Behavior: Behavior normal.         Thought Content: Thought content normal.       Assessment/Plan   Medicare Risks and Personalized Health Plan  CMS Preventative Services Quick Reference  Advance Directive Discussion  Breast Cancer/Mammogram Screening  Cardiovascular risk  Chronic Pain   Fall Risk  Immunizations Discussed/Encouraged (specific immunizations;  Shingrix )  Osteoprorosis Risk  Tobacco Use/Dependance (use dotphrase .tobaccocessation for documentation)    The above risks/problems have been discussed with the patient.  Pertinent information has been shared with the patient in the After Visit Summary.  Follow up plans and orders are seen below in the Assessment/Plan Section.    Diagnoses and all orders for this visit:    1. Coronary artery disease involving native coronary artery of native heart without angina pectoris   Reminded regarding risk factor modification with an emphasis on tobacco cessation.  Continue current medication    2. TIA (transient ischemic attack)  As above.  Patient remains uninterested in carotid artery imaging    3. Type 2 diabetes mellitus without complication, without long-term current use of insulin (CMS/[facility])  Diabetes mellitus Type II, under unknown control.   Encouraged to continue to pursue ADA diet  Encouraged aerobic exercise.  Continue current medication  Updated labs drawn.  -     Hemoglobin A1c  -     Vitamin B12  -     MicroAlbumin, Urine, Random - Urine, Clean Catch    4. Mixed hyperlipidemia  As above.   Continue current medication.  -     Lipid Panel    5. Essential hypertension  As above.   Continue current medication.  -     CBC & Differential  -     Comprehensive Metabolic Panel  -     CBC Auto Differential    6. Venous insufficiency, peripheral    7. Renal insufficiency  Reminded to avoid any NSAIDs prescription or OTC  Will continue to monitor    8. Renal cyst  Bosniak type I  Follow up with urology    9. Nephrolithiasis    10. COPD mixed type (CMS/HCC)   COPD is stable.  Encouraged to remain as active as symptoms allow for    11. Chronic seasonal allergic rhinitis    12. Gastroesophageal reflux disease without esophagitis    13. Chronic constipation    14. Rectal bleeding  Encouraged to report if any recurrence    15. Vitamin D deficiency disease  Continue supplementation with monitoring.    16. Mechanical low back  pain  Reminded regarding symptomatic treatment.   Continue current medication  Encouraged to report if any worse or if any new symptoms or concerns.    17. Left hip pain  As above.    18. Osteopenia, unspecified location    19. Depression with anxiety  Significant situational component.   Supportive therapy.   Continue current medication.  -     TSH    20. Dips tobacco  Reminded of the potential sequelae of ongoing tobacco use and the options with respect to cessation.  Patient uninterested in pursuing this at present but will consider    21. Healthcare maintenance  Patient has already received a flu shot fall.  Encouraged to follow up with shingrix.  She would like to defer an updated mammogram and DEXA scan to her return    Follow Up:  Return in about 3 months (around 3/11/2021).     An After Visit Summary and PPPS were given to the patient.

## 2020-12-12 VITALS
RESPIRATION RATE: 14 BRPM | SYSTOLIC BLOOD PRESSURE: 140 MMHG | WEIGHT: 138 LBS | HEART RATE: 74 BPM | DIASTOLIC BLOOD PRESSURE: 76 MMHG | HEIGHT: 63 IN | OXYGEN SATURATION: 97 % | BODY MASS INDEX: 24.45 KG/M2 | TEMPERATURE: 97.1 F

## 2020-12-12 PROBLEM — B35.3 TINEA PEDIS OF BOTH FEET: Status: RESOLVED | Noted: 2019-06-21 | Resolved: 2020-12-12

## 2020-12-12 PROBLEM — R30.0 DYSURIA: Status: RESOLVED | Noted: 2019-01-21 | Resolved: 2020-12-12

## 2020-12-12 LAB
25(OH)D3 SERPL-MCNC: 52.9 NG/ML (ref 30–100)
ALBUMIN SERPL-MCNC: 4.8 G/DL (ref 3.5–5.2)
ALBUMIN UR-MCNC: 3.6 MG/DL
ALBUMIN/GLOB SERPL: 1.5 G/DL
ALP SERPL-CCNC: 96 U/L (ref 39–117)
ALT SERPL W P-5'-P-CCNC: 14 U/L (ref 1–33)
ANION GAP SERPL CALCULATED.3IONS-SCNC: 8.5 MMOL/L (ref 5–15)
AST SERPL-CCNC: 22 U/L (ref 1–32)
BASOPHILS # BLD AUTO: 0.07 10*3/MM3 (ref 0–0.2)
BASOPHILS NFR BLD AUTO: 0.9 % (ref 0–1.5)
BILIRUB SERPL-MCNC: 0.4 MG/DL (ref 0–1.2)
BUN SERPL-MCNC: 22 MG/DL (ref 8–23)
BUN/CREAT SERPL: 17.1 (ref 7–25)
CALCIUM SPEC-SCNC: 10.8 MG/DL (ref 8.6–10.5)
CHLORIDE SERPL-SCNC: 102 MMOL/L (ref 98–107)
CHOLEST SERPL-MCNC: 103 MG/DL (ref 0–200)
CO2 SERPL-SCNC: 28.5 MMOL/L (ref 22–29)
CREAT SERPL-MCNC: 1.29 MG/DL (ref 0.57–1)
DEPRECATED RDW RBC AUTO: 43.3 FL (ref 37–54)
EOSINOPHIL # BLD AUTO: 0.37 10*3/MM3 (ref 0–0.4)
EOSINOPHIL NFR BLD AUTO: 4.8 % (ref 0.3–6.2)
ERYTHROCYTE [DISTWIDTH] IN BLOOD BY AUTOMATED COUNT: 13.2 % (ref 12.3–15.4)
GFR SERPL CREATININE-BSD FRML MDRD: 41 ML/MIN/1.73
GLOBULIN UR ELPH-MCNC: 3.1 GM/DL
GLUCOSE SERPL-MCNC: 116 MG/DL (ref 65–99)
HBA1C MFR BLD: 6.3 % (ref 4.8–5.6)
HCT VFR BLD AUTO: 48.6 % (ref 34–46.6)
HDLC SERPL-MCNC: 58 MG/DL (ref 40–60)
HGB BLD-MCNC: 15.6 G/DL (ref 12–15.9)
IMM GRANULOCYTES # BLD AUTO: 0.02 10*3/MM3 (ref 0–0.05)
IMM GRANULOCYTES NFR BLD AUTO: 0.3 % (ref 0–0.5)
LDLC SERPL CALC-MCNC: 22 MG/DL (ref 0–100)
LDLC/HDLC SERPL: 0.3 {RATIO}
LYMPHOCYTES # BLD AUTO: 1.76 10*3/MM3 (ref 0.7–3.1)
LYMPHOCYTES NFR BLD AUTO: 22.9 % (ref 19.6–45.3)
MCH RBC QN AUTO: 28.9 PG (ref 26.6–33)
MCHC RBC AUTO-ENTMCNC: 32.1 G/DL (ref 31.5–35.7)
MCV RBC AUTO: 90.2 FL (ref 79–97)
MONOCYTES # BLD AUTO: 0.56 10*3/MM3 (ref 0.1–0.9)
MONOCYTES NFR BLD AUTO: 7.3 % (ref 5–12)
NEUTROPHILS NFR BLD AUTO: 4.91 10*3/MM3 (ref 1.7–7)
NEUTROPHILS NFR BLD AUTO: 63.8 % (ref 42.7–76)
NRBC BLD AUTO-RTO: 0 /100 WBC (ref 0–0.2)
PLATELET # BLD AUTO: 157 10*3/MM3 (ref 140–450)
PMV BLD AUTO: 10.2 FL (ref 6–12)
POTASSIUM SERPL-SCNC: 5 MMOL/L (ref 3.5–5.2)
PROT SERPL-MCNC: 7.9 G/DL (ref 6–8.5)
RBC # BLD AUTO: 5.39 10*6/MM3 (ref 3.77–5.28)
SODIUM SERPL-SCNC: 139 MMOL/L (ref 136–145)
TRIGL SERPL-MCNC: 138 MG/DL (ref 0–150)
TSH SERPL DL<=0.05 MIU/L-ACNC: 2.08 UIU/ML (ref 0.27–4.2)
VIT B12 BLD-MCNC: 851 PG/ML (ref 211–946)
VLDLC SERPL-MCNC: 23 MG/DL (ref 5–40)
WBC # BLD AUTO: 7.69 10*3/MM3 (ref 3.4–10.8)

## 2021-01-11 DIAGNOSIS — E11.9 TYPE 2 DIABETES MELLITUS WITHOUT COMPLICATION, WITHOUT LONG-TERM CURRENT USE OF INSULIN (HCC): ICD-10-CM

## 2021-01-11 RX ORDER — GLUCOSAMINE HCL/CHONDROITIN SU 500-400 MG
CAPSULE ORAL
Qty: 50 EACH | Refills: 5 | Status: SHIPPED | OUTPATIENT
Start: 2021-01-11 | End: 2022-04-01 | Stop reason: SDUPTHER

## 2021-01-25 ENCOUNTER — TELEPHONE (OUTPATIENT)
Dept: FAMILY MEDICINE CLINIC | Facility: CLINIC | Age: 66
End: 2021-01-25

## 2021-03-12 ENCOUNTER — OFFICE VISIT (OUTPATIENT)
Dept: FAMILY MEDICINE CLINIC | Facility: CLINIC | Age: 66
End: 2021-03-12

## 2021-03-12 DIAGNOSIS — M85.80 OSTEOPENIA, UNSPECIFIED LOCATION: ICD-10-CM

## 2021-03-12 DIAGNOSIS — J44.9 COPD MIXED TYPE (HCC): ICD-10-CM

## 2021-03-12 DIAGNOSIS — M54.59 MECHANICAL LOW BACK PAIN: ICD-10-CM

## 2021-03-12 DIAGNOSIS — Z72.0 DIPS TOBACCO: ICD-10-CM

## 2021-03-12 DIAGNOSIS — J30.2 CHRONIC SEASONAL ALLERGIC RHINITIS: Primary | ICD-10-CM

## 2021-03-12 DIAGNOSIS — E55.9 VITAMIN D DEFICIENCY DISEASE: ICD-10-CM

## 2021-03-12 DIAGNOSIS — K59.09 CHRONIC CONSTIPATION: ICD-10-CM

## 2021-03-12 DIAGNOSIS — K21.9 GASTROESOPHAGEAL REFLUX DISEASE WITHOUT ESOPHAGITIS: ICD-10-CM

## 2021-03-12 DIAGNOSIS — G45.9 TIA (TRANSIENT ISCHEMIC ATTACK): ICD-10-CM

## 2021-03-12 DIAGNOSIS — I25.10 CORONARY ARTERY DISEASE INVOLVING NATIVE CORONARY ARTERY OF NATIVE HEART WITHOUT ANGINA PECTORIS: ICD-10-CM

## 2021-03-12 DIAGNOSIS — I10 ESSENTIAL HYPERTENSION: ICD-10-CM

## 2021-03-12 DIAGNOSIS — N18.32 CHRONIC RENAL FAILURE, STAGE 3B (HCC): ICD-10-CM

## 2021-03-12 DIAGNOSIS — E78.2 MIXED HYPERLIPIDEMIA: ICD-10-CM

## 2021-03-12 DIAGNOSIS — Z00.00 HEALTHCARE MAINTENANCE: ICD-10-CM

## 2021-03-12 DIAGNOSIS — N28.1 RENAL CYST: ICD-10-CM

## 2021-03-12 DIAGNOSIS — E11.9 TYPE 2 DIABETES MELLITUS WITHOUT COMPLICATION, WITHOUT LONG-TERM CURRENT USE OF INSULIN (HCC): ICD-10-CM

## 2021-03-12 DIAGNOSIS — F41.8 DEPRESSION WITH ANXIETY: ICD-10-CM

## 2021-03-12 PROCEDURE — 99214 OFFICE O/P EST MOD 30 MIN: CPT | Performed by: GENERAL PRACTICE

## 2021-03-12 RX ORDER — EZETIMIBE 10 MG/1
10 TABLET ORAL NIGHTLY
Qty: 30 TABLET | Refills: 5 | Status: SHIPPED | OUTPATIENT
Start: 2021-03-12 | End: 2022-04-01 | Stop reason: SDUPTHER

## 2021-03-12 RX ORDER — CITALOPRAM 20 MG/1
20 TABLET ORAL DAILY
Qty: 30 TABLET | Refills: 5 | Status: SHIPPED | OUTPATIENT
Start: 2021-03-12 | End: 2022-04-01 | Stop reason: SDUPTHER

## 2021-03-12 RX ORDER — METOPROLOL SUCCINATE 100 MG/1
100 TABLET, EXTENDED RELEASE ORAL DAILY
Qty: 30 TABLET | Refills: 5 | Status: SHIPPED | OUTPATIENT
Start: 2021-03-12 | End: 2021-11-18

## 2021-03-12 RX ORDER — ISOSORBIDE MONONITRATE 30 MG/1
30 TABLET, EXTENDED RELEASE ORAL EVERY MORNING
Qty: 30 TABLET | Refills: 5 | Status: SHIPPED | OUTPATIENT
Start: 2021-03-12 | End: 2022-04-01 | Stop reason: SDUPTHER

## 2021-03-12 RX ORDER — LISINOPRIL 10 MG/1
10 TABLET ORAL DAILY
Qty: 30 TABLET | Refills: 5 | Status: SHIPPED | OUTPATIENT
Start: 2021-03-12 | End: 2022-01-13

## 2021-03-12 RX ORDER — EVOLOCUMAB 140 MG/ML
140 INJECTION, SOLUTION SUBCUTANEOUS
Qty: 2 ML | Refills: 5 | Status: SHIPPED | OUTPATIENT
Start: 2021-03-12 | End: 2021-03-23 | Stop reason: SDUPTHER

## 2021-03-12 RX ORDER — ATORVASTATIN CALCIUM 80 MG/1
80 TABLET, FILM COATED ORAL EVERY EVENING
Qty: 30 TABLET | Refills: 5 | Status: SHIPPED | OUTPATIENT
Start: 2021-03-12 | End: 2021-11-18

## 2021-03-12 RX ORDER — ASPIRIN 81 MG/1
81 TABLET ORAL DAILY
Qty: 30 TABLET | Refills: 5 | Status: SHIPPED | OUTPATIENT
Start: 2021-03-12 | End: 2022-04-01 | Stop reason: SDUPTHER

## 2021-03-12 RX ORDER — SITAGLIPTIN AND METFORMIN HYDROCHLORIDE 50; 500 MG/1; MG/1
1 TABLET, FILM COATED, EXTENDED RELEASE ORAL 2 TIMES DAILY
Qty: 60 TABLET | Refills: 5 | Status: SHIPPED | OUTPATIENT
Start: 2021-03-12 | End: 2021-10-27

## 2021-03-12 RX ORDER — AMLODIPINE BESYLATE 10 MG/1
10 TABLET ORAL EVERY EVENING
Qty: 30 TABLET | Refills: 5 | Status: SHIPPED | OUTPATIENT
Start: 2021-03-12 | End: 2022-01-13

## 2021-03-12 RX ORDER — DULAGLUTIDE 0.75 MG/.5ML
0.75 INJECTION, SOLUTION SUBCUTANEOUS WEEKLY
Qty: 4 PEN | Refills: 5 | Status: SHIPPED | OUTPATIENT
Start: 2021-03-12 | End: 2021-10-27

## 2021-03-12 RX ORDER — EMPAGLIFLOZIN 25 MG/1
1 TABLET, FILM COATED ORAL EVERY MORNING
Qty: 30 TABLET | Refills: 5 | Status: SHIPPED | OUTPATIENT
Start: 2021-03-12 | End: 2022-01-13

## 2021-03-12 RX ORDER — ERGOCALCIFEROL 1.25 MG/1
50000 CAPSULE ORAL WEEKLY
Qty: 4 CAPSULE | Refills: 5 | Status: SHIPPED | OUTPATIENT
Start: 2021-03-12 | End: 2021-11-18
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
## 2021-03-13 VITALS
HEART RATE: 70 BPM | BODY MASS INDEX: 23.74 KG/M2 | OXYGEN SATURATION: 97 % | HEIGHT: 63 IN | TEMPERATURE: 96.9 F | SYSTOLIC BLOOD PRESSURE: 162 MMHG | RESPIRATION RATE: 14 BRPM | DIASTOLIC BLOOD PRESSURE: 68 MMHG | WEIGHT: 134 LBS

## 2021-03-13 PROBLEM — K62.5 RECTAL BLEEDING: Status: RESOLVED | Noted: 2020-09-12 | Resolved: 2021-03-13

## 2021-03-17 ENCOUNTER — TELEPHONE (OUTPATIENT)
Dept: FAMILY MEDICINE CLINIC | Facility: CLINIC | Age: 66
End: 2021-03-17

## 2021-03-17 NOTE — TELEPHONE ENCOUNTER
Caller: [facility] [address] [phone] [facility] [phone]     Relationship: Pharmacy   LUCIO    What medications are you currently taking:   Current Outpatient Medications on File Prior to Visit   Medication Sig Dispense Refill   • amLODIPine (NORVASC) 10 MG tablet Take 1 tablet by mouth Every Evening. 30 tablet 5   • aspirin (aspirin) 81 MG EC tablet Take 1 tablet by mouth Daily. 30 tablet 5   • atorvastatin (LIPITOR) 80 MG tablet Take 1 tablet by mouth Every Evening. 30 tablet 5   • citalopram (CeleXA) 20 MG tablet Take 1 tablet by mouth Daily. 30 tablet 5   • Dulaglutide (Trulicity) 0.75 MG/0.5ML solution pen-injector Inject 0.75 mg under the skin into the appropriate area as directed 1 (One) Time Per Week. 4 pen 5   • Empagliflozin (Jardiance) 25 MG tablet Take 25 mg by mouth Every Morning. 30 tablet 5   • Evolocumab (Repatha SureClick) solution auto-injector SureClick injection Inject 1 mL under the skin into the appropriate area as directed Every 14 (Fourteen) Days. 2 mL 5   • ezetimibe (ZETIA) 10 MG tablet Take 1 tablet by mouth Every Night. 30 tablet 5   • Glucose Blood (Blood Glucose Test) strip USE TO TEST BLOOD GLUCOSE TWO TIMES A DAY 50 each 5   • isosorbide mononitrate (IMDUR) 30 MG 24 hr tablet Take 1 tablet by mouth Every Morning. 30 tablet 5   • Lancets misc 1 twice daily 60 each 5   • linaclotide (Linzess) 72 MCG capsule capsule Take 1 capsule by mouth Every Morning Before Breakfast. 30 capsule 5   • lisinopril (PRINIVIL,ZESTRIL) 10 MG tablet Take 1 tablet by mouth Daily. 30 tablet 5   • metoprolol succinate XL (TOPROL-XL) 100 MG 24 hr tablet Take 1 tablet by mouth Daily. 30 tablet 5   • nitroglycerin (NITROSTAT) 0.4 MG SL tablet Place 1 tablet under the tongue As Needed for Chest Pain. for chest pain, may repeat 3 doses then go to ER 25 tablet 5   • SITagliptin-metFORMIN HCl ER (Janumet XR)  MG tablet Take 1 tablet by mouth 2 (Two) Times a Day.  60 tablet 5   • vitamin D (ERGOCALCIFEROL) 1.25 MG (89346 UT) capsule capsule Take 1 capsule by mouth 1 (One) Time Per Week. 4 capsule 5   • Zoster Vac Recomb Adjuvanted (SHINGRIX) 50 MCG/0.5ML reconstituted suspension Inject 0.5 mL into the appropriate muscle as directed by prescriber See Admin Instructions. Repeat in 2-6 months 1 each 1     No current facility-administered medications on file prior to visit.        Which medication are you concerned about: BILLY     Who prescribed you this medication: DR MEDRANO    What are your concerns: THIS MEDICATION IS NOT AVAILABLE AT THIS PHARMACY AND AT TWO OTHERS.  THE PATIENT ONLY WANTS TO USE THIS PHARMACY.  PLEASE CHANGE IT TO ANY OTHER MEDICATION YOU WOULD LIKE.

## 2021-03-23 DIAGNOSIS — E78.2 MIXED HYPERLIPIDEMIA: Primary | ICD-10-CM

## 2021-03-23 RX ORDER — EVOLOCUMAB 140 MG/ML
140 INJECTION, SOLUTION SUBCUTANEOUS
Qty: 2 ML | Refills: 5 | Status: SHIPPED | OUTPATIENT
Start: 2021-03-23 | End: 2022-04-01 | Stop reason: SDUPTHER

## 2021-03-23 RX ORDER — EVOLOCUMAB 140 MG/ML
140 INJECTION, SOLUTION SUBCUTANEOUS
Qty: 2 ML | Refills: 5 | Status: CANCELLED | OUTPATIENT
Start: 2021-03-23

## 2021-03-23 NOTE — TELEPHONE ENCOUNTER
Spoke to the patient and she said she would be okay with getting this at a different pharmacy. I spoke to [facility] and they think they can get the medication from there ordering company. Will you send the prescription there please?

## 2021-04-26 ENCOUNTER — OFFICE VISIT (OUTPATIENT)
Dept: UROLOGY | Facility: CLINIC | Age: 66
End: 2021-04-26

## 2021-04-26 VITALS — TEMPERATURE: 98.6 F | HEIGHT: 63 IN | WEIGHT: 135 LBS | BODY MASS INDEX: 23.92 KG/M2

## 2021-04-26 DIAGNOSIS — G89.29 CHRONIC LEFT FLANK PAIN: ICD-10-CM

## 2021-04-26 DIAGNOSIS — N28.1 RENAL CYST: Primary | ICD-10-CM

## 2021-04-26 DIAGNOSIS — R10.9 BILATERAL FLANK PAIN: ICD-10-CM

## 2021-04-26 DIAGNOSIS — R10.9 CHRONIC LEFT FLANK PAIN: ICD-10-CM

## 2021-04-26 DIAGNOSIS — R31.9 URINARY TRACT INFECTION WITH HEMATURIA, SITE UNSPECIFIED: ICD-10-CM

## 2021-04-26 DIAGNOSIS — R35.0 FREQUENCY OF URINATION: ICD-10-CM

## 2021-04-26 DIAGNOSIS — N39.0 URINARY TRACT INFECTION WITH HEMATURIA, SITE UNSPECIFIED: ICD-10-CM

## 2021-04-26 LAB
BILIRUB BLD-MCNC: NEGATIVE MG/DL
CLARITY, POC: ABNORMAL
COLOR UR: YELLOW
GLUCOSE UR STRIP-MCNC: NEGATIVE MG/DL
KETONES UR QL: NEGATIVE
LEUKOCYTE EST, POC: ABNORMAL
NITRITE UR-MCNC: POSITIVE MG/ML
PH UR: 5.5 [PH] (ref 5–8)
PROT UR STRIP-MCNC: ABNORMAL MG/DL
RBC # UR STRIP: NEGATIVE /UL
SP GR UR: 1.01 (ref 1–1.03)
UROBILINOGEN UR QL: NORMAL

## 2021-04-26 PROCEDURE — 87186 SC STD MICRODIL/AGAR DIL: CPT | Performed by: NURSE PRACTITIONER

## 2021-04-26 PROCEDURE — 81003 URINALYSIS AUTO W/O SCOPE: CPT | Performed by: NURSE PRACTITIONER

## 2021-04-26 PROCEDURE — 99214 OFFICE O/P EST MOD 30 MIN: CPT | Performed by: NURSE PRACTITIONER

## 2021-04-26 PROCEDURE — 87086 URINE CULTURE/COLONY COUNT: CPT | Performed by: NURSE PRACTITIONER

## 2021-04-26 PROCEDURE — 87077 CULTURE AEROBIC IDENTIFY: CPT | Performed by: NURSE PRACTITIONER

## 2021-04-26 PROCEDURE — 96372 THER/PROPH/DIAG INJ SC/IM: CPT | Performed by: NURSE PRACTITIONER

## 2021-04-26 RX ORDER — PROMETHAZINE HYDROCHLORIDE 25 MG/1
25 TABLET ORAL EVERY 6 HOURS PRN
Qty: 20 TABLET | Refills: 0 | Status: SHIPPED | OUTPATIENT
Start: 2021-04-26 | End: 2021-04-26 | Stop reason: SDUPTHER

## 2021-04-26 RX ORDER — GENTAMICIN SULFATE 40 MG/ML
80 INJECTION, SOLUTION INTRAMUSCULAR; INTRAVENOUS ONCE
Status: COMPLETED | OUTPATIENT
Start: 2021-04-26 | End: 2021-04-26

## 2021-04-26 RX ORDER — IBUPROFEN 800 MG/1
800 TABLET ORAL EVERY 8 HOURS PRN
Qty: 30 TABLET | Refills: 0 | Status: SHIPPED | OUTPATIENT
Start: 2021-04-26 | End: 2021-05-13 | Stop reason: SDUPTHER

## 2021-04-26 RX ORDER — METHOCARBAMOL 500 MG/1
500 TABLET, FILM COATED ORAL 2 TIMES DAILY PRN
Qty: 20 TABLET | Refills: 0 | Status: SHIPPED | OUTPATIENT
Start: 2021-04-26 | End: 2021-05-13 | Stop reason: SDUPTHER

## 2021-04-26 RX ORDER — PROMETHAZINE HYDROCHLORIDE 25 MG/1
25 TABLET ORAL EVERY 6 HOURS PRN
Qty: 20 TABLET | Refills: 0 | Status: SHIPPED | OUTPATIENT
Start: 2021-04-26 | End: 2022-04-01 | Stop reason: SDUPTHER

## 2021-04-26 RX ORDER — SULFAMETHOXAZOLE AND TRIMETHOPRIM 800; 160 MG/1; MG/1
1 TABLET ORAL 2 TIMES DAILY
Qty: 20 TABLET | Refills: 0 | Status: SHIPPED | OUTPATIENT
Start: 2021-04-26 | End: 2021-05-06

## 2021-04-26 RX ADMIN — GENTAMICIN SULFATE 80 MG: 40 INJECTION, SOLUTION INTRAMUSCULAR; INTRAVENOUS at 11:37

## 2021-04-26 NOTE — PROGRESS NOTES
"Chief Complaint  left Renal Mass/RENAL CYST/UTI (6 month fu WITH BILATERAL RENAL CYST/FLANK PAIN/UTI)    Subjective          Ethel Trotter presents to [facility]  History of Present Illness  Ms. Ethel Urbina is a very pleasant but frail and ill looking 65-year-old female returns to clinic today for 6-month follow-up on renal cyst.  She continues to report left flank pain, and lower back pain which has remained very bothersome to her.  She had an MRI of her lumbar spine at [facility] on on 11/18/2020 which also confirmed a multitude of bilateral renal cysts.  However, the MRI also indicated mild diffuse disc bulge with moderate bilateral foraminal narrowing at her L2 to to L5, with significant degenerative disc changes and broad-based midline disc protrusion    Prior to that, patient had a CT abdomen and pelvis last year 05/28/2020 which showed bilateral renal cyst, with a left-sided cyst of 7 cm greater than the right.  No solid masses, or hydronephrosis was noted at that time.  No evidence of retroperitoneal or adenopathy    Today, she denies any urinary symptoms of frequency, urgency, dysuria or nocturia.  She denies any episodes of gross hematuria, abdominal pain, pelvic pressure or suprapubic discomfort.  She denies N/V/D, denies any issues with constipation.  She denies any early satiety, her main symptomatology is her back and flank pain without any CVA tenderness.   However her urine dipstick today showed 2+ leukocyte Estrace, positive nitrites, trace microscopic hematuria.      Objective   Vital Signs:   Temp 98.6 °F (37 °C)   Ht 160 cm (62.99\")   Wt 61.2 kg (135 lb)   BMI 23.92 kg/m²     Physical Exam  Constitutional:       General: She is in acute distress.      Appearance: She is well-developed. She is ill-appearing.   HENT:      Head: Normocephalic and atraumatic.   Eyes:      Pupils: Pupils are equal, round, and reactive to light.   Neck: "      Thyroid: No thyromegaly.      Trachea: No tracheal deviation.   Cardiovascular:      Rate and Rhythm: Normal rate and regular rhythm.      Heart sounds: No murmur heard.     Pulmonary:      Effort: Pulmonary effort is normal. No respiratory distress.      Breath sounds: Normal breath sounds. No stridor. No wheezing.   Abdominal:      General: Bowel sounds are normal.      Palpations: Abdomen is soft.      Tenderness: There is abdominal tenderness.   Genitourinary:     Labia:         Right: No tenderness.         Left: No tenderness.       Vagina: Normal. No vaginal discharge.   Musculoskeletal:         General: Tenderness present. No deformity. Injury:  Lower back, with increase difficulty walking. Normal range of motion.      Cervical back: Normal range of motion.   Skin:     General: Skin is warm and dry.      Capillary Refill: Capillary refill takes less than 2 seconds.      Coloration: Skin is not pale.      Findings: No erythema or rash.   Neurological:      Mental Status: She is alert and oriented to person, place, and time.      Cranial Nerves: No cranial nerve deficit.      Sensory: No sensory deficit.      Motor: Weakness present.      Coordination: Coordination normal.      Gait: Gait abnormal.   Psychiatric:         Behavior: Behavior normal.         Thought Content: Thought content normal.         Judgment: Judgment normal.        Result Review :     CMP    CMP 6/10/20 6/30/20 12/11/20   Glucose  419 (A) 116 (A)   BUN  21 22   Creatinine 1.50 (A) 1.48 (A) 1.29 (A)   eGFR Non African Am  35 (A) 41 (A)   Sodium  136 139   Potassium  4.7 5.0   Chloride  99 102   Calcium  10.1 10.8 (A)   Albumin  4.07 4.80   Total Bilirubin  0.3 0.4   Alkaline Phosphatase  140 (A) 96   AST (SGOT)  174 (A) 22   ALT (SGPT)  104 (A) 14   (A) Abnormal value       Comments are available for some flowsheets but are not being displayed.           CBC w/diff    CBC w/Diff 6/30/20 12/11/20   WBC 9.69 7.69   RBC 4.80 5.39 (A)    Hemoglobin 13.8 15.6   Hematocrit 45.1 48.6 (A)   MCV 94.0 90.2   MCH 28.8 28.9   MCHC 30.6 (A) 32.1   RDW 13.2 13.2   Platelets 164 157   Neutrophil Rel % 75.7 63.8   Immature Granulocyte Rel % 0.2 0.3   Lymphocyte Rel % 13.4 (A) 22.9   Monocyte Rel % 7.1 7.3   Eosinophil Rel % 3.2 4.8   Basophil Rel % 0.4 0.9   (A) Abnormal value            UA    Urinalysis 9/11/20 9/11/20 10/9/20 4/26/21    1530 1531     Squamous Epithelial Cells, UA 0-2      Ketones, UA  Negative Negative Negative   Leukocytes, UA  Negative Negative Moderate (2+) (A)   RBC, UA 0-2      WBC, UA Too Numerous to Count (A)      Bacteria, UA 4+ (A)      (A) Abnormal value            Data reviewed: Radiologic studies SCANNNED IMAGING FROM [facility] MRI 11/18/2020          Assessment and Plan    Diagnoses and all orders for this visit:    1. Renal cyst (Primary)  -     POC Urinalysis Dipstick, Automated  -     sulfamethoxazole-trimethoprim (Bactrim DS) 800-160 MG per tablet; Take 1 tablet by mouth 2 (Two) Times a Day for 10 days.  Dispense: 20 tablet; Refill: 0  -     Discontinue: promethazine (PHENERGAN) 25 MG tablet; Take 1 tablet by mouth Every 6 (Six) Hours As Needed for Nausea or Vomiting.  Dispense: 20 tablet; Refill: 0  -     ibuprofen (ADVIL,MOTRIN) 800 MG tablet; Take 1 tablet by mouth Every 8 (Eight) Hours As Needed for Moderate Pain .  Dispense: 30 tablet; Refill: 0  -      Renal Bilateral; Future    2. Urinary tract infection with hematuria, site unspecified  -     Urine Culture - Urine, Urine, Clean Catch  -     gentamicin (GARAMYCIN) injection 80 mg  -     sulfamethoxazole-trimethoprim (Bactrim DS) 800-160 MG per tablet; Take 1 tablet by mouth 2 (Two) Times a Day for 10 days.  Dispense: 20 tablet; Refill: 0  -     Discontinue: promethazine (PHENERGAN) 25 MG tablet; Take 1 tablet by mouth Every 6 (Six) Hours As Needed for Nausea or Vomiting.  Dispense: 20 tablet; Refill: 0  -     ibuprofen (ADVIL,MOTRIN) 800 MG tablet; Take 1 tablet by mouth  Every 8 (Eight) Hours As Needed for Moderate Pain .  Dispense: 30 tablet; Refill: 0  -     promethazine (PHENERGAN) 25 MG tablet; Take 1 tablet by mouth Every 6 (Six) Hours As Needed for Nausea or Vomiting.  Dispense: 20 tablet; Refill: 0    3. Frequency of urination  -     POC Urinalysis Dipstick, Automated  -     sulfamethoxazole-trimethoprim (Bactrim DS) 800-160 MG per tablet; Take 1 tablet by mouth 2 (Two) Times a Day for 10 days.  Dispense: 20 tablet; Refill: 0  -     Discontinue: promethazine (PHENERGAN) 25 MG tablet; Take 1 tablet by mouth Every 6 (Six) Hours As Needed for Nausea or Vomiting.  Dispense: 20 tablet; Refill: 0  -     ibuprofen (ADVIL,MOTRIN) 800 MG tablet; Take 1 tablet by mouth Every 8 (Eight) Hours As Needed for Moderate Pain .  Dispense: 30 tablet; Refill: 0  -     promethazine (PHENERGAN) 25 MG tablet; Take 1 tablet by mouth Every 6 (Six) Hours As Needed for Nausea or Vomiting.  Dispense: 20 tablet; Refill: 0    4. Chronic left flank pain  -     sulfamethoxazole-trimethoprim (Bactrim DS) 800-160 MG per tablet; Take 1 tablet by mouth 2 (Two) Times a Day for 10 days.  Dispense: 20 tablet; Refill: 0  -     Discontinue: promethazine (PHENERGAN) 25 MG tablet; Take 1 tablet by mouth Every 6 (Six) Hours As Needed for Nausea or Vomiting.  Dispense: 20 tablet; Refill: 0  -     ibuprofen (ADVIL,MOTRIN) 800 MG tablet; Take 1 tablet by mouth Every 8 (Eight) Hours As Needed for Moderate Pain .  Dispense: 30 tablet; Refill: 0  -     US Renal Bilateral; Future  -     promethazine (PHENERGAN) 25 MG tablet; Take 1 tablet by mouth Every 6 (Six) Hours As Needed for Nausea or Vomiting.  Dispense: 20 tablet; Refill: 0  -     methocarbamol (Robaxin) 500 MG tablet; Take 1 tablet by mouth 2 (Two) Times a Day As Needed for Muscle Spasms.  Dispense: 20 tablet; Refill: 0    5. Bilateral flank pain  -     US Renal Bilateral; Future  -     methocarbamol (Robaxin) 500 MG tablet; Take 1 tablet by mouth 2 (Two) Times a Day  As Needed for Muscle Spasms.  Dispense: 20 tablet; Refill: 0         ASSESSMENT  Bilateral Renal Cysts/Flank Pain/lower back pain/UTI: Very pleasant patient evaluated in clinic today with bilateral renal cyst, seen on recent CT scan./MRI she reports flank pain, left side greater than right side, she has significant lower back pain.      We discussed the Bosniak classification of renal cysts.  I discussed the strict criteria involved with making a decision regarding intervention.  We discussed the fact that this is likely a simple cyst-Bosniak type I cyst which has sharp distinct borders and a thin wall and no internal echoes versus a Bosniak 2 with a thicker wall and faint striations. We discussed the risks of malignancy in these situations is essentially 0 and requires no further intervention. However we discussed the fact that a Bosniak type 3 cyst has about a 28% chance of malignancy and finally a Bosniak type IV cyst which most probably is representative of a renal cell carcinoma variant.    We further discussed the fact that most renal cyst are generally asymptomatic and do not require any further intervention and that the appropriate surgical management if it becomes bothersome to the patient is a radiographic cyst puncture, especially when causing significant pain/discomfort or problems particularly on the left with an early satiety.        PLAN  We will send her urine out for culture, patient is symptomatic for UTI.    Gentamicin 80 mg IM x1 dose given-pending urine culture results    We discussed starting antibiotic therapy, Bactrim DS if urine culture is positive.    We discussed taking probiotics to protect her rectal reservoir.    We discussed follow-up in 2 weeks, recheck urinalysis, also review her renal ultrasound.    We discussed comfort measures with hot showers/baths, heating pads, and alternating NSAIDs/Tylenol for pain and discomfort to flank area/lower back.    In the meantime I assured the  patient this is simple renal cyst and nothing to worry about at this point.    Patient is agreeable plan of care, and follow-up in 6 months as discussed.      Follow Up   Return in about 2 weeks (around 5/10/2021) for Next scheduled follow up RECHECK URINE FOR UTI.  Patient was given instructions and counseling regarding her condition or for health maintenance advice. Please see specific information pulled into the AVS if appropriate.

## 2021-04-28 ENCOUNTER — TELEPHONE (OUTPATIENT)
Dept: UROLOGY | Facility: CLINIC | Age: 66
End: 2021-04-28

## 2021-04-28 LAB — BACTERIA SPEC AEROBE CULT: ABNORMAL

## 2021-04-28 NOTE — TELEPHONE ENCOUNTER
Call patient to check on her post clinic visit, and also to discuss urine culture results positive.  Continue antibiotic therapy Bactrim DS p.o. twice daily x10 days as prescribed.  And follow-up as discussed, patient appreciative of call.        Urine Culture >100,000 CFU/mL Escherichia coliAbnormal           Resulting Agency: [facility]   Susceptibility     Escherichia coli     NÉSTOR     Ampicillin Susceptible     Ampicillin + Sulbactam Susceptible     Cefazolin Susceptible     Cefepime Susceptible     Ceftazidime Susceptible     Ceftriaxone Susceptible     Gentamicin Susceptible     Levofloxacin Susceptible     Nitrofurantoin Susceptible     Piperacillin + Tazobactam Susceptible     Tetracycline Susceptible     Trimethoprim + Sulfamethoxazole Susceptible

## 2021-04-29 NOTE — PATIENT INSTRUCTIONS
Renal Mass    A renal mass is a growth in the kidney. A renal mass may be found while performing an MRI, CT scan, or ultrasound for other problems of the abdomen. Certain types of cancers, infections, or injuries can cause a renal mass. A renal mass that is cancerous (malignant) may grow or spread quickly. Others are harmless (benign).  What are common types of renal masses?  Renal masses include:  · Tumors. These may be cancerous (malignant) or noncancerous (benign).  ? The most common type of kidney cancer is renal cell carcinoma.  ? The most common benign tumors of the kidney include renal adenomas, oncocytomas, and angiomyolipoma (AML).  · Cysts. These are fluid-filled sacs that form on or in the kidney.  ? It is not always known what causes a cyst to develop in or on the kidney.  ? Most kidney cysts do not cause symptoms and do not need to be treated.  What type of testing might I need?  Your health care provider may recommend that you have tests to diagnose the cause of your renal mass. The following tests may be done if a renal mass is found:  · Physical exam.  · Blood tests.  · Urine tests.  · Imaging tests, such as ultrasound, CT scan, or MRI.  · Biopsy. This is a small sample that is removed from the renal mass and tested in a lab.  The exact tests and how often they are done will depend on:  · The size and appearance of the renal mass.  · Risk factors or medical conditions that increase your risk for problems.  · Any symptoms associated with the renal mass, or concerns that you have about it.  Tests and physical exams may be done once, or they may be done regularly for a period of time. Tests and exams that are done regularly will help monitor whether the mass is growing and beginning to cause problems.  What are common treatments for renal masses?  Treatment is not always needed for this condition. Your health care provider may recommend careful monitoring (watchful waiting) and regular tests and exams.  Treatment will depend on the cause of the mass.  Follow these instructions at home:  What you need to do at home will depend on the cause of the mass. Follow the instructions that your health care provider gives to you. In general:  · Take over-the-counter and prescription medicines only as told by your health care provider.  · If you are prescribed an antibiotic medicine, take it as told by your health care provider. Do not stop taking the antibiotic even if you start to feel better.  · Follow any restrictions that are given to you by your health care provider.  · Keep all follow-up visits as told by your health care provider. This is important.  ? You may need to see your health care provider once or twice a year to have CT scans and ultrasounds done. These tests will show if your renal mass has changed or grown bigger.  Contact a health care provider if you:  · Have pain in the side or back (flank pain).  · Have a fever.  · Feel full soon after eating.  · Have pain or swelling in the abdomen.  · Lose weight.  Get help right away if:  · Your pain gets worse.  · There is blood in your urine.  · You cannot urinate.  · You have chest pain.  · You have trouble breathing.  Summary  · A renal mass is a growth in the kidney. It may be cancerous (malignant) and grow or spread quickly, or it may be harmless (benign).  · Renal masses may be found while performing an MRI, CT scan, or ultrasound for other problems of the abdomen.  · Your health care provider may recommend that you have tests to diagnose the cause of your renal mass. This may include a physical exam, blood tests, urine tests, imaging, or a biopsy.  · Treatment is not always needed for this condition. Careful monitoring (watchful waiting) may be recommended.  This information is not intended to replace advice given to you by your health care provider. Make sure you discuss any questions you have with your health care provider.  Document Revised: 01/24/2019  Document Reviewed: 01/24/2019  [employer] Patient Education © 2021 [employer]  Urinary Tract Infection, Adult  A urinary tract infection (UTI) is an infection of any part of the urinary tract. The urinary tract includes:  · The kidneys.  · The ureters.  · The bladder.  · The urethra.  These organs make, store, and get rid of pee (urine) in the body.  What are the causes?  This is caused by germs (bacteria) in your genital area. These germs grow and cause swelling (inflammation) of your urinary tract.  What increases the risk?  You are more likely to develop this condition if:  · You have a small, thin tube (catheter) to drain pee.  · You cannot control when you pee or poop (incontinence).  · You are female, and:  ? You use these methods to prevent pregnancy:  § A medicine that kills sperm (spermicide).  § A device that blocks sperm (diaphragm).  ? You have low levels of a female hormone (estrogen).  ? You are pregnant.  · You have genes that add to your risk.  · You are sexually active.  · You take antibiotic medicines.  · You have trouble peeing because of:  ? A prostate that is bigger than normal, if you are male.  ? A blockage in the part of your body that drains pee from the bladder (urethra).  ? A kidney stone.  ? A nerve condition that affects your bladder (neurogenic bladder).  ? Not getting enough to drink.  ? Not peeing often enough.  · You have other conditions, such as:  ? Diabetes.  ? A weak disease-fighting system (immune system).  ? Sickle cell disease.  ? Gout.  ? Injury of the spine.  What are the signs or symptoms?  Symptoms of this condition include:  · Needing to pee right away (urgently).  · Peeing often.  · Peeing small amounts often.  · Pain or burning when peeing.  · Blood in the pee.  · Pee that smells bad or not like normal.  · Trouble peeing.  · Pee that is cloudy.  · Fluid coming from the vagina, if you are female.  · Pain in the belly or lower back.  Other symptoms include:  · Throwing  up (vomiting).  · No urge to eat.  · Feeling mixed up (confused).  · Being tired and grouchy (irritable).  · A fever.  · Watery poop (diarrhea).  How is this treated?  This condition may be treated with:  · Antibiotic medicine.  · Other medicines.  · Drinking enough water.  Follow these instructions at home:    Medicines  · Take over-the-counter and prescription medicines only as told by your doctor.  · If you were prescribed an antibiotic medicine, take it as told by your doctor. Do not stop taking it even if you start to feel better.  General instructions  · Make sure you:  ? Pee until your bladder is empty.  ? Do not hold pee for a long time.  ? Empty your bladder after sex.  ? Wipe from front to back after pooping if you are a female. Use each tissue one time when you wipe.  · Drink enough fluid to keep your pee pale yellow.  · Keep all follow-up visits as told by your doctor. This is important.  Contact a doctor if:  · You do not get better after 1-2 days.  · Your symptoms go away and then come back.  Get help right away if:  · You have very bad back pain.  · You have very bad pain in your lower belly.  · You have a fever.  · You are sick to your stomach (nauseous).  · You are throwing up.  Summary  · A urinary tract infection (UTI) is an infection of any part of the urinary tract.  · This condition is caused by germs in your genital area.  · There are many risk factors for a UTI. These include having a small, thin tube to drain pee and not being able to control when you pee or poop.  · Treatment includes antibiotic medicines for germs.  · Drink enough fluid to keep your pee pale yellow.  This information is not intended to replace advice given to you by your health care provider. Make sure you discuss any questions you have with your health care provider.  Document Revised: 12/05/2019 Document Reviewed: 06/27/2019  [employer] Patient Education © 2021 [employer]

## 2021-05-10 ENCOUNTER — OFFICE VISIT (OUTPATIENT)
Dept: UROLOGY | Facility: CLINIC | Age: 66
End: 2021-05-10

## 2021-05-10 ENCOUNTER — HOSPITAL ENCOUNTER (OUTPATIENT)
Dept: ULTRASOUND IMAGING | Facility: HOSPITAL | Age: 66
Discharge: HOME | End: 2021-05-10
Admitting: NURSE PRACTITIONER

## 2021-05-10 VITALS — TEMPERATURE: 97.1 F | HEIGHT: 63 IN | BODY MASS INDEX: 23.92 KG/M2 | WEIGHT: 135 LBS

## 2021-05-10 DIAGNOSIS — R35.0 FREQUENCY OF URINATION: ICD-10-CM

## 2021-05-10 DIAGNOSIS — R31.9 URINARY TRACT INFECTION WITH HEMATURIA, SITE UNSPECIFIED: ICD-10-CM

## 2021-05-10 DIAGNOSIS — N28.1 ACQUIRED RENAL CYST OF LEFT KIDNEY: ICD-10-CM

## 2021-05-10 DIAGNOSIS — B96.20 E-COLI UTI: Primary | ICD-10-CM

## 2021-05-10 DIAGNOSIS — R10.9 CHRONIC LEFT FLANK PAIN: ICD-10-CM

## 2021-05-10 DIAGNOSIS — N39.0 URINARY TRACT INFECTION WITH HEMATURIA, SITE UNSPECIFIED: ICD-10-CM

## 2021-05-10 DIAGNOSIS — N28.1 RENAL CYST: ICD-10-CM

## 2021-05-10 DIAGNOSIS — G89.29 CHRONIC LEFT FLANK PAIN: ICD-10-CM

## 2021-05-10 DIAGNOSIS — R10.9 BILATERAL FLANK PAIN: ICD-10-CM

## 2021-05-10 DIAGNOSIS — N39.0 E-COLI UTI: Primary | ICD-10-CM

## 2021-05-10 LAB
BILIRUB BLD-MCNC: ABNORMAL MG/DL
CLARITY, POC: CLEAR
COLOR UR: YELLOW
GLUCOSE UR STRIP-MCNC: NEGATIVE MG/DL
KETONES UR QL: NEGATIVE
LEUKOCYTE EST, POC: NEGATIVE
NITRITE UR-MCNC: NEGATIVE MG/ML
PH UR: 5 [PH] (ref 5–8)
PROT UR STRIP-MCNC: NEGATIVE MG/DL
RBC # UR STRIP: NEGATIVE /UL
SP GR UR: 1.02 (ref 1–1.03)
UROBILINOGEN UR QL: NORMAL

## 2021-05-10 PROCEDURE — 99214 OFFICE O/P EST MOD 30 MIN: CPT | Performed by: NURSE PRACTITIONER

## 2021-05-10 PROCEDURE — 81003 URINALYSIS AUTO W/O SCOPE: CPT | Performed by: NURSE PRACTITIONER

## 2021-05-10 PROCEDURE — 76775 US EXAM ABDO BACK WALL LIM: CPT | Performed by: RADIOLOGY

## 2021-05-10 PROCEDURE — 76775 US EXAM ABDO BACK WALL LIM: CPT

## 2021-05-10 PROCEDURE — 87086 URINE CULTURE/COLONY COUNT: CPT | Performed by: NURSE PRACTITIONER

## 2021-05-10 NOTE — PATIENT INSTRUCTIONS

## 2021-05-10 NOTE — PROGRESS NOTES
Chief Complaint  Renal Cyst/ ECOLI UTI (follow up E'COLI UTI /REVIEW RENAL CYST)    Subjective          Ethel Trotter presents to [facility]  History of Present Illness    Ms. Ethel Urbina is a very pleasant but frail and ill looking 65-year-old female returns to clinic today for a 2-week follow-up on E. coli UTI.  Patient reports a significant improvement in her symptoms since finishing antibiotic Bactrim.  Currently on Macrobid for antibiotic suppressive therapy, and tolerating medication well.  Denies any side effects from the medication.    Today, she denies any urinary symptoms of frequency, urgency, dysuria or nocturia.  She denies any episodes of gross hematuria, abdominal pain, pelvic pressure or suprapubic discomfort.  She denies N/V/D, denies any issues with constipation.  She denies any early satiety, her main symptomatology is her back and flank pain which is chronic in origin.  She denies any CVA tenderness.  Her urine dipstick today is negative for leukocyte esterase, negative nitrites, history of gross/ microscopic hematuria.  Overall she reports doing relatively well.     We also reviewed her renal ultrasound which is basically unchanged from the last one.  It shows a significant 8 cm large left renal cyst without hydronephrosis or hydroureter.  There is no solid mass evident on ultrasound.  THE Patient however continues to report left flank pain, and lower back pain which has remained very bothersome to her.  She had an MRI of her lumbar spine at [facility] on on 11/18/2020 which also confirmed a multitude of bilateral renal cysts.  However, the MRI also indicated mild diffuse disc bulge with moderate bilateral foraminal narrowing at her L2 to to L5, with significant degenerative disc changes and broad-based midline disc protrusion. Prior to that, patient had a CT abdomen and pelvis last year 05/28/2020 which showed bilateral renal cyst,  "with a left-sided cyst of 7 cm greater than the right.  No solid masses, or hydronephrosis was noted at that time.  No evidence of retroperitoneal or adenopathy       Objective   Vital Signs:   Temp 97.1 °F (36.2 °C)   Ht 160 cm (62.99\")   Wt 61.2 kg (135 lb)   BMI 23.92 kg/m²     Physical Exam  Constitutional:       General: She is not in acute distress.     Appearance: She is well-developed. She is ill-appearing.   HENT:      Head: Normocephalic and atraumatic.   Eyes:      Pupils: Pupils are equal, round, and reactive to light.   Neck:      Thyroid: No thyromegaly.      Trachea: No tracheal deviation.   Cardiovascular:      Rate and Rhythm: Normal rate and regular rhythm.      Heart sounds: No murmur heard.     Pulmonary:      Effort: Pulmonary effort is normal. No respiratory distress.      Breath sounds: Normal breath sounds. No stridor. No wheezing.   Abdominal:      General: Bowel sounds are normal.      Palpations: Abdomen is soft.      Tenderness: There is abdominal tenderness.   Genitourinary:     Labia:         Right: No tenderness.         Left: No tenderness.       Vagina: Normal. No vaginal discharge.   Musculoskeletal:         General: Tenderness present. No deformity. Normal range of motion.      Cervical back: Normal range of motion.   Skin:     General: Skin is warm and dry.      Capillary Refill: Capillary refill takes less than 2 seconds.      Coloration: Skin is not pale.      Findings: No erythema or rash.   Neurological:      Mental Status: She is alert and oriented to person, place, and time.      Cranial Nerves: No cranial nerve deficit.      Sensory: No sensory deficit.      Coordination: Coordination normal.   Psychiatric:         Behavior: Behavior normal.         Thought Content: Thought content normal.         Judgment: Judgment normal.        Result Review :     UA    Urinalysis 10/9/20 4/26/21 5/10/21   Ketones, UA Negative Negative Negative   Leukocytes, UA Negative Moderate (2+) " (A) Negative   (A) Abnormal value            Urine Culture    Urine Culture 9/11/20 4/26/21 5/10/21   Urine Culture >100,000 CFU/mL Escherichia coli (A) >100,000 CFU/mL Escherichia coli (A) <25,000 CFU/mL Normal Urogenital Glenis   (A) Abnormal value            Data reviewed: Radiologic studies RENAL ULTRASOUND DONE 04/26/2021          Assessment and Plan    Diagnoses and all orders for this visit:    1. E-coli UTI (Primary)  -     nitrofurantoin, macrocrystal-monohydrate, (Macrobid) 100 MG capsule; Take 1 capsule by mouth Daily.  Dispense: 56 capsule; Refill: 3  -     ibuprofen (ADVIL,MOTRIN) 800 MG tablet; Take 1 tablet by mouth Every 8 (Eight) Hours As Needed for Moderate Pain .  Dispense: 30 tablet; Refill: 0    2. Acquired renal cyst of left kidney  -     US Renal Bilateral; Future  -     Urine Culture - Urine, Urine, Random Void    3. Frequency of urination  -     POC Urinalysis Dipstick, Automated  -     Urine Culture - Urine, Urine, Random Void  -     nitrofurantoin, macrocrystal-monohydrate, (Macrobid) 100 MG capsule; Take 1 capsule by mouth Daily.  Dispense: 56 capsule; Refill: 3  -     ibuprofen (ADVIL,MOTRIN) 800 MG tablet; Take 1 tablet by mouth Every 8 (Eight) Hours As Needed for Moderate Pain .  Dispense: 30 tablet; Refill: 0  -     methocarbamol (Robaxin) 500 MG tablet; Take 1 tablet by mouth 2 (Two) Times a Day As Needed for Muscle Spasms.  Dispense: 20 tablet; Refill: 0    4. Renal cyst  -     ibuprofen (ADVIL,MOTRIN) 800 MG tablet; Take 1 tablet by mouth Every 8 (Eight) Hours As Needed for Moderate Pain .  Dispense: 30 tablet; Refill: 0    5. Urinary tract infection with hematuria, site unspecified  -     nitrofurantoin, macrocrystal-monohydrate, (Macrobid) 100 MG capsule; Take 1 capsule by mouth Daily.  Dispense: 56 capsule; Refill: 3  -     ibuprofen (ADVIL,MOTRIN) 800 MG tablet; Take 1 tablet by mouth Every 8 (Eight) Hours As Needed for Moderate Pain .  Dispense: 30 tablet; Refill: 0    6.  Chronic left flank pain  -     nitrofurantoin, macrocrystal-monohydrate, (Macrobid) 100 MG capsule; Take 1 capsule by mouth Daily.  Dispense: 56 capsule; Refill: 3  -     ibuprofen (ADVIL,MOTRIN) 800 MG tablet; Take 1 tablet by mouth Every 8 (Eight) Hours As Needed for Moderate Pain .  Dispense: 30 tablet; Refill: 0  -     methocarbamol (Robaxin) 500 MG tablet; Take 1 tablet by mouth 2 (Two) Times a Day As Needed for Muscle Spasms.  Dispense: 20 tablet; Refill: 0    7. Bilateral flank pain  -     nitrofurantoin, macrocrystal-monohydrate, (Macrobid) 100 MG capsule; Take 1 capsule by mouth Daily.  Dispense: 56 capsule; Refill: 3  -     ibuprofen (ADVIL,MOTRIN) 800 MG tablet; Take 1 tablet by mouth Every 8 (Eight) Hours As Needed for Moderate Pain .  Dispense: 30 tablet; Refill: 0  -     methocarbamol (Robaxin) 500 MG tablet; Take 1 tablet by mouth 2 (Two) Times a Day As Needed for Muscle Spasms.  Dispense: 20 tablet; Refill: 0                                      ASSESSMENT  Bilateral Renal Cysts/Flank Pain/lower back pain/Recurrent urinary tract infections: She is in no apparent distress and reports a remarkable improvement in her overall UTI symptoms post antibiotic therapy. She is happy to be feeling better she states. She reports doing relatively better on her antibiotic prophylaxis in the past, and would like to continue.     Again,  We discussed the types of organisms that are found in the urinary tract indicating that the vast majority are results of the patient's own gastrointestinal oziel.  We discussed how many of the antibiotics that are utilized can actually exacerbate these infections by creating resistant organisms and there is only a very few antibiotics that are concentrated in the urine and do not affect the rectal reservoir nor cause recurrent yeast vaginitis.      We discussed the risk factors for recurrent UTI infections being intercourse in younger patients and atrophic changes in older patients.   We discussed the symptoms that are found including pain, pressure, burning, frequency, urgency suprapubic pain and painful intercourse.  I discussed upper tract symptoms including fevers, chills, and indicated the workup would be much more aggressive if the patient were to present with recurrent infections in the face of upper tract symptomatology such as fever. I discussed the history of vesicoureteral reflux in young patients and finally chronic renal scarring as a result of such.     We discussed the Bosniak classification of renal cysts.  I discussed the strict criteria involved with making a decision regarding intervention.  We discussed the fact that this is likely a simple cyst-Bosniak type I cyst which has sharp distinct borders and a thin wall and no internal echoes versus a Bosniak 2 with a thicker wall and faint striations. We discussed the risks of malignancy in these situations is essentially 0 and requires no further intervention. However we discussed the fact that a Bosniak type 3 cyst has about a 28% chance of malignancy and finally a Bosniak type IV cyst which most probably is representative of a renal cell carcinoma variant.     We further discussed the fact that most renal cyst are generally asymptomatic and do not require any further intervention and that the appropriate surgical management if it becomes bothersome to the patient is a radiographic cyst puncture, especially when causing significant pain/discomfort or problems particularly on the left with an early satiety.            PLAN  We resent her urine for culture. I will call her with results if any bacteria growth not sensitive to her current therapy of Macrobid..    Will RESTART Macrobid 100 mg PO Daily -Suppressive Therapy. She should increase her p.o. fluid intake to at least 1 to 2 L daily and avoid bladder irritants such as caffeine products, spicy foods, and citrusy foods.     I recommend concomitant probiotics with treatment with  antibiotics to protect the rectal reservoir including over-the-counter yogurt preparations to jono oral pills containing the appropriate probiotics. Patient reports the diligent use of Probiotics.    She is to also continue other medications as prescribed above.     We discussed comfort measures with hot showers/baths, heating pads, and alternating NSAIDs/Tylenol for pain and discomfort to flank area/lower back.     In the meantime I assured the patient this is simple renal cyst and nothing to worry about at this point.     Patient is agreeable plan of care, and follow-up in ONE YEAR as discussed.       Follow Up   Return in about 1 year (around 5/10/2022) for Next scheduled follow up with renal ultrasound prior.     Patient was given instructions and counseling regarding her condition or for health maintenance advice. Please see specific information pulled into the AVS if appropriate.

## 2021-05-12 ENCOUNTER — TELEPHONE (OUTPATIENT)
Dept: UROLOGY | Facility: CLINIC | Age: 66
End: 2021-05-12

## 2021-05-12 LAB — BACTERIA SPEC AEROBE CULT: NORMAL

## 2021-05-12 NOTE — TELEPHONE ENCOUNTER
I tried to call the pt and let her know her urine culture came back negative however she was not available and her mailbox was full so I could not leave a message.

## 2021-05-13 RX ORDER — IBUPROFEN 800 MG/1
800 TABLET ORAL EVERY 8 HOURS PRN
Qty: 30 TABLET | Refills: 0 | Status: SHIPPED | OUTPATIENT
Start: 2021-05-13 | End: 2021-10-27

## 2021-05-13 RX ORDER — NITROFURANTOIN 25; 75 MG/1; MG/1
100 CAPSULE ORAL DAILY
Qty: 56 CAPSULE | Refills: 3 | Status: SHIPPED | OUTPATIENT
Start: 2021-05-13 | End: 2022-07-07

## 2021-05-13 RX ORDER — METHOCARBAMOL 500 MG/1
500 TABLET, FILM COATED ORAL 2 TIMES DAILY PRN
Qty: 20 TABLET | Refills: 0 | Status: SHIPPED | OUTPATIENT
Start: 2021-05-13 | End: 2022-04-01 | Stop reason: SDUPTHER

## 2021-07-12 DIAGNOSIS — I25.10 CORONARY ARTERY DISEASE INVOLVING NATIVE CORONARY ARTERY OF NATIVE HEART WITHOUT ANGINA PECTORIS: ICD-10-CM

## 2021-07-12 RX ORDER — NITROGLYCERIN 0.4 MG/1
0.4 TABLET SUBLINGUAL AS NEEDED
Qty: 25 TABLET | Refills: 5 | Status: SHIPPED | OUTPATIENT
Start: 2021-07-12 | End: 2022-04-01 | Stop reason: SDUPTHER

## 2021-07-22 ENCOUNTER — OFFICE VISIT (OUTPATIENT)
Dept: FAMILY MEDICINE CLINIC | Facility: CLINIC | Age: 66
End: 2021-07-22

## 2021-07-22 VITALS
BODY MASS INDEX: 22.68 KG/M2 | HEIGHT: 63 IN | SYSTOLIC BLOOD PRESSURE: 124 MMHG | OXYGEN SATURATION: 96 % | DIASTOLIC BLOOD PRESSURE: 70 MMHG | RESPIRATION RATE: 14 BRPM | HEART RATE: 65 BPM | WEIGHT: 128 LBS | TEMPERATURE: 96.6 F

## 2021-07-22 DIAGNOSIS — M25.552 LEFT HIP PAIN: ICD-10-CM

## 2021-07-22 DIAGNOSIS — M54.59 MECHANICAL LOW BACK PAIN: ICD-10-CM

## 2021-07-22 DIAGNOSIS — J44.9 COPD MIXED TYPE (HCC): ICD-10-CM

## 2021-07-22 DIAGNOSIS — F41.8 DEPRESSION WITH ANXIETY: ICD-10-CM

## 2021-07-22 DIAGNOSIS — N18.32 CHRONIC RENAL FAILURE, STAGE 3B (HCC): ICD-10-CM

## 2021-07-22 DIAGNOSIS — Z72.0 DIPS TOBACCO: ICD-10-CM

## 2021-07-22 DIAGNOSIS — G45.9 TIA (TRANSIENT ISCHEMIC ATTACK): ICD-10-CM

## 2021-07-22 DIAGNOSIS — N28.1 RENAL CYST: ICD-10-CM

## 2021-07-22 DIAGNOSIS — J30.2 CHRONIC SEASONAL ALLERGIC RHINITIS: Primary | ICD-10-CM

## 2021-07-22 DIAGNOSIS — N20.0 NEPHROLITHIASIS: ICD-10-CM

## 2021-07-22 DIAGNOSIS — E78.2 MIXED HYPERLIPIDEMIA: ICD-10-CM

## 2021-07-22 DIAGNOSIS — N95.1 MENOPAUSAL SYMPTOM: ICD-10-CM

## 2021-07-22 DIAGNOSIS — K59.09 CHRONIC CONSTIPATION: ICD-10-CM

## 2021-07-22 DIAGNOSIS — Z87.440 HISTORY OF RECURRENT URINARY TRACT INFECTION: ICD-10-CM

## 2021-07-22 DIAGNOSIS — Z00.00 HEALTHCARE MAINTENANCE: ICD-10-CM

## 2021-07-22 DIAGNOSIS — I25.10 CORONARY ARTERY DISEASE INVOLVING NATIVE CORONARY ARTERY OF NATIVE HEART WITHOUT ANGINA PECTORIS: ICD-10-CM

## 2021-07-22 DIAGNOSIS — M85.80 OSTEOPENIA, UNSPECIFIED LOCATION: ICD-10-CM

## 2021-07-22 DIAGNOSIS — E55.9 VITAMIN D DEFICIENCY DISEASE: ICD-10-CM

## 2021-07-22 DIAGNOSIS — I10 ESSENTIAL HYPERTENSION: ICD-10-CM

## 2021-07-22 DIAGNOSIS — E11.9 TYPE 2 DIABETES MELLITUS WITHOUT COMPLICATION, WITHOUT LONG-TERM CURRENT USE OF INSULIN (HCC): ICD-10-CM

## 2021-07-22 DIAGNOSIS — K21.9 GASTROESOPHAGEAL REFLUX DISEASE WITHOUT ESOPHAGITIS: ICD-10-CM

## 2021-07-22 PROCEDURE — 99214 OFFICE O/P EST MOD 30 MIN: CPT | Performed by: GENERAL PRACTICE

## 2021-07-22 NOTE — PROGRESS NOTES
Subjective   Ethel Trotter is a 65 y.o. female.     Chief Complaint  She returns for a scheduled reassessment of multiple medical problems including CAD, previous TIA, type 2 diabetes mellitus, hypertension, dyslipidemia, and CRF    History of Present Illness     Coronary artery disease  She has a history of previous M.I. and CABG surgery.  She denies any chest pain, palpitations, lightheadedness, shortness of breath, calf pain, or swelling the ankles.  She remains on ASA  Lab Results   Component Value Date    WBC 7.69 12/11/2020    HGB 15.6 12/11/2020    HCT 48.6 (H) 12/11/2020    MCV 90.2 12/11/2020     12/11/2020     Episode of Visual Loss  She experienced a 4 to 5 second episode of complete vision loss in her right eye several years ago.  This occurred during a period of anxiety and was associated with a generalized headache.  She continues to deny any further episodes and has had no weakness, numbness, tingling, or difficulty talking or understanding what is said to her. A duplex doppler ultrasound of the carotids was ordered but never scheduled and she is uninterested in pursuing this further at present.     Diabetes  She continues to deny paresthesias of the feet, visual disturbances, polydipsia, polyuria, hypoglycemia or foot ulcerations.  Evaluation to date has been a hemoglobin A1c.  Current treatments include metformin and sitagliptin (together as janumet) empagliflozin, and dulaglutide.  She has had no recent labs  Lab Results   Component Value Date    HGBA1C 6.30 (H) 12/11/2020     Lab Results   Component Value Date    MICROALBUR 3.6 12/11/2020     Dyslipidemia  Compliance with treatment remains quite good.  She is quite active despite her back and leg pain.  She is prescribed atorvastatin, ezetimibe, and repatha with no apparent effects.   Lab Results   Component Value Date    CHOL 103 12/11/2020    CHLPL 247 (H) 03/10/2016    TRIG 138 12/11/2020    HDL 58 12/11/2020    LDL 22 12/11/2020      Hypertension  Home blood pressure readings: not doing. Associated signs and symptoms: none.  She continues to deny any chest pain, palpitations, dyspnea, orthopnea, paroxysmal nocturnal dyspnea and peripheral edema. Current antihypertensive medications includes lisinopril, metoprolol, amlodipine.    Lab Results   Component Value Date    GLUCOSE 116 (H) 12/11/2020    BUN 22 12/11/2020    CREATININE 1.29 (H) 12/11/2020    EGFRIFNONA 41 (L) 12/11/2020    BCR 17.1 12/11/2020    K 5.0 12/11/2020    CO2 28.5 12/11/2020    CALCIUM 10.8 (H) 12/11/2020    ALBUMIN 4.80 12/11/2020    LABIL2 1.4 (L) 03/10/2016    AST 22 12/11/2020    ALT 14 12/11/2020     Low Back Pain   She has a long history of low back pain worse over the last year. The pain is described as an intermittent left lower ache.  The pain radiates to her left flank, left lateral hip, and left posterior thigh.  The pain in her back is worse than that elsewhere.  She has been unable to identify any precipitating, exacerbating, or relieving factors.  She denies any changes in her strength, sensation, or bowel/bladder control.  She denies any further rectal bleeding has had no changes in her bowel habits or any melena.  She admits to urinary frequency, nocturia, and intermittent dysuria but denies any hematuria.  She denies any vaginal bleeding and there is no history of any fever, chills, or night sweats. Colonoscopy performed on 5/13/2019 by Dr. Reyes was reported as showing mild diverticulosis and several tubular adenomas were removed.  Ultrasound of the abdomen performed on 2/26/2020 revealed a 7.44 cm left renal cyst.  She underwent a urology assessment with Dr. Givens on 5/29/2020 who felt that it was a Bosniak type I.  CT of the abdomen and pelvis performed on 6/10/2020 was reported as showing bilateral renal cysts the largest of which was in the posterior left kidney and measured 7 cm.  Atherosclerotic vascular disease and osteoarthritic changes of the  spine were also noted. X-rays of the left hip performed on 2/26/2020 were unremarkable.  MRI of the lumbar spine performed on 11/18/2020 was reported as showing degenerative disc disease with moderate to severe right neuroforaminal narrowing at L4-5 along with multiple bilateral renal cysts the largest of which was 7 cm.  While records have yet to be received she apparently underwent an orthopedic assessment late last year and was recommended referral to pain management.  She underwent a urology reassessment since last here with an updated ultrasound of the kidneys.  She was advised a 1 year follow-up    Labs  Lab Results   Component Value Date    TSH 2.080 12/11/2020     The following portions of the patient's history were reviewed and updated as appropriate: allergies, current medications, past medical history, past social history and problem list.    Review of Systems   Constitutional: Positive for fatigue. Negative for appetite change, chills, fever and unexpected weight change.   HENT: Positive for congestion and rhinorrhea. Negative for ear pain, postnasal drip, sneezing and sore throat.    Eyes: Negative for visual disturbance.   Respiratory: Negative for cough, shortness of breath and wheezing.    Cardiovascular: Negative for chest pain, palpitations and leg swelling.   Gastrointestinal: Negative for abdominal pain, anal bleeding, blood in stool, constipation, diarrhea, nausea and vomiting.   Endocrine: Negative for polydipsia and polyuria.   Genitourinary: Positive for frequency and vaginal pain (dryness). Negative for dysuria, hematuria and urgency.   Musculoskeletal: Positive for arthralgias and back pain. Negative for myalgias.   Skin: Negative for rash.   Neurological: Negative for weakness, numbness and headaches.   Psychiatric/Behavioral: Positive for sleep disturbance. Negative for dysphoric mood and suicidal ideas. The patient is nervous/anxious (doing better - she and her  have a new home  that they are renovating).      Objective   Physical Exam  Constitutional:       General: She is not in acute distress.     Appearance: Normal appearance. She is well-developed. She is not diaphoretic.      Comments: Bright and in good spirits. No apparent distress. No pallor, jaundice, diaphoresis, or cyanosis.   HENT:      Head: Atraumatic.      Right Ear: Ear canal and external ear normal. Tympanic membrane is scarred.      Left Ear: Ear canal and external ear normal. Tympanic membrane is scarred.   Eyes:      Conjunctiva/sclera: Conjunctivae normal.   Neck:      Thyroid: No thyroid mass or thyromegaly.      Vascular: No carotid bruit or JVD.      Trachea: Trachea normal. No tracheal deviation.   Cardiovascular:      Rate and Rhythm: Normal rate and regular rhythm.      Heart sounds: Normal heart sounds, S1 normal and S2 normal. No murmur heard.   No gallop.    Pulmonary:      Effort: Pulmonary effort is normal.      Breath sounds: Normal breath sounds.   Abdominal:      General: Bowel sounds are normal. There is no distension.   Musculoskeletal:      Right lower leg: No edema.      Left lower leg: No edema.      Comments: Negative straight leg raise. No peripheral joint redness or warmth.   Lymphadenopathy:      Head:      Right side of head: No submental, submandibular, tonsillar, preauricular, posterior auricular or occipital adenopathy.      Left side of head: No submental, submandibular, tonsillar, preauricular, posterior auricular or occipital adenopathy.      Cervical: No cervical adenopathy.      Upper Body:      Right upper body: No supraclavicular adenopathy.      Left upper body: No supraclavicular adenopathy.   Skin:     General: Skin is warm.      Coloration: Skin is not cyanotic, jaundiced or pale.      Findings: No rash.      Nails: There is no clubbing.   Neurological:      Mental Status: She is alert and oriented to person, place, and time.      Cranial Nerves: No cranial nerve deficit.       Motor: No tremor.      Coordination: Coordination normal.      Gait: Gait normal.   Psychiatric:         Attention and Perception: Attention normal.         Mood and Affect: Mood normal.         Speech: Speech normal.         Behavior: Behavior normal.         Thought Content: Thought content normal.       Assessment/Plan   Problems Addressed this Visit        Allergies and Adverse Reactions    Chronic seasonal allergic rhinitis        Cardiac and Vasculature    Coronary artery disease involving native coronary artery  Reminded regarding risk factor modification with an emphasis on tobacco cessation.  Continue current medication    Essential hypertension   Hypertension: at goal. Evidence of target organ damage: coronary artery disease, atherosclerosis of the aorta on imaging, chronic kidney disease and possible TIA.  Encouraged to continue to work on diet and exercise plan.   Continue current medication    Relevant Orders    CBC & Differential    Comprehensive Metabolic Panel    Mixed hyperlipidemia  As above.   Continue current medication.    Relevant Orders    Comprehensive Metabolic Panel    Lipid Panel    TSH       Endocrine and Metabolic    Type 2 diabetes mellitus without complication, without long-term current use of insulin (CMS/[facility])  Diabetes mellitus Type II, under unknown control.   Encouraged to continue to pursue ADA diet  Encouraged aerobic exercise.  Continue current medication  Scheduled for updated labs    Relevant Orders    Hemoglobin A1c    Vitamin B12    MicroAlbumin, Urine, Random - Urine, Clean Catch    Vitamin D deficiency disease    Relevant Orders    Vitamin D 25 Hydroxy       Gastrointestinal Abdominal     Chronic constipation    Gastroesophageal reflux disease without esophagitis       Genitourinary and Reproductive     History of recurrent urinary tract infection    Relevant Orders    Urinalysis With Culture If Indicated -    Menopausal symptom  Patient uninterested in a Pap or  pelvic  Reviewed options and agreed on a trial of topical estrogen  Encouraged to report if any worse, any new symptoms, or if no better over the next month or 2    Relevant Medications    conjugated estrogens (PREMARIN) 0.625 MG/GM vaginal cream    Nephrolithiasis    Renal cyst  Stable  Follow up with urology       Health Encounters    Healthcare maintenance  Encouraged again to obtain a COVID-19 immunization.    Reminded to get a flu shot when available.       Mental Health    Depression with anxiety  Significant situational component.   Supportive therapy.   Continue current medication.    Relevant Orders    TSH       Musculoskeletal and Injuries    Left hip pain    Mechanical low back pain  Reminded regarding symptomatic treatment.   Continue current medication    Osteopenia       Neuro    TIA (transient ischemic attack)  As above.   Continue current medication.       Pulmonary and Pneumonias    COPD mixed type (CMS/[facility])       Tobacco    Dips tobacco       Other    Chronic renal failure, stage 3b (CMS/[facility])  Reminded to avoid any NSAIDs prescription or OTC  Will continue to monitor      Diagnoses       Codes Comments    Chronic seasonal allergic rhinitis    -  Primary ICD-10-CM: J30.2  ICD-9-CM: 477.8     Mixed hyperlipidemia     ICD-10-CM: E78.2  ICD-9-CM: 272.2     Essential hypertension     ICD-10-CM: I10  ICD-9-CM: 401.9     Coronary artery disease involving native coronary artery of native heart without angina pectoris     ICD-10-CM: I25.10  ICD-9-CM: 414.01     Vitamin D deficiency disease     ICD-10-CM: E55.9  ICD-9-CM: 268.9     Type 2 diabetes mellitus without complication, without long-term current use of insulin (CMS/[facility])     ICD-10-CM: E11.9  ICD-9-CM: 250.00     Gastroesophageal reflux disease without esophagitis     ICD-10-CM: K21.9  ICD-9-CM: 530.81     Chronic constipation     ICD-10-CM: K59.09  ICD-9-CM: 564.00     Nephrolithiasis     ICD-10-CM: N20.0  ICD-9-CM: 592.0     Renal cyst      ICD-10-CM: N28.1  ICD-9-CM: 753.10     Healthcare maintenance     ICD-10-CM: Z00.00  ICD-9-CM: V70.0     Depression with anxiety     ICD-10-CM: F41.8  ICD-9-CM: 300.4     Osteopenia, unspecified location     ICD-10-CM: M85.80  ICD-9-CM: 733.90     Mechanical low back pain     ICD-10-CM: M54.5  ICD-9-CM: 724.2     Left hip pain     ICD-10-CM: M25.552  ICD-9-CM: 719.45     TIA (transient ischemic attack)     ICD-10-CM: G45.9  ICD-9-CM: 435.9     COPD mixed type (CMS/HCC)     ICD-10-CM: J44.9  ICD-9-CM: 496     Chronic renal failure, stage 3b (CMS/HCC)     ICD-10-CM: N18.32  ICD-9-CM: 585.3     Dips tobacco     ICD-10-CM: Z72.0  ICD-9-CM: 305.1     Menopausal symptom     ICD-10-CM: N95.1  ICD-9-CM: 627.2     History of recurrent urinary tract infection     ICD-10-CM: Z87.440  ICD-9-CM: V13.02

## 2021-09-27 ENCOUNTER — FLU SHOT (OUTPATIENT)
Dept: FAMILY MEDICINE CLINIC | Facility: CLINIC | Age: 66
End: 2021-09-27

## 2021-09-27 DIAGNOSIS — Z00.00 HEALTHCARE MAINTENANCE: Primary | ICD-10-CM

## 2021-09-27 DIAGNOSIS — Z23 ENCOUNTER FOR IMMUNIZATION: ICD-10-CM

## 2021-09-27 PROCEDURE — G0008 ADMIN INFLUENZA VIRUS VAC: HCPCS | Performed by: GENERAL PRACTICE

## 2021-09-27 PROCEDURE — 90686 IIV4 VACC NO PRSV 0.5 ML IM: CPT | Performed by: GENERAL PRACTICE

## 2021-10-26 ENCOUNTER — OFFICE VISIT (OUTPATIENT)
Dept: FAMILY MEDICINE CLINIC | Facility: CLINIC | Age: 66
End: 2021-10-26

## 2021-10-26 DIAGNOSIS — Z00.00 HEALTHCARE MAINTENANCE: ICD-10-CM

## 2021-10-26 DIAGNOSIS — E78.2 MIXED HYPERLIPIDEMIA: ICD-10-CM

## 2021-10-26 DIAGNOSIS — K59.09 CHRONIC CONSTIPATION: ICD-10-CM

## 2021-10-26 DIAGNOSIS — F41.8 DEPRESSION WITH ANXIETY: ICD-10-CM

## 2021-10-26 DIAGNOSIS — E11.9 TYPE 2 DIABETES MELLITUS WITHOUT COMPLICATION, WITHOUT LONG-TERM CURRENT USE OF INSULIN (HCC): ICD-10-CM

## 2021-10-26 DIAGNOSIS — J44.9 COPD MIXED TYPE (HCC): ICD-10-CM

## 2021-10-26 DIAGNOSIS — I87.2 VENOUS INSUFFICIENCY, PERIPHERAL: ICD-10-CM

## 2021-10-26 DIAGNOSIS — M54.59 MECHANICAL LOW BACK PAIN: ICD-10-CM

## 2021-10-26 DIAGNOSIS — I25.10 CORONARY ARTERY DISEASE INVOLVING NATIVE CORONARY ARTERY OF NATIVE HEART WITHOUT ANGINA PECTORIS: ICD-10-CM

## 2021-10-26 DIAGNOSIS — I10 ESSENTIAL HYPERTENSION: ICD-10-CM

## 2021-10-26 DIAGNOSIS — J30.2 CHRONIC SEASONAL ALLERGIC RHINITIS: Primary | ICD-10-CM

## 2021-10-26 DIAGNOSIS — G45.9 TIA (TRANSIENT ISCHEMIC ATTACK): ICD-10-CM

## 2021-10-26 DIAGNOSIS — N95.1 MENOPAUSAL SYMPTOM: ICD-10-CM

## 2021-10-26 DIAGNOSIS — M25.552 LEFT HIP PAIN: ICD-10-CM

## 2021-10-26 DIAGNOSIS — N18.32 CHRONIC RENAL FAILURE, STAGE 3B (HCC): ICD-10-CM

## 2021-10-26 DIAGNOSIS — M85.80 OSTEOPENIA, UNSPECIFIED LOCATION: ICD-10-CM

## 2021-10-26 DIAGNOSIS — Z72.0 DIPS TOBACCO: ICD-10-CM

## 2021-10-26 DIAGNOSIS — E55.9 VITAMIN D DEFICIENCY DISEASE: ICD-10-CM

## 2021-10-26 DIAGNOSIS — K21.9 GASTROESOPHAGEAL REFLUX DISEASE WITHOUT ESOPHAGITIS: ICD-10-CM

## 2021-10-26 PROCEDURE — 85025 COMPLETE CBC W/AUTO DIFF WBC: CPT | Performed by: GENERAL PRACTICE

## 2021-10-26 PROCEDURE — 82306 VITAMIN D 25 HYDROXY: CPT | Performed by: GENERAL PRACTICE

## 2021-10-26 PROCEDURE — 80061 LIPID PANEL: CPT | Performed by: GENERAL PRACTICE

## 2021-10-26 PROCEDURE — 80053 COMPREHEN METABOLIC PANEL: CPT | Performed by: GENERAL PRACTICE

## 2021-10-26 PROCEDURE — 99214 OFFICE O/P EST MOD 30 MIN: CPT | Performed by: GENERAL PRACTICE

## 2021-10-26 PROCEDURE — 36415 COLL VENOUS BLD VENIPUNCTURE: CPT | Performed by: GENERAL PRACTICE

## 2021-10-26 PROCEDURE — 82607 VITAMIN B-12: CPT | Performed by: GENERAL PRACTICE

## 2021-10-26 PROCEDURE — 83036 HEMOGLOBIN GLYCOSYLATED A1C: CPT | Performed by: GENERAL PRACTICE

## 2021-10-26 PROCEDURE — 84443 ASSAY THYROID STIM HORMONE: CPT | Performed by: GENERAL PRACTICE

## 2021-10-26 NOTE — PROGRESS NOTES
Subjective   Ethel Trotter is a 66 y.o. female.     Chief Complaint  She returns for a scheduled reassessment of multiple medical problems including coronary artery disease, type 2 diabetes mellitus, hyperlipidemia, essential hypertension, and chronic low back pain    History of Present Illness     Coronary artery disease  She has a history of previous M.I. and CABG surgery.  She continues to deny any chest pain, palpitations, lightheadedness, shortness of breath, calf pain, or swelling the ankles.  She remains on low-dose ASA    Episode of Visual Loss  She experienced a 4 to 5 second episode of complete vision loss in her right eye several years ago.  This occurred during a period of anxiety and was associated with a generalized headache.  She continues to deny any further episodes and has had no weakness, numbness, tingling, or difficulty talking or understanding what is said to her. A duplex doppler ultrasound of the carotids was ordered but never scheduled and she remains uninterested in pursuing this further at present.     Diabetes  She continues to deny paresthesias of the feet, visual disturbances, polydipsia, polyuria, hypoglycemia or foot ulcerations.  Evaluation to date has been a hemoglobin A1c.  Current treatments include metformin and sitagliptin (together as janumet) empagliflozin, and dulaglutide.  She has yet to undergo the labs arranged at her last visit    Dyslipidemia  Compliance with treatment remains quite good.  She is quite active despite her back and leg pain.  She is prescribed atorvastatin, ezetimibe, and repatha with no apparent effects.     Hypertension  Home blood pressure readings: not doing. Associated signs and symptoms: none.  She continues to deny any chest pain, palpitations, dyspnea, orthopnea, paroxysmal nocturnal dyspnea and peripheral edema. Current antihypertensive medications includes lisinopril, metoprolol, amlodipine.  She has not had her medicine today    Low Back Pain    She has a long history of low back pain worse over the last few years. The pain is described as an intermittent left lower ache.  This radiates to her left flank, left lateral hip, and left posterior thigh.  The pain in her back is worse than that elsewhere.  She has been unable to identify any precipitating, exacerbating, or relieving factors.  She needs to deny any changes in her strength, sensation, or bowel/bladder control.  There is no history of any change in her bowel habits and she continues to deny any hematochezia or melena.  She admits to urinary frequency, nocturia, and intermittent dysuria but continues to deny any hematuria.  There is no history of any vaginal bleeding, fever, chills, or night sweats. Colonoscopy performed on 5/13/2019 by Dr. Reyes was reported as showing mild diverticulosis and several tubular adenomas were removed.  Ultrasound of the abdomen performed on 2/26/2020 revealed a 7.44 cm left renal cyst.  She underwent a urology assessment with Dr. Givens on 5/29/2020 who felt that it was a Bosniak type I.  CT of the abdomen and pelvis performed on 6/10/2020 was reported as showing bilateral renal cysts the largest of which was in the posterior left kidney and measured 7 cm.  Atherosclerotic vascular disease and osteoarthritic changes of the spine were also noted. X-rays of the left hip performed on 2/26/2020 were unremarkable.  MRI of the lumbar spine performed on 11/18/2020 was reported as showing degenerative disc disease with moderate to severe right neuroforaminal narrowing at L4-5 along with multiple bilateral renal cysts the largest of which was 7 cm.  While records have yet to be received she apparently underwent an orthopedic assessment late last year and was recommended referral to pain management.  She underwent a urology reassessment this year with an updated ultrasound of the kidneys on 5/10/2021.  She was advised a 1 year follow-up    The following portions of the  patient's history were reviewed and updated as appropriate: allergies, current medications, past medical history, past social history and problem list.    Review of Systems   Constitutional: Positive for fatigue. Negative for appetite change, chills, fever and unexpected weight change.   HENT: Positive for congestion and rhinorrhea. Negative for ear pain, postnasal drip, sneezing and sore throat.    Eyes: Negative for visual disturbance.   Respiratory: Negative for cough, shortness of breath and wheezing.    Cardiovascular: Negative for chest pain, palpitations and leg swelling.   Gastrointestinal: Negative for abdominal pain, anal bleeding, blood in stool, constipation, diarrhea, nausea and vomiting.   Endocrine: Negative for polydipsia and polyuria.   Genitourinary: Positive for frequency and vaginal pain (dryness). Negative for dysuria, hematuria and urgency.   Musculoskeletal: Positive for arthralgias and back pain. Negative for myalgias.   Skin: Negative for rash.   Neurological: Negative for weakness, numbness and headaches.   Psychiatric/Behavioral: Positive for sleep disturbance. Negative for dysphoric mood and suicidal ideas. The patient is nervous/anxious (doing better - she and her  have a new home that they are renovating).      Objective   Physical Exam  Constitutional:       General: She is not in acute distress.     Appearance: Normal appearance. She is well-developed. She is not diaphoretic.      Comments: Bright and in good spirits. No apparent distress. No pallor, jaundice, diaphoresis, or cyanosis.   HENT:      Head: Atraumatic.      Right Ear: Ear canal and external ear normal. Tympanic membrane is scarred.      Left Ear: Ear canal and external ear normal. Tympanic membrane is scarred.   Eyes:      Conjunctiva/sclera: Conjunctivae normal.   Neck:      Thyroid: No thyroid mass or thyromegaly.      Vascular: No carotid bruit or JVD.      Trachea: Trachea normal. No tracheal deviation.    Cardiovascular:      Rate and Rhythm: Normal rate and regular rhythm.      Heart sounds: Normal heart sounds, S1 normal and S2 normal. No murmur heard.  No gallop.    Pulmonary:      Effort: Pulmonary effort is normal.      Breath sounds: Normal breath sounds.   Chest:   Breasts:      Right: No supraclavicular adenopathy.      Left: No supraclavicular adenopathy.       Abdominal:      General: Bowel sounds are normal. There is no distension.   Musculoskeletal:      Right lower leg: No edema.      Left lower leg: No edema.      Comments: Negative straight leg raise. No peripheral joint redness or warmth.   Lymphadenopathy:      Head:      Right side of head: No submental, submandibular, tonsillar, preauricular, posterior auricular or occipital adenopathy.      Left side of head: No submental, submandibular, tonsillar, preauricular, posterior auricular or occipital adenopathy.      Cervical: No cervical adenopathy.      Upper Body:      Right upper body: No supraclavicular adenopathy.      Left upper body: No supraclavicular adenopathy.   Skin:     General: Skin is warm.      Coloration: Skin is not cyanotic, jaundiced or pale.      Findings: No rash.      Nails: There is no clubbing.   Neurological:      Mental Status: She is alert and oriented to person, place, and time.      Cranial Nerves: No cranial nerve deficit.      Motor: No tremor.      Coordination: Coordination normal.      Gait: Gait normal.   Psychiatric:         Attention and Perception: Attention normal.         Mood and Affect: Mood normal.         Speech: Speech normal.         Behavior: Behavior normal.         Thought Content: Thought content normal.       Assessment/Plan   Problems Addressed this Visit        Allergies and Adverse Reactions    Chronic seasonal allergic rhinitis       Cardiac and Vasculature    Coronary artery disease involving native coronary artery  Reminded regarding risk factor modification with an emphasis on tobacco  cessation.  Continue low-dose ASA    Essential hypertension   Hypertension: elevated today. Evidence of target organ damage: coronary artery disease, chronic kidney disease and transient ischemic attack.  Encouraged to continue to work on diet and exercise plan.   Continue current medication for now.  Reminded of the importance of taking this consistently    Mixed hyperlipidemia  As above.   Continue current medication.    Venous insufficiency, peripheral       Endocrine and Metabolic    Type 2 diabetes mellitus without complication, without long-term current use of insulin ([facility])  Diabetes mellitus Type II, under unknown control.   Encouraged to continue to pursue ADA diet  Encouraged aerobic exercise.  Continue current medication  Previously scheduled labs drawn    Vitamin D deficiency disease       Gastrointestinal Abdominal     Chronic constipation    Gastroesophageal reflux disease without esophagitis       Genitourinary and Reproductive     Menopausal symptom       Health Encounters    Healthcare maintenance  Patient has already received a flu shot fall.  Reminded of the importance of COVID-19 vaccination  She remains uninterested in an updated mammogram or DEXA scan       Mental Health    Depression with anxiety  Significant situational component.   Supportive therapy.   Continue current medication.  Encouraged to report if any worse or if any new symptoms or concerns.       Musculoskeletal and Injuries    Left hip pain    Mechanical low back pain  Reminded regarding symptomatic treatment.   Continue current medication    Osteopenia       Neuro    TIA (transient ischemic attack)  As above.       Pulmonary and Pneumonias    COPD mixed type (HCC)       Tobacco    Dips tobacco       Other    Chronic renal failure, stage 3b ([facility])  Reminded to avoid any NSAIDs prescription or OTC  Will continue to monitor      Diagnoses       Codes Comments    Chronic seasonal allergic rhinitis    -  Primary ICD-10-CM:  J30.2  ICD-9-CM: 477.8     Coronary artery disease involving native coronary artery of native heart without angina pectoris     ICD-10-CM: I25.10  ICD-9-CM: 414.01     Essential hypertension     ICD-10-CM: I10  ICD-9-CM: 401.9     Mixed hyperlipidemia     ICD-10-CM: E78.2  ICD-9-CM: 272.2     Venous insufficiency, peripheral     ICD-10-CM: I87.2  ICD-9-CM: 459.81     Type 2 diabetes mellitus without complication, without long-term current use of insulin (HCC)     ICD-10-CM: E11.9  ICD-9-CM: 250.00     Vitamin D deficiency disease     ICD-10-CM: E55.9  ICD-9-CM: 268.9     Chronic constipation     ICD-10-CM: K59.09  ICD-9-CM: 564.00     Gastroesophageal reflux disease without esophagitis     ICD-10-CM: K21.9  ICD-9-CM: 530.81     Menopausal symptom     ICD-10-CM: N95.1  ICD-9-CM: 627.2     Healthcare maintenance     ICD-10-CM: Z00.00  ICD-9-CM: V70.0     Depression with anxiety     ICD-10-CM: F41.8  ICD-9-CM: 300.4     Mechanical low back pain     ICD-10-CM: M54.59  ICD-9-CM: 724.2     Left hip pain     ICD-10-CM: M25.552  ICD-9-CM: 719.45     Osteopenia, unspecified location     ICD-10-CM: M85.80  ICD-9-CM: 733.90     TIA (transient ischemic attack)     ICD-10-CM: G45.9  ICD-9-CM: 435.9     COPD mixed type (HCC)     ICD-10-CM: J44.9  ICD-9-CM: 496     Dips tobacco     ICD-10-CM: Z72.0  ICD-9-CM: 305.1     Chronic renal failure, stage 3b (HCC)     ICD-10-CM: N18.32  ICD-9-CM: 585.3

## 2021-10-27 VITALS
OXYGEN SATURATION: 100 % | BODY MASS INDEX: 23.39 KG/M2 | HEART RATE: 68 BPM | WEIGHT: 132 LBS | DIASTOLIC BLOOD PRESSURE: 86 MMHG | HEIGHT: 63 IN | SYSTOLIC BLOOD PRESSURE: 192 MMHG | TEMPERATURE: 97.1 F | RESPIRATION RATE: 14 BRPM

## 2021-10-27 DIAGNOSIS — E11.9 TYPE 2 DIABETES MELLITUS WITHOUT COMPLICATION, WITHOUT LONG-TERM CURRENT USE OF INSULIN (HCC): ICD-10-CM

## 2021-10-27 LAB
25(OH)D3 SERPL-MCNC: 39.9 NG/ML (ref 30–100)
ALBUMIN SERPL-MCNC: 4.9 G/DL (ref 3.5–5.2)
ALBUMIN/GLOB SERPL: 1.5 G/DL
ALP SERPL-CCNC: 97 U/L (ref 39–117)
ALT SERPL W P-5'-P-CCNC: 17 U/L (ref 1–33)
ANION GAP SERPL CALCULATED.3IONS-SCNC: 9.7 MMOL/L (ref 5–15)
AST SERPL-CCNC: 24 U/L (ref 1–32)
BASOPHILS # BLD AUTO: 0.07 10*3/MM3 (ref 0–0.2)
BASOPHILS NFR BLD AUTO: 1.1 % (ref 0–1.5)
BILIRUB SERPL-MCNC: 0.4 MG/DL (ref 0–1.2)
BUN SERPL-MCNC: 22 MG/DL (ref 8–23)
BUN/CREAT SERPL: 13.9 (ref 7–25)
CALCIUM SPEC-SCNC: 10.2 MG/DL (ref 8.6–10.5)
CHLORIDE SERPL-SCNC: 105 MMOL/L (ref 98–107)
CHOLEST SERPL-MCNC: 333 MG/DL (ref 0–200)
CO2 SERPL-SCNC: 27.3 MMOL/L (ref 22–29)
CREAT SERPL-MCNC: 1.58 MG/DL (ref 0.57–1)
DEPRECATED RDW RBC AUTO: 42.7 FL (ref 37–54)
EOSINOPHIL # BLD AUTO: 0.25 10*3/MM3 (ref 0–0.4)
EOSINOPHIL NFR BLD AUTO: 3.8 % (ref 0.3–6.2)
ERYTHROCYTE [DISTWIDTH] IN BLOOD BY AUTOMATED COUNT: 13.1 % (ref 12.3–15.4)
GFR SERPL CREATININE-BSD FRML MDRD: 33 ML/MIN/1.73
GLOBULIN UR ELPH-MCNC: 3.2 GM/DL
GLUCOSE SERPL-MCNC: 161 MG/DL (ref 65–99)
HBA1C MFR BLD: 6.57 % (ref 4.8–5.6)
HCT VFR BLD AUTO: 47.4 % (ref 34–46.6)
HDLC SERPL-MCNC: 52 MG/DL (ref 40–60)
HGB BLD-MCNC: 15.4 G/DL (ref 12–15.9)
IMM GRANULOCYTES # BLD AUTO: 0.02 10*3/MM3 (ref 0–0.05)
IMM GRANULOCYTES NFR BLD AUTO: 0.3 % (ref 0–0.5)
LDLC SERPL CALC-MCNC: 238 MG/DL (ref 0–100)
LDLC/HDLC SERPL: 4.57 {RATIO}
LYMPHOCYTES # BLD AUTO: 1.83 10*3/MM3 (ref 0.7–3.1)
LYMPHOCYTES NFR BLD AUTO: 27.5 % (ref 19.6–45.3)
MCH RBC QN AUTO: 28.8 PG (ref 26.6–33)
MCHC RBC AUTO-ENTMCNC: 32.5 G/DL (ref 31.5–35.7)
MCV RBC AUTO: 88.8 FL (ref 79–97)
MONOCYTES # BLD AUTO: 0.68 10*3/MM3 (ref 0.1–0.9)
MONOCYTES NFR BLD AUTO: 10.2 % (ref 5–12)
NEUTROPHILS NFR BLD AUTO: 3.81 10*3/MM3 (ref 1.7–7)
NEUTROPHILS NFR BLD AUTO: 57.1 % (ref 42.7–76)
NRBC BLD AUTO-RTO: 0 /100 WBC (ref 0–0.2)
PLATELET # BLD AUTO: 107 10*3/MM3 (ref 140–450)
PMV BLD AUTO: 11 FL (ref 6–12)
POTASSIUM SERPL-SCNC: 5.4 MMOL/L (ref 3.5–5.2)
PROT SERPL-MCNC: 8.1 G/DL (ref 6–8.5)
RBC # BLD AUTO: 5.34 10*6/MM3 (ref 3.77–5.28)
SODIUM SERPL-SCNC: 142 MMOL/L (ref 136–145)
TRIGL SERPL-MCNC: 216 MG/DL (ref 0–150)
TSH SERPL DL<=0.05 MIU/L-ACNC: 1.72 UIU/ML (ref 0.27–4.2)
VIT B12 BLD-MCNC: 937 PG/ML (ref 211–946)
VLDLC SERPL-MCNC: 43 MG/DL (ref 5–40)
WBC # BLD AUTO: 6.66 10*3/MM3 (ref 3.4–10.8)

## 2021-10-27 RX ORDER — DULAGLUTIDE 1.5 MG/.5ML
1.5 INJECTION, SOLUTION SUBCUTANEOUS WEEKLY
Qty: 4 PEN | Refills: 5 | Status: SHIPPED | OUTPATIENT
Start: 2021-10-27 | End: 2022-04-01 | Stop reason: SDUPTHER

## 2021-10-29 NOTE — PROGRESS NOTES
She has been taking her meds but she says she has been eating out a lot because she has no stove right now.

## 2021-11-18 DIAGNOSIS — E78.2 MIXED HYPERLIPIDEMIA: ICD-10-CM

## 2021-11-18 DIAGNOSIS — E55.9 VITAMIN D DEFICIENCY DISEASE: ICD-10-CM

## 2021-11-18 DIAGNOSIS — I10 ESSENTIAL HYPERTENSION: ICD-10-CM

## 2021-11-18 DIAGNOSIS — E11.9 TYPE 2 DIABETES MELLITUS WITHOUT COMPLICATION, WITHOUT LONG-TERM CURRENT USE OF INSULIN (HCC): ICD-10-CM

## 2021-11-18 RX ORDER — ATORVASTATIN CALCIUM 80 MG/1
80 TABLET, FILM COATED ORAL EVERY EVENING
Qty: 30 TABLET | Refills: 5 | Status: SHIPPED | OUTPATIENT
Start: 2021-11-18 | End: 2022-04-01 | Stop reason: SDUPTHER

## 2021-11-18 RX ORDER — ERGOCALCIFEROL 1.25 MG/1
CAPSULE ORAL
Qty: 4 CAPSULE | Refills: 5 | Status: SHIPPED | OUTPATIENT
Start: 2021-11-18 | End: 2022-04-01 | Stop reason: SDUPTHER

## 2021-11-18 RX ORDER — METOPROLOL SUCCINATE 100 MG/1
100 TABLET, EXTENDED RELEASE ORAL DAILY
Qty: 30 TABLET | Refills: 5 | Status: SHIPPED | OUTPATIENT
Start: 2021-11-18 | End: 2022-04-01 | Stop reason: SDUPTHER

## 2021-11-18 RX ORDER — SITAGLIPTIN AND METFORMIN HYDROCHLORIDE 50; 500 MG/1; MG/1
TABLET, FILM COATED, EXTENDED RELEASE ORAL
Qty: 60 TABLET | Refills: 5 | Status: SHIPPED | OUTPATIENT
Start: 2021-11-18 | End: 2022-04-01 | Stop reason: SDUPTHER

## 2022-01-13 DIAGNOSIS — E11.9 TYPE 2 DIABETES MELLITUS WITHOUT COMPLICATION, WITHOUT LONG-TERM CURRENT USE OF INSULIN: ICD-10-CM

## 2022-01-13 DIAGNOSIS — I10 ESSENTIAL HYPERTENSION: ICD-10-CM

## 2022-01-13 RX ORDER — LISINOPRIL 10 MG/1
10 TABLET ORAL DAILY
Qty: 30 TABLET | Refills: 5 | Status: SHIPPED | OUTPATIENT
Start: 2022-01-13 | End: 2022-04-01 | Stop reason: SDUPTHER

## 2022-01-13 RX ORDER — EMPAGLIFLOZIN 25 MG/1
TABLET, FILM COATED ORAL
Qty: 30 TABLET | Refills: 5 | Status: SHIPPED | OUTPATIENT
Start: 2022-01-13 | End: 2022-01-28 | Stop reason: SDUPTHER

## 2022-01-13 RX ORDER — AMLODIPINE BESYLATE 10 MG/1
10 TABLET ORAL EVERY EVENING
Qty: 30 TABLET | Refills: 5 | Status: SHIPPED | OUTPATIENT
Start: 2022-01-13 | End: 2022-04-01 | Stop reason: SDUPTHER

## 2022-01-28 ENCOUNTER — OFFICE VISIT (OUTPATIENT)
Dept: FAMILY MEDICINE CLINIC | Facility: CLINIC | Age: 67
End: 2022-01-28

## 2022-01-28 DIAGNOSIS — I25.10 CORONARY ARTERY DISEASE INVOLVING NATIVE CORONARY ARTERY OF NATIVE HEART WITHOUT ANGINA PECTORIS: ICD-10-CM

## 2022-01-28 DIAGNOSIS — M54.59 MECHANICAL LOW BACK PAIN: ICD-10-CM

## 2022-01-28 DIAGNOSIS — J44.9 COPD MIXED TYPE: ICD-10-CM

## 2022-01-28 DIAGNOSIS — K59.09 CHRONIC CONSTIPATION: ICD-10-CM

## 2022-01-28 DIAGNOSIS — F41.8 DEPRESSION WITH ANXIETY: ICD-10-CM

## 2022-01-28 DIAGNOSIS — G45.9 TIA (TRANSIENT ISCHEMIC ATTACK): ICD-10-CM

## 2022-01-28 DIAGNOSIS — N18.32 CHRONIC RENAL FAILURE, STAGE 3B: ICD-10-CM

## 2022-01-28 DIAGNOSIS — N28.1 RENAL CYST: ICD-10-CM

## 2022-01-28 DIAGNOSIS — N95.1 MENOPAUSAL SYMPTOM: ICD-10-CM

## 2022-01-28 DIAGNOSIS — K21.9 GASTROESOPHAGEAL REFLUX DISEASE WITHOUT ESOPHAGITIS: ICD-10-CM

## 2022-01-28 DIAGNOSIS — E78.2 MIXED HYPERLIPIDEMIA: ICD-10-CM

## 2022-01-28 DIAGNOSIS — M25.552 LEFT HIP PAIN: ICD-10-CM

## 2022-01-28 DIAGNOSIS — E55.9 VITAMIN D DEFICIENCY DISEASE: ICD-10-CM

## 2022-01-28 DIAGNOSIS — Z00.00 HEALTHCARE MAINTENANCE: ICD-10-CM

## 2022-01-28 DIAGNOSIS — N20.0 NEPHROLITHIASIS: ICD-10-CM

## 2022-01-28 DIAGNOSIS — E11.9 TYPE 2 DIABETES MELLITUS WITHOUT COMPLICATION, WITHOUT LONG-TERM CURRENT USE OF INSULIN: ICD-10-CM

## 2022-01-28 DIAGNOSIS — M85.80 OSTEOPENIA, UNSPECIFIED LOCATION: ICD-10-CM

## 2022-01-28 DIAGNOSIS — Z72.0 DIPS TOBACCO: ICD-10-CM

## 2022-01-28 DIAGNOSIS — J30.2 CHRONIC SEASONAL ALLERGIC RHINITIS: Primary | ICD-10-CM

## 2022-01-28 DIAGNOSIS — I10 ESSENTIAL HYPERTENSION: ICD-10-CM

## 2022-01-28 PROCEDURE — 1126F AMNT PAIN NOTED NONE PRSNT: CPT | Performed by: GENERAL PRACTICE

## 2022-01-28 PROCEDURE — 1170F FXNL STATUS ASSESSED: CPT | Performed by: GENERAL PRACTICE

## 2022-01-28 PROCEDURE — 1160F RVW MEDS BY RX/DR IN RCRD: CPT | Performed by: GENERAL PRACTICE

## 2022-01-28 PROCEDURE — G0439 PPPS, SUBSEQ VISIT: HCPCS | Performed by: GENERAL PRACTICE

## 2022-01-28 RX ORDER — FLUTICASONE PROPIONATE 50 MCG
SPRAY, SUSPENSION (ML) NASAL
Qty: 16 G | Refills: 5 | Status: SHIPPED | OUTPATIENT
Start: 2022-01-28 | End: 2022-04-01 | Stop reason: SDUPTHER

## 2022-01-28 NOTE — PROGRESS NOTES
The ABCs of the Annual Wellness Visit  Subsequent Medicare Wellness Visit    ChiefCcomplaint on file.  Subsequent Medicare Wellness Visit    Subjective    History of Present Illness:  Ethel Trotter is a 66 y.o. female who presents for a Subsequent Medicare Wellness Visit.    Coronary artery disease  She has a history of previous M.I. and CABG surgery.  She continues to deny any chest pain, palpitations, lightheadedness, shortness of breath, calf pain, or swelling the ankles.  She remains on low-dose ASA  Lab Results   Component Value Date    WBC 6.66 10/26/2021    HGB 15.4 10/26/2021    HCT 47.4 (H) 10/26/2021    MCV 88.8 10/26/2021     (L) 10/26/2021     Episode of Visual Loss  She experienced a 4 to 5 second episode of complete vision loss in her right eye several years ago.  This occurred during a period of anxiety and was associated with a generalized headache.  She continues to deny any further episodes and has had no weakness, numbness, tingling, or difficulty talking or understanding what is said to her. A duplex doppler ultrasound of the carotids was ordered but never scheduled and she remains uninterested in pursuing this further at present.     Diabetes  She continues to deny paresthesias of the feet, visual disturbances, polydipsia, polyuria, hypoglycemia or foot ulcerations.  Evaluation to date has been a hemoglobin A1c.  Current treatments include metformin and sitagliptin (together as janumet) empagliflozin, and dulaglutide.  She has had intermittent vaginal pruritus since starting on empagliflozin.  She admits today that she was not taking her medication for several months prior to her last lab work  Lab Results   Component Value Date    HGBA1C 6.57 (H) 10/26/2021     Dyslipidemia  Compliance with treatment remains quite good.  She is quite active despite her back and leg pain.  She is prescribed atorvastatin, ezetimibe, and repatha with no apparent effects.   Lab Results   Component Value  Date    CHOL 333 (H) 10/26/2021    CHLPL 247 (H) 03/10/2016    TRIG 216 (H) 10/26/2021    HDL 52 10/26/2021     (H) 10/26/2021     Hypertension  Home blood pressure readings: not doing. Associated signs and symptoms: none.  She continues to deny any chest pain, palpitations, dyspnea, orthopnea, paroxysmal nocturnal dyspnea or peripheral edema. Current antihypertensive medications includes lisinopril, metoprolol, amlodipine.  Lab Results   Component Value Date    GLUCOSE 161 (H) 10/26/2021    BUN 22 10/26/2021    CREATININE 1.58 (H) 10/26/2021    EGFRIFNONA 33 (L) 10/26/2021    BCR 13.9 10/26/2021    K 5.4 (H) 10/26/2021    CO2 27.3 10/26/2021    CALCIUM 10.2 10/26/2021    ALBUMIN 4.90 10/26/2021    LABIL2 1.4 (L) 03/10/2016    AST 24 10/26/2021    ALT 17 10/26/2021     Lab Results   Component Value Date    ALKPHOS 97 10/26/2021     Low Back Pain   She has a long history of low back pain worse over the last few years. The pain is described as an intermittent left lower ache.  This radiates to her left flank, left lateral hip, and left posterior thigh.  The pain in her back is worse than that elsewhere.  She has been unable to identify any precipitating, exacerbating, or relieving factors.  She needs to deny any changes in her strength, sensation, or bowel/bladder control.  There is no history of any change in her bowel habits and she continues to deny any hematochezia or melena.  She admits to urinary frequency, nocturia, and intermittent dysuria but continues to deny any hematuria.  There is no history of any vaginal bleeding, fever, chills, or night sweats. Colonoscopy performed on 5/13/2019 by Dr. Reyes was reported as showing mild diverticulosis and several tubular adenomas were removed.  Ultrasound of the abdomen performed on 2/26/2020 revealed a 7.44 cm left renal cyst.  She underwent a urology assessment with Dr. Givens on 5/29/2020 who felt that it was a Bosniak type I.  CT of the abdomen and pelvis  performed on 6/10/2020 was reported as showing bilateral renal cysts the largest of which was in the posterior left kidney and measured 7 cm.  Atherosclerotic vascular disease and osteoarthritic changes of the spine were also noted. X-rays of the left hip performed on 2/26/2020 were unremarkable.  MRI of the lumbar spine performed on 11/18/2020 was reported as showing degenerative disc disease with moderate to severe right neuroforaminal narrowing at L4-5 along with multiple bilateral renal cysts the largest of which was 7 cm.  While records have yet to be received she apparently underwent an orthopedic assessment late last year and was recommended referral to pain management.  She underwent a urology reassessment this year with an updated ultrasound of the kidneys on 5/10/2021.  She was advised a 1 year follow-up and has an appointment with urology on 5/10/2022    Depression with Anxiety  She has a long history of intermittent depression, nervousness, and difficulty sleeping.  She and her  have had marital difficulties over the last 6 months with an increase in her symptoms.  She denies any change in her concentration or memory and there is no history of any suicidal ideation.  She remains on citalopram    Labs  Most recent vitamin D 39.9 with a B12 of 937    The following portions of the patient's history were reviewed and   updated as appropriate: allergies, current medications, past family history, past medical history, past social history, past surgical history and problem list.    Compared to one year ago, the patient feels her physical   health is the same.    Compared to one year ago, the patient feels her mental   health is the same.    Recent Hospitalizations:  She was not admitted to the hospital during the last year.     Current Medical Providers:  Patient Care Team:  Sacha Montez MD as PCP - General  Sacha Montez MD as PCP - Family Medicine    Outpatient Medications Prior  to Visit   Medication Sig Dispense Refill   • amLODIPine (NORVASC) 10 MG tablet TAKE 1 TABLET BY MOUTH EVERY EVENING 30 tablet 5   • aspirin (aspirin) 81 MG EC tablet Take 1 tablet by mouth Daily. 30 tablet 5   • atorvastatin (LIPITOR) 80 MG tablet TAKE 1 TABLET BY MOUTH EVERY EVENING. 30 tablet 5   • citalopram (CeleXA) 20 MG tablet Take 1 tablet by mouth Daily. 30 tablet 5   • conjugated estrogens (PREMARIN) 0.625 MG/GM vaginal cream 1 applicator pv twice weekly 30 g 5   • Dulaglutide (Trulicity) 1.5 MG/0.5ML solution pen-injector Inject 1.5 mg under the skin into the appropriate area as directed 1 (One) Time Per Week. 4 pen 5   • Evolocumab (Repatha SureClick) solution auto-injector SureClick injection Inject 1 mL under the skin into the appropriate area as directed Every 14 (Fourteen) Days. 2 mL 5   • ezetimibe (ZETIA) 10 MG tablet Take 1 tablet by mouth Every Night. 30 tablet 5   • Glucose Blood (Blood Glucose Test) strip USE TO TEST BLOOD GLUCOSE TWO TIMES A DAY 50 each 5   • isosorbide mononitrate (IMDUR) 30 MG 24 hr tablet Take 1 tablet by mouth Every Morning. 30 tablet 5   • Janumet XR  MG tablet TAKE 1 TABLET BY MOUTH TWO TIMES A DAY 60 tablet 5   • Lancets misc 1 twice daily 60 each 5   • linaclotide (Linzess) 72 MCG capsule capsule Take 1 capsule by mouth Every Morning Before Breakfast. 30 capsule 5   • lisinopril (PRINIVIL,ZESTRIL) 10 MG tablet TAKE 1 TABLET BY MOUTH DAILY. 30 tablet 5   • methocarbamol (Robaxin) 500 MG tablet Take 1 tablet by mouth 2 (Two) Times a Day As Needed for Muscle Spasms. 20 tablet 0   • metoprolol succinate XL (TOPROL-XL) 100 MG 24 hr tablet TAKE 1 TABLET BY MOUTH DAILY. 30 tablet 5   • nitrofurantoin, macrocrystal-monohydrate, (Macrobid) 100 MG capsule Take 1 capsule by mouth Daily. 56 capsule 3   • nitroglycerin (NITROSTAT) 0.4 MG SL tablet PLACE 1 TABLET UNDER THE TONGUE AS NEEDED FOR CHEST PAIN. FOR CHEST PAIN, MAY REPEAT 3 DOSES THEN GO TO ER 25 tablet 5   •  promethazine (PHENERGAN) 25 MG tablet Take 1 tablet by mouth Every 6 (Six) Hours As Needed for Nausea or Vomiting. 20 tablet 0   • vitamin D (ERGOCALCIFEROL) 1.25 MG (47936 UT) capsule capsule TAKE 1 CAPSULE BY MOUTH ONCE WEEKLY 4 capsule 5   • Zoster Vac Recomb Adjuvanted (SHINGRIX) 50 MCG/0.5ML reconstituted suspension Inject 0.5 mL into the appropriate muscle as directed by prescriber See Admin Instructions. Repeat in 2-6 months 1 each 1   • Jardiance 25 MG tablet tablet TAKE 1 TABLET BY MOUTH EVERY MORNING 30 tablet 5     No facility-administered medications prior to visit.     No opioid medication identified on active medication list. I have reviewed chart for other potential  high risk medication/s and harmful drug interactions in the elderly.          Aspirin is on active medication list. Aspirin use is indicated based on review of current medical condition/s. Pros and cons of this therapy have been discussed today. Benefits of this medication outweigh potential harm.  Patient has been encouraged to continue taking this medication.  .    Patient Active Problem List   Diagnosis   • Depression with anxiety   • COPD mixed type (HCC)   • Essential hypertension   • Mixed hyperlipidemia   • Type 2 diabetes mellitus without complication, without long-term current use of insulin (HCC)   • Coronary artery disease involving native coronary artery   • Vitamin D deficiency disease   • Osteopenia   • Hepatitis C antibody test positive   • H/O acute pancreatitis   • Gastroesophageal reflux disease without esophagitis   • Venous insufficiency, peripheral   • Chronic seasonal allergic rhinitis   • Dips tobacco   • Encounter for immunization   • Healthcare maintenance   • Mechanical low back pain   • Left hip pain   • Encounter for screening mammogram for breast cancer   • Chronic constipation   • TIA (transient ischemic attack)   • Chronic renal failure, stage 3b (HCC)   • Renal cyst   • Nephrolithiasis   • Menopausal  "symptom   • History of recurrent urinary tract infection     Advance Care Planning  Advance Directive is not on file.  ACP discussion was held with the patient during this visit. Patient does not have an advance directive, information provided.    Review of Systems   Constitutional: Positive for fatigue. Negative for appetite change, chills, diaphoresis and fever.   HENT: Positive for congestion, ear pain (Intermittent bilateral ear fullness) and rhinorrhea. Negative for postnasal drip, sinus pressure, sneezing, sore throat, tinnitus and voice change.    Eyes: Negative for visual disturbance.   Respiratory: Negative for cough, shortness of breath and wheezing.    Cardiovascular: Negative for chest pain, palpitations and leg swelling.   Gastrointestinal: Negative for abdominal pain, blood in stool, constipation, diarrhea, nausea and vomiting.   Endocrine: Negative for polydipsia and polyuria.   Genitourinary: Positive for frequency and vaginal pain (intermittent burning). Negative for dysuria, hematuria and urgency.   Musculoskeletal: Positive for arthralgias and back pain. Negative for joint swelling and myalgias.   Skin: Negative for rash.   Neurological: Negative for weakness, numbness and confusion.   Psychiatric/Behavioral: Positive for dysphoric mood and sleep disturbance. Negative for decreased concentration and suicidal ideas. The patient is nervous/anxious.         Objective    Vitals:    01/28/22 0912   BP: 144/76   Pulse: 74   Resp: 14   Temp: 97.2 °F (36.2 °C)   TempSrc: Temporal   SpO2: 97%   Weight: 60.9 kg (134 lb 3.2 oz)   Height: 160 cm (62.99\")     BMI Readings from Last 1 Encounters:   01/28/22 23.78 kg/m²   BMI is within normal parameters. No follow-up required.    Does the patient have evidence of cognitive impairment? No    Physical Exam  Constitutional:       General: She is not in acute distress.     Appearance: Normal appearance. She is well-developed. She is not diaphoretic.      Comments: " Alert and oriented. No apparent distress. No pallor, jaundice, diaphoresis, or cyanosis.   HENT:      Head: Atraumatic.      Right Ear: Ear canal and external ear normal. Tympanic membrane is scarred.      Left Ear: Ear canal and external ear normal. Tympanic membrane is scarred.   Eyes:      Conjunctiva/sclera: Conjunctivae normal.   Neck:      Thyroid: No thyroid mass or thyromegaly.      Vascular: No carotid bruit or JVD.      Trachea: Trachea normal. No tracheal deviation.   Cardiovascular:      Rate and Rhythm: Normal rate and regular rhythm.      Heart sounds: Normal heart sounds, S1 normal and S2 normal. No murmur heard.  No gallop.    Pulmonary:      Effort: Pulmonary effort is normal.      Breath sounds: Normal breath sounds.   Chest:   Breasts:      Right: No supraclavicular adenopathy.      Left: No supraclavicular adenopathy.       Abdominal:      General: Bowel sounds are normal. There is no distension or abdominal bruit.      Palpations: Abdomen is soft. There is no hepatomegaly, splenomegaly or mass.      Tenderness: There is no abdominal tenderness.      Hernia: No hernia is present.   Musculoskeletal:      Right lower leg: No edema.      Left lower leg: No edema.      Comments: Negative straight leg raise. No peripheral joint redness or warmth.   Lymphadenopathy:      Head:      Right side of head: No submental, submandibular, tonsillar, preauricular, posterior auricular or occipital adenopathy.      Left side of head: No submental, submandibular, tonsillar, preauricular, posterior auricular or occipital adenopathy.      Cervical: No cervical adenopathy.      Upper Body:      Right upper body: No supraclavicular adenopathy.      Left upper body: No supraclavicular adenopathy.   Skin:     General: Skin is warm.      Coloration: Skin is not cyanotic, jaundiced or pale.      Findings: No rash.      Nails: There is no clubbing.   Neurological:      Mental Status: She is alert and oriented to person,  place, and time.      Cranial Nerves: No cranial nerve deficit.      Motor: No tremor.      Coordination: Coordination normal.      Gait: Gait normal.   Psychiatric:         Attention and Perception: Attention normal.         Mood and Affect: Mood is depressed (when discussing marital issues).         Speech: Speech normal.         Behavior: Behavior normal.         Thought Content: Thought content normal. Thought content does not include suicidal ideation.       HEALTH RISK ASSESSMENT    Smoking Status:  Social History     Tobacco Use   Smoking Status Never Smoker   Smokeless Tobacco Current User   • Types: Chew     Alcohol Consumption:  Social History     Substance and Sexual Activity   Alcohol Use No     Fall Risk Screen:  Fall Risk Assessment was completed, and patient is at low risk for falls.    Depression Screening:  PHQ-2/PHQ-9 Depression Screening 1/28/2022   Little interest or pleasure in doing things 1   Feeling down, depressed, or hopeless 2   Trouble falling or staying asleep, or sleeping too much 3   Feeling tired or having little energy 0   Poor appetite or overeating 0   Feeling bad about yourself - or that you are a failure or have let yourself or your family down 0   Trouble concentrating on things, such as reading the newspaper or watching television 0   Moving or speaking so slowly that other people could have noticed. Or the opposite - being so fidgety or restless that you have been moving around a lot more than usual 0   Thoughts that you would be better off dead, or of hurting yourself in some way 0   Total Score 6   If you checked off any problems, how difficult have these problems made it for you to do your work, take care of things at home, or get along with other people? Not difficult at all     Health Habits and Functional and Cognitive Screening:  Functional & Cognitive Status 1/28/2022   Do you have difficulty preparing food and eating? No   Do you have difficulty bathing yourself,  getting dressed or grooming yourself? No   Do you have difficulty using the toilet? No   Do you have difficulty moving around from place to place? No   Do you have trouble with steps or getting out of a bed or a chair? No   Current Diet Well Balanced Diet   Dental Exam -   Eye Exam Not up to date   Exercise (times per week) 0 times per week   Current Exercises Include -   Do you need help using the phone?  No   Are you deaf or do you have serious difficulty hearing?  No   Do you need help with transportation? No   Do you need help shopping? No   Do you need help preparing meals?  No   Do you need help with housework?  No   Do you need help with laundry? No   Do you need help taking your medications? Yes   Do you need help managing money? No   Do you ever drive or ride in a car without wearing a seat belt? No   Have you felt unusual stress, anger or loneliness in the last month? Yes   Who do you live with? Spouse   If you need help, do you have trouble finding someone available to you? No   Have you been bothered in the last four weeks by sexual problems? No   Do you have difficulty concentrating, remembering or making decisions? No     Age-appropriate Screening Schedule:  Refer to the list below for future screening recommendations based on patient's age, sex and/or medical conditions. Orders for these recommended tests are listed in the plan section. The patient has been provided with a written plan.    Health Maintenance   Topic Date Due   • ZOSTER VACCINE (1 of 2) Never done   • DIABETIC EYE EXAM  Never done   • DIABETIC FOOT EXAM  06/22/2020   • URINE MICROALBUMIN  12/11/2021   • MAMMOGRAM  10/27/2022 (Originally 2/2/2020)   • DXA SCAN  10/27/2022 (Originally 2/2/2020)   • HEMOGLOBIN A1C  04/26/2022   • LIPID PANEL  10/26/2022   • TDAP/TD VACCINES (4 - Td or Tdap) 10/22/2024   • INFLUENZA VACCINE  Completed   • PAP SMEAR  Discontinued        Assessment/Plan   CMS Preventative Services Quick Reference  Risk  Factors Identified During Encounter  Cardiovascular Disease  Chronic Pain   Depression/Dysphoria  Fall Risk-High or Moderate  Immunizations Discussed/Encouraged (specific Immunizations; Shingrix and COVID19  Tobacco Use/Dependance (use dotphrase .tobaccocessation for documentation)  The above risks/problems have been discussed with the patient.  Follow up actions/plans if indicated are seen below in the Assessment/Plan Section.  Pertinent information has been shared with the patient in the After Visit Summary.    Diagnoses and all orders for this visit:    1. Chronic seasonal allergic rhinitis  Reminded regarding allergen avoidance.  Reviewed options and agreed on a nasal corticosteroid  Encouraged to report if any worse, any new symptoms, or if no better over the next several weeks  -     fluticasone (Flonase) 50 MCG/ACT nasal spray; Administer 2 sprays both nostrils once daily  Dispense: 16 g; Refill: 5    2. Coronary artery disease involving native coronary artery of native heart without angina pectoris  Reminded regarding risk factor modification with an emphasis on tobacco cessation.  Continue low-dose ASA    3. Essential hypertension   Hypertension: marginal. Evidence of target organ damage: coronary artery disease, chronic kidney disease and transient ischemic attack.  Encouraged to continue to work on diet and exercise plan.   Continue current medication    4. Mixed hyperlipidemia  As above.   Continue current medication.    5. Type 2 diabetes mellitus without complication, without long-term current use of insulin (HCC)  Diabetes mellitus Type II, under excellent control.   Encouraged to continue to pursue ADA diet  Encouraged aerobic exercise.   Empagliflozin will be reduced to 10 daily.  We will consider replacing it and janumet with synjardy at her return  -     empagliflozin (Jardiance) 10 MG tablet tablet; Take 1 tablet by mouth Every Morning.  Dispense: 30 tablet; Refill: 5    6. Vitamin D deficiency  disease    7. Chronic constipation    8. Gastroesophageal reflux disease without esophagitis    9. Menopausal symptom    10. Nephrolithiasis    11. Renal cyst  Follow up with urology    12. Healthcare maintenance  Patient remains uninterested in COVID-19 vaccination nor any mammogram or DEXA scan  Reminded that she is due for Shingrix    13. Depression with anxiety  Significant situational component.   Supportive therapy.   Reviewed options.  Patient uninterested in referral for counseling or a psychiatric assessment but will report if any worse or if any new symptoms  Continue current medication.    14. Left hip pain    15. Mechanical low back pain  Reminded regarding symptomatic treatment.   Continue current medication    16. Osteopenia, unspecified location    17. TIA (transient ischemic attack)  As above.   Continue current medication.    18. COPD mixed type (HCC)   COPD is stable.  Encouraged to remain as active as symptoms allow for    19. Dips tobacco  Lengthy discussion regarding the potential sequela of continued tobacco use and the options with respect to cessation.  Patient uninterested in pursuing at present but will consider.    20. Chronic renal failure, stage 3b (HCC)  Reminded to avoid any NSAIDs prescription or OTC  Will continue to monitor    Follow Up:   Return in about 3 months (around 4/28/2022).     An After Visit Summary and PPPS were made available to the patient.

## 2022-01-28 NOTE — PATIENT INSTRUCTIONS
Advance Directive    Advance directives are legal documents that let you make choices ahead of time about your health care and medical treatment in case you become unable to communicate for yourself. Advance directives are a way for you to make known your wishes to family, friends, and health care providers. This can let others know about your end-of-life care if you become unable to communicate.  Discussing and writing advance directives should happen over time rather than all at once. Advance directives can be changed depending on your situation and what you want, even after you have signed the advance directives.  There are different types of advance directives, such as:  · Medical power of .  · Living will.  · Do not resuscitate (DNR) or do not attempt resuscitation (DNAR) order.  Health care proxy and medical power of   A health care proxy is also called a health care agent. This is a person who is appointed to make medical decisions for you in cases where you are unable to make the decisions yourself. Generally, people choose someone they know well and trust to represent their preferences. Make sure to ask this person for an agreement to act as your proxy. A proxy may have to exercise judgment in the event of a medical decision for which your wishes are not known.  A medical power of  is a legal document that names your health care proxy. Depending on the laws in your state, after the document is written, it may also need to be:  · Signed.  · Notarized.  · Dated.  · Copied.  · Witnessed.  · Incorporated into your medical record.  You may also want to appoint someone to manage your money in a situation in which you are unable to do so. This is called a durable power of  for finances. It is a separate legal document from the durable power of  for health care. You may choose the same person or someone different from your health care proxy to act as your agent in money  matters.  If you do not appoint a proxy, or if there is a concern that the proxy is not acting in your best interests, a court may appoint a guardian to act on your behalf.  Living will  A living will is a set of instructions that state your wishes about medical care when you cannot express them yourself. Health care providers should keep a copy of your living will in your medical record. You may want to give a copy to family members or friends. To alert caregivers in case of an emergency, you can place a card in your wallet to let them know that you have a living will and where they can find it. A living will is used if you become:  · Terminally ill.  · Disabled.  · Unable to communicate or make decisions.  Items to consider in your living will include:  · To use or not to use life-support equipment, such as dialysis machines and breathing machines (ventilators).  · A DNR or DNAR order. This tells health care providers not to use cardiopulmonary resuscitation (CPR) if breathing or heartbeat stops.  · To use or not to use tube feeding.  · To be given or not to be given food and fluids.  · Comfort (palliative) care when the goal becomes comfort rather than a cure.  · Donation of organs and tissues.  A living will does not give instructions for distributing your money and property if you should pass away.  DNR or DNAR  A DNR or DNAR order is a request not to have CPR in the event that your heart stops beating or you stop breathing. If a DNR or DNAR order has not been made and shared, a health care provider will try to help any patient whose heart has stopped or who has stopped breathing. If you plan to have surgery, talk with your health care provider about how your DNR or DNAR order will be followed if problems occur.  What if I do not have an advance directive?  If you do not have an advance directive, some states assign family decision makers to act on your behalf based on how closely you are related to them. Each  state has its own laws about advance directives. You may want to check with your health care provider, , or state representative about the laws in your state.  Summary  · Advance directives are the legal documents that allow you to make choices ahead of time about your health care and medical treatment in case you become unable to tell others about your care.  · The process of discussing and writing advance directives should happen over time. You can change the advance directives, even after you have signed them.  · Advance directives include DNR or DNAR orders, living hood, and designating an agent as your medical power of .  This information is not intended to replace advice given to you by your health care provider. Make sure you discuss any questions you have with your health care provider.  Document Revised: 01/28/2021 Document Reviewed: 07/16/2020  [employer] Patient Education © 2021 [employer]      Fall Prevention in the Home, Adult  Falls can cause injuries. They can happen to people of all ages. There are many things you can do to make your home safe and to help prevent falls. Ask for help when making these changes, if needed.  What actions can I take to prevent falls?  General Instructions  · Use good lighting in all rooms. Replace any light bulbs that burn out.  · Turn on the lights when you go into a dark area. Use night-lights.  · Keep items that you use often in easy-to-reach places. Lower the shelves around your home if necessary.  · Set up your furniture so you have a clear path. Avoid moving your furniture around.  · Do not have throw rugs and other things on the floor that can make you trip.  · Avoid walking on wet floors.  · If any of your floors are uneven, fix them.  · Add color or contrast paint or tape to clearly alana and help you see:  ? Any grab bars or handrails.  ? First and last steps of stairways.  ? Where the edge of each step is.  · If you use a stepladder:  ? Make  sure that it is fully opened. Do not climb a closed stepladder.  ? Make sure that both sides of the stepladder are locked into place.  ? Ask someone to hold the stepladder for you while you use it.  · If there are any pets around you, be aware of where they are.  What can I do in the bathroom?         · Keep the floor dry. Clean up any water that spills onto the floor as soon as it happens.  · Remove soap buildup in the tub or shower regularly.  · Use non-skid mats or decals on the floor of the tub or shower.  · Attach bath mats securely with double-sided, non-slip rug tape.  · If you need to sit down in the shower, use a plastic, non-slip stool.  · Install grab bars by the toilet and in the tub and shower. Do not use towel bars as grab bars.  What can I do in the bedroom?  · Make sure that you have a light by your bed that is easy to reach.  · Do not use any sheets or blankets that are too big for your bed. They should not hang down onto the floor.  · Have a firm chair that has side arms. You can use this for support while you get dressed.  What can I do in the kitchen?  · Clean up any spills right away.  · If you need to reach something above you, use a strong step stool that has a grab bar.  · Keep electrical cords out of the way.  · Do not use floor polish or wax that makes floors slippery. If you must use wax, use non-skid floor wax.  What can I do with my stairs?  · Do not leave any items on the stairs.  · Make sure that you have a light switch at the top of the stairs and the bottom of the stairs. If you do not have them, ask someone to add them for you.  · Make sure that there are handrails on both sides of the stairs, and use them. Fix handrails that are broken or loose. Make sure that handrails are as long as the stairways.  · Install non-slip stair treads on all stairs in your home.  · Avoid having throw rugs at the top or bottom of the stairs. If you do have throw rugs, attach them to the floor with  carpet tape.  · Choose a carpet that does not hide the edge of the steps on the stairway.  · Check any carpeting to make sure that it is firmly attached to the stairs. Fix any carpet that is loose or worn.  What can I do on the outside of my home?  · Use bright outdoor lighting.  · Regularly fix the edges of walkways and driveways and fix any cracks.  · Remove anything that might make you trip as you walk through a door, such as a raised step or threshold.  · Trim any bushes or trees on the path to your home.  · Regularly check to see if handrails are loose or broken. Make sure that both sides of any steps have handrails.  · Install guardrails along the edges of any raised decks and porches.  · Clear walking paths of anything that might make someone trip, such as tools or rocks.  · Have any leaves, snow, or ice cleared regularly.  · Use sand or salt on walking paths during winter.  · Clean up any spills in your garage right away. This includes grease or oil spills.  What other actions can I take?  · Wear shoes that:  ? Have a low heel. Do not wear high heels.  ? Have rubber bottoms.  ? Are comfortable and fit you well.  ? Are closed at the toe. Do not wear open-toe sandals.  · Use tools that help you move around (mobility aids) if they are needed. These include:  ? Canes.  ? Walkers.  ? Scooters.  ? Crutches.  · Review your medicines with your doctor. Some medicines can make you feel dizzy. This can increase your chance of falling.  Ask your doctor what other things you can do to help prevent falls.  Where to find more information  · Centers for Disease Control and Prevention, [place]AD[place]: https://cdc.gov  · National [place] on Aging: https://xl1whyy.madhuri.nih.gov  Contact a doctor if:  · You are afraid of falling at home.  · You feel weak, drowsy, or dizzy at home.  · You fall at home.  Summary  · There are many simple things that you can do to make your home safe and to help prevent falls.  · Ways to make your home  safe include removing tripping hazards and installing grab bars in the bathroom.  · Ask for help when making these changes in your home.  This information is not intended to replace advice given to you by your health care provider. Make sure you discuss any questions you have with your health care provider.  Document Revised: 04/09/2020 Document Reviewed: 08/02/2018  [employer] Patient Education © 2021 [employer]      Heart Disease Prevention  Heart disease is the leading cause of death in the world. Coronary artery disease is the most common cause of heart disease. This condition results when cholesterol and other substances (plaque) build up inside the walls of the blood vessels that supply your heart muscle (arteries). This buildup in arteries is called atherosclerosis. You can take actions to lower your risk of heart disease.  How can heart disease affect me?  Heart disease can cause many unpleasant symptoms and complications, such as:  · Chest pain (angina).  · Reduced or blocked blood flow to your heart. This can cause:  ? Irregular heartbeats (arrhythmias).  ? Heart attack.  ? Heart failure.  What can increase my risk?  The following factors may make you more likely to develop this condition:  · High blood pressure (hypertension).  · High cholesterol.  · Smoking.  · A diet high in saturated fats or trans fats.  · Lack of physical activity.  · Obesity.  · Drinking too much alcohol.  · Diabetes.  · Having a family history of heart disease.  What actions can I take to prevent heart disease?  Nutrition    · Eat a heart-healthy eating plan as told by your health care provider. Examples include the DASH (Dietary Approaches to Stop Hypertension) eating plan or the Mediterranean diet.  · Generally, it is recommended that you:  ? Eat less salt (sodium). Ask your health care provider how much sodium is safe for you. Most people should have less than 2,300 mg each day.  ? Limit unhealthy fats, such as saturated and  trans fats, in your diet. You can do this by eating low-fat dairy products, eating less red meat, and avoiding processed foods.  ? Eat healthy fats (omega-3 fatty acids). These are found in fish, such as mackerel or salmon.  ? Eat more fruits and vegetables. You should try to fill one-half of your plate with fruits and vegetables at each meal.  ? Eat more whole grains.  ? Avoid foods and drinks that have added sugars.    Lifestyle    · Get regular exercise. This is one of the most important things you can do for your health. Generally, it is recommended that you:  ? Exercise for at least 30 minutes on most days of the week (150 minutes each week). The exercise should increase your heart rate and make you sweat (aerobic exercise).  ? Add strength exercises on at least 2 days each week.  · Do not use any products that contain nicotine or tobacco, such as cigarettes and e-cigarettes. These can damage your heart and blood vessels. If you need help quitting, ask your health care provider.    Alcohol use  · Do not drink alcohol if:  ? Your health care provider tells you not to drink.  ? You are pregnant, may be pregnant, or are planning to become pregnant.  · If you drink alcohol, limit how much you have:  ? 0-1 drink a day for women.  ? 0-2 drinks a day for men.  · Be aware of how much alcohol is in your drink. In the [place] one drink equals one typical bottle of beer (12 oz), one-half glass of wine (5 oz), or one shot of hard liquor (1½ oz).  Medicines  · Take over-the-counter and prescription medicines only as told by your health care provider.  · Ask your health care provider whether you should take an aspirin every day. Taking aspirin may help reduce your risk of heart disease and stroke.  · Depending on your risk factors, your health care provider may prescribe medicines to lower your risk of heart disease or to control related conditions. You may take medicine to:  ? Lower cholesterol.  ? Control blood  pressure.  ? Control diabetes.  General information  · Keep your blood pressure under control, as recommended by your health care provider. For most healthy people, the upper number of your blood pressure (systolic) should be no higher than 120, and the lower number (diastolic) no higher than 80. Treatment may be needed if your blood pressure is higher than 130/80.  · Have your blood pressure checked at least every two years. Your health care provider may check your blood pressure more often if you have high blood pressure.  · After age 20, have your cholesterol checked every 4-6 years. If you have risk factors for heart disease, you may need to have it checked more frequently. Treatment may be needed if your cholesterol is high.  · Have your body mass index (BMI) checked every year. Your health care provider can calculate your BMI from your height and weight.  · Work with your health care provider to lose weight, if needed, or to maintain a healthy weight.  Where to find more information:  · Centers for Disease Control and Prevention: www.cdc.gov/heartdisease  · American Heart Association: www.heart.org  ? Take a free online heart disease risk quiz to better understand your personal risk factors.  Summary  · Heart disease is the leading cause of death in the world.  · Heart disease can cause chest pain, abnormal heart rhythms, heart attack, and heart failure.  · High blood pressure, high cholesterol, and smoking are the main risk factors for heart disease, although other factors also contribute.  · You can take actions to lower your chances of developing heart disease. Work with your health care provider to reduce your risk by following a heart-healthy diet, being physically active, and controlling your weight, blood pressure, and cholesterol level.  This information is not intended to replace advice given to you by your health care provider. Make sure you discuss any questions you have with your health care  provider.  Document Revised: 01/02/2019 Document Reviewed: 01/02/2019  [employer] Patient Education © 2021 [employer]      Mammogram  A mammogram is a low energy X-ray of the breasts that is done to check for abnormal changes. This procedure can screen for and detect any changes that may indicate breast cancer. Mammograms are regularly done on women. A man may have a mammogram if he has a lump or swelling in his breast. A mammogram can also identify other changes and variations in the breast, such as:  · Inflammation of the breast tissue (mastitis).  · An infected area that contains a collection of pus (abscess).  · A fluid-filled sac (cyst).  · Fibrocystic changes. This is when breast tissue becomes denser, which can make the tissue feel rope-like or uneven under the skin.  · Tumors that are not cancerous (benign).  Tell a health care provider:  · About any allergies you have.  · If you have breast implants.  · If you have had previous breast disease, biopsy, or surgery.  · If you are breastfeeding.  · If you are younger than age 25.  · If you have a family history of breast cancer.  · Whether you are pregnant or may be pregnant.  What are the risks?  Generally, this is a safe procedure. However, problems may occur, including:  · Exposure to radiation. Radiation levels are very low with this test.  · The results being misinterpreted.  · The need for further tests.  · The inability of the mammogram to detect certain cancers.  What happens before the procedure?  · Schedule your test about 1-2 weeks after your menstrual period if you are still menstruating. This is usually when your breasts are the least tender.  · If you have had a mammogram done at a different facility in the past, get the mammogram X-rays or have them sent to your current exam facility. The new and old images will be compared.  · Wash your breasts and underarms on the day of the test.  · Do not wear deodorants, perfumes, lotions, or powders  anywhere on your body on the day of the test.  · Remove any jewelry from your neck.  · Wear clothes that you can change into and out of easily.  What happens during the procedure?    · You will undress from the waist up and put on a gown that opens in the front.  · You will  front of the X-ray machine.  · Each breast will be placed between two plastic or glass plates. The plates will compress your breast for a few seconds. Try to stay as relaxed as possible during the procedure. This does not cause any harm to your breasts and any discomfort you feel will be very brief.  · X-rays will be taken from different angles of each breast.  The procedure may vary among health care providers and hospitals.  What happens after the procedure?  · The mammogram will be examined by a specialist (radiologist).  · You may need to repeat certain parts of the test, depending on the quality of the images. This is commonly done if the radiologist needs a better view of the breast tissue.  · You may resume your normal activities.  · It is up to you to get the results of your procedure. Ask your health care provider, or the department that is doing the procedure, when your results will be ready.  Summary  · A mammogram is a low energy X-ray of the breasts that is done to check for abnormal changes. A man may have a mammogram if he has a lump or swelling in his breast.  · If you have had a mammogram done at a different facility in the past, get the mammogram X-rays or have them sent to your current exam facility in order to compare them.  · Schedule your test about 1-2 weeks after your menstrual period if you are still menstruating.  · For this test, each breast will be placed between two plastic or glass plates. The plates will compress your breast for a few seconds.  · Ask when your test results will be ready. Make sure you get your test results.  This information is not intended to replace advice given to you by your health care  provider. Make sure you discuss any questions you have with your health care provider.  Document Revised: 08/08/2019 Document Reviewed: 08/08/2019  [employer] Patient Education © 2021 [employer]      Osteoporosis    Osteoporosis is when the bones get thin and weak. This can cause your bones to break (fracture) more easily.  What are the causes?  The exact cause of this condition is not known.  What increases the risk?  · Having family members with this condition.  · Not eating enough healthy foods.  · Taking certain medicines.  · Being female.  · Being age 50 or older.  · Smoking or using other products that contain nicotine or tobacco, such as e-cigarettes or chewing tobacco.  · Not exercising.  · Being of  or  ancestry.  · Having a small body frame.  What are the signs or symptoms?  A broken bone might be the first sign, especially if the break results from a fall or injury that usually would not cause a bone to break.  Other signs and symptoms include:  · Pain in the neck or low back.  · Being hunched over (stooped posture).  · Getting shorter.  How is this treated?  · Eating more foods with more calcium and vitamin D in them.  · Doing exercises.  · Stopping tobacco use.  · Limiting how much alcohol you drink.  · Taking medicines to slow bone loss or help make the bones stronger.  · Taking supplements of calcium and vitamin D every day.  · Taking medicines to replace chemicals in the body (hormone replacement medicines).  · Monitoring your levels of calcium and vitamin D.  The goal of treatment is to strengthen your bones and lower your risk for a bone break.  Follow these instructions at home:  Eating and drinking  Eat plenty of calcium and vitamin D. These nutrients are good for your bones. Good sources of calcium and vitamin D include:  · Some fish, such as salmon and tuna.  · Foods that have calcium and vitamin D added to them (fortified foods), such as some breakfast cereals.  · Egg  yolks.  · Cheese.  · Liver.    Activity  Do exercises as told by your doctor. Ask your doctor what exercises are safe for you. You should do:  · Exercises that make your muscles work to hold your body weight up (weight-bearing exercises). These include jose chi, yoga, and walking.  · Exercises to make your muscles stronger. One example is lifting weights.  Lifestyle  · Do not drink alcohol if:  ? Your doctor tells you not to drink.  ? You are pregnant, may be pregnant, or are planning to become pregnant.  · If you drink alcohol:  ? Limit how much you use to:  § 0-1 drink a day for women.  § 0-2 drinks a day for men.  · Know how much alcohol is in your drink. In the [place] one drink equals one 12 oz bottle of beer (355 mL), one 5 oz glass of wine (148 mL), or one 1½ oz glass of hard liquor (44 mL).  · Do not smoke or use any products that contain nicotine or tobacco. If you need help quitting, ask your doctor.  Preventing falls  · Use tools to help you move around (mobility aids) as needed. These include canes, walkers, scooters, and crutches.  · Keep rooms well-lit.  · Put away things on the floor that could make you trip. These include cords and rugs.  · Install safety rails on stairs. Install grab bars in bathrooms.  · Use rubber mats in slippery areas, like bathrooms.  · Wear shoes that:  ? Fit you well.  ? Support your feet.  ? Have closed toes.  ? Have rubber soles or low heels.  · Tell your doctor about all of the medicines you are taking. Some medicines can make you more likely to fall.  General instructions  · Take over-the-counter and prescription medicines only as told by your doctor.  · Keep all follow-up visits.  Contact a doctor if:  · You have not been tested (screened) for osteoporosis and you are:  ? A woman who is age 65 or older.  ? A man who is age 70 or older.  Get help right away if:  · You fall.  · You get hurt.  Summary  · Osteoporosis happens when your bones get thin and weak.  · Weak bones can  break (fracture) more easily.  · Eat plenty of calcium and vitamin D. These are good for your bones.  · Tell your doctor about all of the medicines that you take.  This information is not intended to replace advice given to you by your health care provider. Make sure you discuss any questions you have with your health care provider.  Document Revised: 06/03/2021 Document Reviewed: 06/03/2021  [employer] Patient Education © 2021 [employer]

## 2022-01-29 VITALS
HEIGHT: 63 IN | OXYGEN SATURATION: 97 % | TEMPERATURE: 97.2 F | HEART RATE: 74 BPM | SYSTOLIC BLOOD PRESSURE: 144 MMHG | DIASTOLIC BLOOD PRESSURE: 76 MMHG | WEIGHT: 134.2 LBS | RESPIRATION RATE: 14 BRPM | BODY MASS INDEX: 23.78 KG/M2

## 2022-02-17 ENCOUNTER — OFFICE VISIT (OUTPATIENT)
Dept: FAMILY MEDICINE CLINIC | Facility: CLINIC | Age: 67
End: 2022-02-17

## 2022-02-17 VITALS
HEART RATE: 81 BPM | OXYGEN SATURATION: 99 % | WEIGHT: 134 LBS | TEMPERATURE: 97.9 F | SYSTOLIC BLOOD PRESSURE: 146 MMHG | BODY MASS INDEX: 23.74 KG/M2 | DIASTOLIC BLOOD PRESSURE: 92 MMHG | HEIGHT: 63 IN

## 2022-02-17 DIAGNOSIS — Z86.16 HISTORY OF COVID-19: Primary | ICD-10-CM

## 2022-02-17 DIAGNOSIS — F41.8 DEPRESSION WITH ANXIETY: Chronic | ICD-10-CM

## 2022-02-17 DIAGNOSIS — M54.59 MECHANICAL LOW BACK PAIN: ICD-10-CM

## 2022-02-17 PROCEDURE — 99213 OFFICE O/P EST LOW 20 MIN: CPT | Performed by: NURSE PRACTITIONER

## 2022-02-17 RX ORDER — ALBUTEROL SULFATE 90 UG/1
2 AEROSOL, METERED RESPIRATORY (INHALATION) EVERY 4 HOURS PRN
COMMUNITY
End: 2022-07-22

## 2022-02-17 RX ORDER — ALBUTEROL SULFATE 90 UG/1
AEROSOL, METERED RESPIRATORY (INHALATION)
COMMUNITY
Start: 2022-02-08 | End: 2022-04-01 | Stop reason: SDUPTHER

## 2022-02-17 NOTE — PROGRESS NOTES
Chief Complaint  Follow-up    Subjective          Ethel Trotter is a 66 y.o. female who presents today to [facility] for follow up hospital stay and s/p covid.     HPI:   Covid- patient was recently admitted to [facility] for covid on 2/6/2022.  She had initially presented with chief complaint of shortness of breath, weakness, nausea, vomiting.  She was noted to be Covid positive and had acute renal failure.  Patient was admitted with Covid protocol and Decadron with 3 doses of remdesivir.  IV fluids were given and patient's oxygenation status was maintained with O2 at 1-2 L via n/c. Patient was discharged on 2/8/2022 with new orders to use Ventolin inhaler 2 puffs every 4 hours as needed and dexamethasone 6 mg tablets p.o. daily for 5 days, resume all home medicines.    Depression- patient states that she has had a lot going on this past month. During her hospitalization with covid, her brother passed away with heart problems. She states he was her only remaining brother and she is experiencing a lot of grief. She is currently prescribed celexa 20 mg daily. Her support system is her sister in law (his girlfriend).     Mechanical low back pain- Patient states that she has increased lower back pain. She would like to have a refill on her gabapentin. She states she has had this medication prescribed to her by Dr. Montez in the past, not long ago, possibly 3-4 months ago. Describes pain as achy, hurting with sitting or standing, location lower back with no radiation.         Objective     Problem List:  Patient Active Problem List   Diagnosis   • Depression with anxiety   • COPD mixed type (HCC)   • Essential hypertension   • Mixed hyperlipidemia   • Type 2 diabetes mellitus without complication, without long-term current use of insulin (HCC)   • Coronary artery disease involving native coronary artery   • Vitamin D deficiency disease   • Osteopenia   • Hepatitis C antibody  test positive   • H/O acute pancreatitis   • Gastroesophageal reflux disease without esophagitis   • Venous insufficiency, peripheral   • Chronic seasonal allergic rhinitis   • Dips tobacco   • Encounter for immunization   • Healthcare maintenance   • Mechanical low back pain   • Left hip pain   • Encounter for screening mammogram for breast cancer   • Chronic constipation   • TIA (transient ischemic attack)   • Chronic renal failure, stage 3b (HCC)   • Renal cyst   • Nephrolithiasis   • Menopausal symptom   • History of recurrent urinary tract infection       Allergy:   No Known Allergies     Discontinued Medications:  There are no discontinued medications.    Current Medications:   Current Outpatient Medications   Medication Sig Dispense Refill   • albuterol sulfate HFA (Ventolin HFA) 108 (90 Base) MCG/ACT inhaler Inhale 2 puffs Every 4 (Four) Hours As Needed for Wheezing.     • albuterol sulfate  (90 Base) MCG/ACT inhaler INHALE 2 PUFFS BY MOUTH EVERY 4 HOURS AS NEEDED FOR SHORTNESS OF BREATH OR WHEEZING     • amLODIPine (NORVASC) 10 MG tablet TAKE 1 TABLET BY MOUTH EVERY EVENING 30 tablet 5   • aspirin (aspirin) 81 MG EC tablet Take 1 tablet by mouth Daily. 30 tablet 5   • atorvastatin (LIPITOR) 80 MG tablet TAKE 1 TABLET BY MOUTH EVERY EVENING. 30 tablet 5   • citalopram (CeleXA) 20 MG tablet Take 1 tablet by mouth Daily. 30 tablet 5   • conjugated estrogens (PREMARIN) 0.625 MG/GM vaginal cream 1 applicator pv twice weekly 30 g 5   • Dulaglutide (Trulicity) 1.5 MG/0.5ML solution pen-injector Inject 1.5 mg under the skin into the appropriate area as directed 1 (One) Time Per Week. 4 pen 5   • empagliflozin (Jardiance) 10 MG tablet tablet Take 1 tablet by mouth Every Morning. 30 tablet 5   • Evolocumab (Repatha SureClick) solution auto-injector SureClick injection Inject 1 mL under the skin into the appropriate area as directed Every 14 (Fourteen) Days. 2 mL 5   • ezetimibe (ZETIA) 10 MG tablet Take 1  tablet by mouth Every Night. 30 tablet 5   • fluticasone (Flonase) 50 MCG/ACT nasal spray Administer 2 sprays both nostrils once daily 16 g 5   • Glucose Blood (Blood Glucose Test) strip USE TO TEST BLOOD GLUCOSE TWO TIMES A DAY 50 each 5   • ipratropium-albuterol (COMBIVENT RESPIMAT)  MCG/ACT inhaler Inhale 1 puff 4 (Four) Times a Day As Needed for Wheezing.     • isosorbide mononitrate (IMDUR) 30 MG 24 hr tablet Take 1 tablet by mouth Every Morning. 30 tablet 5   • Janumet XR  MG tablet TAKE 1 TABLET BY MOUTH TWO TIMES A DAY 60 tablet 5   • Lancets misc 1 twice daily 60 each 5   • linaclotide (Linzess) 72 MCG capsule capsule Take 1 capsule by mouth Every Morning Before Breakfast. 30 capsule 5   • lisinopril (PRINIVIL,ZESTRIL) 10 MG tablet TAKE 1 TABLET BY MOUTH DAILY. 30 tablet 5   • methocarbamol (Robaxin) 500 MG tablet Take 1 tablet by mouth 2 (Two) Times a Day As Needed for Muscle Spasms. 20 tablet 0   • metoprolol succinate XL (TOPROL-XL) 100 MG 24 hr tablet TAKE 1 TABLET BY MOUTH DAILY. 30 tablet 5   • nitrofurantoin, macrocrystal-monohydrate, (Macrobid) 100 MG capsule Take 1 capsule by mouth Daily. 56 capsule 3   • nitroglycerin (NITROSTAT) 0.4 MG SL tablet PLACE 1 TABLET UNDER THE TONGUE AS NEEDED FOR CHEST PAIN. FOR CHEST PAIN, MAY REPEAT 3 DOSES THEN GO TO ER 25 tablet 5   • promethazine (PHENERGAN) 25 MG tablet Take 1 tablet by mouth Every 6 (Six) Hours As Needed for Nausea or Vomiting. 20 tablet 0   • vitamin D (ERGOCALCIFEROL) 1.25 MG (54304 UT) capsule capsule TAKE 1 CAPSULE BY MOUTH ONCE WEEKLY 4 capsule 5   • Zoster Vac Recomb Adjuvanted (SHINGRIX) 50 MCG/0.5ML reconstituted suspension Inject 0.5 mL into the appropriate muscle as directed by prescriber See Admin Instructions. Repeat in 2-6 months 1 each 1     No current facility-administered medications for this visit.       Past Medical History:  Past Medical History:   Diagnosis Date   • Arthritis    • CAD (coronary artery disease)     • COPD (chronic obstructive pulmonary disease) (HCC)    • Diabetes mellitus (HCC)    • Elevated cholesterol    • Fracture of wrist    • GERD (gastroesophageal reflux disease)    • Hepatitis C    • History of EKG 03/25/2016    ABNORMAL   • History of transfusion    • Hyperlipidemia    • Hypertension    • Myocardial infarction (HCC)     x2 last one 5 years ago   • Osteopenia    • Pancreatitis    • Stroke (HCC)        Past Surgical History:  Past Surgical History:   Procedure Laterality Date   • COLONOSCOPY N/A 5/13/2019    Procedure: COLONOSCOPY;  Surgeon: Arnav Reyes MD;  Location: [facility];  Service: Gastroenterology   • CORONARY ARTERY BYPASS GRAFT     • HYSTERECTOMY      1998   • TONSILLECTOMY         Review of Systems:  Review of Systems   Constitutional: Negative.    HENT: Negative.    Eyes: Negative.    Respiratory: Negative.    Cardiovascular: Negative.    Gastrointestinal: Negative.    Genitourinary: Negative.    Musculoskeletal: Positive for back pain.   Skin: Negative.    Neurological: Negative.    Psychiatric/Behavioral: Positive for dysphoric mood.   All other systems reviewed and are negative.      Physical Exam:  Physical Exam  Vitals and nursing note reviewed.   Constitutional:       General: She is not in acute distress.     Appearance: Normal appearance. She is not ill-appearing.      Interventions: Face mask in place.   HENT:      Head: Normocephalic and atraumatic.      Nose: Nose normal.   Cardiovascular:      Rate and Rhythm: Normal rate and regular rhythm.      Pulses: Normal pulses.      Heart sounds: Normal heart sounds.   Pulmonary:      Effort: Pulmonary effort is normal. No respiratory distress.      Breath sounds: Normal breath sounds.      Comments: Portable oxygen with patient but not in use  Abdominal:      Palpations: Abdomen is soft.   Musculoskeletal:         General: Normal range of motion.      Cervical back: Normal range of motion and neck supple.   Skin:     General:  "Skin is warm and dry.      Capillary Refill: Capillary refill takes less than 2 seconds.   Neurological:      Mental Status: She is alert and oriented to person, place, and time.   Psychiatric:         Mood and Affect: Mood normal.         Behavior: Behavior normal.         Vital Signs:   /92 (BP Location: Right arm, Patient Position: Sitting)   Pulse 81   Temp 97.9 °F (36.6 °C)   Ht 160 cm (62.99\")   Wt 60.8 kg (134 lb)   SpO2 99%   BMI 23.74 kg/m²      Lab Results:   Office Visit on 10/26/2021   Component Date Value Ref Range Status   • Glucose 10/26/2021 161* 65 - 99 mg/dL Final   • BUN 10/26/2021 22  8 - 23 mg/dL Final   • Creatinine 10/26/2021 1.58* 0.57 - 1.00 mg/dL Final   • Sodium 10/26/2021 142  136 - 145 mmol/L Final   • Potassium 10/26/2021 5.4* 3.5 - 5.2 mmol/L Final   • Chloride 10/26/2021 105  98 - 107 mmol/L Final   • CO2 10/26/2021 27.3  22.0 - 29.0 mmol/L Final   • Calcium 10/26/2021 10.2  8.6 - 10.5 mg/dL Final   • Total Protein 10/26/2021 8.1  6.0 - 8.5 g/dL Final   • Albumin 10/26/2021 4.90  3.50 - 5.20 g/dL Final   • ALT (SGPT) 10/26/2021 17  1 - 33 U/L Final   • AST (SGOT) 10/26/2021 24  1 - 32 U/L Final   • Alkaline Phosphatase 10/26/2021 97  39 - 117 U/L Final   • Total Bilirubin 10/26/2021 0.4  0.0 - 1.2 mg/dL Final   • eGFR Non  Amer 10/26/2021 33* >60 mL/min/1.73 Final   • Globulin 10/26/2021 3.2  gm/dL Final   • A/G Ratio 10/26/2021 1.5  g/dL Final   • BUN/Creatinine Ratio 10/26/2021 13.9  7.0 - 25.0 Final   • Anion Gap 10/26/2021 9.7  5.0 - 15.0 mmol/L Final   • Total Cholesterol 10/26/2021 333* 0 - 200 mg/dL Final   • Triglycerides 10/26/2021 216* 0 - 150 mg/dL Final   • HDL Cholesterol 10/26/2021 52  40 - 60 mg/dL Final   • LDL Cholesterol  10/26/2021 238* 0 - 100 mg/dL Final   • VLDL Cholesterol 10/26/2021 43* 5 - 40 mg/dL Final   • LDL/HDL Ratio 10/26/2021 4.57   Final   • TSH 10/26/2021 1.720  0.270 - 4.200 uIU/mL Final   • Hemoglobin A1C 10/26/2021 6.57* 4.80 - " 5.60 % Final   • 25 Hydroxy, Vitamin D 10/26/2021 39.9  30.0 - 100.0 ng/ml Final   • Vitamin B-12 10/26/2021 937  211 - 946 pg/mL Final   • WBC 10/26/2021 6.66  3.40 - 10.80 10*3/mm3 Final   • RBC 10/26/2021 5.34* 3.77 - 5.28 10*6/mm3 Final   • Hemoglobin 10/26/2021 15.4  12.0 - 15.9 g/dL Final   • Hematocrit 10/26/2021 47.4* 34.0 - 46.6 % Final   • MCV 10/26/2021 88.8  79.0 - 97.0 fL Final   • MCH 10/26/2021 28.8  26.6 - 33.0 pg Final   • MCHC 10/26/2021 32.5  31.5 - 35.7 g/dL Final   • RDW 10/26/2021 13.1  12.3 - 15.4 % Final   • RDW-SD 10/26/2021 42.7  37.0 - 54.0 fl Final   • MPV 10/26/2021 11.0  6.0 - 12.0 fL Final   • Platelets 10/26/2021 107* 140 - 450 10*3/mm3 Final   • Neutrophil % 10/26/2021 57.1  42.7 - 76.0 % Final   • Lymphocyte % 10/26/2021 27.5  19.6 - 45.3 % Final   • Monocyte % 10/26/2021 10.2  5.0 - 12.0 % Final   • Eosinophil % 10/26/2021 3.8  0.3 - 6.2 % Final   • Basophil % 10/26/2021 1.1  0.0 - 1.5 % Final   • Immature Grans % 10/26/2021 0.3  0.0 - 0.5 % Final   • Neutrophils, Absolute 10/26/2021 3.81  1.70 - 7.00 10*3/mm3 Final   • Lymphocytes, Absolute 10/26/2021 1.83  0.70 - 3.10 10*3/mm3 Final   • Monocytes, Absolute 10/26/2021 0.68  0.10 - 0.90 10*3/mm3 Final   • Eosinophils, Absolute 10/26/2021 0.25  0.00 - 0.40 10*3/mm3 Final   • Basophils, Absolute 10/26/2021 0.07  0.00 - 0.20 10*3/mm3 Final   • Immature Grans, Absolute 10/26/2021 0.02  0.00 - 0.05 10*3/mm3 Final   • nRBC 10/26/2021 0.0  0.0 - 0.2 /100 WBC Final       EKG Results:  No orders to display       Imaging Results:  No Images in the past 120 days found..              Assessment and Plan   Diagnoses and all orders for this visit:    1. History of COVID-19 (Primary)    2. Depression with anxiety  Comments:  discussed and offered increase in celexa but patient declined, she states she feels like she is going to be ok with time    3. Mechanical low back pain  Comments:  medardo report with no gabapentin noted to be prescribed over  the past year, no records in epic of having gabapentin in the past 5 years   Advised use of tylenol as needed for pain, she is welcome to follow up with Dr. Montez to discuss gabapentin further      Meds ordered during this visit:  No orders of the defined types were placed in this encounter.      Patient Instructions:  Patient instructions given for the following visit diagnosis:    ICD-10-CM ICD-9-CM   1. History of COVID-19  Z86.16 V12.09   2. Depression with anxiety  F41.8 300.4   3. Mechanical low back pain  M54.59 724.2       Follow Up   Return for Next scheduled follow up.        This document has been electronically signed by RAY Omalley  February 17, 2022 10:53 EST    Patient was given instructions and counseling regarding her condition or for health maintenance advice. Please see specific information pulled into the AVS if appropriate.     Part of this note may be an electronic transcription/translation of spoken language to printed text using the Dragon Dictation System.

## 2022-03-10 ENCOUNTER — TELEPHONE (OUTPATIENT)
Dept: FAMILY MEDICINE CLINIC | Facility: CLINIC | Age: 67
End: 2022-03-10

## 2022-03-10 NOTE — TELEPHONE ENCOUNTER
Made patient aware and let her know she stated she would wait until her appointment with Dr. Frazier.     ----- Message from RYA Omalley sent at 2/17/2022  9:38 AM EST -----  Please call patient and let her know that after reviewing her chart, I dont see where dr frazier has wrote her any neurontin in the past 5 years. If she still feels like she needs it, schedule a follow up appt with him, she has one in April, but if she wants one sooner that is what she will need to do. I cant prescribe that to her, she has to discuss with him.

## 2022-04-01 DIAGNOSIS — F41.8 DEPRESSION WITH ANXIETY: ICD-10-CM

## 2022-04-01 DIAGNOSIS — K59.09 CHRONIC CONSTIPATION: ICD-10-CM

## 2022-04-01 DIAGNOSIS — I25.10 CORONARY ARTERY DISEASE INVOLVING NATIVE CORONARY ARTERY OF NATIVE HEART WITHOUT ANGINA PECTORIS: ICD-10-CM

## 2022-04-01 DIAGNOSIS — R10.9 CHRONIC LEFT FLANK PAIN: ICD-10-CM

## 2022-04-01 DIAGNOSIS — I10 ESSENTIAL HYPERTENSION: ICD-10-CM

## 2022-04-01 DIAGNOSIS — E11.9 TYPE 2 DIABETES MELLITUS WITHOUT COMPLICATION, WITHOUT LONG-TERM CURRENT USE OF INSULIN: ICD-10-CM

## 2022-04-01 DIAGNOSIS — N39.0 URINARY TRACT INFECTION WITH HEMATURIA, SITE UNSPECIFIED: ICD-10-CM

## 2022-04-01 DIAGNOSIS — J30.2 CHRONIC SEASONAL ALLERGIC RHINITIS: ICD-10-CM

## 2022-04-01 DIAGNOSIS — N95.1 MENOPAUSAL SYMPTOM: ICD-10-CM

## 2022-04-01 DIAGNOSIS — R35.0 FREQUENCY OF URINATION: ICD-10-CM

## 2022-04-01 DIAGNOSIS — E78.2 MIXED HYPERLIPIDEMIA: ICD-10-CM

## 2022-04-01 DIAGNOSIS — R10.9 BILATERAL FLANK PAIN: ICD-10-CM

## 2022-04-01 DIAGNOSIS — R31.9 URINARY TRACT INFECTION WITH HEMATURIA, SITE UNSPECIFIED: ICD-10-CM

## 2022-04-01 DIAGNOSIS — E55.9 VITAMIN D DEFICIENCY DISEASE: ICD-10-CM

## 2022-04-01 DIAGNOSIS — G89.29 CHRONIC LEFT FLANK PAIN: ICD-10-CM

## 2022-04-01 RX ORDER — METHOCARBAMOL 500 MG/1
500 TABLET, FILM COATED ORAL 2 TIMES DAILY PRN
Qty: 20 TABLET | Refills: 0 | Status: SHIPPED | OUTPATIENT
Start: 2022-04-01 | End: 2022-08-25

## 2022-04-01 RX ORDER — DULAGLUTIDE 1.5 MG/.5ML
1.5 INJECTION, SOLUTION SUBCUTANEOUS WEEKLY
Qty: 4 PEN | Refills: 5 | Status: SHIPPED | OUTPATIENT
Start: 2022-04-01 | End: 2022-08-25 | Stop reason: SDUPTHER

## 2022-04-01 RX ORDER — CITALOPRAM 20 MG/1
20 TABLET ORAL DAILY
Qty: 30 TABLET | Refills: 5 | Status: SHIPPED | OUTPATIENT
Start: 2022-04-01 | End: 2022-08-25 | Stop reason: SDUPTHER

## 2022-04-01 RX ORDER — ERGOCALCIFEROL 1.25 MG/1
50000 CAPSULE ORAL WEEKLY
Qty: 4 CAPSULE | Refills: 5 | Status: SHIPPED | OUTPATIENT
Start: 2022-04-01 | End: 2022-08-25 | Stop reason: SDUPTHER

## 2022-04-01 RX ORDER — PROMETHAZINE HYDROCHLORIDE 25 MG/1
25 TABLET ORAL EVERY 6 HOURS PRN
Qty: 20 TABLET | Refills: 0 | Status: SHIPPED | OUTPATIENT
Start: 2022-04-01 | End: 2022-07-07

## 2022-04-01 RX ORDER — EVOLOCUMAB 140 MG/ML
140 INJECTION, SOLUTION SUBCUTANEOUS
Qty: 2 ML | Refills: 5 | Status: SHIPPED | OUTPATIENT
Start: 2022-04-01 | End: 2022-04-28 | Stop reason: SDUPTHER

## 2022-04-01 RX ORDER — ATORVASTATIN CALCIUM 80 MG/1
80 TABLET, FILM COATED ORAL EVERY EVENING
Qty: 30 TABLET | Refills: 5 | Status: SHIPPED | OUTPATIENT
Start: 2022-04-01 | End: 2022-06-29

## 2022-04-01 RX ORDER — ALBUTEROL SULFATE 90 UG/1
2 AEROSOL, METERED RESPIRATORY (INHALATION)
Qty: 18 G | Refills: 3 | Status: SHIPPED | OUTPATIENT
Start: 2022-04-01 | End: 2022-08-25 | Stop reason: SDUPTHER

## 2022-04-01 RX ORDER — AMLODIPINE BESYLATE 10 MG/1
10 TABLET ORAL EVERY EVENING
Qty: 30 TABLET | Refills: 5 | Status: SHIPPED | OUTPATIENT
Start: 2022-04-01 | End: 2022-08-25 | Stop reason: SDUPTHER

## 2022-04-01 RX ORDER — GLUCOSAMINE HCL/CHONDROITIN SU 500-400 MG
CAPSULE ORAL
Qty: 50 EACH | Refills: 5 | Status: SHIPPED | OUTPATIENT
Start: 2022-04-01

## 2022-04-01 RX ORDER — ISOSORBIDE MONONITRATE 30 MG/1
30 TABLET, EXTENDED RELEASE ORAL EVERY MORNING
Qty: 30 TABLET | Refills: 5 | Status: SHIPPED | OUTPATIENT
Start: 2022-04-01 | End: 2022-04-28 | Stop reason: SDUPTHER

## 2022-04-01 RX ORDER — ASPIRIN 81 MG/1
81 TABLET ORAL DAILY
Qty: 30 TABLET | Refills: 5 | Status: SHIPPED | OUTPATIENT
Start: 2022-04-01 | End: 2022-08-25 | Stop reason: SDUPTHER

## 2022-04-01 RX ORDER — NITROGLYCERIN 0.4 MG/1
0.4 TABLET SUBLINGUAL AS NEEDED
Qty: 25 TABLET | Refills: 5 | Status: SHIPPED | OUTPATIENT
Start: 2022-04-01 | End: 2022-04-18

## 2022-04-01 RX ORDER — FLUTICASONE PROPIONATE 50 MCG
SPRAY, SUSPENSION (ML) NASAL
Qty: 16 G | Refills: 5 | Status: SHIPPED | OUTPATIENT
Start: 2022-04-01 | End: 2022-08-25 | Stop reason: SDUPTHER

## 2022-04-01 RX ORDER — SITAGLIPTIN AND METFORMIN HYDROCHLORIDE 50; 500 MG/1; MG/1
1 TABLET, FILM COATED, EXTENDED RELEASE ORAL 2 TIMES DAILY
Qty: 60 TABLET | Refills: 5 | Status: SHIPPED | OUTPATIENT
Start: 2022-04-01 | End: 2022-06-29

## 2022-04-01 RX ORDER — METOPROLOL SUCCINATE 100 MG/1
100 TABLET, EXTENDED RELEASE ORAL DAILY
Qty: 30 TABLET | Refills: 5 | Status: SHIPPED | OUTPATIENT
Start: 2022-04-01 | End: 2022-06-29

## 2022-04-01 RX ORDER — LISINOPRIL 10 MG/1
10 TABLET ORAL DAILY
Qty: 30 TABLET | Refills: 5 | Status: SHIPPED | OUTPATIENT
Start: 2022-04-01 | End: 2022-07-07

## 2022-04-01 RX ORDER — LANCETS 30 GAUGE
EACH MISCELLANEOUS
Qty: 60 EACH | Refills: 5 | Status: SHIPPED | OUTPATIENT
Start: 2022-04-01

## 2022-04-01 RX ORDER — EZETIMIBE 10 MG/1
10 TABLET ORAL NIGHTLY
Qty: 30 TABLET | Refills: 5 | Status: SHIPPED | OUTPATIENT
Start: 2022-04-01 | End: 2022-08-25 | Stop reason: SDUPTHER

## 2022-04-01 NOTE — TELEPHONE ENCOUNTER
Pt changed pharmacy's to [facility]. She needs all of her prescriptions sent there instead of [facility].

## 2022-04-17 DIAGNOSIS — I25.10 CORONARY ARTERY DISEASE INVOLVING NATIVE CORONARY ARTERY OF NATIVE HEART WITHOUT ANGINA PECTORIS: ICD-10-CM

## 2022-04-18 RX ORDER — NITROGLYCERIN 0.4 MG/1
TABLET SUBLINGUAL
Qty: 25 TABLET | Refills: 5 | Status: SHIPPED | OUTPATIENT
Start: 2022-04-18 | End: 2022-08-25 | Stop reason: SDUPTHER

## 2022-04-28 ENCOUNTER — OFFICE VISIT (OUTPATIENT)
Dept: FAMILY MEDICINE CLINIC | Facility: CLINIC | Age: 67
End: 2022-04-28

## 2022-04-28 VITALS
TEMPERATURE: 98.6 F | SYSTOLIC BLOOD PRESSURE: 144 MMHG | RESPIRATION RATE: 14 BRPM | WEIGHT: 139 LBS | BODY MASS INDEX: 24.63 KG/M2 | HEART RATE: 71 BPM | HEIGHT: 63 IN | OXYGEN SATURATION: 99 % | DIASTOLIC BLOOD PRESSURE: 75 MMHG

## 2022-04-28 DIAGNOSIS — J44.9 COPD MIXED TYPE: ICD-10-CM

## 2022-04-28 DIAGNOSIS — Z00.00 HEALTHCARE MAINTENANCE: ICD-10-CM

## 2022-04-28 DIAGNOSIS — G45.9 TIA (TRANSIENT ISCHEMIC ATTACK): ICD-10-CM

## 2022-04-28 DIAGNOSIS — M85.80 OSTEOPENIA, UNSPECIFIED LOCATION: ICD-10-CM

## 2022-04-28 DIAGNOSIS — F41.8 DEPRESSION WITH ANXIETY: ICD-10-CM

## 2022-04-28 DIAGNOSIS — N18.32 CHRONIC RENAL FAILURE, STAGE 3B: ICD-10-CM

## 2022-04-28 DIAGNOSIS — I25.10 CORONARY ARTERY DISEASE INVOLVING NATIVE CORONARY ARTERY OF NATIVE HEART WITHOUT ANGINA PECTORIS: ICD-10-CM

## 2022-04-28 DIAGNOSIS — I87.2 CHRONIC VENOUS INSUFFICIENCY: ICD-10-CM

## 2022-04-28 DIAGNOSIS — K59.09 CHRONIC CONSTIPATION: ICD-10-CM

## 2022-04-28 DIAGNOSIS — E55.9 VITAMIN D DEFICIENCY DISEASE: ICD-10-CM

## 2022-04-28 DIAGNOSIS — E78.2 MIXED HYPERLIPIDEMIA: ICD-10-CM

## 2022-04-28 DIAGNOSIS — Z72.0 DIPS TOBACCO: ICD-10-CM

## 2022-04-28 DIAGNOSIS — I10 ESSENTIAL HYPERTENSION: ICD-10-CM

## 2022-04-28 DIAGNOSIS — N20.0 NEPHROLITHIASIS: ICD-10-CM

## 2022-04-28 DIAGNOSIS — N28.1 RENAL CYST: ICD-10-CM

## 2022-04-28 DIAGNOSIS — M54.59 MECHANICAL LOW BACK PAIN: ICD-10-CM

## 2022-04-28 DIAGNOSIS — K21.9 GASTROESOPHAGEAL REFLUX DISEASE WITHOUT ESOPHAGITIS: ICD-10-CM

## 2022-04-28 DIAGNOSIS — E11.9 TYPE 2 DIABETES MELLITUS WITHOUT COMPLICATION, WITHOUT LONG-TERM CURRENT USE OF INSULIN: ICD-10-CM

## 2022-04-28 DIAGNOSIS — J30.2 CHRONIC SEASONAL ALLERGIC RHINITIS: Primary | ICD-10-CM

## 2022-04-28 PROCEDURE — 82043 UR ALBUMIN QUANTITATIVE: CPT | Performed by: GENERAL PRACTICE

## 2022-04-28 PROCEDURE — 80053 COMPREHEN METABOLIC PANEL: CPT | Performed by: GENERAL PRACTICE

## 2022-04-28 PROCEDURE — 80061 LIPID PANEL: CPT | Performed by: GENERAL PRACTICE

## 2022-04-28 PROCEDURE — 85025 COMPLETE CBC W/AUTO DIFF WBC: CPT | Performed by: GENERAL PRACTICE

## 2022-04-28 PROCEDURE — 83036 HEMOGLOBIN GLYCOSYLATED A1C: CPT | Performed by: GENERAL PRACTICE

## 2022-04-28 PROCEDURE — 99214 OFFICE O/P EST MOD 30 MIN: CPT | Performed by: GENERAL PRACTICE

## 2022-04-28 RX ORDER — EVOLOCUMAB 140 MG/ML
140 INJECTION, SOLUTION SUBCUTANEOUS
Qty: 2 ML | Refills: 5 | Status: SHIPPED | OUTPATIENT
Start: 2022-04-28 | End: 2022-05-10 | Stop reason: SDUPTHER

## 2022-04-28 RX ORDER — ISOSORBIDE MONONITRATE 60 MG/1
60 TABLET, EXTENDED RELEASE ORAL EVERY MORNING
Qty: 30 TABLET | Refills: 5 | Status: SHIPPED | OUTPATIENT
Start: 2022-04-28 | End: 2022-07-07

## 2022-04-28 NOTE — PROGRESS NOTES
Subjective   Ethel Trotter is a 66 y.o. female.     Chief Complaint  She returns for a scheduled reassessment of multiple medical problems including coronary artery disease, type 2 diabetes mellitus, chronic low back pain, and depression    History of Present Illness     Coronary artery disease  She has a history of previous M.I. and CABG surgery.  She gives an approximate 1 week history of intermittent chest pain.  This is described as an anterior pressure radiating to the left shoulder.  With exception of mild numbness of that arm she denies any associated symptoms.  There is no history of any palpitations, lightheadedness, shortness of breath, calf pain, or swelling the ankles.  The pain has been occurring with exertion particularly in the morning.  The pain generally resolves within 5 to 10 minutes with rest and/or SL nitroglycerin.  She remains on low-dose ASA    Episode of Visual Loss  She experienced a 4 to 5 second episode of complete vision loss in her right eye several years ago.  This occurred during a period of anxiety and was associated with a generalized headache.  She continues to deny any further episodes and has had no weakness, numbness, tingling, or difficulty talking or understanding what is said to her. A duplex doppler ultrasound of the carotids was ordered but never scheduled and she remains uninterested in pursuing this further at present.     Diabetes  She continues to deny paresthesias of the feet, visual disturbances, polydipsia, polyuria, hypoglycemia or foot ulcerations.  Evaluation to date has been a hemoglobin A1c.  Current treatments include metformin and sitagliptin (together as janumet) empagliflozin, and dulaglutide.  She states she is taking her medication as prescribed at present    Dyslipidemia  Compliance with treatment remains quite good. She is prescribed atorvastatin, ezetimibe, and repatha with no apparent effects.     Hypertension  Home blood pressure readings: not  doing. Associated signs and symptoms: chest pain.  She continues to deny any palpitations, dyspnea, orthopnea, paroxysmal nocturnal dyspnea or peripheral edema. Current antihypertensive medications includes lisinopril, metoprolol, amlodipine.    Low Back Pain   She has a long history of low back pain worse over the last few years. The pain is described as an intermittent left lower ache.  This radiates to her left flank, left lateral hip, and left posterior thigh.  The pain in her back is worse than that elsewhere.  She has been unable to identify any precipitating, exacerbating, or relieving factors.  She needs to deny any changes in her strength, sensation, or bowel/bladder control.  There is no history of any change in her bowel habits and she continues to deny any hematochezia or melena.  She admits to urinary frequency, nocturia, and intermittent dysuria but continues to deny any hematuria.  There is no history of any vaginal bleeding, fever, chills, or night sweats. Colonoscopy performed on 5/13/2019 by Dr. Reyes was reported as showing mild diverticulosis and several tubular adenomas were removed.  Ultrasound of the abdomen performed on 2/26/2020 revealed a 7.44 cm left renal cyst.  She underwent a urology assessment with Dr. Givens on 5/29/2020 who felt that it was a Bosniak type I.  CT of the abdomen and pelvis performed on 6/10/2020 was reported as showing bilateral renal cysts the largest of which was in the posterior left kidney and measured 7 cm.  Atherosclerotic vascular disease and osteoarthritic changes of the spine were also noted. X-rays of the left hip performed on 2/26/2020 were unremarkable.  MRI of the lumbar spine performed on 11/18/2020 was reported as showing degenerative disc disease with moderate to severe right neuroforaminal narrowing at L4-5 along with multiple bilateral renal cysts the largest of which was 7 cm.  While records have yet to be received she apparently underwent an  orthopedic assessment late last year and was recommended referral to pain management.  She underwent a urology reassessment this year with an updated ultrasound of the kidneys on 5/10/2021.  She was advised a 1 year follow-up and has an appointment with urology on 5/10/2022    Depression with Anxiety  She has a long history of intermittent depression, nervousness, and difficulty sleeping.  She and her  have had marital difficulties over the last 6 months with an increase in her symptoms.  She denies any change in her concentration or memory and there is no history of any suicidal ideation.  She remains on citalopram    The following portions of the patient's history were reviewed and updated as appropriate: allergies, current medications, past medical history, past social history and problem list.    Review of Systems   Constitutional: Positive for fatigue. Negative for appetite change, chills, fever and unexpected weight change.   HENT: Positive for congestion and rhinorrhea. Negative for ear pain, postnasal drip, sneezing and sore throat.    Eyes: Negative for visual disturbance.   Respiratory: Negative for cough, shortness of breath and wheezing.    Cardiovascular: Positive for chest pain. Negative for palpitations and leg swelling.   Gastrointestinal: Negative for abdominal pain, anal bleeding, blood in stool, constipation, diarrhea, nausea and vomiting.   Endocrine: Negative for polydipsia and polyuria.   Genitourinary: Positive for frequency and vaginal pain (dryness). Negative for dysuria, hematuria and urgency.   Musculoskeletal: Positive for arthralgias and back pain. Negative for myalgias.   Skin: Negative for rash.   Neurological: Negative for weakness, numbness and headaches.   Psychiatric/Behavioral: Positive for sleep disturbance. Negative for dysphoric mood and suicidal ideas. The patient is nervous/anxious.      Objective   Physical Exam  Constitutional:       General: She is not in acute  distress.     Appearance: Normal appearance. She is well-developed. She is not diaphoretic.      Comments: Alert and oriented. No apparent distress. No pallor, jaundice, diaphoresis, or cyanosis.   HENT:      Head: Atraumatic.      Right Ear: Ear canal and external ear normal. Tympanic membrane is scarred.      Left Ear: Ear canal and external ear normal. Tympanic membrane is scarred.   Eyes:      Conjunctiva/sclera: Conjunctivae normal.   Neck:      Thyroid: No thyroid mass or thyromegaly.      Vascular: No carotid bruit or JVD.      Trachea: Trachea normal. No tracheal deviation.   Cardiovascular:      Rate and Rhythm: Normal rate and regular rhythm.      Heart sounds: Normal heart sounds, S1 normal and S2 normal. No murmur heard.    No gallop.   Pulmonary:      Effort: Pulmonary effort is normal.      Breath sounds: Normal breath sounds.   Chest:      Chest wall: No tenderness.   Breasts:      Right: No supraclavicular adenopathy.      Left: No supraclavicular adenopathy.       Abdominal:      General: Bowel sounds are normal. There is no distension.   Musculoskeletal:      Right lower leg: No edema.      Left lower leg: No edema.      Comments: Negative straight leg raise. No peripheral joint redness or warmth.   Lymphadenopathy:      Head:      Right side of head: No submental, submandibular, tonsillar, preauricular, posterior auricular or occipital adenopathy.      Left side of head: No submental, submandibular, tonsillar, preauricular, posterior auricular or occipital adenopathy.      Cervical: No cervical adenopathy.      Upper Body:      Right upper body: No supraclavicular adenopathy.      Left upper body: No supraclavicular adenopathy.   Skin:     General: Skin is warm.      Coloration: Skin is not cyanotic, jaundiced or pale.      Findings: No rash.      Nails: There is no clubbing.   Neurological:      Mental Status: She is alert and oriented to person, place, and time.      Cranial Nerves: No cranial  nerve deficit.      Motor: No tremor.      Coordination: Coordination normal.      Gait: Gait normal.   Psychiatric:         Attention and Perception: Attention normal.         Mood and Affect: Mood is depressed (when discussing marital issues).         Speech: Speech normal.         Behavior: Behavior normal.         Thought Content: Thought content normal. Thought content does not include suicidal ideation.       Assessment/Plan   Problems Addressed this Visit        Allergies and Adverse Reactions    Chronic seasonal allergic rhinitis        Cardiac and Vasculature    Chronic venous insufficiency    Coronary artery disease involving native coronary artery  With recent exertional chest pain  Reminded how to use SL NTG and when to seek him assessment (call 911)  Isosorbide mononitrate will be increased to 60 every morning  We will arrange a stress test and cardiology assessment    Relevant Medications    isosorbide mononitrate (IMDUR) 60 MG 24 hr tablet    Other Relevant Orders    Ambulatory Referral to Cardiology    CBC & Differential    Stress Test With Myocardial Perfusion One Day    Essential hypertension   Hypertension: improved. Evidence of target organ damage: coronary artery disease, chronic kidney disease and transient ischemic attack.  Encouraged to continue to work on diet and exercise plan.   Continue current medication    Relevant Orders    CBC & Differential    Comprehensive Metabolic Panel    Mixed hyperlipidemia  As above.   Continue current medication.    Relevant Medications    Evolocumab (Repatha SureClick) solution auto-injector SureClick injection    Other Relevant Orders    Comprehensive Metabolic Panel    Lipid Panel       Endocrine and Metabolic    Type 2 diabetes mellitus without complication, without long-term current use of insulin (HCC)  Diabetes mellitus Type II, under unknown control.   Encouraged to continue to pursue ADA diet  Encouraged aerobic exercise.  Continue current  medication  Updated labs drawn.    Relevant Orders    Hemoglobin A1c    MicroAlbumin, Urine, Random - Urine, Clean Catch    Vitamin D deficiency disease       Gastrointestinal Abdominal     Chronic constipation    Gastroesophageal reflux disease without esophagitis       Genitourinary and Reproductive     Nephrolithiasis    Renal cyst       Health Encounters    Healthcare maintenance  Patient remains uninterested in COVID-19 vaccination       Mental Health    Depression with anxiety  Significant situational component.   Supportive therapy.   Continue current medication.       Musculoskeletal and Injuries    Mechanical low back pain  Reminded regarding symptomatic treatment.   Continue current medication    Osteopenia       Neuro    TIA (transient ischemic attack)  As above.   Continue current medication.       Pulmonary and Pneumonias    COPD mixed type (HCC)       Tobacco    Dips tobacco       Other    Chronic renal failure, stage 3b (HCC)  Will continue to monitor    Relevant Orders    Comprehensive Metabolic Panel      Diagnoses       Codes Comments    Chronic seasonal allergic rhinitis    -  Primary ICD-10-CM: J30.2  ICD-9-CM: 477.8     Coronary artery disease involving native coronary artery of native heart without angina pectoris     ICD-10-CM: I25.10  ICD-9-CM: 414.01     Essential hypertension     ICD-10-CM: I10  ICD-9-CM: 401.9     Mixed hyperlipidemia     ICD-10-CM: E78.2  ICD-9-CM: 272.2     Chronic venous insufficiency     ICD-10-CM: I87.2  ICD-9-CM: 459.81     Type 2 diabetes mellitus without complication, without long-term current use of insulin ([facility])     ICD-10-CM: E11.9  ICD-9-CM: 250.00     Vitamin D deficiency disease     ICD-10-CM: E55.9  ICD-9-CM: 268.9     Chronic constipation     ICD-10-CM: K59.09  ICD-9-CM: 564.00     Gastroesophageal reflux disease without esophagitis     ICD-10-CM: K21.9  ICD-9-CM: 530.81     Nephrolithiasis     ICD-10-CM: N20.0  ICD-9-CM: 592.0     Renal cyst     ICD-10-CM:  N28.1  ICD-9-CM: 753.10     Healthcare maintenance     ICD-10-CM: Z00.00  ICD-9-CM: V70.0     Depression with anxiety     ICD-10-CM: F41.8  ICD-9-CM: 300.4     Mechanical low back pain     ICD-10-CM: M54.59  ICD-9-CM: 724.2     TIA (transient ischemic attack)     ICD-10-CM: G45.9  ICD-9-CM: 435.9     Osteopenia, unspecified location     ICD-10-CM: M85.80  ICD-9-CM: 733.90     COPD mixed type (HCC)     ICD-10-CM: J44.9  ICD-9-CM: 496     Dips tobacco     ICD-10-CM: Z72.0  ICD-9-CM: 305.1     Chronic renal failure, stage 3b (HCC)     ICD-10-CM: N18.32  ICD-9-CM: 585.3

## 2022-04-29 LAB
ALBUMIN SERPL-MCNC: 4.5 G/DL (ref 3.5–5.2)
ALBUMIN UR-MCNC: <1.2 MG/DL
ALBUMIN/GLOB SERPL: 1.3 G/DL
ALP SERPL-CCNC: 104 U/L (ref 39–117)
ALT SERPL W P-5'-P-CCNC: 11 U/L (ref 1–33)
ANION GAP SERPL CALCULATED.3IONS-SCNC: 11.1 MMOL/L (ref 5–15)
AST SERPL-CCNC: 14 U/L (ref 1–32)
BASOPHILS # BLD AUTO: 0.08 10*3/MM3 (ref 0–0.2)
BASOPHILS NFR BLD AUTO: 1 % (ref 0–1.5)
BILIRUB SERPL-MCNC: 0.4 MG/DL (ref 0–1.2)
BUN SERPL-MCNC: 26 MG/DL (ref 8–23)
BUN/CREAT SERPL: 18.8 (ref 7–25)
CALCIUM SPEC-SCNC: 10.6 MG/DL (ref 8.6–10.5)
CHLORIDE SERPL-SCNC: 99 MMOL/L (ref 98–107)
CHOLEST SERPL-MCNC: 267 MG/DL (ref 0–200)
CO2 SERPL-SCNC: 25.9 MMOL/L (ref 22–29)
CREAT SERPL-MCNC: 1.38 MG/DL (ref 0.57–1)
DEPRECATED RDW RBC AUTO: 44.3 FL (ref 37–54)
EGFRCR SERPLBLD CKD-EPI 2021: 41.8 ML/MIN/1.73
EOSINOPHIL # BLD AUTO: 0.61 10*3/MM3 (ref 0–0.4)
EOSINOPHIL NFR BLD AUTO: 7.4 % (ref 0.3–6.2)
ERYTHROCYTE [DISTWIDTH] IN BLOOD BY AUTOMATED COUNT: 13.7 % (ref 12.3–15.4)
GLOBULIN UR ELPH-MCNC: 3.5 GM/DL
GLUCOSE SERPL-MCNC: 279 MG/DL (ref 65–99)
HBA1C MFR BLD: 8.9 % (ref 4.8–5.6)
HCT VFR BLD AUTO: 45.3 % (ref 34–46.6)
HDLC SERPL-MCNC: 50 MG/DL (ref 40–60)
HGB BLD-MCNC: 14.5 G/DL (ref 12–15.9)
IMM GRANULOCYTES # BLD AUTO: 0.04 10*3/MM3 (ref 0–0.05)
IMM GRANULOCYTES NFR BLD AUTO: 0.5 % (ref 0–0.5)
LDLC SERPL CALC-MCNC: 163 MG/DL (ref 0–100)
LDLC/HDLC SERPL: 3.2 {RATIO}
LYMPHOCYTES # BLD AUTO: 1.63 10*3/MM3 (ref 0.7–3.1)
LYMPHOCYTES NFR BLD AUTO: 19.9 % (ref 19.6–45.3)
MCH RBC QN AUTO: 28.7 PG (ref 26.6–33)
MCHC RBC AUTO-ENTMCNC: 32 G/DL (ref 31.5–35.7)
MCV RBC AUTO: 89.7 FL (ref 79–97)
MONOCYTES # BLD AUTO: 0.86 10*3/MM3 (ref 0.1–0.9)
MONOCYTES NFR BLD AUTO: 10.5 % (ref 5–12)
NEUTROPHILS NFR BLD AUTO: 4.97 10*3/MM3 (ref 1.7–7)
NEUTROPHILS NFR BLD AUTO: 60.7 % (ref 42.7–76)
NRBC BLD AUTO-RTO: 0 /100 WBC (ref 0–0.2)
PLATELET # BLD AUTO: 161 10*3/MM3 (ref 140–450)
PMV BLD AUTO: 10.7 FL (ref 6–12)
POTASSIUM SERPL-SCNC: 5.1 MMOL/L (ref 3.5–5.2)
PROT SERPL-MCNC: 8 G/DL (ref 6–8.5)
RBC # BLD AUTO: 5.05 10*6/MM3 (ref 3.77–5.28)
SODIUM SERPL-SCNC: 136 MMOL/L (ref 136–145)
TRIGL SERPL-MCNC: 286 MG/DL (ref 0–150)
VLDLC SERPL-MCNC: 54 MG/DL (ref 5–40)
WBC NRBC COR # BLD: 8.19 10*3/MM3 (ref 3.4–10.8)

## 2022-05-03 ENCOUNTER — HOSPITAL ENCOUNTER (OUTPATIENT)
Dept: NUCLEAR MEDICINE | Facility: HOSPITAL | Age: 67
Discharge: HOME | End: 2022-05-03

## 2022-05-03 ENCOUNTER — HOSPITAL ENCOUNTER (OUTPATIENT)
Dept: CARDIOLOGY | Facility: HOSPITAL | Age: 67
Discharge: HOME | End: 2022-05-03

## 2022-05-03 DIAGNOSIS — I25.10 CORONARY ARTERY DISEASE INVOLVING NATIVE CORONARY ARTERY OF NATIVE HEART WITHOUT ANGINA PECTORIS: ICD-10-CM

## 2022-05-03 LAB
BH CV NUCLEAR PRIOR STUDY: 3
BH CV REST NUCLEAR ISOTOPE DOSE: 9.7 MCI
BH CV STRESS BP STAGE 1: NORMAL
BH CV STRESS BP STAGE 2: NORMAL
BH CV STRESS DURATION MIN STAGE 1: 3
BH CV STRESS DURATION MIN STAGE 2: 3
BH CV STRESS DURATION SEC STAGE 1: 0
BH CV STRESS DURATION SEC STAGE 2: 0
BH CV STRESS GRADE STAGE 1: 10
BH CV STRESS GRADE STAGE 2: 12
BH CV STRESS HR STAGE 1: 85
BH CV STRESS HR STAGE 2: 129
BH CV STRESS METS STAGE 1: 5
BH CV STRESS METS STAGE 2: 7.5
BH CV STRESS NUCLEAR ISOTOPE DOSE: 29.8 MCI
BH CV STRESS PROTOCOL 1: NORMAL
BH CV STRESS RECOVERY BP: NORMAL MMHG
BH CV STRESS RECOVERY HR: 102 BPM
BH CV STRESS SPEED STAGE 1: 1.7
BH CV STRESS SPEED STAGE 2: 2.5
BH CV STRESS STAGE 1: 1
BH CV STRESS STAGE 2: 2
LV EF NUC BP: 37 %
MAXIMAL PREDICTED HEART RATE: 152 BPM
PERCENT MAX PREDICTED HR: 84.87 %
STRESS BASELINE BP: NORMAL MMHG
STRESS BASELINE HR: 74 BPM
STRESS PERCENT HR: 100 %
STRESS POST ESTIMATED WORKLOAD: 7 METS
STRESS POST EXERCISE DUR MIN: 4 MIN
STRESS POST EXERCISE DUR SEC: 30 SEC
STRESS POST PEAK BP: NORMAL MMHG
STRESS POST PEAK HR: 129 BPM
STRESS TARGET HR: 129 BPM

## 2022-05-03 PROCEDURE — 78452 HT MUSCLE IMAGE SPECT MULT: CPT | Performed by: SPECIALIST

## 2022-05-03 PROCEDURE — 93018 CV STRESS TEST I&R ONLY: CPT | Performed by: SPECIALIST

## 2022-05-03 PROCEDURE — A9500 TC99M SESTAMIBI: HCPCS | Performed by: GENERAL PRACTICE

## 2022-05-03 PROCEDURE — 0 TECHNETIUM SESTAMIBI: Performed by: GENERAL PRACTICE

## 2022-05-03 PROCEDURE — 78452 HT MUSCLE IMAGE SPECT MULT: CPT

## 2022-05-03 PROCEDURE — 93017 CV STRESS TEST TRACING ONLY: CPT

## 2022-05-03 RX ADMIN — TECHNETIUM TC 99M SESTAMIBI 1 DOSE: 1 INJECTION INTRAVENOUS at 08:35

## 2022-05-03 RX ADMIN — TECHNETIUM TC 99M SESTAMIBI 1 DOSE: 1 INJECTION INTRAVENOUS at 10:10

## 2022-05-10 DIAGNOSIS — E78.2 MIXED HYPERLIPIDEMIA: ICD-10-CM

## 2022-05-10 RX ORDER — EVOLOCUMAB 140 MG/ML
140 INJECTION, SOLUTION SUBCUTANEOUS
Qty: 2 ML | Refills: 5 | Status: SHIPPED | OUTPATIENT
Start: 2022-05-10 | End: 2022-07-22

## 2022-05-10 NOTE — TELEPHONE ENCOUNTER
Caller: Ethel Trotter    Relationship: Self    Best call back number: [phone]    Requested Prescriptions:   Requested Prescriptions     Pending Prescriptions Disp Refills   • Evolocumab (Repatha SureClick) solution auto-injector SureClick injection 2 mL 5     Sig: Inject 1 mL under the skin into the appropriate area as directed Every 14 (Fourteen) Days.        Pharmacy where request should be sent: [facility] #[id number] [address] [facility] [address] - [phone] PH - [phone] FX     Does the patient have less than a 3 day supply:  [x] Yes  [] No    Juan Ramon Gaona Rep   05/10/22 10:10 EDT

## 2022-05-11 ENCOUNTER — PRIOR AUTHORIZATION (OUTPATIENT)
Dept: FAMILY MEDICINE CLINIC | Facility: CLINIC | Age: 67
End: 2022-05-11

## 2022-05-12 ENCOUNTER — TELEPHONE (OUTPATIENT)
Dept: FAMILY MEDICINE CLINIC | Facility: CLINIC | Age: 67
End: 2022-05-12

## 2022-05-12 NOTE — TELEPHONE ENCOUNTER
Caller: [facility] #[id number] - [place] [address] - [phone] PH - [phone] FX    Relationship to patient: Pharmacy    Best call back number: [phone] REFERENCE [phone]    Patient is needing: YVON WOULD LIKE AN UPDATE ON THE AUTHORIZATION FOR REPATHA

## 2022-05-13 NOTE — TELEPHONE ENCOUNTER
PHARMACY CALLED TO FOLLOW UP ON STATUS ON PRIOR AUTHORIZATION.    [facility] #[id number] - [place] [address] [facility] [address] AT [facility] [address] - [phone]  - [phone]   [phone]

## 2022-05-20 NOTE — TELEPHONE ENCOUNTER
PO FROM [facility] IS CALLING TO STATE THAT THE AUTHORIZATION HAS BEEN DENIED, BUT AN APPEAL CAN BE DONE

## 2022-06-29 DIAGNOSIS — E78.2 MIXED HYPERLIPIDEMIA: ICD-10-CM

## 2022-06-29 DIAGNOSIS — E11.9 TYPE 2 DIABETES MELLITUS WITHOUT COMPLICATION, WITHOUT LONG-TERM CURRENT USE OF INSULIN: ICD-10-CM

## 2022-06-29 DIAGNOSIS — I10 ESSENTIAL HYPERTENSION: ICD-10-CM

## 2022-06-29 RX ORDER — ATORVASTATIN CALCIUM 80 MG/1
TABLET, FILM COATED ORAL
Qty: 30 TABLET | Refills: 5 | Status: SHIPPED | OUTPATIENT
Start: 2022-06-29 | End: 2022-07-07 | Stop reason: ALTCHOICE

## 2022-06-29 RX ORDER — SITAGLIPTIN AND METFORMIN HYDROCHLORIDE 50; 500 MG/1; MG/1
TABLET, FILM COATED, EXTENDED RELEASE ORAL
Qty: 60 TABLET | Refills: 5 | Status: SHIPPED | OUTPATIENT
Start: 2022-06-29 | End: 2022-08-18 | Stop reason: ALTCHOICE

## 2022-06-29 RX ORDER — METOPROLOL SUCCINATE 100 MG/1
TABLET, EXTENDED RELEASE ORAL
Qty: 30 TABLET | Refills: 5 | Status: SHIPPED | OUTPATIENT
Start: 2022-06-29 | End: 2022-08-18

## 2022-07-07 ENCOUNTER — OFFICE VISIT (OUTPATIENT)
Dept: CARDIOLOGY | Facility: CLINIC | Age: 67
End: 2022-07-07

## 2022-07-07 VITALS
BODY MASS INDEX: 25.2 KG/M2 | SYSTOLIC BLOOD PRESSURE: 208 MMHG | HEART RATE: 67 BPM | HEIGHT: 63 IN | DIASTOLIC BLOOD PRESSURE: 108 MMHG | WEIGHT: 142.2 LBS

## 2022-07-07 DIAGNOSIS — N18.32 TYPE 2 DIABETES MELLITUS WITH STAGE 3B CHRONIC KIDNEY DISEASE, WITHOUT LONG-TERM CURRENT USE OF INSULIN: ICD-10-CM

## 2022-07-07 DIAGNOSIS — I25.10 CORONARY ARTERY DISEASE INVOLVING NATIVE CORONARY ARTERY OF NATIVE HEART WITHOUT ANGINA PECTORIS: ICD-10-CM

## 2022-07-07 DIAGNOSIS — I20.9 ANGINA, CLASS III: ICD-10-CM

## 2022-07-07 DIAGNOSIS — I10 UNCONTROLLED HYPERTENSION: ICD-10-CM

## 2022-07-07 DIAGNOSIS — E78.2 MIXED DYSLIPIDEMIA: ICD-10-CM

## 2022-07-07 DIAGNOSIS — I10 ESSENTIAL HYPERTENSION: ICD-10-CM

## 2022-07-07 DIAGNOSIS — I25.10 ASCVD (ARTERIOSCLEROTIC CARDIOVASCULAR DISEASE): Primary | ICD-10-CM

## 2022-07-07 DIAGNOSIS — R06.09 DYSPNEA ON EXERTION: ICD-10-CM

## 2022-07-07 DIAGNOSIS — E11.22 TYPE 2 DIABETES MELLITUS WITH STAGE 3B CHRONIC KIDNEY DISEASE, WITHOUT LONG-TERM CURRENT USE OF INSULIN: ICD-10-CM

## 2022-07-07 PROCEDURE — 99204 OFFICE O/P NEW MOD 45 MIN: CPT | Performed by: INTERNAL MEDICINE

## 2022-07-07 PROCEDURE — 93000 ELECTROCARDIOGRAM COMPLETE: CPT | Performed by: INTERNAL MEDICINE

## 2022-07-07 RX ORDER — ROSUVASTATIN CALCIUM 40 MG/1
40 TABLET, COATED ORAL DAILY
Qty: 30 TABLET | Refills: 5 | Status: SHIPPED | OUTPATIENT
Start: 2022-07-07 | End: 2022-08-18 | Stop reason: ALTCHOICE

## 2022-07-07 RX ORDER — ISOSORBIDE MONONITRATE 60 MG/1
90 TABLET, EXTENDED RELEASE ORAL EVERY MORNING
Qty: 45 TABLET | Refills: 5 | Status: SHIPPED | OUTPATIENT
Start: 2022-07-07 | End: 2022-07-07 | Stop reason: SDUPTHER

## 2022-07-07 RX ORDER — ISOSORBIDE MONONITRATE 60 MG/1
90 TABLET, EXTENDED RELEASE ORAL EVERY MORNING
Qty: 45 TABLET | Refills: 5 | Status: SHIPPED | OUTPATIENT
Start: 2022-07-07 | End: 2022-08-25 | Stop reason: SDUPTHER

## 2022-07-07 RX ORDER — LISINOPRIL 10 MG/1
20 TABLET ORAL DAILY
Qty: 60 TABLET | Refills: 5 | Status: SHIPPED | OUTPATIENT
Start: 2022-07-07 | End: 2022-08-25 | Stop reason: SDUPTHER

## 2022-07-07 RX ORDER — LISINOPRIL 10 MG/1
20 TABLET ORAL DAILY
Qty: 60 TABLET | Refills: 5 | Status: SHIPPED | OUTPATIENT
Start: 2022-07-07 | End: 2022-07-07 | Stop reason: SDUPTHER

## 2022-07-07 NOTE — PROGRESS NOTES
Sacha Montez MD  Ethel Trotter  1955 07/07/2022    Patient Active Problem List   Diagnosis   • Depression with anxiety   • COPD mixed type (HCC)   • Essential hypertension   • Mixed hyperlipidemia   • Type 2 diabetes mellitus without complication, without long-term current use of insulin (HCC)   • Coronary artery disease involving native coronary artery   • Vitamin D deficiency disease   • Osteopenia   • Hepatitis C antibody test positive   • H/O acute pancreatitis   • Gastroesophageal reflux disease without esophagitis   • Chronic venous insufficiency   • Chronic seasonal allergic rhinitis   • Dips tobacco   • Encounter for immunization   • Healthcare maintenance   • Mechanical low back pain   • Left hip pain   • Encounter for screening mammogram for breast cancer   • Chronic constipation   • TIA (transient ischemic attack)   • Chronic renal failure, stage 3b (HCC)   • Renal cyst   • Nephrolithiasis   • Menopausal symptom   • History of recurrent urinary tract infection       Dear Sacha Montez MD:    Tara Trotter is a 66 y.o. female with the problems as listed above, presents    Chief complaint: Recurrent chest pain and discomfort.    History of Present Illness: Ms. Trotter is a pleasant 66-year-old  female with history of known coronary artery disease with previous myocardial infarction's, status post three-vessel CABG about 9 years ago at [facility].  She has been having some recent chest pain and discomfort over the last 6 months or so.  She describes these pains as being felt as sometimes sharp pains and sometimes tightness in the substernal region that seem to occur mostly with mild exertion but sometimes at rest as well.  They are of mild to moderate intensity and seem to be relieved with sublingual nitroglycerin and resting.  There is some associated shortness of breath but no sweating.  These pains have been occurring at different  frequencies, sometimes every day and sometimes every few days.  She had a Lexiscan sestamibi study on 5/3/2022 that was read by Dr. Alvarez as revealing mostly a fixed basal to mid inferolateral defect with slight reversibility consistent with inferolateral infarction with mild shelley-infarction ischemia.      She is a non-smoker but chews tobacco.  He states that she takes her medications regularly most of the time unless she runs out.  She states her blood pressure always runs high at home as well sometimes in 200s on the top and 100s on the bottom.  She says she has not been able to get the Repatha for the last 4 to 5 months as the a local pharmacy ([facility]) is reportedly not stocking it anymore.  Her most recent LDL was very high at 163.    Ethel Trotter  Exercise Stress Test With Myocardial Perfusion SPECT (Multi Study)  Order# [id number]  Reading physician: Mora Alvarez MD Ordering physician: Sacha Montez MD Study date: 5/3/22     Patient Name   Ethel Trotter MRN   [id number] Legal Sex   Female  (Age)   1955 (66 y.o.)     Interpretation Summary    · Left ventricular ejection fraction is moderately reduced. (Calculated EF = 37%).  · There is no prior study available for comparison.  · Mild mostly fixed basal to mid inferolateral defect with slight reversibility consistent with inferolateral infarction and above areas with mild shelley-infarction ischemia, TID 0.96.  · Assessment of stress test ejection fraction as somewhat not accurate due to frequent premature ventricular complexes.  · Findings consistent with a normal ECG stress test.       No Known Allergies:      Current Outpatient Medications:   •  albuterol sulfate  (90 Base) MCG/ACT inhaler, Inhale 2 puffs 4 (Four) Times a Day., Disp: 18 g, Rfl: 3  •  amLODIPine (NORVASC) 10 MG tablet, Take 1 tablet by mouth Every Evening., Disp: 30 tablet, Rfl: 5  •  aspirin (aspirin) 81 MG EC tablet, Take 1 tablet by mouth  Daily., Disp: 30 tablet, Rfl: 5  •  citalopram (CeleXA) 20 MG tablet, Take 1 tablet by mouth Daily., Disp: 30 tablet, Rfl: 5  •  Dulaglutide (Trulicity) 1.5 MG/0.5ML solution pen-injector, Inject 1.5 mg under the skin into the appropriate area as directed 1 (One) Time Per Week., Disp: 4 pen, Rfl: 5  •  ezetimibe (ZETIA) 10 MG tablet, Take 1 tablet by mouth Every Night., Disp: 30 tablet, Rfl: 5  •  fluticasone (Flonase) 50 MCG/ACT nasal spray, Administer 2 sprays both nostrils once daily, Disp: 16 g, Rfl: 5  •  Glucose Blood (Blood Glucose Test) strip, USE TO TEST BLOOD GLUCOSE TWO TIMES A DAY, Disp: 50 each, Rfl: 5  •  ipratropium-albuterol (COMBIVENT RESPIMAT)  MCG/ACT inhaler, Inhale 1 puff 4 (Four) Times a Day As Needed for Wheezing., Disp: , Rfl:   •  isosorbide mononitrate (IMDUR) 60 MG 24 hr tablet, Take 1.5 tablets by mouth Every Morning., Disp: 45 tablet, Rfl: 5  •  Janumet XR  MG tablet, TAKE 1 TABLET BY MOUTH TWICE DAILY, Disp: 60 tablet, Rfl: 5  •  Lancets misc, 1 twice daily, Disp: 60 each, Rfl: 5  •  linaclotide (Linzess) 72 MCG capsule capsule, Take 1 capsule by mouth Every Morning Before Breakfast., Disp: 30 capsule, Rfl: 5  •  lisinopril (PRINIVIL,ZESTRIL) 10 MG tablet, Take 2 tablets by mouth Daily., Disp: 60 tablet, Rfl: 5  •  metoprolol succinate XL (TOPROL-XL) 100 MG 24 hr tablet, TAKE 1 TABLET BY MOUTH EVERY DAY, Disp: 30 tablet, Rfl: 5  •  nitroglycerin (NITROSTAT) 0.4 MG SL tablet, DISSOLVE ONE TABLET UNDER TONGUE AS NEEDED FOR CHEST PAIN, MAY REPEAT EVERY 5 MINUTES FOR 3 DOSES, IF NO RELIEF CALL 911, Disp: 25 tablet, Rfl: 5  •  vitamin D (ERGOCALCIFEROL) 1.25 MG (60968 UT) capsule capsule, Take 1 capsule by mouth 1 (One) Time Per Week., Disp: 4 capsule, Rfl: 5  •  albuterol sulfate  (90 Base) MCG/ACT inhaler, Inhale 2 puffs Every 4 (Four) Hours As Needed for Wheezing., Disp: , Rfl:   •  conjugated estrogens (PREMARIN) 0.625 MG/GM vaginal cream, 1 applicator pv twice weekly,  Disp: 30 g, Rfl: 5  •  empagliflozin (Jardiance) 10 MG tablet tablet, Take 1 tablet by mouth Every Morning., Disp: 30 tablet, Rfl: 5  •  Evolocumab (Repatha SureClick) solution auto-injector SureClick injection, Inject 1 mL under the skin into the appropriate area as directed Every 14 (Fourteen) Days., Disp: 2 mL, Rfl: 5  •  methocarbamol (Robaxin) 500 MG tablet, Take 1 tablet by mouth 2 (Two) Times a Day As Needed for Muscle Spasms., Disp: 20 tablet, Rfl: 0  •  rosuvastatin (CRESTOR) 40 MG tablet, Take 1 tablet by mouth Daily. Stop taking atorvastatin., Disp: 30 tablet, Rfl: 5    Past Medical History:   Diagnosis Date   • Arthritis    • CAD (coronary artery disease)    • COPD (chronic obstructive pulmonary disease) (HCC)    • Diabetes mellitus (HCC)    • Elevated cholesterol    • Fracture of wrist    • GERD (gastroesophageal reflux disease)    • Hepatitis C    • History of EKG 03/25/2016    ABNORMAL   • History of transfusion    • Hyperlipidemia    • Hypertension    • Myocardial infarction (HCC)     x2 last one 5 years ago   • Osteopenia    • Pancreatitis    • Stroke (HCC)      Past Surgical History:   Procedure Laterality Date   • COLONOSCOPY N/A 5/13/2019    Procedure: COLONOSCOPY;  Surgeon: Arnav Reyes MD;  Location: [facility];  Service: Gastroenterology   • CORONARY ARTERY BYPASS GRAFT     • HYSTERECTOMY      1998   • TONSILLECTOMY       Family History   Problem Relation Age of Onset   • No Known Problems Father    • No Known Problems Mother    • Breast cancer Neg Hx      Social History     Tobacco Use   • Smoking status: Never Smoker   • Smokeless tobacco: Current User     Types: Chew   Vaping Use   • Vaping Use: Never used   Substance Use Topics   • Alcohol use: No   • Drug use: Yes     Types: Marijuana     Comment: OCCASIONAL       Review of Systems   Constitutional: Negative for chills, diaphoresis and fever.   Cardiovascular: Positive for chest pain. Negative for leg swelling, orthopnea,  "palpitations and paroxysmal nocturnal dyspnea.   Respiratory: Negative for cough, hemoptysis and shortness of breath.    Endocrine: Negative for cold intolerance and heat intolerance.   Hematologic/Lymphatic: Does not bruise/bleed easily.   Skin: Negative for rash.   Musculoskeletal: Positive for back pain. Negative for myalgias.   Gastrointestinal: Positive for abdominal pain. Negative for constipation, diarrhea, nausea and vomiting.   Genitourinary: Negative for dysuria and hematuria.   Neurological: Negative for dizziness, focal weakness and numbness.   Psychiatric/Behavioral: Positive for depression. The patient has insomnia.        Objective   Blood pressure (!) 208/108, pulse 67, height 160 cm (62.99\"), weight 64.5 kg (142 lb 3.2 oz).  Body mass index is 25.2 kg/m².      Constitutional:       Appearance: Well-developed.   Eyes:      Conjunctiva/sclera: Conjunctivae normal.   HENT:      Head: Normocephalic.   Neck:      Thyroid: No thyromegaly.      Vascular: No JVD.      Trachea: No tracheal deviation.   Pulmonary:      Effort: No respiratory distress.      Breath sounds: Normal breath sounds. No wheezing. No rales.   Cardiovascular:      PMI at left midclavicular line. Normal rate. Regular rhythm. Normal S1. Normal S2.      Murmurs: There is no murmur.      No gallop. No click. No rub.   Pulses:     Carotid: 2+ bilaterally.     Radial: 2+ bilaterally.     Dorsalis pedis: 1+ bilaterally.     Posterior tibial: 2+ bilaterally.  Edema:     Peripheral edema absent.   Abdominal:      General: Bowel sounds are normal.      Palpations: Abdomen is soft. There is no abdominal mass.      Tenderness: There is no abdominal tenderness.   Musculoskeletal:      Cervical back: Normal range of motion and neck supple. Skin:     General: Skin is warm and dry.   Neurological:      Mental Status: Alert and oriented to person, place, and time.      Cranial Nerves: No cranial nerve deficit.         Lab Results   Component Value Date "     04/28/2022    K 5.1 04/28/2022    CL 99 04/28/2022    CO2 25.9 04/28/2022    BUN 26 (H) 04/28/2022    CREATININE 1.38 (H) 04/28/2022    GLUCOSE 279 (H) 04/28/2022    CALCIUM 10.6 (H) 04/28/2022    AST 14 04/28/2022    ALT 11 04/28/2022    ALKPHOS 104 04/28/2022    LABIL2 1.4 (L) 03/10/2016     Lab Results   Component Value Date    CKTOTAL 63 06/30/2020     Lab Results   Component Value Date    WBC 8.19 04/28/2022    HGB 14.5 04/28/2022    HCT 45.3 04/28/2022     04/28/2022     No results found for: INR  Lab Results   Component Value Date    MG 2.1 06/30/2020     Lab Results   Component Value Date    TSH 1.720 10/26/2021    CHLPL 247 (H) 03/10/2016    TRIG 286 (H) 04/28/2022    HDL 50 04/28/2022     (H) 04/28/2022        ECG 12 Lead    Date/Time: 7/7/2022 9:42 AM  Performed by: Remy Rosa MD  Authorized by: Remy Rosa MD   Comparison: compared with previous ECG from 6/30/2020  Comparison to previous ECG: There were more prominent T wave inversions noted in leads V4 through V6 now than before.  Rhythm: sinus rhythm  BPM: 72  Comments: T wave version in leads V4 through V6, 2 3 and aVF, cannot rule out inferolateral myocardial ischemia.  Possible old inferior wall myocardial infarction.              Assessment & Plan :   Diagnosis Plan   1. ASCVD (arteriosclerotic cardiovascular disease), status post previous MI, status post CABG (three-vessel) about 9 years ago.     2. Angina, class III ([facility])  ECG 12 Lead   3. Uncontrolled hypertension     4. Dyspnea on exertion     5. Mixed dyslipidemia     6. Type 2 diabetes mellitus with stage 3b chronic kidney disease, without long-term current use of insulin ([facility])  Comprehensive Metabolic Panel   7. Coronary artery disease involving native coronary artery of native heart without angina pectoris  isosorbide mononitrate (IMDUR) 60 MG 24 hr tablet       Recommendations:  Orders Placed This Encounter   Procedures   • Comprehensive Metabolic Panel    • ECG 12 Lead      1. For her uncontrolled hypertension, increase the lisinopril to 20 mg daily and add carvedilol 12.5 mg p.o. twice daily.  2. For angina, increase isosorbide mononitrate to 90 mg (1-1/2 tablet of 60 mg) daily.  3. For her dyslipidemia, since she is not able to get the PCSK9 inhibitor through the local pharmacy, will try to get this through hospital pharmacy here at [facility].  4. Change atorvastatin to rosuvastatin 40 mg daily to hopefully get a better effect on her dyslipidemia.  5. Will reevaluate her kidney function (CMP) and if she has significant chronic kidney disease, consider referral to nephrologist she continues to have significant chronic kidney disease.  6. If she continues to have recurrent significant anginal symptoms and if her kidney function is reasonable then will consider further cardiac evaluation with cardiac catheterization.    7. I have asked her to keep a check on her blood pressure at home 3 times a day and bring it with next visit.  8. I have instructed her on salt restricted diet.  Avoid such as canned vegetables, canned soups, all chips, pickles, pizzas, processed meats such as sausages, hot dogs, hamburgers and cheeseburgers etc.  Patient expressed understanding.  9. Obtain echo Doppler study to evaluate her LV systolic and diastolic function and tailor further therapy accordingly.  10. I have discussed this plan with Mrs. Urbina and she is agreeable.    Return in about 4 weeks (around 8/4/2022).    As always, I appreciate very much the opportunity to participate in the cardiovascular care of your patients.      With Best Regards,    Remy Rosa MD, FACC    Dragon disclaimer:  Much of this encounter note is an electronic transcription/translation of spoken language to printed text. The electronic translation of spoken language may permit erroneous, or at times, nonsensical words or phrases to be inadvertently transcribed; Although I have reviewed  the note for such errors, some may still exist.

## 2022-07-08 ENCOUNTER — TELEPHONE (OUTPATIENT)
Dept: FAMILY MEDICINE CLINIC | Facility: CLINIC | Age: 67
End: 2022-07-08

## 2022-07-08 NOTE — TELEPHONE ENCOUNTER
I spoke with Lauren and let her know that I didn't see anything in her chart. She said that she spoke to Evi last week. I let her know that she is out on vacation and will not be back until Tuesday. She said that she will call back then.

## 2022-07-08 NOTE — TELEPHONE ENCOUNTER
Caller: [facility]CK[facility] RIVAS [facility]     Relationship:     Best call back number:  [phone]    What test/procedure requested:  REPATHA     When is it needed: ASAP    Additional information or concerns: PLEASE FAX THE APPEAL PAPERWORK FOR THIS RX TO BUCKY -[phone]

## 2022-07-22 ENCOUNTER — SPECIALTY PHARMACY (OUTPATIENT)
Dept: PHARMACY | Facility: HOSPITAL | Age: 67
End: 2022-07-22

## 2022-07-22 ENCOUNTER — DISEASE STATE MANAGEMENT VISIT (OUTPATIENT)
Dept: PHARMACY | Facility: HOSPITAL | Age: 67
End: 2022-07-22

## 2022-07-22 DIAGNOSIS — E78.2 MIXED HYPERLIPIDEMIA: Primary | ICD-10-CM

## 2022-07-22 RX ORDER — ALIROCUMAB 150 MG/ML
300 INJECTION, SOLUTION SUBCUTANEOUS
Qty: 2 ML | Refills: 5 | Status: SHIPPED | OUTPATIENT
Start: 2022-07-22 | End: 2023-01-18 | Stop reason: SDUPTHER

## 2022-07-22 RX ORDER — ACETAMINOPHEN 500 MG
500 TABLET ORAL EVERY 6 HOURS PRN
COMMUNITY
End: 2022-08-25

## 2022-07-22 NOTE — PROGRESS NOTES
[facility]  Lipid Management Program - PCSK9i       Ethel Trotter is a 66 y.o. female referred to the [facility] by Moe Avelar for clinical pharmacy and specialty pharmacy management of PCSK9i.  Ethel Trotter is treated for clinical ASCVD and currently takes Lipitor and Zetia and will be switching to Crestor when she runs out of her current supply of Lipitor.  In the past, Pt has tried other statins, as well as Repatha but reports that her pharmacy could not get it any longer. The patient denies any allergies to latex.      Relevant Past Medical History and Comorbidities  Past Medical History:   Diagnosis Date   • Arthritis    • CAD (coronary artery disease)    • COPD (chronic obstructive pulmonary disease) (HCC)    • Diabetes mellitus (HCC)    • Elevated cholesterol    • Fracture of wrist    • GERD (gastroesophageal reflux disease)    • Hepatitis C    • History of EKG 03/25/2016    ABNORMAL   • History of transfusion    • Hyperlipidemia    • Hypertension    • Myocardial infarction (HCC)     x2 last one 5 years ago   • Osteopenia    • Pancreatitis    • Stroke (HCC)      Social History     Socioeconomic History   • Marital status:    Tobacco Use   • Smoking status: Never Smoker   • Smokeless tobacco: Current User     Types: Chew   Vaping Use   • Vaping Use: Never used   Substance and Sexual Activity   • Alcohol use: No   • Drug use: Yes     Types: Marijuana     Comment: OCCASIONAL   • Sexual activity: Defer       Allergies  Patient has no known allergies.    Current Medication List    Current Outpatient Medications:   •  acetaminophen (TYLENOL) 500 MG tablet, Take 500 mg by mouth Every 6 (Six) Hours As Needed for Mild Pain ., Disp: , Rfl:   •  albuterol sulfate  (90 Base) MCG/ACT inhaler, Inhale 2 puffs 4 (Four) Times a Day., Disp: 18 g, Rfl: 3  •  Alirocumab (Praluent) 150 MG/ML injection pen, Inject 2 mL under the skin into the appropriate area as directed  Every 28 (Twenty-Eight) Days., Disp: 2 mL, Rfl: 5  •  amLODIPine (NORVASC) 10 MG tablet, Take 1 tablet by mouth Every Evening., Disp: 30 tablet, Rfl: 5  •  aspirin (aspirin) 81 MG EC tablet, Take 1 tablet by mouth Daily., Disp: 30 tablet, Rfl: 5  •  citalopram (CeleXA) 20 MG tablet, Take 1 tablet by mouth Daily., Disp: 30 tablet, Rfl: 5  •  conjugated estrogens (PREMARIN) 0.625 MG/GM vaginal cream, 1 applicator pv twice weekly, Disp: 30 g, Rfl: 5  •  Dulaglutide (Trulicity) 1.5 MG/0.5ML solution pen-injector, Inject 1.5 mg under the skin into the appropriate area as directed 1 (One) Time Per Week., Disp: 4 pen, Rfl: 5  •  empagliflozin (Jardiance) 10 MG tablet tablet, Take 1 tablet by mouth Every Morning., Disp: 30 tablet, Rfl: 5  •  ezetimibe (ZETIA) 10 MG tablet, Take 1 tablet by mouth Every Night., Disp: 30 tablet, Rfl: 5  •  fluticasone (Flonase) 50 MCG/ACT nasal spray, Administer 2 sprays both nostrils once daily, Disp: 16 g, Rfl: 5  •  Glucose Blood (Blood Glucose Test) strip, USE TO TEST BLOOD GLUCOSE TWO TIMES A DAY, Disp: 50 each, Rfl: 5  •  ipratropium-albuterol (COMBIVENT RESPIMAT)  MCG/ACT inhaler, Inhale 1 puff 4 (Four) Times a Day As Needed for Wheezing., Disp: , Rfl:   •  isosorbide mononitrate (IMDUR) 60 MG 24 hr tablet, Take 1.5 tablets by mouth Every Morning., Disp: 45 tablet, Rfl: 5  •  Janumet XR  MG tablet, TAKE 1 TABLET BY MOUTH TWICE DAILY, Disp: 60 tablet, Rfl: 5  •  Lancets misc, 1 twice daily, Disp: 60 each, Rfl: 5  •  linaclotide (Linzess) 72 MCG capsule capsule, Take 1 capsule by mouth Every Morning Before Breakfast., Disp: 30 capsule, Rfl: 5  •  lisinopril (PRINIVIL,ZESTRIL) 10 MG tablet, Take 2 tablets by mouth Daily., Disp: 60 tablet, Rfl: 5  •  methocarbamol (Robaxin) 500 MG tablet, Take 1 tablet by mouth 2 (Two) Times a Day As Needed for Muscle Spasms., Disp: 20 tablet, Rfl: 0  •  metoprolol succinate XL (TOPROL-XL) 100 MG 24 hr tablet, TAKE 1 TABLET BY MOUTH EVERY DAY,  Disp: 30 tablet, Rfl: 5  •  nitroglycerin (NITROSTAT) 0.4 MG SL tablet, DISSOLVE ONE TABLET UNDER TONGUE AS NEEDED FOR CHEST PAIN, MAY REPEAT EVERY 5 MINUTES FOR 3 DOSES, IF NO RELIEF CALL 911, Disp: 25 tablet, Rfl: 5  •  rosuvastatin (CRESTOR) 40 MG tablet, Take 1 tablet by mouth Daily. Stop taking atorvastatin., Disp: 30 tablet, Rfl: 5  •  vitamin D (ERGOCALCIFEROL) 1.25 MG (31179 UT) capsule capsule, Take 1 capsule by mouth 1 (One) Time Per Week., Disp: 4 capsule, Rfl: 5    Drug Interactions  None With Praluent    Relevant Laboratory Values  Lab Results   Component Value Date    CHOL 267 (H) 04/28/2022    CHLPL 247 (H) 03/10/2016    TRIG 286 (H) 04/28/2022    HDL 50 04/28/2022     (H) 04/28/2022       Initial Education Provided for Praluent/Repatha    Patient seen in the [facility]ication Management Clinic for initial education and injection training for PCSK9 inhibitors. The patient was introduced to services offered by [facility], including: regular assessments, refill coordination, [facility] pick-up or mail order delivery options, prior authorization maintenance, and financial assistance programs as applicable. The patient was also provided with contact information for the pharmacy team. Welcome information and patient satisfaction survey to be sent by retail team with patient's initial fill.    Patient Instructions    Praluent is used to lower LDL, or bad cholesterol, to help reduce your chance of a heart attack or stroke.  You should give your injection once every 28 days.  Your doctor will likely keep you on this medication indefinitely as long as it is working for you and not causing any adverse effects.      This medication should be kept in the refrigerator until you are ready to use it.  Once removed from the refrigerator, it is good at room temperature for 30 days.  Do not leave in your car or expose to extreme heat.  Do not shake or freeze.  Dispose the used syringe/device in a  sharps container.     This injection is a subcutaneous injection, which means just under the skin.  It is important to choose an area of your skin that is not tender, bruised, cut or has scars or stretch marks.  You can inject into your abdomen (except for 2 inches around your navel), your thigh or your upper arm.  Do not administer with other drugs.   Rotate injection sites each time you give the injection. Wash your hands prior to giving yourself an injection and use an alcohol wipe to clean the area of the injection and allow to dry prior to injecting.      If you miss a dose, take it as soon as you remember and resume the original schedule if it is within 7 days from the missed dose.  If a once-monthly dose is not administered within 7 days, administer the dose and start a new schedule based on this date.     Be sure to let your doctor or pharmacist know of any medication changes, including OTC and herbal supplements.     Adverse Effects  Reviewed with patient and education on management provided.     • Sore Throat  • Injection site pain  • Itching or irritation   • Flu-like symptoms  • Signs of an allergic reaction     Immunizations  While there are no immunizations that you need to get specifically because you are on this drug, it is important to keep up with your recommended routine vaccinations.     Adherence and Self-Administration    • Barriers to Patient Adherence and/or Self-Administration: None  • Methods for Supporting Patient Adherence and/or Self-Administration: None Required     Goals of Therapy  • Patient Goals of Therapy: To adhere to medication dosing schedule  • Clinical Goals or Therapeutic Targets, If Applicable: LDL Reduction      Attestation  I attest that the initiated specialty medication(s) are appropriate for the patient based on my assessment. If the prescribed therapy is at any point deemed not appropriate based on the current or future assessments, a consultation will be initiated  with the patient's specialty care provider to determine the best course of action. The revised plan of therapy will be documented along with any additional patient education provided.     The patient has been provided with the above education and any applicable administration techniques (i.e. self-injection) have been demonstrated for the therapies indicated. I did go over that it will be both injections (2 -150mg syringes) given in different places every 28 days. All questions and concerns have been addressed prior to the patient receiving the medication, and the patient has verbalized understanding of the education and any materials provided.  Additional patient education shall be provided and documented upon request by the patient, provider or payer.      The patient would like to receive specialty mail-out services for the next injection to the physical address below. Care Coordinator to set up future refill outreaches, coordinate prescription delivery, and escalate clinical questions to pharmacist.     Physical Address  [address] [place] [id number]    Patient should receive a lipid panel in 4-12 weeks.  Order has been placed.     Thank you,     Jacqueline Lugo RPH  07/22/22  09:24 EDT

## 2022-07-22 NOTE — PROGRESS NOTES
[facility]  Lipid Management Program - PCSK9i       Ethel Trotter is a 66 y.o. female referred to the [facility] by Moe Avelar for clinical pharmacy and specialty pharmacy management of PCSK9i.  Ethel Trotter is treated for clinical ASCVD and currently takes Lipitor and Zetia and will be switching to Crestor when she runs out of her current supply of Lipitor.  In the past, Pt has tried other statins in the past, as well as Repatha but reports that her pharmacy could not get it any longer. The patient denies any allergies to latex.      Relevant Past Medical History and Comorbidities  Past Medical History:   Diagnosis Date   • Arthritis    • CAD (coronary artery disease)    • COPD (chronic obstructive pulmonary disease) (HCC)    • Diabetes mellitus (HCC)    • Elevated cholesterol    • Fracture of wrist    • GERD (gastroesophageal reflux disease)    • Hepatitis C    • History of EKG 03/25/2016    ABNORMAL   • History of transfusion    • Hyperlipidemia    • Hypertension    • Myocardial infarction (HCC)     x2 last one 5 years ago   • Osteopenia    • Pancreatitis    • Stroke (HCC)      Social History     Socioeconomic History   • Marital status:    Tobacco Use   • Smoking status: Never Smoker   • Smokeless tobacco: Current User     Types: Chew   Vaping Use   • Vaping Use: Never used   Substance and Sexual Activity   • Alcohol use: No   • Drug use: Yes     Types: Marijuana     Comment: OCCASIONAL   • Sexual activity: Defer       Allergies  Patient has no known allergies.    Current Medication List    Current Outpatient Medications:   •  acetaminophen (TYLENOL) 500 MG tablet, Take 500 mg by mouth Every 6 (Six) Hours As Needed for Mild Pain ., Disp: , Rfl:   •  albuterol sulfate  (90 Base) MCG/ACT inhaler, Inhale 2 puffs 4 (Four) Times a Day., Disp: 18 g, Rfl: 3  •  amLODIPine (NORVASC) 10 MG tablet, Take 1 tablet by mouth Every Evening., Disp: 30 tablet, Rfl: 5  •   aspirin (aspirin) 81 MG EC tablet, Take 1 tablet by mouth Daily., Disp: 30 tablet, Rfl: 5  •  citalopram (CeleXA) 20 MG tablet, Take 1 tablet by mouth Daily., Disp: 30 tablet, Rfl: 5  •  conjugated estrogens (PREMARIN) 0.625 MG/GM vaginal cream, 1 applicator pv twice weekly, Disp: 30 g, Rfl: 5  •  Dulaglutide (Trulicity) 1.5 MG/0.5ML solution pen-injector, Inject 1.5 mg under the skin into the appropriate area as directed 1 (One) Time Per Week., Disp: 4 pen, Rfl: 5  •  empagliflozin (Jardiance) 10 MG tablet tablet, Take 1 tablet by mouth Every Morning., Disp: 30 tablet, Rfl: 5  •  ezetimibe (ZETIA) 10 MG tablet, Take 1 tablet by mouth Every Night., Disp: 30 tablet, Rfl: 5  •  fluticasone (Flonase) 50 MCG/ACT nasal spray, Administer 2 sprays both nostrils once daily, Disp: 16 g, Rfl: 5  •  Glucose Blood (Blood Glucose Test) strip, USE TO TEST BLOOD GLUCOSE TWO TIMES A DAY, Disp: 50 each, Rfl: 5  •  ipratropium-albuterol (COMBIVENT RESPIMAT)  MCG/ACT inhaler, Inhale 1 puff 4 (Four) Times a Day As Needed for Wheezing., Disp: , Rfl:   •  isosorbide mononitrate (IMDUR) 60 MG 24 hr tablet, Take 1.5 tablets by mouth Every Morning., Disp: 45 tablet, Rfl: 5  •  Janumet XR  MG tablet, TAKE 1 TABLET BY MOUTH TWICE DAILY, Disp: 60 tablet, Rfl: 5  •  Lancets misc, 1 twice daily, Disp: 60 each, Rfl: 5  •  linaclotide (Linzess) 72 MCG capsule capsule, Take 1 capsule by mouth Every Morning Before Breakfast., Disp: 30 capsule, Rfl: 5  •  lisinopril (PRINIVIL,ZESTRIL) 10 MG tablet, Take 2 tablets by mouth Daily., Disp: 60 tablet, Rfl: 5  •  methocarbamol (Robaxin) 500 MG tablet, Take 1 tablet by mouth 2 (Two) Times a Day As Needed for Muscle Spasms., Disp: 20 tablet, Rfl: 0  •  metoprolol succinate XL (TOPROL-XL) 100 MG 24 hr tablet, TAKE 1 TABLET BY MOUTH EVERY DAY, Disp: 30 tablet, Rfl: 5  •  nitroglycerin (NITROSTAT) 0.4 MG SL tablet, DISSOLVE ONE TABLET UNDER TONGUE AS NEEDED FOR CHEST PAIN, MAY REPEAT EVERY 5 MINUTES  FOR 3 DOSES, IF NO RELIEF CALL 911, Disp: 25 tablet, Rfl: 5  •  rosuvastatin (CRESTOR) 40 MG tablet, Take 1 tablet by mouth Daily. Stop taking atorvastatin., Disp: 30 tablet, Rfl: 5  •  vitamin D (ERGOCALCIFEROL) 1.25 MG (32680 UT) capsule capsule, Take 1 capsule by mouth 1 (One) Time Per Week., Disp: 4 capsule, Rfl: 5  •  Alirocumab (Praluent) 150 MG/ML injection pen, Inject 2 mL under the skin into the appropriate area as directed Every 28 (Twenty-Eight) Days., Disp: 2 mL, Rfl: 5    Drug Interactions  None With Praluent    Relevant Laboratory Values  Lab Results   Component Value Date    CHOL 267 (H) 04/28/2022    CHLPL 247 (H) 03/10/2016    TRIG 286 (H) 04/28/2022    HDL 50 04/28/2022     (H) 04/28/2022       Initial Education Provided for Praluent/Repatha    Patient seen in the [facility] Management [facility] for initial education and injection training for PCSK9 inhibitors. The patient was introduced to services offered by [facility], including: regular assessments, refill coordination, curbside pick-up or mail order delivery options, prior authorization maintenance, and financial assistance programs as applicable. The patient was also provided with contact information for the pharmacy team. Welcome information and patient satisfaction survey to be sent by retail team with patient's initial fill.    Patient Instructions    Praluent is used to lower LDL, or bad cholesterol, to help reduce your chance of a heart attack or stroke.  You should give your injection once every 28 days.  Your doctor will likely keep you on this medication indefinitely as long as it is working for you and not causing any adverse effects.      This medication should be kept in the refrigerator until you are ready to use it.  Once removed from the refrigerator, it is good at room temperature for 30 days.  Do not leave in your car or expose to extreme heat.  Do not shake or freeze.  Dispose the used  syringe/device in a sharps container.     This injection is a subcutaneous injection, which means just under the skin.  It is important to choose an area of your skin that is not tender, bruised, cut or has scars or stretch marks.  You can inject into your abdomen (except for 2 inches around your navel), your thigh or your upper arm.  Do not administer with other drugs.   Rotate injection sites each time you give the injection. Wash your hands prior to giving yourself an injection and use an alcohol wipe to clean the area of the injection and allow to dry prior to injecting.      If you miss a dose, take it as soon as you remember and resume the original schedule if it is within 7 days from the missed dose.  If a once-monthly dose is not administered within 7 days, administer the dose and start a new schedule based on this date.     Be sure to let your doctor or pharmacist know of any medication changes, including OTC and herbal supplements.     Adverse Effects  Reviewed with patient and education on management provided.     • Sore Throat  • Injection site pain  • Itching or irritation   • Flu-like symptoms  • Signs of an allergic reaction     Immunizations  While there are no immunizations that you need to get specifically because you are on this drug, it is important to keep up with your recommended routine vaccinations.     Adherence and Self-Administration    • Barriers to Patient Adherence and/or Self-Administration: None  • Methods for Supporting Patient Adherence and/or Self-Administration: None Required     Goals of Therapy  • Patient Goals of Therapy: To adhere to medication dosing schedule  • Clinical Goals or Therapeutic Targets, If Applicable: LDL Reduction      Attestation  I attest that the initiated specialty medication(s) are appropriate for the patient based on my assessment. If the prescribed therapy is at any point deemed not appropriate based on the current or future assessments, a consultation  will be initiated with the patient's specialty care provider to determine the best course of action. The revised plan of therapy will be documented along with any additional patient education provided.     The patient has been provided with the above education and any applicable administration techniques (i.e. self-injection) have been demonstrated for the therapies indicated. I did go over that it will be both injections (2 -150mg syringes) given in different places every 28 days. All questions and concerns have been addressed prior to the patient receiving the medication, and the patient has verbalized understanding of the education and any materials provided.  Additional patient education shall be provided and documented upon request by the patient, provider or payer.      The patient would like to receive specialty mail-out services for the next injection to the physical address below. Care Coordinator to set up future refill outreaches, coordinate prescription delivery, and escalate clinical questions to pharmacist.     Physical Address  [address] [place] [id number]    Patient should receive a lipid panel in 4-12 weeks.  Order has been placed.     Thank you,     Jacqueline Lugo [facility]  07/22/22  08:58 EDT

## 2022-07-26 ENCOUNTER — APPOINTMENT (OUTPATIENT)
Dept: CARDIOLOGY | Facility: HOSPITAL | Age: 67
End: 2022-07-26

## 2022-07-29 ENCOUNTER — OFFICE VISIT (OUTPATIENT)
Dept: FAMILY MEDICINE CLINIC | Facility: CLINIC | Age: 67
End: 2022-07-29

## 2022-07-29 DIAGNOSIS — Z00.00 HEALTHCARE MAINTENANCE: ICD-10-CM

## 2022-07-29 DIAGNOSIS — F41.8 DEPRESSION WITH ANXIETY: ICD-10-CM

## 2022-07-29 DIAGNOSIS — J44.9 COPD MIXED TYPE: ICD-10-CM

## 2022-07-29 DIAGNOSIS — Z72.0 DIPS TOBACCO: ICD-10-CM

## 2022-07-29 DIAGNOSIS — K21.9 GASTROESOPHAGEAL REFLUX DISEASE WITHOUT ESOPHAGITIS: ICD-10-CM

## 2022-07-29 DIAGNOSIS — J30.2 CHRONIC SEASONAL ALLERGIC RHINITIS: Primary | ICD-10-CM

## 2022-07-29 DIAGNOSIS — M85.80 OSTEOPENIA, UNSPECIFIED LOCATION: ICD-10-CM

## 2022-07-29 DIAGNOSIS — E78.2 MIXED HYPERLIPIDEMIA: ICD-10-CM

## 2022-07-29 DIAGNOSIS — N18.32 CHRONIC RENAL FAILURE, STAGE 3B: ICD-10-CM

## 2022-07-29 DIAGNOSIS — E11.9 TYPE 2 DIABETES MELLITUS WITHOUT COMPLICATION, WITHOUT LONG-TERM CURRENT USE OF INSULIN: ICD-10-CM

## 2022-07-29 DIAGNOSIS — M54.59 MECHANICAL LOW BACK PAIN: ICD-10-CM

## 2022-07-29 DIAGNOSIS — N95.1 MENOPAUSAL SYMPTOM: ICD-10-CM

## 2022-07-29 DIAGNOSIS — I25.10 CORONARY ARTERY DISEASE INVOLVING NATIVE CORONARY ARTERY OF NATIVE HEART WITHOUT ANGINA PECTORIS: ICD-10-CM

## 2022-07-29 DIAGNOSIS — N20.0 NEPHROLITHIASIS: ICD-10-CM

## 2022-07-29 DIAGNOSIS — K59.09 CHRONIC CONSTIPATION: ICD-10-CM

## 2022-07-29 DIAGNOSIS — N28.1 RENAL CYST, LEFT: ICD-10-CM

## 2022-07-29 DIAGNOSIS — G45.9 TIA (TRANSIENT ISCHEMIC ATTACK): ICD-10-CM

## 2022-07-29 DIAGNOSIS — I87.2 CHRONIC VENOUS INSUFFICIENCY: ICD-10-CM

## 2022-07-29 DIAGNOSIS — E55.9 VITAMIN D DEFICIENCY DISEASE: ICD-10-CM

## 2022-07-29 DIAGNOSIS — I10 ESSENTIAL HYPERTENSION: ICD-10-CM

## 2022-07-29 PROCEDURE — 99214 OFFICE O/P EST MOD 30 MIN: CPT | Performed by: GENERAL PRACTICE

## 2022-07-29 RX ORDER — FLUCONAZOLE 150 MG/1
150 TABLET ORAL ONCE
Qty: 1 TABLET | Refills: 0 | Status: SHIPPED | OUTPATIENT
Start: 2022-07-29 | End: 2022-07-29

## 2022-07-29 NOTE — PROGRESS NOTES
Subjective   Ethel Trotter is a 66 y.o. female.     Chief Complaint  She returns for a scheduled reassessment of multiple medical problems including coronary artery disease, type 2 diabetes mellitus, chronic renal failure, chronic low back pain, and chronic depression/anxiety    History of Present Illness     Coronary artery disease  She has a history of previous M.I. and CABG surgery.  She denies any recent chest pain.  There is no history of any palpitations, lightheadedness, shortness of breath, calf pain, or swelling the ankles. She remains on low-dose ASA.  She underwent a nuclear stress test on 5/3/2022 with no evidence of inducible ischemia.  Her left ventricular ejection fraction was estimated at 37%.  She is scheduled to undergo an echocardiogram on 8/3/2022 and will see cardiology again the following day  Lab Results   Component Value Date    WBC 8.19 04/28/2022    HGB 14.5 04/28/2022    HCT 45.3 04/28/2022    MCV 89.7 04/28/2022     04/28/2022     Episode of Visual Loss  She experienced a 4 to 5 second episode of complete vision loss in her right eye several years ago. This occurred during a period of anxiety and was associated with a generalized headache.  She continues to deny any further episodes and has had no weakness, numbness, tingling, or difficulty talking or understanding what is said to her.      Type 2 Diabetes Mellitus  She continues to deny paresthesias of the feet, visual disturbances, polydipsia, polyuria, hypoglycemia or foot ulcerations.  Evaluation to date has been a hemoglobin A1c.  Current treatments include metformin and sitagliptin (together as janumet) empagliflozin, and dulaglutide.  She states she is taking her medication as prescribed at present  Lab Results   Component Value Date    HGBA1C 8.90 (H) 04/28/2022     Lab Results   Component Value Date    MICROALBUR <1.2 04/28/2022     Hyperlipidemia  Her compliance with treatment remains quite good. She is currently taking  rosuvastatin and ezetimibe.  She did well with repatha but has been unable to get through her insurance  Lab Results   Component Value Date    CHOL 267 (H) 04/28/2022    CHLPL 247 (H) 03/10/2016    TRIG 286 (H) 04/28/2022    HDL 50 04/28/2022     (H) 04/28/2022     Essential Hypertension  Home blood pressure readings: not doing. She denies any recent chest pain and there is no history of any palpitations, dyspnea, orthopnea, paroxysmal nocturnal dyspnea or peripheral edema. Current antihypertensive medications includes lisinopril, metoprolol, amlodipine.  Lab Results   Component Value Date    GLUCOSE 279 (H) 04/28/2022    BUN 26 (H) 04/28/2022    CREATININE 1.38 (H) 04/28/2022    EGFRIFNONA 33 (L) 10/26/2021    BCR 18.8 04/28/2022    K 5.1 04/28/2022    CO2 25.9 04/28/2022    CALCIUM 10.6 (H) 04/28/2022    ALBUMIN 4.50 04/28/2022    LABIL2 1.4 (L) 03/10/2016    AST 14 04/28/2022    ALT 11 04/28/2022     Lab Results   Component Value Date    ALKPHOS 104 04/28/2022     Chronic Renal Failure  This has been contributed to hypertensive and diabetic nephropathy.  She is aware to avoid any NSAIDs.  Renal ultrasound performed on 5/10/2021 revealed a left renal cyst but no other significant abnormalities.  She was scheduled to undergo a follow-up with urology on 5/10/2022 but elected not to    Low Back Pain   She has a long history of low back pain worse over the last few years. The pain is described as an intermittent left lower ache.  This radiates to her left flank, left lateral hip, and left posterior thigh.  The pain in her back is worse than that elsewhere.  She has been unable to identify any precipitating, exacerbating, or relieving factors.  She needs to deny any changes in her strength, sensation, or bowel/bladder control.  There is no history of any change in her bowel habits and she continues to deny any hematochezia or melena.  She admits to urinary frequency, nocturia, and intermittent dysuria but  continues to deny any hematuria.  There is no history of any vaginal bleeding, fever, chills, or night sweats. Colonoscopy performed on 5/13/2019 by Dr. Reyes was reported as showing mild diverticulosis and several tubular adenomas were removed.  Ultrasound of the abdomen performed on 2/26/2020 revealed a 7.44 cm left renal cyst.  She underwent a urology assessment with Dr. Givens on 5/29/2020 who felt that it was a Bosniak type I.  CT of the abdomen and pelvis performed on 6/10/2020 was reported as showing bilateral renal cysts the largest of which was in the posterior left kidney and measured 7 cm.  Atherosclerotic vascular disease and osteoarthritic changes of the spine were also noted. X-rays of the left hip performed on 2/26/2020 were unremarkable.  MRI of the lumbar spine performed on 11/18/2020 was reported as showing degenerative disc disease with moderate to severe right neuroforaminal narrowing at L4-5     Depression with Anxiety  She has a long history of intermittent depression, nervousness, and difficulty sleeping.  She and her  have had marital difficulties over the last 6 months with an increase in her symptoms.  She denies any change in her concentration or memory and there is no history of any suicidal ideation.  She remains on citalopram    The following portions of the patient's history were reviewed and updated as appropriate: allergies, current medications, past medical history, past social history and problem list.    Review of Systems   Constitutional: Positive for fatigue. Negative for appetite change, chills, fever and unexpected weight change.   HENT: Positive for congestion and rhinorrhea. Negative for ear pain, postnasal drip, sneezing and sore throat.    Eyes: Negative for visual disturbance.   Respiratory: Negative for cough, shortness of breath and wheezing.    Cardiovascular: Negative for chest pain, palpitations and leg swelling.   Gastrointestinal: Negative for abdominal  pain, anal bleeding, blood in stool, constipation, diarrhea, nausea and vomiting.   Endocrine: Negative for polydipsia and polyuria.   Genitourinary: Positive for frequency and vaginal pain (dryness). Negative for dysuria, hematuria and urgency.   Musculoskeletal: Positive for arthralgias and back pain. Negative for myalgias.   Skin: Negative for rash.   Neurological: Negative for weakness, numbness and headaches.   Psychiatric/Behavioral: Positive for sleep disturbance. Negative for dysphoric mood and suicidal ideas. The patient is nervous/anxious.      Objective   Physical Exam  Constitutional:       General: She is not in acute distress.     Appearance: Normal appearance. She is well-developed. She is not diaphoretic.      Comments: Alert and oriented. No apparent distress. No pallor, jaundice, diaphoresis, or cyanosis.   HENT:      Head: Atraumatic.      Right Ear: Ear canal and external ear normal. Tympanic membrane is scarred.      Left Ear: Ear canal and external ear normal. Tympanic membrane is scarred.   Eyes:      Conjunctiva/sclera: Conjunctivae normal.   Neck:      Thyroid: No thyroid mass or thyromegaly.      Vascular: No carotid bruit or JVD.      Trachea: Trachea normal. No tracheal deviation.   Cardiovascular:      Rate and Rhythm: Normal rate and regular rhythm.      Heart sounds: Normal heart sounds, S1 normal and S2 normal. No murmur heard.    No gallop.   Pulmonary:      Effort: Pulmonary effort is normal.      Breath sounds: Normal breath sounds.   Chest:      Chest wall: No tenderness.   Breasts:      Right: No supraclavicular adenopathy.      Left: No supraclavicular adenopathy.       Abdominal:      General: Bowel sounds are normal. There is no distension.   Musculoskeletal:      Right lower leg: No edema.      Left lower leg: No edema.      Comments: Negative straight leg raise. No peripheral joint redness or warmth.   Lymphadenopathy:      Head:      Right side of head: No submental,  submandibular, tonsillar, preauricular, posterior auricular or occipital adenopathy.      Left side of head: No submental, submandibular, tonsillar, preauricular, posterior auricular or occipital adenopathy.      Cervical: No cervical adenopathy.      Upper Body:      Right upper body: No supraclavicular adenopathy.      Left upper body: No supraclavicular adenopathy.   Skin:     General: Skin is warm.      Coloration: Skin is not cyanotic, jaundiced or pale.      Findings: No rash.      Nails: There is no clubbing.   Neurological:      Mental Status: She is alert and oriented to person, place, and time.      Cranial Nerves: No cranial nerve deficit.      Motor: No tremor.      Coordination: Coordination normal.      Gait: Gait normal.   Psychiatric:         Attention and Perception: Attention normal.         Mood and Affect: Mood normal.         Speech: Speech normal.         Behavior: Behavior normal.         Thought Content: Thought content normal. Thought content does not include suicidal ideation.       Assessment & Plan   Problems Addressed this Visit        Allergies and Adverse Reactions    Chronic seasonal allergic rhinitis       Cardiac and Vasculature    Chronic venous insufficiency    Coronary artery disease involving native coronary artery  Reminded regarding risk factor modification with an emphasis on tobacco cessation.  Continue low-dose ASA  Follow-up with scheduled echocardiogram and cardiology    Relevant Orders    CBC & Differential    Essential hypertension   Hypertension: elevated today. Evidence of target organ damage: coronary artery disease, heart failure, chronic kidney disease and transient ischemic attack.  Encouraged to continue to work on diet and exercise plan.   Continue current medication  Follow up with cardiology     Relevant Orders    Comprehensive Metabolic Panel    TSH    Mixed hyperlipidemia  As above.   Continue current medication.    Relevant Orders    Comprehensive Metabolic  Panel    Lipid Panel    TSH       Endocrine and Metabolic    Type 2 diabetes mellitus without complication, without long-term current use of insulin (HCC)  Diabetes mellitus Type II, under unknown control.   Encouraged to continue to pursue ADA diet  Encouraged aerobic exercise.  Continue current medication  Scheduled for updated labs    Relevant Orders    Hemoglobin A1c    MicroAlbumin, Urine, Random - Urine, Clean Catch    Vitamin B12    Vitamin D deficiency disease  Continue supplementation with monitoring.    Relevant Orders    Vitamin D 25 Hydroxy       Gastrointestinal Abdominal     Chronic constipation    Gastroesophageal reflux disease without esophagitis       Genitourinary and Reproductive     Menopausal symptom    Nephrolithiasis    Renal cyst, left  We will arrange an updated ultrasound at her return       Health Encounters    Healthcare maintenance  Patient remains uninterested in COVID-19 vaccination, a mammogram, or a DEXA scan  Reminded to get a flu shot when available       Mental Health    Depression with anxiety  Significant situational component.   Supportive therapy.   Continue current medication.       Musculoskeletal and Injuries    Mechanical low back pain  Reminded regarding symptomatic treatment.   Encouraged to report if any worse or if any new symptoms or concerns.    Osteopenia       Neuro    TIA (transient ischemic attack)  As above.   Continue current medication.       Pulmonary and Pneumonias    COPD mixed type (HCC)    Relevant Orders    CBC & Differential       Tobacco    Dips tobacco       Other    Chronic renal failure, stage 3b (HCC)  Reminded to avoid any NSAIDs prescription or OTC  Will continue to monitor    Relevant Orders    CBC & Differential    Comprehensive Metabolic Panel      Diagnoses       Codes Comments    Chronic seasonal allergic rhinitis    -  Primary ICD-10-CM: J30.2  ICD-9-CM: 477.8     Chronic venous insufficiency     ICD-10-CM: I87.2  ICD-9-CM: 459.81      Coronary artery disease involving native coronary artery of native heart without angina pectoris     ICD-10-CM: I25.10  ICD-9-CM: 414.01     Essential hypertension     ICD-10-CM: I10  ICD-9-CM: 401.9     Mixed hyperlipidemia     ICD-10-CM: E78.2  ICD-9-CM: 272.2     Type 2 diabetes mellitus without complication, without long-term current use of insulin (HCC)     ICD-10-CM: E11.9  ICD-9-CM: 250.00     Vitamin D deficiency disease     ICD-10-CM: E55.9  ICD-9-CM: 268.9     Chronic constipation     ICD-10-CM: K59.09  ICD-9-CM: 564.00     Gastroesophageal reflux disease without esophagitis     ICD-10-CM: K21.9  ICD-9-CM: 530.81     Menopausal symptom     ICD-10-CM: N95.1  ICD-9-CM: 627.2     Nephrolithiasis     ICD-10-CM: N20.0  ICD-9-CM: 592.0     Healthcare maintenance     ICD-10-CM: Z00.00  ICD-9-CM: V70.0     Depression with anxiety     ICD-10-CM: F41.8  ICD-9-CM: 300.4     Mechanical low back pain     ICD-10-CM: M54.59  ICD-9-CM: 724.2     Osteopenia, unspecified location     ICD-10-CM: M85.80  ICD-9-CM: 733.90     TIA (transient ischemic attack)     ICD-10-CM: G45.9  ICD-9-CM: 435.9     COPD mixed type (HCC)     ICD-10-CM: J44.9  ICD-9-CM: 496     Dips tobacco     ICD-10-CM: Z72.0  ICD-9-CM: 305.1     Chronic renal failure, stage 3b (HCC)     ICD-10-CM: N18.32  ICD-9-CM: 585.3     Renal cyst, left     ICD-10-CM: N28.1  ICD-9-CM: 753.10

## 2022-07-30 VITALS
BODY MASS INDEX: 23.92 KG/M2 | SYSTOLIC BLOOD PRESSURE: 158 MMHG | HEART RATE: 74 BPM | WEIGHT: 135 LBS | OXYGEN SATURATION: 97 % | RESPIRATION RATE: 14 BRPM | DIASTOLIC BLOOD PRESSURE: 104 MMHG | TEMPERATURE: 98.4 F | HEIGHT: 63 IN

## 2022-07-30 PROBLEM — M25.552 LEFT HIP PAIN: Status: RESOLVED | Noted: 2018-10-19 | Resolved: 2022-07-30

## 2022-08-03 ENCOUNTER — LAB (OUTPATIENT)
Dept: LAB | Facility: HOSPITAL | Age: 67
End: 2022-08-03

## 2022-08-03 ENCOUNTER — HOSPITAL ENCOUNTER (OUTPATIENT)
Dept: CARDIOLOGY | Facility: HOSPITAL | Age: 67
Discharge: HOME | End: 2022-08-03

## 2022-08-03 DIAGNOSIS — R06.09 DYSPNEA ON EXERTION: ICD-10-CM

## 2022-08-03 DIAGNOSIS — E78.2 MIXED HYPERLIPIDEMIA: ICD-10-CM

## 2022-08-03 DIAGNOSIS — E11.22 TYPE 2 DIABETES MELLITUS WITH STAGE 3B CHRONIC KIDNEY DISEASE, WITHOUT LONG-TERM CURRENT USE OF INSULIN: ICD-10-CM

## 2022-08-03 DIAGNOSIS — I25.10 CORONARY ARTERY DISEASE INVOLVING NATIVE CORONARY ARTERY OF NATIVE HEART WITHOUT ANGINA PECTORIS: ICD-10-CM

## 2022-08-03 DIAGNOSIS — E11.9 TYPE 2 DIABETES MELLITUS WITHOUT COMPLICATION, WITHOUT LONG-TERM CURRENT USE OF INSULIN: ICD-10-CM

## 2022-08-03 DIAGNOSIS — J44.9 COPD MIXED TYPE: ICD-10-CM

## 2022-08-03 DIAGNOSIS — N18.32 CHRONIC RENAL FAILURE, STAGE 3B: ICD-10-CM

## 2022-08-03 DIAGNOSIS — N18.32 TYPE 2 DIABETES MELLITUS WITH STAGE 3B CHRONIC KIDNEY DISEASE, WITHOUT LONG-TERM CURRENT USE OF INSULIN: ICD-10-CM

## 2022-08-03 DIAGNOSIS — I10 ESSENTIAL HYPERTENSION: ICD-10-CM

## 2022-08-03 DIAGNOSIS — E55.9 VITAMIN D DEFICIENCY DISEASE: ICD-10-CM

## 2022-08-03 LAB
25(OH)D3 SERPL-MCNC: 50.1 NG/ML (ref 30–100)
ALBUMIN SERPL-MCNC: 4.2 G/DL (ref 3.5–5.2)
ALBUMIN UR-MCNC: 4.8 MG/DL
ALBUMIN/GLOB SERPL: 1.6 G/DL
ALP SERPL-CCNC: 80 U/L (ref 39–117)
ALT SERPL W P-5'-P-CCNC: 9 U/L (ref 1–33)
ANION GAP SERPL CALCULATED.3IONS-SCNC: 10 MMOL/L (ref 5–15)
AST SERPL-CCNC: 20 U/L (ref 1–32)
BASOPHILS # BLD AUTO: 0.04 10*3/MM3 (ref 0–0.2)
BASOPHILS NFR BLD AUTO: 0.7 % (ref 0–1.5)
BILIRUB SERPL-MCNC: 0.4 MG/DL (ref 0–1.2)
BUN SERPL-MCNC: 16 MG/DL (ref 8–23)
BUN/CREAT SERPL: 10.5 (ref 7–25)
CALCIUM SPEC-SCNC: 9.5 MG/DL (ref 8.6–10.5)
CHLORIDE SERPL-SCNC: 105 MMOL/L (ref 98–107)
CHOLEST SERPL-MCNC: 111 MG/DL (ref 0–200)
CO2 SERPL-SCNC: 25 MMOL/L (ref 22–29)
CREAT SERPL-MCNC: 1.52 MG/DL (ref 0.57–1)
DEPRECATED RDW RBC AUTO: 42.6 FL (ref 37–54)
EGFRCR SERPLBLD CKD-EPI 2021: 37.2 ML/MIN/1.73
EOSINOPHIL # BLD AUTO: 0.68 10*3/MM3 (ref 0–0.4)
EOSINOPHIL NFR BLD AUTO: 11.5 % (ref 0.3–6.2)
ERYTHROCYTE [DISTWIDTH] IN BLOOD BY AUTOMATED COUNT: 13.2 % (ref 12.3–15.4)
GLOBULIN UR ELPH-MCNC: 2.7 GM/DL
GLUCOSE SERPL-MCNC: 153 MG/DL (ref 65–99)
HBA1C MFR BLD: 7.8 % (ref 4.8–5.6)
HCT VFR BLD AUTO: 43.6 % (ref 34–46.6)
HDLC SERPL-MCNC: 41 MG/DL (ref 40–60)
HGB BLD-MCNC: 13.9 G/DL (ref 12–15.9)
IMM GRANULOCYTES # BLD AUTO: 0.01 10*3/MM3 (ref 0–0.05)
IMM GRANULOCYTES NFR BLD AUTO: 0.2 % (ref 0–0.5)
LDLC SERPL CALC-MCNC: 38 MG/DL (ref 0–100)
LDLC/HDLC SERPL: 0.73 {RATIO}
LYMPHOCYTES # BLD AUTO: 1.55 10*3/MM3 (ref 0.7–3.1)
LYMPHOCYTES NFR BLD AUTO: 26.2 % (ref 19.6–45.3)
MCH RBC QN AUTO: 28.1 PG (ref 26.6–33)
MCHC RBC AUTO-ENTMCNC: 31.9 G/DL (ref 31.5–35.7)
MCV RBC AUTO: 88.3 FL (ref 79–97)
MONOCYTES # BLD AUTO: 0.47 10*3/MM3 (ref 0.1–0.9)
MONOCYTES NFR BLD AUTO: 8 % (ref 5–12)
NEUTROPHILS NFR BLD AUTO: 3.16 10*3/MM3 (ref 1.7–7)
NEUTROPHILS NFR BLD AUTO: 53.4 % (ref 42.7–76)
NRBC BLD AUTO-RTO: 0 /100 WBC (ref 0–0.2)
PLATELET # BLD AUTO: 99 10*3/MM3 (ref 140–450)
PMV BLD AUTO: 10.7 FL (ref 6–12)
POTASSIUM SERPL-SCNC: 4.9 MMOL/L (ref 3.5–5.2)
PROT SERPL-MCNC: 6.9 G/DL (ref 6–8.5)
RBC # BLD AUTO: 4.94 10*6/MM3 (ref 3.77–5.28)
SODIUM SERPL-SCNC: 140 MMOL/L (ref 136–145)
TRIGL SERPL-MCNC: 201 MG/DL (ref 0–150)
TSH SERPL DL<=0.05 MIU/L-ACNC: 1.07 UIU/ML (ref 0.27–4.2)
VIT B12 BLD-MCNC: 827 PG/ML (ref 211–946)
VLDLC SERPL-MCNC: 32 MG/DL (ref 5–40)
WBC NRBC COR # BLD: 5.91 10*3/MM3 (ref 3.4–10.8)

## 2022-08-03 PROCEDURE — 93306 TTE W/DOPPLER COMPLETE: CPT | Performed by: INTERNAL MEDICINE

## 2022-08-03 PROCEDURE — 80053 COMPREHEN METABOLIC PANEL: CPT

## 2022-08-03 PROCEDURE — 36415 COLL VENOUS BLD VENIPUNCTURE: CPT

## 2022-08-03 PROCEDURE — 82607 VITAMIN B-12: CPT

## 2022-08-03 PROCEDURE — 84443 ASSAY THYROID STIM HORMONE: CPT

## 2022-08-03 PROCEDURE — 82043 UR ALBUMIN QUANTITATIVE: CPT

## 2022-08-03 PROCEDURE — 80061 LIPID PANEL: CPT

## 2022-08-03 PROCEDURE — 83036 HEMOGLOBIN GLYCOSYLATED A1C: CPT

## 2022-08-03 PROCEDURE — 93306 TTE W/DOPPLER COMPLETE: CPT

## 2022-08-03 PROCEDURE — 85025 COMPLETE CBC W/AUTO DIFF WBC: CPT

## 2022-08-03 PROCEDURE — 82306 VITAMIN D 25 HYDROXY: CPT

## 2022-08-04 LAB
BH CV ECHO MEAS - ACS: 1.9 CM
BH CV ECHO MEAS - AI P1/2T: 528.1 MSEC
BH CV ECHO MEAS - AO MAX PG: 9.1 MMHG
BH CV ECHO MEAS - AO MEAN PG: 4 MMHG
BH CV ECHO MEAS - AO ROOT DIAM: 3.3 CM
BH CV ECHO MEAS - AO V2 MAX: 151 CM/SEC
BH CV ECHO MEAS - AO V2 VTI: 27 CM
BH CV ECHO MEAS - EDV(CUBED): 88.1 ML
BH CV ECHO MEAS - EDV(MOD-SP4): 130 ML
BH CV ECHO MEAS - EF(MOD-SP4): 49.9 %
BH CV ECHO MEAS - ESV(CUBED): 42.7 ML
BH CV ECHO MEAS - ESV(MOD-SP4): 65.1 ML
BH CV ECHO MEAS - FS: 21.5 %
BH CV ECHO MEAS - IVS/LVPW: 1.19 CM
BH CV ECHO MEAS - IVSD: 1.36 CM
BH CV ECHO MEAS - LA DIMENSION: 4.3 CM
BH CV ECHO MEAS - LAT PEAK E' VEL: 5.4 CM/SEC
BH CV ECHO MEAS - LV DIASTOLIC VOL/BSA (35-75): 79.4 CM2
BH CV ECHO MEAS - LV MASS(C)D: 206 GRAMS
BH CV ECHO MEAS - LV SYSTOLIC VOL/BSA (12-30): 39.8 CM2
BH CV ECHO MEAS - LVIDD: 4.5 CM
BH CV ECHO MEAS - LVIDS: 3.5 CM
BH CV ECHO MEAS - LVOT AREA: 3.1 CM2
BH CV ECHO MEAS - LVOT DIAM: 2 CM
BH CV ECHO MEAS - LVPWD: 1.14 CM
BH CV ECHO MEAS - MED PEAK E' VEL: 3.8 CM/SEC
BH CV ECHO MEAS - MV A MAX VEL: 99.8 CM/SEC
BH CV ECHO MEAS - MV E MAX VEL: 51.8 CM/SEC
BH CV ECHO MEAS - MV E/A: 0.52
BH CV ECHO MEAS - PA ACC TIME: 0.09 SEC
BH CV ECHO MEAS - PA PR(ACCEL): 37.6 MMHG
BH CV ECHO MEAS - RAP SYSTOLE: 10 MMHG
BH CV ECHO MEAS - RVSP: 44.1 MMHG
BH CV ECHO MEAS - SI(MOD-SP4): 39.7 ML/M2
BH CV ECHO MEAS - SV(MOD-SP4): 64.9 ML
BH CV ECHO MEAS - TAPSE (>1.6): 1.63 CM
BH CV ECHO MEAS - TR MAX PG: 34.1 MMHG
BH CV ECHO MEAS - TR MAX VEL: 292 CM/SEC
BH CV ECHO MEASUREMENTS AVERAGE E/E' RATIO: 11.26
LEFT ATRIUM VOLUME INDEX: 27 ML/M2
MAXIMAL PREDICTED HEART RATE: 152 BPM
STRESS TARGET HR: 129 BPM

## 2022-08-11 DIAGNOSIS — R79.89 ELEVATED SERUM CREATININE: Primary | ICD-10-CM

## 2022-08-18 ENCOUNTER — OFFICE VISIT (OUTPATIENT)
Dept: CARDIOLOGY | Facility: CLINIC | Age: 67
End: 2022-08-18

## 2022-08-18 VITALS
SYSTOLIC BLOOD PRESSURE: 190 MMHG | WEIGHT: 135 LBS | BODY MASS INDEX: 23.92 KG/M2 | HEIGHT: 63 IN | HEART RATE: 73 BPM | DIASTOLIC BLOOD PRESSURE: 95 MMHG

## 2022-08-18 DIAGNOSIS — I25.10 ASCVD (ARTERIOSCLEROTIC CARDIOVASCULAR DISEASE): Primary | ICD-10-CM

## 2022-08-18 DIAGNOSIS — E78.2 MIXED DYSLIPIDEMIA: ICD-10-CM

## 2022-08-18 DIAGNOSIS — N18.32 TYPE 2 DIABETES MELLITUS WITH STAGE 3B CHRONIC KIDNEY DISEASE, WITHOUT LONG-TERM CURRENT USE OF INSULIN: ICD-10-CM

## 2022-08-18 DIAGNOSIS — I10 UNCONTROLLED HYPERTENSION: ICD-10-CM

## 2022-08-18 DIAGNOSIS — E11.22 TYPE 2 DIABETES MELLITUS WITH STAGE 3B CHRONIC KIDNEY DISEASE, WITHOUT LONG-TERM CURRENT USE OF INSULIN: ICD-10-CM

## 2022-08-18 DIAGNOSIS — R94.39 ABNORMAL STRESS TEST: ICD-10-CM

## 2022-08-18 PROCEDURE — 99214 OFFICE O/P EST MOD 30 MIN: CPT | Performed by: NURSE PRACTITIONER

## 2022-08-18 RX ORDER — CARVEDILOL 12.5 MG/1
12.5 TABLET ORAL 2 TIMES DAILY
Qty: 60 TABLET | Refills: 11 | Status: SHIPPED | OUTPATIENT
Start: 2022-08-18 | End: 2022-08-25 | Stop reason: SDUPTHER

## 2022-08-18 RX ORDER — ROSUVASTATIN CALCIUM 40 MG/1
40 TABLET, COATED ORAL DAILY
COMMUNITY
End: 2022-08-25 | Stop reason: SDUPTHER

## 2022-08-18 NOTE — PROGRESS NOTES
Sacha Montez MD  Ethel Trotter  1955 08/18/2022    Patient Active Problem List   Diagnosis   • Depression with anxiety   • COPD mixed type (HCC)   • Essential hypertension   • Mixed hyperlipidemia   • Type 2 diabetes mellitus without complication, without long-term current use of insulin (HCC)   • Coronary artery disease involving native coronary artery   • Vitamin D deficiency disease   • Osteopenia   • Hepatitis C antibody test positive   • H/O acute pancreatitis   • Gastroesophageal reflux disease without esophagitis   • Chronic venous insufficiency   • Chronic seasonal allergic rhinitis   • Dips tobacco   • Encounter for immunization   • Healthcare maintenance   • Mechanical low back pain   • Encounter for screening mammogram for breast cancer   • Chronic constipation   • TIA (transient ischemic attack)   • Chronic renal failure, stage 3b (HCC)   • Renal cyst, left   • Nephrolithiasis   • Menopausal symptom   • History of recurrent urinary tract infection       Dear Sacha Montez MD:    Subjective     Chief Complaint   Patient presents with   • Follow-up     ECHO/LABS           History of Present Illness:    Ethel Trotter is a 66 y.o. female with a past medical history of known CAD with previous MI s/p 3 vessel CABG about 9 years ago at [facility] in [place], [place]. previously she was having some chest pains and evaluated with a Lexiscan stress test which revealed a fixed basal to mid inferolateral defect with slight reversibility consistent with inferolateral infarction with mild shelley-infarction ischemia.  She recently underwent echocardiogram which revealed LVEF 51 to 55% with mild aortic valve insufficiency and mild to moderate mitral valve insufficiency.  Her most recent creatinine was 1.5 and thus she was referred to Dr. Agus alvarado for chronic kidney disease.  She states she has been feeling better since her recent medication changes.  She denies any chest pains or  shortness of breath.  She has not yet started Praluent but states she is expecting this to come in the mail today.  Previously, it was recommended the patient start carvedilol due to elevated blood pressure.  She states she did not  the medication and has not yet started this.            No Known Allergies:      Current Outpatient Medications:   •  acetaminophen (TYLENOL) 500 MG tablet, Take 500 mg by mouth Every 6 (Six) Hours As Needed for Mild Pain ., Disp: , Rfl:   •  albuterol sulfate  (90 Base) MCG/ACT inhaler, Inhale 2 puffs 4 (Four) Times a Day., Disp: 18 g, Rfl: 3  •  Alirocumab (Praluent) 150 MG/ML injection pen, Inject 2 mL under the skin into the appropriate area as directed Every 28 (Twenty-Eight) Days., Disp: 2 mL, Rfl: 5  •  amLODIPine (NORVASC) 10 MG tablet, Take 1 tablet by mouth Every Evening., Disp: 30 tablet, Rfl: 5  •  aspirin (aspirin) 81 MG EC tablet, Take 1 tablet by mouth Daily., Disp: 30 tablet, Rfl: 5  •  citalopram (CeleXA) 20 MG tablet, Take 1 tablet by mouth Daily., Disp: 30 tablet, Rfl: 5  •  Dulaglutide (Trulicity) 1.5 MG/0.5ML solution pen-injector, Inject 1.5 mg under the skin into the appropriate area as directed 1 (One) Time Per Week., Disp: 4 pen, Rfl: 5  •  empagliflozin (Jardiance) 10 MG tablet tablet, Take 1 tablet by mouth Every Morning., Disp: 30 tablet, Rfl: 5  •  ezetimibe (ZETIA) 10 MG tablet, Take 1 tablet by mouth Every Night., Disp: 30 tablet, Rfl: 5  •  fluticasone (Flonase) 50 MCG/ACT nasal spray, Administer 2 sprays both nostrils once daily, Disp: 16 g, Rfl: 5  •  Glucose Blood (Blood Glucose Test) strip, USE TO TEST BLOOD GLUCOSE TWO TIMES A DAY, Disp: 50 each, Rfl: 5  •  ipratropium-albuterol (COMBIVENT RESPIMAT)  MCG/ACT inhaler, Inhale 1 puff 4 (Four) Times a Day As Needed for Wheezing., Disp: , Rfl:   •  isosorbide mononitrate (IMDUR) 60 MG 24 hr tablet, Take 1.5 tablets by mouth Every Morning., Disp: 45 tablet, Rfl: 5  •  Lancets misc, 1  "twice daily, Disp: 60 each, Rfl: 5  •  linaclotide (Linzess) 72 MCG capsule capsule, Take 1 capsule by mouth Every Morning Before Breakfast., Disp: 30 capsule, Rfl: 5  •  lisinopril (PRINIVIL,ZESTRIL) 10 MG tablet, Take 2 tablets by mouth Daily., Disp: 60 tablet, Rfl: 5  •  nitroglycerin (NITROSTAT) 0.4 MG SL tablet, DISSOLVE ONE TABLET UNDER TONGUE AS NEEDED FOR CHEST PAIN, MAY REPEAT EVERY 5 MINUTES FOR 3 DOSES, IF NO RELIEF CALL 911, Disp: 25 tablet, Rfl: 5  •  rosuvastatin (CRESTOR) 40 MG tablet, Take 40 mg by mouth Daily., Disp: , Rfl:   •  vitamin D (ERGOCALCIFEROL) 1.25 MG (03332 UT) capsule capsule, Take 1 capsule by mouth 1 (One) Time Per Week., Disp: 4 capsule, Rfl: 5  •  carvedilol (COREG) 12.5 MG tablet, Take 1 tablet by mouth 2 (Two) Times a Day., Disp: 60 tablet, Rfl: 11  •  conjugated estrogens (PREMARIN) 0.625 MG/GM vaginal cream, 1 applicator pv twice weekly, Disp: 30 g, Rfl: 5  •  methocarbamol (Robaxin) 500 MG tablet, Take 1 tablet by mouth 2 (Two) Times a Day As Needed for Muscle Spasms., Disp: 20 tablet, Rfl: 0      The following portions of the patient's history were reviewed and updated as appropriate: allergies, current medications, past family history, past medical history, past social history, past surgical history and problem list.    Social History     Tobacco Use   • Smoking status: Never Smoker   • Smokeless tobacco: Current User     Types: Chew   Vaping Use   • Vaping Use: Never used   Substance Use Topics   • Alcohol use: No   • Drug use: Yes     Types: Marijuana     Comment: OCCASIONAL       Review of Systems   Constitutional: Negative for decreased appetite and malaise/fatigue.   Cardiovascular: Negative for chest pain, dyspnea on exertion and palpitations.   Respiratory: Negative for cough and shortness of breath.        Objective   Vitals:    08/18/22 1036   BP: (!) 190/95   Pulse: 73   Weight: 61.2 kg (135 lb)   Height: 160 cm (63\")     Body mass index is 23.91 " kg/m².        Vitals reviewed.   Constitutional:       Appearance: Healthy appearance. Well-developed and not in distress.   HENT:      Head: Normocephalic and atraumatic.   Neck:      Vascular: No JVD.   Pulmonary:      Effort: Pulmonary effort is normal.      Breath sounds: Normal breath sounds. No wheezing. No rales.   Cardiovascular:      Normal rate. Regular rhythm.      Murmurs: There is no murmur.      . No S3 and S4 gallop.   Edema:     Peripheral edema absent.   Abdominal:      General: Bowel sounds are normal.      Palpations: Abdomen is soft.   Skin:     General: Skin is warm and dry.   Neurological:      Mental Status: Alert, oriented to person, place, and time and oriented to person, place and time.   Psychiatric:         Mood and Affect: Mood normal.         Behavior: Behavior normal.         Lab Results   Component Value Date     08/03/2022    K 4.9 08/03/2022     08/03/2022    CO2 25.0 08/03/2022    BUN 16 08/03/2022    CREATININE 1.52 (H) 08/03/2022    GLUCOSE 153 (H) 08/03/2022    CALCIUM 9.5 08/03/2022    AST 20 08/03/2022    ALT 9 08/03/2022    ALKPHOS 80 08/03/2022    LABIL2 1.4 (L) 03/10/2016     Lab Results   Component Value Date    CKTOTAL 63 06/30/2020     Lab Results   Component Value Date    WBC 5.91 08/03/2022    HGB 13.9 08/03/2022    HCT 43.6 08/03/2022    PLT 99 (L) 08/03/2022     No results found for: INR  Lab Results   Component Value Date    MG 2.1 06/30/2020     Lab Results   Component Value Date    TSH 1.070 08/03/2022    CHLPL 247 (H) 03/10/2016    TRIG 201 (H) 08/03/2022    HDL 41 08/03/2022    LDL 38 08/03/2022      No results found for: BNP        Procedures      Assessment & Plan    Diagnosis Plan   1. ASCVD (arteriosclerotic cardiovascular disease), status post previous MI, status post CABG (three-vessel) about 9 years ago.  carvedilol (COREG) 12.5 MG tablet   2. Uncontrolled hypertension     3. Mixed dyslipidemia     4. Type 2 diabetes mellitus with stage 3b  chronic kidney disease, without long-term current use of insulin (HCC)     5. Abnormal stress test  carvedilol (COREG) 12.5 MG tablet                Recommendations:    1. ASCVD-recent stress test revealing some shelley-infarction ischemia.  However, patient reports her chest pains have completely resolved.  Since her renal function is abnormal with creatinine of 1.5 and nephrology referral pending and chest pains have resolved, will continue with GDMT for now.  If she has any recurrent anginal symptoms in the future we will have to consider further evaluation with cardiac catheterization.    2. Essential hypertension-Will stop metoprolol succinate and start carvedilol 12.5 mg BID as previously recommended.  3. Dyslipidemia-continue Crestor, Zetia, and Praluent.  We will continue to monitor lipids with initiation of PCSK9 inhibitor.   4. Follow-up in 4 weeks or sooner if needed.        Return in about 4 weeks (around 9/15/2022) for Recheck.    As always, I appreciate very much the opportunity to participate in the cardiovascular care of your patients.      With Best Regards,    RAY Coleman

## 2022-08-23 ENCOUNTER — TELEPHONE (OUTPATIENT)
Dept: FAMILY MEDICINE CLINIC | Facility: CLINIC | Age: 67
End: 2022-08-23

## 2022-08-23 NOTE — TELEPHONE ENCOUNTER
Caller: Ethel Trotter    Relationship: Self    Best call back number: [phone]    Requested Prescriptions:   PATIENT IS REQUESTING THAT ALL HER MEDICATIONS BE REFILLED     Pharmacy where request should be sent: [facility] [address] - [phone]  - [phone]     Additional details provided by patient: THE PATIENT STATES THAT SHE IS OUT OF MEDICATION AND HAS BEEN OUT OF MEDICATION FOR MONTHS PLEASE CALL PATIENT TO LET HER KNOW IF THAT WAS REFILLED     Does the patient have less than a 3 day supply:  [x] Yes  [] No    Juan Ramon Esposito Rep   08/23/22 09:21 EDT

## 2022-08-25 DIAGNOSIS — J30.2 CHRONIC SEASONAL ALLERGIC RHINITIS: ICD-10-CM

## 2022-08-25 DIAGNOSIS — R94.39 ABNORMAL STRESS TEST: ICD-10-CM

## 2022-08-25 DIAGNOSIS — E78.2 MIXED HYPERLIPIDEMIA: ICD-10-CM

## 2022-08-25 DIAGNOSIS — N95.1 MENOPAUSAL SYMPTOM: ICD-10-CM

## 2022-08-25 DIAGNOSIS — E11.9 TYPE 2 DIABETES MELLITUS WITHOUT COMPLICATION, WITHOUT LONG-TERM CURRENT USE OF INSULIN: ICD-10-CM

## 2022-08-25 DIAGNOSIS — I10 ESSENTIAL HYPERTENSION: ICD-10-CM

## 2022-08-25 DIAGNOSIS — E55.9 VITAMIN D DEFICIENCY DISEASE: ICD-10-CM

## 2022-08-25 DIAGNOSIS — F41.8 DEPRESSION WITH ANXIETY: ICD-10-CM

## 2022-08-25 DIAGNOSIS — I25.10 CORONARY ARTERY DISEASE INVOLVING NATIVE CORONARY ARTERY OF NATIVE HEART WITHOUT ANGINA PECTORIS: ICD-10-CM

## 2022-08-25 DIAGNOSIS — K59.09 CHRONIC CONSTIPATION: ICD-10-CM

## 2022-08-25 DIAGNOSIS — I25.10 ASCVD (ARTERIOSCLEROTIC CARDIOVASCULAR DISEASE): ICD-10-CM

## 2022-08-25 RX ORDER — ISOSORBIDE MONONITRATE 60 MG/1
90 TABLET, EXTENDED RELEASE ORAL EVERY MORNING
Qty: 45 TABLET | Refills: 5 | Status: SHIPPED | OUTPATIENT
Start: 2022-08-25 | End: 2022-12-15 | Stop reason: SDUPTHER

## 2022-08-25 RX ORDER — CARVEDILOL 12.5 MG/1
12.5 TABLET ORAL 2 TIMES DAILY
Qty: 60 TABLET | Refills: 11 | Status: SHIPPED | OUTPATIENT
Start: 2022-08-25 | End: 2022-12-15 | Stop reason: SDUPTHER

## 2022-08-25 RX ORDER — ALIROCUMAB 150 MG/ML
300 INJECTION, SOLUTION SUBCUTANEOUS
Qty: 2 ML | Refills: 5 | Status: SHIPPED | OUTPATIENT
Start: 2022-08-25 | End: 2022-12-23 | Stop reason: SDUPTHER

## 2022-08-25 RX ORDER — ROSUVASTATIN CALCIUM 40 MG/1
40 TABLET, COATED ORAL DAILY
Qty: 90 TABLET | Refills: 3 | Status: SHIPPED | OUTPATIENT
Start: 2022-08-25 | End: 2022-12-15 | Stop reason: SDUPTHER

## 2022-08-25 RX ORDER — FLUTICASONE PROPIONATE 50 MCG
SPRAY, SUSPENSION (ML) NASAL
Qty: 16 G | Refills: 5 | Status: SHIPPED | OUTPATIENT
Start: 2022-08-25 | End: 2023-04-03 | Stop reason: SDUPTHER

## 2022-08-25 RX ORDER — ALBUTEROL SULFATE 90 UG/1
2 AEROSOL, METERED RESPIRATORY (INHALATION)
Qty: 18 G | Refills: 3 | Status: SHIPPED | OUTPATIENT
Start: 2022-08-25 | End: 2022-12-15 | Stop reason: SDUPTHER

## 2022-08-25 RX ORDER — NITROGLYCERIN 0.4 MG/1
0.4 TABLET SUBLINGUAL
Qty: 25 TABLET | Refills: 5 | Status: SHIPPED | OUTPATIENT
Start: 2022-08-25 | End: 2022-12-15 | Stop reason: SDUPTHER

## 2022-08-25 RX ORDER — AMLODIPINE BESYLATE 10 MG/1
10 TABLET ORAL EVERY EVENING
Qty: 30 TABLET | Refills: 5 | Status: SHIPPED | OUTPATIENT
Start: 2022-08-25 | End: 2022-12-15 | Stop reason: SDUPTHER

## 2022-08-25 RX ORDER — EZETIMIBE 10 MG/1
10 TABLET ORAL NIGHTLY
Qty: 30 TABLET | Refills: 5 | Status: SHIPPED | OUTPATIENT
Start: 2022-08-25 | End: 2022-12-15 | Stop reason: SDUPTHER

## 2022-08-25 RX ORDER — CITALOPRAM 20 MG/1
20 TABLET ORAL DAILY
Qty: 30 TABLET | Refills: 5 | Status: SHIPPED | OUTPATIENT
Start: 2022-08-25 | End: 2022-12-15 | Stop reason: SDUPTHER

## 2022-08-25 RX ORDER — ASPIRIN 81 MG/1
81 TABLET ORAL DAILY
Qty: 30 TABLET | Refills: 5 | Status: SHIPPED | OUTPATIENT
Start: 2022-08-25 | End: 2022-12-15 | Stop reason: SDUPTHER

## 2022-08-25 RX ORDER — DULAGLUTIDE 1.5 MG/.5ML
1.5 INJECTION, SOLUTION SUBCUTANEOUS WEEKLY
Qty: 4 PEN | Refills: 5 | Status: SHIPPED | OUTPATIENT
Start: 2022-08-25 | End: 2022-12-15 | Stop reason: SDUPTHER

## 2022-08-25 RX ORDER — LISINOPRIL 10 MG/1
20 TABLET ORAL DAILY
Qty: 60 TABLET | Refills: 5 | Status: SHIPPED | OUTPATIENT
Start: 2022-08-25 | End: 2022-12-15 | Stop reason: SDUPTHER

## 2022-08-25 RX ORDER — ERGOCALCIFEROL 1.25 MG/1
50000 CAPSULE ORAL WEEKLY
Qty: 4 CAPSULE | Refills: 5 | Status: SHIPPED | OUTPATIENT
Start: 2022-08-25 | End: 2022-12-15

## 2022-09-13 ENCOUNTER — HOSPITAL ENCOUNTER (EMERGENCY)
Facility: HOSPITAL | Age: 67
Discharge: HOME | End: 2022-09-13
Attending: STUDENT IN AN ORGANIZED HEALTH CARE EDUCATION/TRAINING PROGRAM | Admitting: STUDENT IN AN ORGANIZED HEALTH CARE EDUCATION/TRAINING PROGRAM

## 2022-09-13 ENCOUNTER — OFFICE VISIT (OUTPATIENT)
Dept: FAMILY MEDICINE CLINIC | Facility: CLINIC | Age: 67
End: 2022-09-13

## 2022-09-13 VITALS
BODY MASS INDEX: 23.21 KG/M2 | WEIGHT: 131 LBS | HEIGHT: 63 IN | OXYGEN SATURATION: 100 % | SYSTOLIC BLOOD PRESSURE: 214 MMHG | HEART RATE: 72 BPM | TEMPERATURE: 98.2 F | RESPIRATION RATE: 18 BRPM | DIASTOLIC BLOOD PRESSURE: 110 MMHG

## 2022-09-13 VITALS
WEIGHT: 131 LBS | SYSTOLIC BLOOD PRESSURE: 220 MMHG | BODY MASS INDEX: 23.21 KG/M2 | TEMPERATURE: 98.2 F | HEART RATE: 103 BPM | OXYGEN SATURATION: 98 % | DIASTOLIC BLOOD PRESSURE: 120 MMHG | HEIGHT: 63 IN

## 2022-09-13 DIAGNOSIS — N30.00 ACUTE CYSTITIS WITHOUT HEMATURIA: Primary | ICD-10-CM

## 2022-09-13 DIAGNOSIS — I16.0 HYPERTENSIVE URGENCY: Primary | ICD-10-CM

## 2022-09-13 DIAGNOSIS — R33.9 URINARY RETENTION: ICD-10-CM

## 2022-09-13 DIAGNOSIS — E11.65 UNCONTROLLED TYPE 2 DIABETES MELLITUS WITH HYPERGLYCEMIA: ICD-10-CM

## 2022-09-13 DIAGNOSIS — I10 UNCONTROLLED STAGE 2 HYPERTENSION: ICD-10-CM

## 2022-09-13 DIAGNOSIS — N30.00 ACUTE CYSTITIS WITHOUT HEMATURIA: ICD-10-CM

## 2022-09-13 LAB
ALBUMIN SERPL-MCNC: 4.12 G/DL (ref 3.5–5.2)
ALBUMIN/GLOB SERPL: 1.1 G/DL
ALP SERPL-CCNC: 100 U/L (ref 39–117)
ALT SERPL W P-5'-P-CCNC: 15 U/L (ref 1–33)
ANION GAP SERPL CALCULATED.3IONS-SCNC: 10.2 MMOL/L (ref 5–15)
AST SERPL-CCNC: 18 U/L (ref 1–32)
BACTERIA UR QL AUTO: ABNORMAL /HPF
BASOPHILS # BLD AUTO: 0.08 10*3/MM3 (ref 0–0.2)
BASOPHILS NFR BLD AUTO: 1.2 % (ref 0–1.5)
BILIRUB SERPL-MCNC: 0.2 MG/DL (ref 0–1.2)
BILIRUB UR QL STRIP: NEGATIVE
BUN SERPL-MCNC: 27 MG/DL (ref 8–23)
BUN/CREAT SERPL: 16.1 (ref 7–25)
CALCIUM SPEC-SCNC: 10.5 MG/DL (ref 8.6–10.5)
CHLORIDE SERPL-SCNC: 102 MMOL/L (ref 98–107)
CLARITY UR: ABNORMAL
CO2 SERPL-SCNC: 25.8 MMOL/L (ref 22–29)
COLOR UR: ABNORMAL
CREAT SERPL-MCNC: 1.68 MG/DL (ref 0.57–1)
DEPRECATED RDW RBC AUTO: 44.2 FL (ref 37–54)
EGFRCR SERPLBLD CKD-EPI 2021: 33 ML/MIN/1.73
EOSINOPHIL # BLD AUTO: 0.51 10*3/MM3 (ref 0–0.4)
EOSINOPHIL NFR BLD AUTO: 7.4 % (ref 0.3–6.2)
ERYTHROCYTE [DISTWIDTH] IN BLOOD BY AUTOMATED COUNT: 13.3 % (ref 12.3–15.4)
GLOBULIN UR ELPH-MCNC: 3.9 GM/DL
GLUCOSE SERPL-MCNC: 162 MG/DL (ref 65–99)
GLUCOSE UR STRIP-MCNC: ABNORMAL MG/DL
HCT VFR BLD AUTO: 45.7 % (ref 34–46.6)
HGB BLD-MCNC: 14.7 G/DL (ref 12–15.9)
HGB UR QL STRIP.AUTO: NEGATIVE
HYALINE CASTS UR QL AUTO: ABNORMAL /LPF
IMM GRANULOCYTES # BLD AUTO: 0.03 10*3/MM3 (ref 0–0.05)
IMM GRANULOCYTES NFR BLD AUTO: 0.4 % (ref 0–0.5)
KETONES UR QL STRIP: ABNORMAL
LEUKOCYTE ESTERASE UR QL STRIP.AUTO: ABNORMAL
LYMPHOCYTES # BLD AUTO: 1.46 10*3/MM3 (ref 0.7–3.1)
LYMPHOCYTES NFR BLD AUTO: 21.3 % (ref 19.6–45.3)
MCH RBC QN AUTO: 29.1 PG (ref 26.6–33)
MCHC RBC AUTO-ENTMCNC: 32.2 G/DL (ref 31.5–35.7)
MCV RBC AUTO: 90.5 FL (ref 79–97)
MONOCYTES # BLD AUTO: 0.74 10*3/MM3 (ref 0.1–0.9)
MONOCYTES NFR BLD AUTO: 10.8 % (ref 5–12)
MUCOUS THREADS URNS QL MICRO: ABNORMAL /HPF
NEUTROPHILS NFR BLD AUTO: 4.03 10*3/MM3 (ref 1.7–7)
NEUTROPHILS NFR BLD AUTO: 58.9 % (ref 42.7–76)
NITRITE UR QL STRIP: NEGATIVE
NRBC BLD AUTO-RTO: 0 /100 WBC (ref 0–0.2)
PH UR STRIP.AUTO: <=5 [PH] (ref 5–8)
PLATELET # BLD AUTO: 224 10*3/MM3 (ref 140–450)
PMV BLD AUTO: 9.5 FL (ref 6–12)
POTASSIUM SERPL-SCNC: 4.9 MMOL/L (ref 3.5–5.2)
PROT SERPL-MCNC: 8 G/DL (ref 6–8.5)
PROT UR QL STRIP: ABNORMAL
RBC # BLD AUTO: 5.05 10*6/MM3 (ref 3.77–5.28)
RBC # UR STRIP: ABNORMAL /HPF
REF LAB TEST METHOD: ABNORMAL
SODIUM SERPL-SCNC: 138 MMOL/L (ref 136–145)
SP GR UR STRIP: 1.03 (ref 1–1.03)
SQUAMOUS #/AREA URNS HPF: ABNORMAL /HPF
UROBILINOGEN UR QL STRIP: ABNORMAL
WBC # UR STRIP: ABNORMAL /HPF
WBC NRBC COR # BLD: 6.85 10*3/MM3 (ref 3.4–10.8)

## 2022-09-13 PROCEDURE — 99283 EMERGENCY DEPT VISIT LOW MDM: CPT

## 2022-09-13 PROCEDURE — 99214 OFFICE O/P EST MOD 30 MIN: CPT | Performed by: PHYSICIAN ASSISTANT

## 2022-09-13 PROCEDURE — 87086 URINE CULTURE/COLONY COUNT: CPT | Performed by: STUDENT IN AN ORGANIZED HEALTH CARE EDUCATION/TRAINING PROGRAM

## 2022-09-13 PROCEDURE — 81001 URINALYSIS AUTO W/SCOPE: CPT | Performed by: STUDENT IN AN ORGANIZED HEALTH CARE EDUCATION/TRAINING PROGRAM

## 2022-09-13 PROCEDURE — 36415 COLL VENOUS BLD VENIPUNCTURE: CPT

## 2022-09-13 PROCEDURE — 85025 COMPLETE CBC W/AUTO DIFF WBC: CPT | Performed by: STUDENT IN AN ORGANIZED HEALTH CARE EDUCATION/TRAINING PROGRAM

## 2022-09-13 PROCEDURE — 80053 COMPREHEN METABOLIC PANEL: CPT | Performed by: STUDENT IN AN ORGANIZED HEALTH CARE EDUCATION/TRAINING PROGRAM

## 2022-09-13 RX ORDER — ATORVASTATIN CALCIUM 80 MG/1
TABLET, FILM COATED ORAL
COMMUNITY
Start: 2022-08-23 | End: 2022-12-15

## 2022-09-13 RX ORDER — LACTULOSE 10 G/15ML
SOLUTION ORAL
COMMUNITY
Start: 2022-09-12 | End: 2022-12-15

## 2022-09-13 RX ORDER — PSEUDOEPHED/ACETAMINOPH/DIPHEN 30MG-500MG
500 TABLET ORAL EVERY 8 HOURS PRN
COMMUNITY
Start: 2022-09-12

## 2022-09-13 RX ORDER — HYDRALAZINE HYDROCHLORIDE 10 MG/1
20 TABLET, FILM COATED ORAL ONCE
Status: COMPLETED | OUTPATIENT
Start: 2022-09-13 | End: 2022-09-13

## 2022-09-13 RX ORDER — HYDRALAZINE HYDROCHLORIDE 25 MG/1
25 TABLET, FILM COATED ORAL EVERY 8 HOURS PRN
Qty: 30 TABLET | Refills: 0 | Status: SHIPPED | OUTPATIENT
Start: 2022-09-13 | End: 2022-12-15

## 2022-09-13 RX ORDER — HYDRALAZINE HYDROCHLORIDE 20 MG/ML
10 INJECTION INTRAMUSCULAR; INTRAVENOUS ONCE
Status: DISCONTINUED | OUTPATIENT
Start: 2022-09-13 | End: 2022-09-13

## 2022-09-13 RX ORDER — CLONIDINE HYDROCHLORIDE 0.1 MG/1
0.1 TABLET ORAL ONCE
Status: COMPLETED | OUTPATIENT
Start: 2022-09-13 | End: 2022-09-13

## 2022-09-13 RX ORDER — CEFDINIR 300 MG/1
CAPSULE ORAL
COMMUNITY
Start: 2022-09-12 | End: 2022-10-03

## 2022-09-13 RX ADMIN — CLONIDINE HYDROCHLORIDE 0.1 MG: 0.1 TABLET ORAL at 10:42

## 2022-09-13 RX ADMIN — HYDRALAZINE HYDROCHLORIDE 20 MG: 10 TABLET, FILM COATED ORAL at 16:55

## 2022-09-13 NOTE — ED NOTES
Dr. Lopez verbalized to not wait for blood pressure to lower before discharge. See discharge orders.

## 2022-09-13 NOTE — DISCHARGE INSTRUCTIONS
Take your following medications in the morning to help control your blood pressure    Lisinopril 10 mg  Coreg 12.5 mg  Amlodipine 10 mg    You have been prescribed hydralazine 25 mg every 8 hours if your blood pressure is greater than 180 systolic as the top number or 110 diastolic as your bottom number on blood pressure check.

## 2022-09-13 NOTE — PROGRESS NOTES
Subjective        Chief Complaint  ED follow up.     Subjective      History of Present Illness  Ethel Trotter is a 66 y.o. female who presents today to [facility] for hospital follow up. Past medical history is significant for hypertension, dyslipidemia, polycystic kidney disease with stage IIIb CKD, CAD, GERD, depression, anxiety, history of TIA, COPD.    ED visit:  Acute cystitis:   Flank pain:   Constipation:   Ethel was seen in the ED of [facility] on 9/10/2022 with complaints of left lower quadrant abdominal pain as well as constipation and dysuria.  Blood pressure was significantly elevated at 214/127.  Creatinine was elevated at 1.67 with recent baseline being around 1.3-1.6.  She was diagnosed with a UTI.  She had a CT scan of her abdomen and pelvis which showed diverticulosis without evidence of acute diverticulitis.  There were simple and complex cysts in both kidneys.  No acute process or adenopathy appreciated in the abdomen or pelvis.  There was a large amount of stool in the colon/rectum.  She is to follow-up with both cardiology and urology later this week.    She reports that since her ED visit, she continues to have left flank, left lower back, and left lower quadrant abdominal pain.  She has been moving her bowels.  She reports very little urination.  She is only urinating a very small amount twice a day.  It is dark.  She denies any fevers, but she has had chills.  She has been taking the cefdinir which she was written in the ED on Saturday without any improvement in her dysuria.      Uncontrolled HTN:   BP in the office today is significantly elevated at 220/120.  She reports that she has taken her regular antihypertensive medications this morning.  She denies any complaints of headache, vision changes, unilateral weakness, slurred speech, altered mental status, chest pains, or shortness of breath.  Per recent cardiology visit, she was to start  carvedilol 12.5 mg twice daily multiple times recently, however, had not filled the medication.  She reports that she is still not feeling this as there was a mixup with her prescriptions at [facility] in [place].  She reports they were supposed to send all of her prescriptions to [facility], however, the carvedilol had not been sent.      Current Outpatient Medications:   •  albuterol sulfate  (90 Base) MCG/ACT inhaler, Inhale 2 puffs 4 (Four) Times a Day., Disp: 18 g, Rfl: 3  •  Alirocumab (Praluent) 150 MG/ML injection pen, Inject 2 mL under the skin into the appropriate area as directed Every 28 (Twenty-Eight) Days., Disp: 2 mL, Rfl: 5  •  amLODIPine (NORVASC) 10 MG tablet, Take 1 tablet by mouth Every Evening., Disp: 30 tablet, Rfl: 5  •  aspirin (aspirin) 81 MG EC tablet, Take 1 tablet by mouth Daily., Disp: 30 tablet, Rfl: 5  •  carvedilol (COREG) 12.5 MG tablet, Take 1 tablet by mouth 2 (Two) Times a Day., Disp: 60 tablet, Rfl: 11  •  citalopram (CeleXA) 20 MG tablet, Take 1 tablet by mouth Daily., Disp: 30 tablet, Rfl: 5  •  conjugated estrogens (PREMARIN) 0.625 MG/GM vaginal cream, 1 applicator pv twice weekly, Disp: 30 g, Rfl: 5  •  Dulaglutide (Trulicity) 1.5 MG/0.5ML solution pen-injector, Inject 1.5 mg under the skin into the appropriate area as directed 1 (One) Time Per Week., Disp: 4 pen, Rfl: 5  •  empagliflozin (Jardiance) 10 MG tablet tablet, Take 1 tablet by mouth Every Morning., Disp: 30 tablet, Rfl: 5  •  ezetimibe (ZETIA) 10 MG tablet, Take 1 tablet by mouth Every Night., Disp: 30 tablet, Rfl: 5  •  fluticasone (Flonase) 50 MCG/ACT nasal spray, Administer 2 sprays both nostrils once daily, Disp: 16 g, Rfl: 5  •  Glucose Blood (Blood Glucose Test) strip, USE TO TEST BLOOD GLUCOSE TWO TIMES A DAY, Disp: 50 each, Rfl: 5  •  isosorbide mononitrate (IMDUR) 60 MG 24 hr tablet, Take 1.5 tablets by mouth Every Morning., Disp: 45 tablet, Rfl: 5  •  Lancets misc, 1 twice  "daily, Disp: 60 each, Rfl: 5  •  linaclotide (Linzess) 72 MCG capsule capsule, Take 1 capsule by mouth Every Morning Before Breakfast., Disp: 30 capsule, Rfl: 5  •  lisinopril (PRINIVIL,ZESTRIL) 10 MG tablet, Take 2 tablets by mouth Daily., Disp: 60 tablet, Rfl: 5  •  nitroglycerin (NITROSTAT) 0.4 MG SL tablet, Place 1 tablet under the tongue Every 5 (Five) Minutes As Needed for Chest Pain. Take no more than 3 doses in 15 minutes., Disp: 25 tablet, Rfl: 5  •  rosuvastatin (CRESTOR) 40 MG tablet, Take 1 tablet by mouth Daily., Disp: 90 tablet, Rfl: 3  •  vitamin D (ERGOCALCIFEROL) 1.25 MG (82729 UT) capsule capsule, Take 1 capsule by mouth 1 (One) Time Per Week., Disp: 4 capsule, Rfl: 5  •  Acetaminophen Extra Strength 500 MG tablet, , Disp: , Rfl:   •  atorvastatin (LIPITOR) 80 MG tablet, , Disp: , Rfl:   •  cefdinir (OMNICEF) 300 MG capsule, , Disp: , Rfl:   •  lactulose (CHRONULAC) 10 GM/15ML solution, , Disp: , Rfl:   No current facility-administered medications for this visit.      No Known Allergies    Objective     Objective   Vital Signs:  Blood Pressure (Abnormal) 220/120   Pulse 103   Temperature 98.2 °F (36.8 °C) (Temporal)   Height 160 cm (63\")   Weight 59.4 kg (131 lb)   Oxygen Saturation 98%   Body Mass Index 23.21 kg/m²   Estimated body mass index is 23.21 kg/m² as calculated from the following:    Height as of this encounter: 160 cm (63\").    Weight as of this encounter: 59.4 kg (131 lb).    BMI is within normal parameters. No other follow-up for BMI required.    Past Medical History:   Diagnosis Date   • Arthritis    • CAD (coronary artery disease)    • COPD (chronic obstructive pulmonary disease) (HCC)    • Diabetes mellitus (HCC)    • Elevated cholesterol    • Fracture of wrist    • GERD (gastroesophageal reflux disease)    • Hepatitis C    • History of EKG 03/25/2016    ABNORMAL   • History of transfusion    • Hyperlipidemia    • Hypertension    • Myocardial infarction (HCC)     x2 last one " 5 years ago   • Osteopenia    • Pancreatitis    • Stroke (HCC)      Past Surgical History:   Procedure Laterality Date   • COLONOSCOPY N/A 5/13/2019    Procedure: COLONOSCOPY;  Surgeon: Arnav Reyes MD;  Location: [facility];  Service: Gastroenterology   • CORONARY ARTERY BYPASS GRAFT     • HYSTERECTOMY      1998   • TONSILLECTOMY       Social History     Socioeconomic History   • Marital status:    Tobacco Use   • Smoking status: Never Smoker   • Smokeless tobacco: Current User     Types: Chew   Vaping Use   • Vaping Use: Never used   Substance and Sexual Activity   • Alcohol use: No   • Drug use: Yes     Types: Marijuana     Comment: OCCASIONAL   • Sexual activity: Defer      Physical Exam  Vitals reviewed.   Constitutional:       General: She is not in acute distress.     Appearance: She is not ill-appearing, toxic-appearing or diaphoretic.   HENT:      Head: Normocephalic and atraumatic.   Eyes:      General: No scleral icterus.        Right eye: No discharge.         Left eye: No discharge.   Cardiovascular:      Rate and Rhythm: Normal rate and regular rhythm.      Heart sounds: Normal heart sounds. No murmur heard.    No gallop.   Pulmonary:      Effort: Pulmonary effort is normal. No respiratory distress.      Breath sounds: Normal breath sounds. No rales.   Abdominal:      General: Bowel sounds are normal. There is no distension.      Palpations: Abdomen is soft.      Tenderness: There is abdominal tenderness (Suprapubic and LLQ tenderness. ). There is left CVA tenderness.   Musculoskeletal:      Right lower leg: No edema.      Left lower leg: No edema.   Skin:     General: Skin is warm and dry.   Neurological:      General: No focal deficit present.      Mental Status: She is alert and oriented to person, place, and time. Mental status is at baseline.      Comments: No facial asymmetry. No unilateral weakness. No slurred speech. Moving all 4 extremities.    Psychiatric:         Mood and  Affect: Mood normal.         Behavior: Behavior normal.         Thought Content: Thought content normal.         Judgment: Judgment normal.        Result Review :  The following data was reviewed by: RODRIGUE Gonzalez on 09/13/2022:  Hospital Outpatient Visit on 08/03/2022   Component Date Value Ref Range Status   • Target HR (85%) 08/03/2022 129  bpm Final   • Max. Pred. HR (100%) 08/03/2022 152  bpm Final   • LA ESV Index (BP) 08/03/2022 27.0  ml/m2 Final   • Avg E/e' ratio 08/03/2022 11.26   Final   • ACS 08/03/2022 1.90  cm Final   • Ao root diam 08/03/2022 3.3  cm Final   • Ao pk malena 08/03/2022 151.0  cm/sec Final   • Ao V2 VTI 08/03/2022 27.0  cm Final   • EDV(cubed) 08/03/2022 88.1  ml Final   • EDV(MOD-sp4) 08/03/2022 130.0  ml Final   • EF(MOD-sp4) 08/03/2022 49.9  % Final   • ESV(cubed) 08/03/2022 42.7  ml Final   • ESV(MOD-sp4) 08/03/2022 65.1  ml Final   • IVS/LVPW 08/03/2022 1.19  cm Final   • Lat Peak E' Malena 08/03/2022 5.4  cm/sec Final   • LV mass(C)d 08/03/2022 206.0  grams Final   • LVPWd 08/03/2022 1.14  cm Final   • Med Peak E' Malena 08/03/2022 3.8  cm/sec Final   • PA acc time 08/03/2022 0.09  sec Final   • PA pr(Accel) 08/03/2022 37.6  mmHg Final   • RAP systole 08/03/2022 10.0  mmHg Final   • RVSP(TR) 08/03/2022 44.1  mmHg Final   • SI(MOD-sp4) 08/03/2022 39.7  ml/m2 Final   • SV(MOD-sp4) 08/03/2022 64.9  ml Final   • TR max PG 08/03/2022 34.1  mmHg Final   • AI P1/2t 08/03/2022 528.1  msec Final   • Ao max PG 08/03/2022 9.1  mmHg Final   • Ao mean PG 08/03/2022 4.0  mmHg Final   • FS 08/03/2022 21.5  % Final   • IVSd 08/03/2022 1.36  cm Final   • LA dimension (2D)  08/03/2022 4.3  cm Final   • LVIDd 08/03/2022 4.5  cm Final   • LVIDs 08/03/2022 3.5  cm Final   • LVOT area 08/03/2022 3.1  cm2 Final   • LVOT diam 08/03/2022 2.00  cm Final   • MV E/A 08/03/2022 0.52   Final   • MV A max malena 08/03/2022 99.8  cm/sec Final   • MV E max malena 08/03/2022 51.8  cm/sec Final   • TR max malena 08/03/2022  292.0  cm/sec Final   • LV Winkler Vol (BSA corrected) 08/03/2022 79.4  cm2 Final   • LV Sys Vol (BSA corrected) 08/03/2022 39.8  cm2 Final   • TAPSE (>1.6) 08/03/2022 1.63  cm Final   Lab on 08/03/2022   Component Date Value Ref Range Status   • Glucose 08/03/2022 153 (A) 65 - 99 mg/dL Final   • BUN 08/03/2022 16  8 - 23 mg/dL Final   • Creatinine 08/03/2022 1.52 (A) 0.57 - 1.00 mg/dL Final   • Sodium 08/03/2022 140  136 - 145 mmol/L Final   • Potassium 08/03/2022 4.9  3.5 - 5.2 mmol/L Final   • Chloride 08/03/2022 105  98 - 107 mmol/L Final   • CO2 08/03/2022 25.0  22.0 - 29.0 mmol/L Final   • Calcium 08/03/2022 9.5  8.6 - 10.5 mg/dL Final   • Total Protein 08/03/2022 6.9  6.0 - 8.5 g/dL Final   • Albumin 08/03/2022 4.20  3.50 - 5.20 g/dL Final   • ALT (SGPT) 08/03/2022 9  1 - 33 U/L Final   • AST (SGOT) 08/03/2022 20  1 - 32 U/L Final   • Alkaline Phosphatase 08/03/2022 80  39 - 117 U/L Final   • Total Bilirubin 08/03/2022 0.4  0.0 - 1.2 mg/dL Final   • Globulin 08/03/2022 2.7  gm/dL Final   • A/G Ratio 08/03/2022 1.6  g/dL Final   • BUN/Creatinine Ratio 08/03/2022 10.5  7.0 - 25.0 Final   • Anion Gap 08/03/2022 10.0  5.0 - 15.0 mmol/L Final   • eGFR 08/03/2022 37.2 (A) >60.0 mL/min/1.73 Final    National Kidney Foundation and American Society of Nephrology (ASN) Task Force recommended calculation based on the Chronic Kidney Disease Epidemiology Collaboration (CKD-EPI) equation refit without adjustment for race.   • Total Cholesterol 08/03/2022 111  0 - 200 mg/dL Final   • Triglycerides 08/03/2022 201 (A) 0 - 150 mg/dL Final   • HDL Cholesterol 08/03/2022 41  40 - 60 mg/dL Final   • LDL Cholesterol  08/03/2022 38  0 - 100 mg/dL Final   • VLDL Cholesterol 08/03/2022 32  5 - 40 mg/dL Final   • LDL/HDL Ratio 08/03/2022 0.73   Final   • TSH 08/03/2022 1.070  0.270 - 4.200 uIU/mL Final   • Hemoglobin A1C 08/03/2022 7.80 (A) 4.80 - 5.60 % Final   • Microalbumin, Urine 08/03/2022 4.8  mg/dL Final   • 25 Hydroxy, Vitamin D  08/03/2022 50.1  30.0 - 100.0 ng/ml Final   • Vitamin B-12 08/03/2022 827  211 - 946 pg/mL Final   • WBC 08/03/2022 5.91  3.40 - 10.80 10*3/mm3 Final   • RBC 08/03/2022 4.94  3.77 - 5.28 10*6/mm3 Final   • Hemoglobin 08/03/2022 13.9  12.0 - 15.9 g/dL Final   • Hematocrit 08/03/2022 43.6  34.0 - 46.6 % Final   • MCV 08/03/2022 88.3  79.0 - 97.0 fL Final   • MCH 08/03/2022 28.1  26.6 - 33.0 pg Final   • MCHC 08/03/2022 31.9  31.5 - 35.7 g/dL Final   • RDW 08/03/2022 13.2  12.3 - 15.4 % Final   • RDW-SD 08/03/2022 42.6  37.0 - 54.0 fl Final   • MPV 08/03/2022 10.7  6.0 - 12.0 fL Final   • Platelets 08/03/2022 99 (A) 140 - 450 10*3/mm3 Final   • Neutrophil % 08/03/2022 53.4  42.7 - 76.0 % Final   • Lymphocyte % 08/03/2022 26.2  19.6 - 45.3 % Final   • Monocyte % 08/03/2022 8.0  5.0 - 12.0 % Final   • Eosinophil % 08/03/2022 11.5 (A) 0.3 - 6.2 % Final   • Basophil % 08/03/2022 0.7  0.0 - 1.5 % Final   • Immature Grans % 08/03/2022 0.2  0.0 - 0.5 % Final   • Neutrophils, Absolute 08/03/2022 3.16  1.70 - 7.00 10*3/mm3 Final   • Lymphocytes, Absolute 08/03/2022 1.55  0.70 - 3.10 10*3/mm3 Final   • Monocytes, Absolute 08/03/2022 0.47  0.10 - 0.90 10*3/mm3 Final   • Eosinophils, Absolute 08/03/2022 0.68 (A) 0.00 - 0.40 10*3/mm3 Final   • Basophils, Absolute 08/03/2022 0.04  0.00 - 0.20 10*3/mm3 Final   • Immature Grans, Absolute 08/03/2022 0.01  0.00 - 0.05 10*3/mm3 Final   • nRBC 08/03/2022 0.0  0.0 - 0.2 /100 WBC Final      Data reviewed: Recent hospitalization notes Recent ED visit notes summarized above.            Assessment / Plan         Assessment   Diagnoses and all orders for this visit:    1. Hypertensive urgency (Primary)  • Initial BP in the office was 220/120. She was given clonidine 0.1mg PO x1. After about 30 minutes, BP had increased to 232/120. Asymptomatic.   • Give hypertensive urgency as well as possible urinary retention, the patient was encouraged on ED evaluation. I did offer to call her an ambulance,  however, her  is driving her and can take her directly there. She was walked to the car and instructed to go straight to the ED. She expressed understanding and agreed.  • Could consider starting the previously recommended carvedilol 12.5mg BID, will leave to the ED after their work up.   -     cloNIDine (CATAPRES) tablet 0.1 mg (Given at 10:42AM).     2. Acute cystitis without hematuria  3. Urinary retention  4. Polycystic kidney disease  • Patient denies any improvement in her dysuria with recently started cefdinir. She continues to have intermittent chills and reports very little urine output. Recommended repeat ED evaluation as noted above as she needs evaluated for urinary retention/obstructive uropathy.   • If she gets discharged from the ED, recommended keeping scheduled follow ups with urology and cardiology later this week.        New Medications Ordered This Visit   Medications   • cloNIDine (CATAPRES) tablet 0.1 mg     Follow Up   Return in about 1 week (around 9/20/2022).    Patient was given instructions and counseling regarding her condition or for health maintenance advice. Please see specific information pulled into the AVS if appropriate.       This document has been electronically signed by RODRIGUE Gonzalez   September 13, 2022 11:41 EDT    Dictated Utilizing Dragon Dictation: Part of this note may be an electronic transcription/translation of spoken language to printed text using the Dragon Dictation System.

## 2022-09-13 NOTE — ED PROVIDER NOTES
Subjective   PIT    Patient is a 66-year-old female chronically uncontrolled hypertension who presented from her primary care physician today due to elevated blood pressure.  Patient is an extremely poor historian and states that she took her medicine but continues to have blood pressure problems.  States that she was started on oral antibiotics on Saturday after going to [facility] for acute UTI and she is complaining of dysuria and flank pain.  Patient reports a fever but does not appear reliable as she answers yes to every leading to question ask regarding her acute medical complaints.   Patient has no headache, changes or loss of vision dizziness or altered mental status associated with her pretension.       Additional history present illness is that patient received 1 tablet of oral clonidine in the office to attempt to bring her blood pressure down.  Patient also reports that she takes her medication routinely at night.          Review of Systems   Constitutional: Negative.  Negative for fever.   HENT: Negative.    Respiratory: Negative.    Cardiovascular: Negative.  Negative for chest pain.   Gastrointestinal: Negative.  Negative for abdominal pain.   Endocrine: Negative.    Genitourinary: Positive for difficulty urinating, dysuria and flank pain.   Skin: Negative.    Neurological: Negative.    Psychiatric/Behavioral: Negative.    All other systems reviewed and are negative.      Past Medical History:   Diagnosis Date   • Arthritis    • CAD (coronary artery disease)    • COPD (chronic obstructive pulmonary disease) (HCC)    • Diabetes mellitus (HCC)    • Elevated cholesterol    • Fracture of wrist    • GERD (gastroesophageal reflux disease)    • Hepatitis C    • History of EKG 03/25/2016    ABNORMAL   • History of transfusion    • Hyperlipidemia    • Hypertension    • Myocardial infarction (HCC)     x2 last one 5 years ago   • Osteopenia    • Pancreatitis    • Stroke (HCC)        No Known  Allergies    Past Surgical History:   Procedure Laterality Date   • COLONOSCOPY N/A 5/13/2019    Procedure: COLONOSCOPY;  Surgeon: Arnav Reyes MD;  Location: [facility];  Service: Gastroenterology   • CORONARY ARTERY BYPASS GRAFT     • HYSTERECTOMY      1998   • TONSILLECTOMY         Family History   Problem Relation Age of Onset   • No Known Problems Father    • No Known Problems Mother    • Breast cancer Neg Hx        Social History     Socioeconomic History   • Marital status:    Tobacco Use   • Smoking status: Never Smoker   • Smokeless tobacco: Current User     Types: Chew   Vaping Use   • Vaping Use: Never used   Substance and Sexual Activity   • Alcohol use: No   • Drug use: Yes     Types: Marijuana     Comment: OCCASIONAL   • Sexual activity: Defer           Objective   Physical Exam  Vitals and nursing note reviewed.   Constitutional:       General: She is not in acute distress.     Appearance: She is well-developed. She is not diaphoretic.   HENT:      Head: Normocephalic and atraumatic.      Right Ear: External ear normal.      Left Ear: External ear normal.      Nose: Nose normal.   Eyes:      Conjunctiva/sclera: Conjunctivae normal.      Pupils: Pupils are equal, round, and reactive to light.   Neck:      Vascular: No JVD.      Trachea: No tracheal deviation.   Cardiovascular:      Rate and Rhythm: Normal rate and regular rhythm.      Heart sounds: Normal heart sounds. No murmur heard.  Pulmonary:      Effort: Pulmonary effort is normal. No respiratory distress.      Breath sounds: Normal breath sounds. No wheezing.   Abdominal:      General: Bowel sounds are normal.      Palpations: Abdomen is soft.      Tenderness: There is no abdominal tenderness. There is no right CVA tenderness or left CVA tenderness.   Musculoskeletal:         General: No deformity. Normal range of motion.      Cervical back: Normal range of motion and neck supple.   Skin:     General: Skin is warm and dry.       Coloration: Skin is not pale.      Findings: No erythema or rash.   Neurological:      Mental Status: She is alert and oriented to person, place, and time.      Cranial Nerves: No cranial nerve deficit.   Psychiatric:         Behavior: Behavior normal.         Thought Content: Thought content normal.         Procedures        Results for orders placed or performed during the hospital encounter of 09/13/22   Urinalysis With Culture If Indicated - Urine, Clean Catch    Specimen: Urine, Clean Catch   Result Value Ref Range    Color, UA Dark Yellow (A) Yellow, Straw    Appearance, UA Cloudy (A) Clear    pH, UA <=5.0 5.0 - 8.0    Specific Gravity, UA 1.026 1.005 - 1.030    Glucose,  mg/dL (1+) (A) Negative    Ketones, UA Trace (A) Negative    Bilirubin, UA Negative Negative    Blood, UA Negative Negative    Protein, UA 30 mg/dL (1+) (A) Negative    Leuk Esterase, UA Small (1+) (A) Negative    Nitrite, UA Negative Negative    Urobilinogen, UA 0.2 E.U./dL 0.2 - 1.0 E.U./dL   Comprehensive Metabolic Panel    Specimen: Blood   Result Value Ref Range    Glucose 162 (H) 65 - 99 mg/dL    BUN 27 (H) 8 - 23 mg/dL    Creatinine 1.68 (H) 0.57 - 1.00 mg/dL    Sodium 138 136 - 145 mmol/L    Potassium 4.9 3.5 - 5.2 mmol/L    Chloride 102 98 - 107 mmol/L    CO2 25.8 22.0 - 29.0 mmol/L    Calcium 10.5 8.6 - 10.5 mg/dL    Total Protein 8.0 6.0 - 8.5 g/dL    Albumin 4.12 3.50 - 5.20 g/dL    ALT (SGPT) 15 1 - 33 U/L    AST (SGOT) 18 1 - 32 U/L    Alkaline Phosphatase 100 39 - 117 U/L    Total Bilirubin 0.2 0.0 - 1.2 mg/dL    Globulin 3.9 gm/dL    A/G Ratio 1.1 g/dL    BUN/Creatinine Ratio 16.1 7.0 - 25.0    Anion Gap 10.2 5.0 - 15.0 mmol/L    eGFR 33.0 (L) >60.0 mL/min/1.73   CBC Auto Differential    Specimen: Blood   Result Value Ref Range    WBC 6.85 3.40 - 10.80 10*3/mm3    RBC 5.05 3.77 - 5.28 10*6/mm3    Hemoglobin 14.7 12.0 - 15.9 g/dL    Hematocrit 45.7 34.0 - 46.6 %    MCV 90.5 79.0 - 97.0 fL    MCH 29.1 26.6 - 33.0 pg     MCHC 32.2 31.5 - 35.7 g/dL    RDW 13.3 12.3 - 15.4 %    RDW-SD 44.2 37.0 - 54.0 fl    MPV 9.5 6.0 - 12.0 fL    Platelets 224 140 - 450 10*3/mm3    Neutrophil % 58.9 42.7 - 76.0 %    Lymphocyte % 21.3 19.6 - 45.3 %    Monocyte % 10.8 5.0 - 12.0 %    Eosinophil % 7.4 (H) 0.3 - 6.2 %    Basophil % 1.2 0.0 - 1.5 %    Immature Grans % 0.4 0.0 - 0.5 %    Neutrophils, Absolute 4.03 1.70 - 7.00 10*3/mm3    Lymphocytes, Absolute 1.46 0.70 - 3.10 10*3/mm3    Monocytes, Absolute 0.74 0.10 - 0.90 10*3/mm3    Eosinophils, Absolute 0.51 (H) 0.00 - 0.40 10*3/mm3    Basophils, Absolute 0.08 0.00 - 0.20 10*3/mm3    Immature Grans, Absolute 0.03 0.00 - 0.05 10*3/mm3    nRBC 0.0 0.0 - 0.2 /100 WBC   Urinalysis, Microscopic Only - Urine, Clean Catch    Specimen: Urine, Clean Catch   Result Value Ref Range    RBC, UA 0-2 None Seen, 0-2 /HPF    WBC, UA 6-12 (A) None Seen, 0-2 /HPF    Bacteria, UA Trace (A) None Seen /HPF    Squamous Epithelial Cells, UA 3-6 (A) None Seen, 0-2 /HPF    Hyaline Casts, UA None Seen None Seen /LPF    Mucus, UA Small/1+ (A) None Seen, Trace /HPF    Methodology Manual Light Microscopy            ED Course  ED Course as of 09/13/22 1658   Tue Sep 13, 2022   1639 Patient is a very simple minded poor historian regarding her overall health and care. Patient states he takes her medicine at night currently she is prescribed lisinopril 10 mg, amlodipine 10 mg and Coreg 12.5 mg    She was given a single dose of clonidine in the office in attempt bring it down.  Patient continues to remain hypertensive but asymptomatic.    Will give patient a single dose of oral hydralazine 20 mg as 1 dose., patient states that her blood pressure routinely runs 180-200 on her current medications.  Patient appears to have started lisinopril 10 mg on 8/2522.    Attempting to receive records from [facility] emergency department given patient's complaint of recent UTI and flank pain she says she has bilateral renal cysts.    Does not  appear to meet pyelonephritis criteria given she has a normal white count, afebrile and urinalysis only shows trace bacteria.  Sickle exam findings do not clinically correlate with risk of pyelonephritis at this time.  junie was already placed on oral antibiotics Saturday arboviral ER. [LK]      ED Course User Index  [LK] Christina Lopez DO                                            MDM    Final diagnoses:   Acute cystitis without hematuria   Uncontrolled stage 2 hypertension   Uncontrolled type 2 diabetes mellitus with hyperglycemia (HCC)       ED Disposition  ED Disposition     ED Disposition   Discharge    Condition   Stable    Comment   --             Sacha Montez MD  [address] [facility] [address] [phone]               Medication List      New Prescriptions    hydrALAZINE 25 MG tablet  Commonly known as: APRESOLINE  Take 1 tablet by mouth Every 8 (Eight) Hours As Needed (Blood pressure greater than 180 Systolic or 110 diastolic).           Where to Get Your Medications      These medications were sent to [facility] - [place], [place] - [address] - [phone] [facility] [phone]   [address] [facility] [address]    Phone: [phone]   · hydrALAZINE 25 MG tablet          Christina Lopez DO  09/13/22 1650       Christina Lopez DO  09/13/22 1652       Christina Lopez DO  09/13/22 165

## 2022-09-13 NOTE — ED NOTES
MEDICAL SCREENING:    Reason for Visit: Patient is a 66-year-old female chronically uncontrolled hypertension who presented from her primary care physician today due to elevated blood pressure.  Patient is an extremely poor historian and states that she took her medicine but continues to have blood pressure problems.  States that she was started on oral antibiotics on Saturday after going to [facility] for acute UTI and she is complaining of dysuria and flank pain.  Patient reports a fever but does not appear reliable as she answers yes to every leading to question ask regarding her acute medical complaints.   Patient has no headache, changes or loss of vision dizziness or altered mental status associated with her pretension.        Patient initially seen in triage.  The patient was advised further evaluation and diagnostic testing will be needed, some of the treatment and testing will be initiated in the lobby in order to begin the process.  The patient will be returned to the waiting area for the time being and possibly be re-assessed by a subsequent ED provider.  The patient will be brought back to the treatment area in as timely manner as possible.         Christina Lopez,   09/13/22 1428

## 2022-09-14 LAB — BACTERIA SPEC AEROBE CULT: NO GROWTH

## 2022-09-16 ENCOUNTER — OFFICE VISIT (OUTPATIENT)
Dept: UROLOGY | Facility: CLINIC | Age: 67
End: 2022-09-16

## 2022-09-16 VITALS — BODY MASS INDEX: 23.21 KG/M2 | HEIGHT: 63 IN | WEIGHT: 131 LBS

## 2022-09-16 DIAGNOSIS — N28.1 KIDNEY CYSTS: ICD-10-CM

## 2022-09-16 DIAGNOSIS — R35.0 FREQUENCY OF URINATION: Primary | ICD-10-CM

## 2022-09-16 LAB
BILIRUB BLD-MCNC: NEGATIVE MG/DL
CLARITY, POC: CLEAR
COLOR UR: YELLOW
EXPIRATION DATE: ABNORMAL
GLUCOSE UR STRIP-MCNC: ABNORMAL MG/DL
KETONES UR QL: NEGATIVE
LEUKOCYTE EST, POC: NEGATIVE
Lab: ABNORMAL
NITRITE UR-MCNC: NEGATIVE MG/ML
PH UR: 5 [PH] (ref 5–8)
PROT UR STRIP-MCNC: ABNORMAL MG/DL
RBC # UR STRIP: NEGATIVE /UL
SP GR UR: 1.02 (ref 1–1.03)
UROBILINOGEN UR QL: NORMAL

## 2022-09-16 PROCEDURE — 99213 OFFICE O/P EST LOW 20 MIN: CPT

## 2022-09-16 PROCEDURE — 81003 URINALYSIS AUTO W/O SCOPE: CPT

## 2022-09-16 NOTE — PROGRESS NOTES
"Chief Complaint:    Chief Complaint   Patient presents with   • RENAL CYST        Vital Signs:   Ht 160 cm (62.99\")   Wt 59.4 kg (131 lb)   BMI 23.21 kg/m²   Body mass index is 23.21 kg/m².      HPI:  Ethel Trotter is a 66 y.o. female who presents today for initial evaluation     History of Present Illness  Mr. Trotter presents to the clinic for a bilateral cyst and possible obstruction.  She has been seen previously by Dr. Elliott and Nislon for urinary tract infections, renal cyst, and nonobstructing left renal stones. She seen at the [facility] for urinary retention and left flank pain.  She reports that an MRI was completed showing bilateral renal cysts.  I am contacting [facility] to get the testing results. She was discharged from the hospital and diagnosed with polycystic kidney disease and urinary tract infection.  She was given cefdinir for her UTI and states that her symptoms have greatly improved.  After hospital visit she was referred by her heart doctor to nephrology. She reports that nephrology informed her that they were unable to help her at this time and she needed to see a urologist.  Today she complains of some left back/flank pain with urinary frequency.  She reports that she is not having difficulty urinating at this time.  She had a urine culture completed roughly 3-days ago showing no growth.      Past Medical History:  Past Medical History:   Diagnosis Date   • Arthritis    • CAD (coronary artery disease)    • COPD (chronic obstructive pulmonary disease) (HCC)    • Diabetes mellitus (HCC)    • Elevated cholesterol    • Fracture of wrist    • GERD (gastroesophageal reflux disease)    • Hepatitis C    • History of EKG 03/25/2016    ABNORMAL   • History of transfusion    • Hyperlipidemia    • Hypertension    • Myocardial infarction (HCC)     x2 last one 5 years ago   • Osteopenia    • Pancreatitis    • Stroke (HCC)        Current Meds:  Current Outpatient Medications "   Medication Sig Dispense Refill   • Acetaminophen Extra Strength 500 MG tablet      • albuterol sulfate  (90 Base) MCG/ACT inhaler Inhale 2 puffs 4 (Four) Times a Day. 18 g 3   • Alirocumab (Praluent) 150 MG/ML injection pen Inject 2 mL under the skin into the appropriate area as directed Every 28 (Twenty-Eight) Days. 2 mL 5   • Alirocumab (PRALUENT) 150 MG/ML injection pen Inject 2 ml under the skin into the appropriate area as directed every 28 days 2 mL 5   • amLODIPine (NORVASC) 10 MG tablet Take 1 tablet by mouth Every Evening. 30 tablet 5   • aspirin (aspirin) 81 MG EC tablet Take 1 tablet by mouth Daily. 30 tablet 5   • atorvastatin (LIPITOR) 80 MG tablet      • carvedilol (COREG) 12.5 MG tablet Take 1 tablet by mouth 2 (Two) Times a Day. 60 tablet 11   • cefdinir (OMNICEF) 300 MG capsule      • citalopram (CeleXA) 20 MG tablet Take 1 tablet by mouth Daily. 30 tablet 5   • conjugated estrogens (PREMARIN) 0.625 MG/GM vaginal cream 1 applicator pv twice weekly 30 g 5   • Dulaglutide (Trulicity) 1.5 MG/0.5ML solution pen-injector Inject 1.5 mg under the skin into the appropriate area as directed 1 (One) Time Per Week. 4 pen 5   • empagliflozin (Jardiance) 10 MG tablet tablet Take 1 tablet by mouth Every Morning. 30 tablet 5   • ezetimibe (ZETIA) 10 MG tablet Take 1 tablet by mouth Every Night. 30 tablet 5   • fluticasone (Flonase) 50 MCG/ACT nasal spray Administer 2 sprays both nostrils once daily 16 g 5   • Glucose Blood (Blood Glucose Test) strip USE TO TEST BLOOD GLUCOSE TWO TIMES A DAY 50 each 5   • hydrALAZINE (APRESOLINE) 25 MG tablet Take 1 tablet by mouth Every 8 (Eight) Hours As Needed (Blood pressure greater than 180 Systolic or 110 diastolic). 30 tablet 0   • isosorbide mononitrate (IMDUR) 60 MG 24 hr tablet Take 1.5 tablets by mouth Every Morning. 45 tablet 5   • lactulose (CHRONULAC) 10 GM/15ML solution      • Lancets misc 1 twice daily 60 each 5   • linaclotide (Linzess) 72 MCG capsule  capsule Take 1 capsule by mouth Every Morning Before Breakfast. 30 capsule 5   • lisinopril (PRINIVIL,ZESTRIL) 10 MG tablet Take 2 tablets by mouth Daily. 60 tablet 5   • nitroglycerin (NITROSTAT) 0.4 MG SL tablet Place 1 tablet under the tongue Every 5 (Five) Minutes As Needed for Chest Pain. Take no more than 3 doses in 15 minutes. 25 tablet 5   • rosuvastatin (CRESTOR) 40 MG tablet Take 1 tablet by mouth Daily. 90 tablet 3   • vitamin D (ERGOCALCIFEROL) 1.25 MG (24228 UT) capsule capsule Take 1 capsule by mouth 1 (One) Time Per Week. 4 capsule 5     No current facility-administered medications for this visit.        Allergies:   No Known Allergies     Past Surgical History:  Past Surgical History:   Procedure Laterality Date   • COLONOSCOPY N/A 5/13/2019    Procedure: COLONOSCOPY;  Surgeon: Arnav Reyes MD;  Location: [facility];  Service: Gastroenterology   • CORONARY ARTERY BYPASS GRAFT     • HYSTERECTOMY      1998   • TONSILLECTOMY         Social History:  Social History     Socioeconomic History   • Marital status:    Tobacco Use   • Smoking status: Never Smoker   • Smokeless tobacco: Current User     Types: Chew   Vaping Use   • Vaping Use: Never used   Substance and Sexual Activity   • Alcohol use: No   • Drug use: Yes     Types: Marijuana     Comment: OCCASIONAL   • Sexual activity: Defer       Family History:  Family History   Problem Relation Age of Onset   • No Known Problems Father    • No Known Problems Mother    • Breast cancer Neg Hx        Review of Systems:  Review of Systems   Constitutional: Negative for fatigue, fever and unexpected weight change.   Respiratory: Negative for chest tightness and shortness of breath.    Cardiovascular: Negative for chest pain.   Gastrointestinal: Negative for abdominal pain, constipation, diarrhea, nausea and vomiting.   Genitourinary: Positive for flank pain and frequency. Negative for decreased urine volume, difficulty urinating, dyspareunia,  dysuria, enuresis, genital sores, hematuria, menstrual problem, pelvic pain, urgency, vaginal bleeding, vaginal discharge and vaginal pain.   Skin: Negative for rash.   Psychiatric/Behavioral: Negative for confusion and suicidal ideas.       Physical Exam:  Physical Exam  Constitutional:       General: She is not in acute distress.     Appearance: Normal appearance.   HENT:      Head: Normocephalic and atraumatic.      Nose: Nose normal.      Mouth/Throat:      Mouth: Mucous membranes are moist.   Eyes:      Conjunctiva/sclera: Conjunctivae normal.   Cardiovascular:      Rate and Rhythm: Normal rate and regular rhythm.      Pulses: Normal pulses.      Heart sounds: Normal heart sounds.   Pulmonary:      Effort: Pulmonary effort is normal.      Breath sounds: Normal breath sounds.   Abdominal:      General: Bowel sounds are normal.      Palpations: Abdomen is soft.      Tenderness: There is right CVA tenderness. There is no left CVA tenderness.   Musculoskeletal:         General: Normal range of motion.      Cervical back: Normal range of motion.   Skin:     General: Skin is warm.   Neurological:      General: No focal deficit present.      Mental Status: She is alert and oriented to person, place, and time.   Psychiatric:         Mood and Affect: Mood normal.         Behavior: Behavior normal.         Thought Content: Thought content normal.         Judgment: Judgment normal.       Recent Image (CT and/or KUB):   CT Abdomen and Pelvis: Results for orders placed during the hospital encounter of 06/10/20    CT Abdomen Pelvis With & Without Contrast    Narrative  CT ABDOMEN PELVIS W WO CONTRAST-    CLINICAL INDICATION: Hematuria      COMPARISON: 04/08/2014    TECHNIQUE: Axial images were acquired from the lung bases through the  pubic symphysis without any IV and then after IV contrast. No oral  contrast was administered.  Reformatted images were created in both the coronal and sagittal planes.    Radiation dose  reduction techniques were utilized per ALARA protocol.  Automated exposure control was initiated through either or CareDose or  DoseRight software packages by  protocol.      FINDINGS:  The lung bases are clear. There are no pleural effusions.    The liver is homogeneous. There is no evidence of focal hepatic mass    The spleen is homogeneous    There is no peripancreatic stranding or pancreatic head mass.    There is no adrenal enlargement.    Bilateral renal cysts are present. The largest is off the posterior  aspect of the left kidney measures 7 cm.  No solid mass or hydronephrosis.    Otherwise I do not see any free fluid or walled off fluid collections.    There is no bowel wall thickening or mesenteric stranding.    There is no evidence of mesenteric or retroperitoneal adenopathy    There is evidence of atherosclerotic vascular disease    Arthritic changes seen in the spine    Impression  : Bilateral renal cysts, greater on the left than on the right          This report was finalized on 6/10/2020 2:53 PM by Dr. Vaughn Aparicio MD.     CT Stone Protocol: No results found for this or any previous visit.     KUB: No results found for this or any previous visit.       Labs:  Brief Urine Lab Results  (Last result in the past 365 days)      Color   Clarity   Blood   Leuk Est   Nitrite   Protein   CREAT   Urine HCG        09/16/22 1335 Yellow   Clear   Negative   Negative   Negative   Trace               Office Visit on 09/16/2022   Component Date Value Ref Range Status   • Color 09/16/2022 Yellow  Yellow, Straw, Dark Yellow, Pao Final   • Clarity, UA 09/16/2022 Clear  Clear Final   • Specific Gravity  09/16/2022 1.020  1.005 - 1.030 Final   • pH, Urine 09/16/2022 5.0  5.0 - 8.0 Final   • Leukocytes 09/16/2022 Negative  Negative Final   • Nitrite, UA 09/16/2022 Negative  Negative Final   • Protein, POC 09/16/2022 Trace (A) Negative mg/dL Final   • Glucose, UA 09/16/2022 3+ (A) Negative mg/dL Final   •  Ketones, UA 09/16/2022 Negative  Negative Final   • Urobilinogen, UA 09/16/2022 Normal  Normal, 0.2 E.U./dL Final   • Bilirubin 09/16/2022 Negative  Negative Final   • Blood, UA 09/16/2022 Negative  Negative Final   • Lot Number 09/16/2022 [id number]   Final   • Expiration Date 09/16/2022 12/21/23   Final   Admission on 09/13/2022, Discharged on 09/13/2022   Component Date Value Ref Range Status   • Color, UA 09/13/2022 Dark Yellow (A) Yellow, Straw Final   • Appearance, UA 09/13/2022 Cloudy (A) Clear Final   • pH, UA 09/13/2022 <=5.0  5.0 - 8.0 Final   • Specific Gravity, UA 09/13/2022 1.026  1.005 - 1.030 Final   • Glucose, UA 09/13/2022 250 mg/dL (1+) (A) Negative Final   • Ketones, UA 09/13/2022 Trace (A) Negative Final   • Bilirubin, UA 09/13/2022 Negative  Negative Final   • Blood, UA 09/13/2022 Negative  Negative Final   • Protein, UA 09/13/2022 30 mg/dL (1+) (A) Negative Final   • Leuk Esterase, UA 09/13/2022 Small (1+) (A) Negative Final   • Nitrite, UA 09/13/2022 Negative  Negative Final   • Urobilinogen, UA 09/13/2022 0.2 E.U./dL  0.2 - 1.0 E.U./dL Final   • Glucose 09/13/2022 162 (A) 65 - 99 mg/dL Final   • BUN 09/13/2022 27 (A) 8 - 23 mg/dL Final   • Creatinine 09/13/2022 1.68 (A) 0.57 - 1.00 mg/dL Final   • Sodium 09/13/2022 138  136 - 145 mmol/L Final   • Potassium 09/13/2022 4.9  3.5 - 5.2 mmol/L Final   • Chloride 09/13/2022 102  98 - 107 mmol/L Final   • CO2 09/13/2022 25.8  22.0 - 29.0 mmol/L Final   • Calcium 09/13/2022 10.5  8.6 - 10.5 mg/dL Final   • Total Protein 09/13/2022 8.0  6.0 - 8.5 g/dL Final   • Albumin 09/13/2022 4.12  3.50 - 5.20 g/dL Final   • ALT (SGPT) 09/13/2022 15  1 - 33 U/L Final   • AST (SGOT) 09/13/2022 18  1 - 32 U/L Final   • Alkaline Phosphatase 09/13/2022 100  39 - 117 U/L Final   • Total Bilirubin 09/13/2022 0.2  0.0 - 1.2 mg/dL Final   • Globulin 09/13/2022 3.9  gm/dL Final   • A/G Ratio 09/13/2022 1.1  g/dL Final   • BUN/Creatinine Ratio 09/13/2022 16.1  7.0 - 25.0  Final   • Anion Gap 09/13/2022 10.2  5.0 - 15.0 mmol/L Final   • eGFR 09/13/2022 33.0 (A) >60.0 mL/min/1.73 Final    National Kidney Foundation and American Society of Nephrology (ASN) Task Force recommended calculation based on the Chronic Kidney Disease Epidemiology Collaboration (CKD-EPI) equation refit without adjustment for race.   • WBC 09/13/2022 6.85  3.40 - 10.80 10*3/mm3 Final   • RBC 09/13/2022 5.05  3.77 - 5.28 10*6/mm3 Final   • Hemoglobin 09/13/2022 14.7  12.0 - 15.9 g/dL Final   • Hematocrit 09/13/2022 45.7  34.0 - 46.6 % Final   • MCV 09/13/2022 90.5  79.0 - 97.0 fL Final   • MCH 09/13/2022 29.1  26.6 - 33.0 pg Final   • MCHC 09/13/2022 32.2  31.5 - 35.7 g/dL Final   • RDW 09/13/2022 13.3  12.3 - 15.4 % Final   • RDW-SD 09/13/2022 44.2  37.0 - 54.0 fl Final   • MPV 09/13/2022 9.5  6.0 - 12.0 fL Final   • Platelets 09/13/2022 224  140 - 450 10*3/mm3 Final   • Neutrophil % 09/13/2022 58.9  42.7 - 76.0 % Final   • Lymphocyte % 09/13/2022 21.3  19.6 - 45.3 % Final   • Monocyte % 09/13/2022 10.8  5.0 - 12.0 % Final   • Eosinophil % 09/13/2022 7.4 (A) 0.3 - 6.2 % Final   • Basophil % 09/13/2022 1.2  0.0 - 1.5 % Final   • Immature Grans % 09/13/2022 0.4  0.0 - 0.5 % Final   • Neutrophils, Absolute 09/13/2022 4.03  1.70 - 7.00 10*3/mm3 Final   • Lymphocytes, Absolute 09/13/2022 1.46  0.70 - 3.10 10*3/mm3 Final   • Monocytes, Absolute 09/13/2022 0.74  0.10 - 0.90 10*3/mm3 Final   • Eosinophils, Absolute 09/13/2022 0.51 (A) 0.00 - 0.40 10*3/mm3 Final   • Basophils, Absolute 09/13/2022 0.08  0.00 - 0.20 10*3/mm3 Final   • Immature Grans, Absolute 09/13/2022 0.03  0.00 - 0.05 10*3/mm3 Final   • nRBC 09/13/2022 0.0  0.0 - 0.2 /100 WBC Final   • RBC, UA 09/13/2022 0-2  None Seen, 0-2 /HPF Final   • WBC, UA 09/13/2022 6-12 (A) None Seen, 0-2 /HPF Final   • Bacteria, UA 09/13/2022 Trace (A) None Seen /HPF Final   • Squamous Epithelial Cells, UA 09/13/2022 3-6 (A) None Seen, 0-2 /HPF Final   • Hyaline Casts, UA  09/13/2022 None Seen  None Seen /LPF Final   • Mucus, UA 09/13/2022 Small/1+ (A) None Seen, Trace /HPF Final   • Methodology 09/13/2022 Manual Light Microscopy   Final   • Urine Culture 09/13/2022 No growth   Final   Hospital Outpatient Visit on 08/03/2022   Component Date Value Ref Range Status   • Target HR (85%) 08/03/2022 129  bpm Final   • Max. Pred. HR (100%) 08/03/2022 152  bpm Final   • LA ESV Index (BP) 08/03/2022 27.0  ml/m2 Final   • Avg E/e' ratio 08/03/2022 11.26   Final   • ACS 08/03/2022 1.90  cm Final   • Ao root diam 08/03/2022 3.3  cm Final   • Ao pk vamsi 08/03/2022 151.0  cm/sec Final   • Ao V2 VTI 08/03/2022 27.0  cm Final   • EDV(cubed) 08/03/2022 88.1  ml Final   • EDV(MOD-sp4) 08/03/2022 130.0  ml Final   • EF(MOD-sp4) 08/03/2022 49.9  % Final   • ESV(cubed) 08/03/2022 42.7  ml Final   • ESV(MOD-sp4) 08/03/2022 65.1  ml Final   • IVS/LVPW 08/03/2022 1.19  cm Final   • Lat Peak E' Vamsi 08/03/2022 5.4  cm/sec Final   • LV mass(C)d 08/03/2022 206.0  grams Final   • LVPWd 08/03/2022 1.14  cm Final   • Med Peak E' Vamsi 08/03/2022 3.8  cm/sec Final   • PA acc time 08/03/2022 0.09  sec Final   • PA pr(Accel) 08/03/2022 37.6  mmHg Final   • RAP systole 08/03/2022 10.0  mmHg Final   • RVSP(TR) 08/03/2022 44.1  mmHg Final   • SI(MOD-sp4) 08/03/2022 39.7  ml/m2 Final   • SV(MOD-sp4) 08/03/2022 64.9  ml Final   • TR max PG 08/03/2022 34.1  mmHg Final   • AI P1/2t 08/03/2022 528.1  msec Final   • Ao max PG 08/03/2022 9.1  mmHg Final   • Ao mean PG 08/03/2022 4.0  mmHg Final   • FS 08/03/2022 21.5  % Final   • IVSd 08/03/2022 1.36  cm Final   • LA dimension (2D)  08/03/2022 4.3  cm Final   • LVIDd 08/03/2022 4.5  cm Final   • LVIDs 08/03/2022 3.5  cm Final   • LVOT area 08/03/2022 3.1  cm2 Final   • LVOT diam 08/03/2022 2.00  cm Final   • MV E/A 08/03/2022 0.52   Final   • MV A max vamsi 08/03/2022 99.8  cm/sec Final   • MV E max vamsi 08/03/2022 51.8  cm/sec Final   • TR max vamsi 08/03/2022 292.0  cm/sec Final   •  LV Winkler Vol (BSA corrected) 08/03/2022 79.4  cm2 Final   • LV Sys Vol (BSA corrected) 08/03/2022 39.8  cm2 Final   • TAPSE (>1.6) 08/03/2022 1.63  cm Final   Lab on 08/03/2022   Component Date Value Ref Range Status   • Glucose 08/03/2022 153 (A) 65 - 99 mg/dL Final   • BUN 08/03/2022 16  8 - 23 mg/dL Final   • Creatinine 08/03/2022 1.52 (A) 0.57 - 1.00 mg/dL Final   • Sodium 08/03/2022 140  136 - 145 mmol/L Final   • Potassium 08/03/2022 4.9  3.5 - 5.2 mmol/L Final   • Chloride 08/03/2022 105  98 - 107 mmol/L Final   • CO2 08/03/2022 25.0  22.0 - 29.0 mmol/L Final   • Calcium 08/03/2022 9.5  8.6 - 10.5 mg/dL Final   • Total Protein 08/03/2022 6.9  6.0 - 8.5 g/dL Final   • Albumin 08/03/2022 4.20  3.50 - 5.20 g/dL Final   • ALT (SGPT) 08/03/2022 9  1 - 33 U/L Final   • AST (SGOT) 08/03/2022 20  1 - 32 U/L Final   • Alkaline Phosphatase 08/03/2022 80  39 - 117 U/L Final   • Total Bilirubin 08/03/2022 0.4  0.0 - 1.2 mg/dL Final   • Globulin 08/03/2022 2.7  gm/dL Final   • A/G Ratio 08/03/2022 1.6  g/dL Final   • BUN/Creatinine Ratio 08/03/2022 10.5  7.0 - 25.0 Final   • Anion Gap 08/03/2022 10.0  5.0 - 15.0 mmol/L Final   • eGFR 08/03/2022 37.2 (A) >60.0 mL/min/1.73 Final    National Kidney Foundation and American Society of Nephrology (ASN) Task Force recommended calculation based on the Chronic Kidney Disease Epidemiology Collaboration (CKD-EPI) equation refit without adjustment for race.   • Total Cholesterol 08/03/2022 111  0 - 200 mg/dL Final   • Triglycerides 08/03/2022 201 (A) 0 - 150 mg/dL Final   • HDL Cholesterol 08/03/2022 41  40 - 60 mg/dL Final   • LDL Cholesterol  08/03/2022 38  0 - 100 mg/dL Final   • VLDL Cholesterol 08/03/2022 32  5 - 40 mg/dL Final   • LDL/HDL Ratio 08/03/2022 0.73   Final   • TSH 08/03/2022 1.070  0.270 - 4.200 uIU/mL Final   • Hemoglobin A1C 08/03/2022 7.80 (A) 4.80 - 5.60 % Final   • Microalbumin, Urine 08/03/2022 4.8  mg/dL Final   • 25 Hydroxy, Vitamin D 08/03/2022 50.1  30.0 -  100.0 ng/ml Final   • Vitamin B-12 08/03/2022 827  211 - 946 pg/mL Final   • WBC 08/03/2022 5.91  3.40 - 10.80 10*3/mm3 Final   • RBC 08/03/2022 4.94  3.77 - 5.28 10*6/mm3 Final   • Hemoglobin 08/03/2022 13.9  12.0 - 15.9 g/dL Final   • Hematocrit 08/03/2022 43.6  34.0 - 46.6 % Final   • MCV 08/03/2022 88.3  79.0 - 97.0 fL Final   • MCH 08/03/2022 28.1  26.6 - 33.0 pg Final   • MCHC 08/03/2022 31.9  31.5 - 35.7 g/dL Final   • RDW 08/03/2022 13.2  12.3 - 15.4 % Final   • RDW-SD 08/03/2022 42.6  37.0 - 54.0 fl Final   • MPV 08/03/2022 10.7  6.0 - 12.0 fL Final   • Platelets 08/03/2022 99 (A) 140 - 450 10*3/mm3 Final   • Neutrophil % 08/03/2022 53.4  42.7 - 76.0 % Final   • Lymphocyte % 08/03/2022 26.2  19.6 - 45.3 % Final   • Monocyte % 08/03/2022 8.0  5.0 - 12.0 % Final   • Eosinophil % 08/03/2022 11.5 (A) 0.3 - 6.2 % Final   • Basophil % 08/03/2022 0.7  0.0 - 1.5 % Final   • Immature Grans % 08/03/2022 0.2  0.0 - 0.5 % Final   • Neutrophils, Absolute 08/03/2022 3.16  1.70 - 7.00 10*3/mm3 Final   • Lymphocytes, Absolute 08/03/2022 1.55  0.70 - 3.10 10*3/mm3 Final   • Monocytes, Absolute 08/03/2022 0.47  0.10 - 0.90 10*3/mm3 Final   • Eosinophils, Absolute 08/03/2022 0.68 (A) 0.00 - 0.40 10*3/mm3 Final   • Basophils, Absolute 08/03/2022 0.04  0.00 - 0.20 10*3/mm3 Final   • Immature Grans, Absolute 08/03/2022 0.01  0.00 - 0.05 10*3/mm3 Final   • nRBC 08/03/2022 0.0  0.0 - 0.2 /100 WBC Final        Procedure: None  Procedures     I have reviewed and agree with the above PMH, PSH, FMH, social history, medications, allergies, and labs.     Assessment/Plan:   Problem List Items Addressed This Visit    None     Visit Diagnoses     Frequency of urination    -  Primary    Relevant Orders    POC Urinalysis Dipstick, Automated (Completed)    Kidney cysts        Relevant Orders    CT Abdomen Pelvis With & Without Contrast          Health Maintenance:   Health Maintenance Due   Topic Date Due   • ZOSTER VACCINE (1 of 2) Never  done   • DIABETIC FOOT EXAM  06/22/2020        Smoking Counseling: Patient has never smoked.  She is a current user of smokeless tobacco counseling given however patient is not ready to quit at this time.    Urine Incontinence: Patient reports that she is not currently experiencing any symptoms of urinary incontinence.    Patient was given instructions and counseling regarding her condition or for health maintenance advice. Please see specific information pulled into the AVS if appropriate.    Patient Education:   Renal cyst -I discussed with the patient the causes of polycystic kidney disease.  I discussed this often occurs due to genetics.  Discussed the use of surgery to remove cyst if renal function or blood pressure are affected. I discussed the use of CT scan to help identify any causes of obstruction and extent of cysts on each kidney bilaterally. I discussed with the patient multiple ways to help prevent UTI's. I advised patient to increase water intake to 2-3L per day. I discussed the use of a daily OTC probiotic to help with growth and control of normal urogenital oziel. I discussed the use of a daily antibiotic to help prevent UTI's. I advised patient to have CT scan completed and follow up with me in 2 weeks or sooner if needed.    Visit Diagnoses:    ICD-10-CM ICD-9-CM   1. Frequency of urination  R35.0 788.41   2. Kidney cysts  N28.1 753.10       Meds Ordered During Visit:  No orders of the defined types were placed in this encounter.      Follow Up Appointment: 2 weeks  No follow-ups on file.      This document has been electronically signed by Braulio Hamm PA-C   September 16, 2022 14:18 EDT    Part of this note may be an electronic transcription/translation of spoken language to printed text using the Dragon Dictation System.

## 2022-09-23 ENCOUNTER — HOSPITAL ENCOUNTER (OUTPATIENT)
Dept: CT IMAGING | Facility: HOSPITAL | Age: 67
Discharge: HOME | End: 2022-09-23

## 2022-09-23 DIAGNOSIS — N28.1 KIDNEY CYSTS: ICD-10-CM

## 2022-09-23 PROCEDURE — 74176 CT ABD & PELVIS W/O CONTRAST: CPT | Performed by: RADIOLOGY

## 2022-09-23 PROCEDURE — 74176 CT ABD & PELVIS W/O CONTRAST: CPT

## 2022-09-29 ENCOUNTER — FLU SHOT (OUTPATIENT)
Dept: FAMILY MEDICINE CLINIC | Facility: CLINIC | Age: 67
End: 2022-09-29

## 2022-09-29 DIAGNOSIS — Z23 NEED FOR INFLUENZA VACCINATION: Primary | ICD-10-CM

## 2022-09-29 PROCEDURE — 90686 IIV4 VACC NO PRSV 0.5 ML IM: CPT | Performed by: GENERAL PRACTICE

## 2022-09-29 PROCEDURE — G0008 ADMIN INFLUENZA VIRUS VAC: HCPCS | Performed by: GENERAL PRACTICE

## 2022-10-03 ENCOUNTER — OFFICE VISIT (OUTPATIENT)
Dept: UROLOGY | Facility: CLINIC | Age: 67
End: 2022-10-03

## 2022-10-03 VITALS — WEIGHT: 131 LBS | HEIGHT: 63 IN | BODY MASS INDEX: 23.21 KG/M2

## 2022-10-03 DIAGNOSIS — R31.9 URINARY TRACT INFECTION WITH HEMATURIA, SITE UNSPECIFIED: ICD-10-CM

## 2022-10-03 DIAGNOSIS — R35.0 FREQUENCY OF URINATION: Primary | ICD-10-CM

## 2022-10-03 DIAGNOSIS — Z87.440 HISTORY OF RECURRENT URINARY TRACT INFECTION: ICD-10-CM

## 2022-10-03 DIAGNOSIS — N39.0 URINARY TRACT INFECTION WITH HEMATURIA, SITE UNSPECIFIED: ICD-10-CM

## 2022-10-03 LAB
BILIRUB BLD-MCNC: NEGATIVE MG/DL
CLARITY, POC: CLEAR
COLOR UR: YELLOW
EXPIRATION DATE: ABNORMAL
GLUCOSE UR STRIP-MCNC: ABNORMAL MG/DL
KETONES UR QL: NEGATIVE
LEUKOCYTE EST, POC: ABNORMAL
Lab: ABNORMAL
NITRITE UR-MCNC: POSITIVE MG/ML
PH UR: 5.5 [PH] (ref 5–8)
PROT UR STRIP-MCNC: ABNORMAL MG/DL
RBC # UR STRIP: ABNORMAL /UL
SP GR UR: 1.02 (ref 1–1.03)
UROBILINOGEN UR QL: NORMAL

## 2022-10-03 PROCEDURE — 87086 URINE CULTURE/COLONY COUNT: CPT

## 2022-10-03 PROCEDURE — 81003 URINALYSIS AUTO W/O SCOPE: CPT

## 2022-10-03 PROCEDURE — 87186 SC STD MICRODIL/AGAR DIL: CPT

## 2022-10-03 PROCEDURE — 87088 URINE BACTERIA CULTURE: CPT

## 2022-10-03 PROCEDURE — 96372 THER/PROPH/DIAG INJ SC/IM: CPT

## 2022-10-03 PROCEDURE — 99213 OFFICE O/P EST LOW 20 MIN: CPT

## 2022-10-03 RX ORDER — CEFTRIAXONE 1 G/1
1 INJECTION, POWDER, FOR SOLUTION INTRAMUSCULAR; INTRAVENOUS ONCE
Status: COMPLETED | OUTPATIENT
Start: 2022-10-03 | End: 2022-10-03

## 2022-10-03 RX ORDER — CEFDINIR 300 MG/1
300 CAPSULE ORAL DAILY
Qty: 5 CAPSULE | Refills: 0 | Status: SHIPPED | OUTPATIENT
Start: 2022-10-03 | End: 2022-12-05

## 2022-10-03 RX ADMIN — CEFTRIAXONE 1 G: 1 INJECTION, POWDER, FOR SOLUTION INTRAMUSCULAR; INTRAVENOUS at 11:16

## 2022-10-03 NOTE — PROGRESS NOTES
"Chief Complaint:    Chief Complaint   Patient presents with   • Urinary Frequency   • recurrent uti     Follow up        Vital Signs:   Ht 160 cm (62.99\")   Wt 59.4 kg (131 lb)   BMI 23.21 kg/m²   Body mass index is 23.21 kg/m².      HPI:  Ethel Trotter is a 67 y.o. female who presents today for follow up    History of Present Illness  Ms. Trotter presents to the clinic for for a 1 month follow-up.  She has a past medical history significant for multiple renal cysts bilaterally, chronic renal failure stage IIIb, and recurrent urinary tract infections.  She was recently treated for urinary tract infection 2 weeks ago for which she was on cefdinir.  Urinalysis today is positive for 3+ leukocytes, nitrites, and 1+ blood.  Patient denies any fevers, chills, nausea, vomiting, or gross hematuria.      Past Medical History:  Past Medical History:   Diagnosis Date   • Arthritis    • CAD (coronary artery disease)    • COPD (chronic obstructive pulmonary disease) (HCC)    • Diabetes mellitus (HCC)    • Elevated cholesterol    • Fracture of wrist    • GERD (gastroesophageal reflux disease)    • Hepatitis C    • History of EKG 03/25/2016    ABNORMAL   • History of transfusion    • Hyperlipidemia    • Hypertension    • Myocardial infarction (HCC)     x2 last one 5 years ago   • Osteopenia    • Pancreatitis    • Stroke (HCC)        Current Meds:  Current Outpatient Medications   Medication Sig Dispense Refill   • Acetaminophen Extra Strength 500 MG tablet      • albuterol sulfate  (90 Base) MCG/ACT inhaler Inhale 2 puffs 4 (Four) Times a Day. 18 g 3   • Alirocumab (Praluent) 150 MG/ML injection pen Inject 2 mL under the skin into the appropriate area as directed Every 28 (Twenty-Eight) Days. 2 mL 5   • Alirocumab (PRALUENT) 150 MG/ML injection pen Inject 2 ml under the skin into the appropriate area as directed every 28 days 2 mL 5   • amLODIPine (NORVASC) 10 MG tablet Take 1 tablet by mouth Every Evening. 30 tablet 5 "   • aspirin (aspirin) 81 MG EC tablet Take 1 tablet by mouth Daily. 30 tablet 5   • atorvastatin (LIPITOR) 80 MG tablet      • carvedilol (COREG) 12.5 MG tablet Take 1 tablet by mouth 2 (Two) Times a Day. 60 tablet 11   • cefdinir (OMNICEF) 300 MG capsule Take 1 capsule by mouth Daily. 5 capsule 0   • citalopram (CeleXA) 20 MG tablet Take 1 tablet by mouth Daily. 30 tablet 5   • conjugated estrogens (PREMARIN) 0.625 MG/GM vaginal cream 1 applicator pv twice weekly 30 g 5   • Dulaglutide (Trulicity) 1.5 MG/0.5ML solution pen-injector Inject 1.5 mg under the skin into the appropriate area as directed 1 (One) Time Per Week. 4 pen 5   • empagliflozin (Jardiance) 10 MG tablet tablet Take 1 tablet by mouth Every Morning. 30 tablet 5   • ezetimibe (ZETIA) 10 MG tablet Take 1 tablet by mouth Every Night. 30 tablet 5   • fluticasone (Flonase) 50 MCG/ACT nasal spray Administer 2 sprays both nostrils once daily 16 g 5   • Glucose Blood (Blood Glucose Test) strip USE TO TEST BLOOD GLUCOSE TWO TIMES A DAY 50 each 5   • hydrALAZINE (APRESOLINE) 25 MG tablet Take 1 tablet by mouth Every 8 (Eight) Hours As Needed (Blood pressure greater than 180 Systolic or 110 diastolic). 30 tablet 0   • isosorbide mononitrate (IMDUR) 60 MG 24 hr tablet Take 1.5 tablets by mouth Every Morning. 45 tablet 5   • lactulose (CHRONULAC) 10 GM/15ML solution      • Lancets misc 1 twice daily 60 each 5   • linaclotide (Linzess) 72 MCG capsule capsule Take 1 capsule by mouth Every Morning Before Breakfast. 30 capsule 5   • lisinopril (PRINIVIL,ZESTRIL) 10 MG tablet Take 2 tablets by mouth Daily. 60 tablet 5   • nitroglycerin (NITROSTAT) 0.4 MG SL tablet Place 1 tablet under the tongue Every 5 (Five) Minutes As Needed for Chest Pain. Take no more than 3 doses in 15 minutes. 25 tablet 5   • rosuvastatin (CRESTOR) 40 MG tablet Take 1 tablet by mouth Daily. 90 tablet 3   • vitamin D (ERGOCALCIFEROL) 1.25 MG (51995 UT) capsule capsule Take 1 capsule by mouth 1  (One) Time Per Week. 4 capsule 5     No current facility-administered medications for this visit.        Allergies:   No Known Allergies     Past Surgical History:  Past Surgical History:   Procedure Laterality Date   • COLONOSCOPY N/A 5/13/2019    Procedure: COLONOSCOPY;  Surgeon: Arnav Reyes MD;  Location: [facility];  Service: Gastroenterology   • CORONARY ARTERY BYPASS GRAFT     • HYSTERECTOMY      1998   • TONSILLECTOMY         Social History:  Social History     Socioeconomic History   • Marital status:    Tobacco Use   • Smoking status: Never Smoker   • Smokeless tobacco: Current User     Types: Chew   Vaping Use   • Vaping Use: Never used   Substance and Sexual Activity   • Alcohol use: No   • Drug use: Yes     Types: Marijuana     Comment: OCCASIONAL   • Sexual activity: Defer       Family History:  Family History   Problem Relation Age of Onset   • No Known Problems Father    • No Known Problems Mother    • Breast cancer Neg Hx        Review of Systems:  Review of Systems   Constitutional: Negative for chills, fatigue and fever.   HENT: Negative for congestion and sinus pressure.    Respiratory: Negative for chest tightness and shortness of breath.    Cardiovascular: Negative for chest pain.   Gastrointestinal: Negative for abdominal pain, constipation, diarrhea, nausea and vomiting.   Genitourinary: Positive for dysuria, frequency and urgency. Negative for difficulty urinating and hematuria.   Musculoskeletal: Negative for back pain and neck pain.   Neurological: Negative for dizziness and headaches.   Hematological: Bruises/bleeds easily.   Psychiatric/Behavioral: The patient is not nervous/anxious.        Physical Exam:  Physical Exam    IPSS Questionnaire (AUA-7):  IPSS Questionnaire (AUA-7):                  Recent Image (CT and/or KUB):   CT Abdomen and Pelvis: Results for orders placed during the hospital encounter of 06/10/20    CT Abdomen Pelvis With & Without  Contrast    Narrative  CT ABDOMEN PELVIS W WO CONTRAST-    CLINICAL INDICATION: Hematuria      COMPARISON: 04/08/2014    TECHNIQUE: Axial images were acquired from the lung bases through the  pubic symphysis without any IV and then after IV contrast. No oral  contrast was administered.  Reformatted images were created in both the coronal and sagittal planes.    Radiation dose reduction techniques were utilized per ALARA protocol.  Automated exposure control was initiated through either or [employer] or  [employer] software packages by  protocol.      FINDINGS:  The lung bases are clear. There are no pleural effusions.    The liver is homogeneous. There is no evidence of focal hepatic mass    The spleen is homogeneous    There is no peripancreatic stranding or pancreatic head mass.    There is no adrenal enlargement.    Bilateral renal cysts are present. The largest is off the posterior  aspect of the left kidney measures 7 cm.  No solid mass or hydronephrosis.    Otherwise I do not see any free fluid or walled off fluid collections.    There is no bowel wall thickening or mesenteric stranding.    There is no evidence of mesenteric or retroperitoneal adenopathy    There is evidence of atherosclerotic vascular disease    Arthritic changes seen in the spine    Impression  : Bilateral renal cysts, greater on the left than on the right          This report was finalized on 6/10/2020 2:53 PM by Dr. Vaughn Aparicio MD.     CT Stone Protocol: No results found for this or any previous visit.     KUB: No results found for this or any previous visit.       Labs:  Brief Urine Lab Results  (Last result in the past 365 days)      Color   Clarity   Blood   Leuk Est   Nitrite   Protein   CREAT   Urine HCG        10/03/22 1052 Yellow   Clear   1+   Large (3+)   Positive   1+               Office Visit on 10/03/2022   Component Date Value Ref Range Status   • Color 10/03/2022 Yellow  Yellow, Straw, Dark Yellow, Pao Final   •  Clarity, UA 10/03/2022 Clear  Clear Final   • Specific Gravity  10/03/2022 1.020  1.005 - 1.030 Final   • pH, Urine 10/03/2022 5.5  5.0 - 8.0 Final   • Leukocytes 10/03/2022 Large (3+) (A) Negative Final   • Nitrite, UA 10/03/2022 Positive (A) Negative Final   • Protein, POC 10/03/2022 1+ (A) Negative mg/dL Final   • Glucose, UA 10/03/2022 2+ (A) Negative mg/dL Final   • Ketones, UA 10/03/2022 Negative  Negative Final   • Urobilinogen, UA 10/03/2022 Normal  Normal, 0.2 E.U./dL Final   • Bilirubin 10/03/2022 Negative  Negative Final   • Blood, UA 10/03/2022 1+ (A) Negative Final   • Lot Number 10/03/2022 [id number]   Final   • Expiration Date 10/03/2022 120,223   Final   Office Visit on 09/16/2022   Component Date Value Ref Range Status   • Color 09/16/2022 Yellow  Yellow, Straw, Dark Yellow, Pao Final-Edited   • Clarity, UA 09/16/2022 Clear  Clear Final-Edited   • Specific Gravity  09/16/2022 1.020  1.005 - 1.030 Final-Edited   • pH, Urine 09/16/2022 5.0  5.0 - 8.0 Final-Edited   • Leukocytes 09/16/2022 Negative  Negative Final-Edited   • Nitrite, UA 09/16/2022 Negative  Negative Final-Edited   • Protein, POC 09/16/2022 Trace (A) Negative mg/dL Final-Edited   • Glucose, UA 09/16/2022 3+ (A) Negative mg/dL Final-Edited   • Ketones, UA 09/16/2022 Negative  Negative Final-Edited   • Urobilinogen, UA 09/16/2022 Normal  Normal, 0.2 E.U./dL Final-Edited   • Bilirubin 09/16/2022 Negative  Negative Final-Edited   • Blood, UA 09/16/2022 Negative  Negative Final-Edited   • Lot Number 09/16/2022 [id number]   Final-Edited   • Expiration Date 09/16/2022 12/21/23   Final-Edited   Admission on 09/13/2022, Discharged on 09/13/2022   Component Date Value Ref Range Status   • Color, UA 09/13/2022 Dark Yellow (A) Yellow, Straw Final   • Appearance, UA 09/13/2022 Cloudy (A) Clear Final   • pH, UA 09/13/2022 <=5.0  5.0 - 8.0 Final   • Specific Gravity, UA 09/13/2022 1.026  1.005 - 1.030 Final   • Glucose, UA 09/13/2022 250 mg/dL  (1+) (A) Negative Final   • Ketones, UA 09/13/2022 Trace (A) Negative Final   • Bilirubin, UA 09/13/2022 Negative  Negative Final   • Blood, UA 09/13/2022 Negative  Negative Final   • Protein, UA 09/13/2022 30 mg/dL (1+) (A) Negative Final   • Leuk Esterase, UA 09/13/2022 Small (1+) (A) Negative Final   • Nitrite, UA 09/13/2022 Negative  Negative Final   • Urobilinogen, UA 09/13/2022 0.2 E.U./dL  0.2 - 1.0 E.U./dL Final   • Glucose 09/13/2022 162 (A) 65 - 99 mg/dL Final   • BUN 09/13/2022 27 (A) 8 - 23 mg/dL Final   • Creatinine 09/13/2022 1.68 (A) 0.57 - 1.00 mg/dL Final   • Sodium 09/13/2022 138  136 - 145 mmol/L Final   • Potassium 09/13/2022 4.9  3.5 - 5.2 mmol/L Final   • Chloride 09/13/2022 102  98 - 107 mmol/L Final   • CO2 09/13/2022 25.8  22.0 - 29.0 mmol/L Final   • Calcium 09/13/2022 10.5  8.6 - 10.5 mg/dL Final   • Total Protein 09/13/2022 8.0  6.0 - 8.5 g/dL Final   • Albumin 09/13/2022 4.12  3.50 - 5.20 g/dL Final   • ALT (SGPT) 09/13/2022 15  1 - 33 U/L Final   • AST (SGOT) 09/13/2022 18  1 - 32 U/L Final   • Alkaline Phosphatase 09/13/2022 100  39 - 117 U/L Final   • Total Bilirubin 09/13/2022 0.2  0.0 - 1.2 mg/dL Final   • Globulin 09/13/2022 3.9  gm/dL Final   • A/G Ratio 09/13/2022 1.1  g/dL Final   • BUN/Creatinine Ratio 09/13/2022 16.1  7.0 - 25.0 Final   • Anion Gap 09/13/2022 10.2  5.0 - 15.0 mmol/L Final   • eGFR 09/13/2022 33.0 (A) >60.0 mL/min/1.73 Final    National Kidney Foundation and American Society of Nephrology (ASN) Task Force recommended calculation based on the Chronic Kidney Disease Epidemiology Collaboration (CKD-EPI) equation refit without adjustment for race.   • WBC 09/13/2022 6.85  3.40 - 10.80 10*3/mm3 Final   • RBC 09/13/2022 5.05  3.77 - 5.28 10*6/mm3 Final   • Hemoglobin 09/13/2022 14.7  12.0 - 15.9 g/dL Final   • Hematocrit 09/13/2022 45.7  34.0 - 46.6 % Final   • MCV 09/13/2022 90.5  79.0 - 97.0 fL Final   • MCH 09/13/2022 29.1  26.6 - 33.0 pg Final   • MCHC 09/13/2022  32.2  31.5 - 35.7 g/dL Final   • RDW 09/13/2022 13.3  12.3 - 15.4 % Final   • RDW-SD 09/13/2022 44.2  37.0 - 54.0 fl Final   • MPV 09/13/2022 9.5  6.0 - 12.0 fL Final   • Platelets 09/13/2022 224  140 - 450 10*3/mm3 Final   • Neutrophil % 09/13/2022 58.9  42.7 - 76.0 % Final   • Lymphocyte % 09/13/2022 21.3  19.6 - 45.3 % Final   • Monocyte % 09/13/2022 10.8  5.0 - 12.0 % Final   • Eosinophil % 09/13/2022 7.4 (A) 0.3 - 6.2 % Final   • Basophil % 09/13/2022 1.2  0.0 - 1.5 % Final   • Immature Grans % 09/13/2022 0.4  0.0 - 0.5 % Final   • Neutrophils, Absolute 09/13/2022 4.03  1.70 - 7.00 10*3/mm3 Final   • Lymphocytes, Absolute 09/13/2022 1.46  0.70 - 3.10 10*3/mm3 Final   • Monocytes, Absolute 09/13/2022 0.74  0.10 - 0.90 10*3/mm3 Final   • Eosinophils, Absolute 09/13/2022 0.51 (A) 0.00 - 0.40 10*3/mm3 Final   • Basophils, Absolute 09/13/2022 0.08  0.00 - 0.20 10*3/mm3 Final   • Immature Grans, Absolute 09/13/2022 0.03  0.00 - 0.05 10*3/mm3 Final   • nRBC 09/13/2022 0.0  0.0 - 0.2 /100 WBC Final   • RBC, UA 09/13/2022 0-2  None Seen, 0-2 /HPF Final   • WBC, UA 09/13/2022 6-12 (A) None Seen, 0-2 /HPF Final   • Bacteria, UA 09/13/2022 Trace (A) None Seen /HPF Final   • Squamous Epithelial Cells, UA 09/13/2022 3-6 (A) None Seen, 0-2 /HPF Final   • Hyaline Casts, UA 09/13/2022 None Seen  None Seen /LPF Final   • Mucus, UA 09/13/2022 Small/1+ (A) None Seen, Trace /HPF Final   • Methodology 09/13/2022 Manual Light Microscopy   Final   • Urine Culture 09/13/2022 No growth   Final   Hospital Outpatient Visit on 08/03/2022   Component Date Value Ref Range Status   • Target HR (85%) 08/03/2022 129  bpm Final   • Max. Pred. HR (100%) 08/03/2022 152  bpm Final   • LA ESV Index (BP) 08/03/2022 27.0  ml/m2 Final   • Avg E/e' ratio 08/03/2022 11.26   Final   • ACS 08/03/2022 1.90  cm Final   • Ao root diam 08/03/2022 3.3  cm Final   • Ao pk malena 08/03/2022 151.0  cm/sec Final   • Ao V2 VTI 08/03/2022 27.0  cm Final   • EDV(cubed)  08/03/2022 88.1  ml Final   • EDV(MOD-sp4) 08/03/2022 130.0  ml Final   • EF(MOD-sp4) 08/03/2022 49.9  % Final   • ESV(cubed) 08/03/2022 42.7  ml Final   • ESV(MOD-sp4) 08/03/2022 65.1  ml Final   • IVS/LVPW 08/03/2022 1.19  cm Final   • Lat Peak E' Vamsi 08/03/2022 5.4  cm/sec Final   • LV mass(C)d 08/03/2022 206.0  grams Final   • LVPWd 08/03/2022 1.14  cm Final   • Med Peak E' Vamsi 08/03/2022 3.8  cm/sec Final   • PA acc time 08/03/2022 0.09  sec Final   • PA pr(Accel) 08/03/2022 37.6  mmHg Final   • RAP systole 08/03/2022 10.0  mmHg Final   • RVSP(TR) 08/03/2022 44.1  mmHg Final   • SI(MOD-sp4) 08/03/2022 39.7  ml/m2 Final   • SV(MOD-sp4) 08/03/2022 64.9  ml Final   • TR max PG 08/03/2022 34.1  mmHg Final   • AI P1/2t 08/03/2022 528.1  msec Final   • Ao max PG 08/03/2022 9.1  mmHg Final   • Ao mean PG 08/03/2022 4.0  mmHg Final   • FS 08/03/2022 21.5  % Final   • IVSd 08/03/2022 1.36  cm Final   • LA dimension (2D)  08/03/2022 4.3  cm Final   • LVIDd 08/03/2022 4.5  cm Final   • LVIDs 08/03/2022 3.5  cm Final   • LVOT area 08/03/2022 3.1  cm2 Final   • LVOT diam 08/03/2022 2.00  cm Final   • MV E/A 08/03/2022 0.52   Final   • MV A max vamsi 08/03/2022 99.8  cm/sec Final   • MV E max vamsi 08/03/2022 51.8  cm/sec Final   • TR max vamsi 08/03/2022 292.0  cm/sec Final   • LV Winkler Vol (BSA corrected) 08/03/2022 79.4  cm2 Final   • LV Sys Vol (BSA corrected) 08/03/2022 39.8  cm2 Final   • TAPSE (>1.6) 08/03/2022 1.63  cm Final   Lab on 08/03/2022   Component Date Value Ref Range Status   • Glucose 08/03/2022 153 (A) 65 - 99 mg/dL Final   • BUN 08/03/2022 16  8 - 23 mg/dL Final   • Creatinine 08/03/2022 1.52 (A) 0.57 - 1.00 mg/dL Final   • Sodium 08/03/2022 140  136 - 145 mmol/L Final   • Potassium 08/03/2022 4.9  3.5 - 5.2 mmol/L Final   • Chloride 08/03/2022 105  98 - 107 mmol/L Final   • CO2 08/03/2022 25.0  22.0 - 29.0 mmol/L Final   • Calcium 08/03/2022 9.5  8.6 - 10.5 mg/dL Final   • Total Protein 08/03/2022 6.9  6.0 -  8.5 g/dL Final   • Albumin 08/03/2022 4.20  3.50 - 5.20 g/dL Final   • ALT (SGPT) 08/03/2022 9  1 - 33 U/L Final   • AST (SGOT) 08/03/2022 20  1 - 32 U/L Final   • Alkaline Phosphatase 08/03/2022 80  39 - 117 U/L Final   • Total Bilirubin 08/03/2022 0.4  0.0 - 1.2 mg/dL Final   • Globulin 08/03/2022 2.7  gm/dL Final   • A/G Ratio 08/03/2022 1.6  g/dL Final   • BUN/Creatinine Ratio 08/03/2022 10.5  7.0 - 25.0 Final   • Anion Gap 08/03/2022 10.0  5.0 - 15.0 mmol/L Final   • eGFR 08/03/2022 37.2 (A) >60.0 mL/min/1.73 Final    National Kidney Foundation and American Society of Nephrology (ASN) Task Force recommended calculation based on the Chronic Kidney Disease Epidemiology Collaboration (CKD-EPI) equation refit without adjustment for race.   • Total Cholesterol 08/03/2022 111  0 - 200 mg/dL Final   • Triglycerides 08/03/2022 201 (A) 0 - 150 mg/dL Final   • HDL Cholesterol 08/03/2022 41  40 - 60 mg/dL Final   • LDL Cholesterol  08/03/2022 38  0 - 100 mg/dL Final   • VLDL Cholesterol 08/03/2022 32  5 - 40 mg/dL Final   • LDL/HDL Ratio 08/03/2022 0.73   Final   • TSH 08/03/2022 1.070  0.270 - 4.200 uIU/mL Final   • Hemoglobin A1C 08/03/2022 7.80 (A) 4.80 - 5.60 % Final   • Microalbumin, Urine 08/03/2022 4.8  mg/dL Final   • 25 Hydroxy, Vitamin D 08/03/2022 50.1  30.0 - 100.0 ng/ml Final   • Vitamin B-12 08/03/2022 827  211 - 946 pg/mL Final   • WBC 08/03/2022 5.91  3.40 - 10.80 10*3/mm3 Final   • RBC 08/03/2022 4.94  3.77 - 5.28 10*6/mm3 Final   • Hemoglobin 08/03/2022 13.9  12.0 - 15.9 g/dL Final   • Hematocrit 08/03/2022 43.6  34.0 - 46.6 % Final   • MCV 08/03/2022 88.3  79.0 - 97.0 fL Final   • MCH 08/03/2022 28.1  26.6 - 33.0 pg Final   • MCHC 08/03/2022 31.9  31.5 - 35.7 g/dL Final   • RDW 08/03/2022 13.2  12.3 - 15.4 % Final   • RDW-SD 08/03/2022 42.6  37.0 - 54.0 fl Final   • MPV 08/03/2022 10.7  6.0 - 12.0 fL Final   • Platelets 08/03/2022 99 (A) 140 - 450 10*3/mm3 Final   • Neutrophil % 08/03/2022 53.4  42.7 -  76.0 % Final   • Lymphocyte % 08/03/2022 26.2  19.6 - 45.3 % Final   • Monocyte % 08/03/2022 8.0  5.0 - 12.0 % Final   • Eosinophil % 08/03/2022 11.5 (A) 0.3 - 6.2 % Final   • Basophil % 08/03/2022 0.7  0.0 - 1.5 % Final   • Immature Grans % 08/03/2022 0.2  0.0 - 0.5 % Final   • Neutrophils, Absolute 08/03/2022 3.16  1.70 - 7.00 10*3/mm3 Final   • Lymphocytes, Absolute 08/03/2022 1.55  0.70 - 3.10 10*3/mm3 Final   • Monocytes, Absolute 08/03/2022 0.47  0.10 - 0.90 10*3/mm3 Final   • Eosinophils, Absolute 08/03/2022 0.68 (A) 0.00 - 0.40 10*3/mm3 Final   • Basophils, Absolute 08/03/2022 0.04  0.00 - 0.20 10*3/mm3 Final   • Immature Grans, Absolute 08/03/2022 0.01  0.00 - 0.05 10*3/mm3 Final   • nRBC 08/03/2022 0.0  0.0 - 0.2 /100 WBC Final        Procedure: None  Procedures     I have reviewed and agree with the above PMH, PSH, FMH, social history, medications, allergies, and labs.     Assessment/Plan:   Problem List Items Addressed This Visit        Genitourinary and Reproductive     History of recurrent urinary tract infection      Other Visit Diagnoses     Frequency of urination    -  Primary    Relevant Orders    POC Urinalysis Dipstick, Automated (Completed)    Urine Culture - Urine, Urine, Clean Catch    Urinary tract infection with hematuria, site unspecified        Relevant Medications    cefTRIAXone (ROCEPHIN) injection 1 g (Completed)    cefdinir (OMNICEF) 300 MG capsule          Health Maintenance:   Health Maintenance Due   Topic Date Due   • ZOSTER VACCINE (1 of 2) Never done   • DIABETIC FOOT EXAM  06/22/2020        Smoking Counseling: Never smoked.  Current user of smokeless tobacco.  Counseling given.    Urine Incontinence: Patient reports that she is not currently experiencing any symptoms of urinary incontinence.    Patient was given instructions and counseling regarding her condition or for health maintenance advice. Please see specific information pulled into the AVS if appropriate.    Patient  Education:   Urinary tract infection -discussed with the patient's urinalysis today shows signs of urinary tract infection.  I will give the patient a shot of 1 g Rocephin today in office.  Given patient's history of chronic kidney disease antimicrobial therapy is very limited.  I will send in 300 mg cefdinir once daily for 5 days.  Educated patient on risk and side effects of medication.  I will culture the patient's urine and call with results. Educated patient to return to clinic if symptoms worsen or to the nearest ER if she experiences fever, chills, nausea, vomiting, gross hematuria, increase in pain, or altered mental status.  Patient verbalizes understanding and agrees to plan of care.  I will follow-up with her in 1 month.  Renal cyst -CT scan showed no changes of renal cyst since last ultrasound 1 year ago.  Left cysts were greater than right.  Patient reports that she went to nephrology and they informed her they could not do anything for her.  We will continue to monitor cysts and possible referral to [facility].    Visit Diagnoses:    ICD-10-CM ICD-9-CM   1. Frequency of urination  R35.0 788.41   2. Urinary tract infection with hematuria, site unspecified  N39.0 599.0    R31.9 599.70   3. History of recurrent urinary tract infection  Z87.440 V13.02       Meds Ordered During Visit:  New Medications Ordered This Visit   Medications   • cefTRIAXone (ROCEPHIN) injection 1 g   • cefdinir (OMNICEF) 300 MG capsule     Sig: Take 1 capsule by mouth Daily.     Dispense:  5 capsule     Refill:  0       Follow Up Appointment: 1 month  No follow-ups on file.      This document has been electronically signed by Braulio Hamm PA-C   October 3, 2022 12:33 EDT    Part of this note may be an electronic transcription/translation of spoken language to printed text using the Dragon Dictation System.

## 2022-10-05 LAB — BACTERIA SPEC AEROBE CULT: ABNORMAL

## 2022-10-11 ENCOUNTER — SPECIALTY PHARMACY (OUTPATIENT)
Dept: PHARMACY | Facility: HOSPITAL | Age: 67
End: 2022-10-11

## 2022-10-11 NOTE — PROGRESS NOTES
Specialty Pharmacy Refill Coordination Note      Name:  Ethel Trotter  :  1955  Date:  10/11/2022         Past Medical History:   Diagnosis Date   • Arthritis    • CAD (coronary artery disease)    • COPD (chronic obstructive pulmonary disease) (HCC)    • Diabetes mellitus (HCC)    • Elevated cholesterol    • Fracture of wrist    • GERD (gastroesophageal reflux disease)    • Hepatitis C    • History of EKG 2016    ABNORMAL   • History of transfusion    • Hyperlipidemia    • Hypertension    • Myocardial infarction (HCC)     x2 last one 5 years ago   • Osteopenia    • Pancreatitis    • Stroke (HCC)        Past Surgical History:   Procedure Laterality Date   • COLONOSCOPY N/A 2019    Procedure: COLONOSCOPY;  Surgeon: Arnav Reyes MD;  Location: [facility];  Service: Gastroenterology   • CORONARY ARTERY BYPASS GRAFT     • HYSTERECTOMY         • TONSILLECTOMY         Social History     Socioeconomic History   • Marital status:    Tobacco Use   • Smoking status: Never   • Smokeless tobacco: Current     Types: Chew   Vaping Use   • Vaping Use: Never used   Substance and Sexual Activity   • Alcohol use: No   • Drug use: Yes     Types: Marijuana     Comment: OCCASIONAL   • Sexual activity: Defer       Family History   Problem Relation Age of Onset   • No Known Problems Father    • No Known Problems Mother    • Breast cancer Neg Hx        No Known Allergies    Current Outpatient Medications   Medication Sig Dispense Refill   • Acetaminophen Extra Strength 500 MG tablet      • albuterol sulfate  (90 Base) MCG/ACT inhaler Inhale 2 puffs 4 (Four) Times a Day. 18 g 3   • Alirocumab (Praluent) 150 MG/ML injection pen Inject 2 mL under the skin into the appropriate area as directed Every 28 (Twenty-Eight) Days. 2 mL 5   • Alirocumab (PRALUENT) 150 MG/ML injection pen Inject 2 ml under the skin into the appropriate area as directed every 28 days 2 mL 5   • amLODIPine (NORVASC) 10 MG  tablet Take 1 tablet by mouth Every Evening. 30 tablet 5   • aspirin (aspirin) 81 MG EC tablet Take 1 tablet by mouth Daily. 30 tablet 5   • atorvastatin (LIPITOR) 80 MG tablet      • carvedilol (COREG) 12.5 MG tablet Take 1 tablet by mouth 2 (Two) Times a Day. 60 tablet 11   • cefdinir (OMNICEF) 300 MG capsule Take 1 capsule by mouth Daily. 5 capsule 0   • citalopram (CeleXA) 20 MG tablet Take 1 tablet by mouth Daily. 30 tablet 5   • conjugated estrogens (PREMARIN) 0.625 MG/GM vaginal cream 1 applicator pv twice weekly 30 g 5   • Dulaglutide (Trulicity) 1.5 MG/0.5ML solution pen-injector Inject 1.5 mg under the skin into the appropriate area as directed 1 (One) Time Per Week. 4 pen 5   • empagliflozin (Jardiance) 10 MG tablet tablet Take 1 tablet by mouth Every Morning. 30 tablet 5   • ezetimibe (ZETIA) 10 MG tablet Take 1 tablet by mouth Every Night. 30 tablet 5   • fluticasone (Flonase) 50 MCG/ACT nasal spray Administer 2 sprays both nostrils once daily 16 g 5   • Glucose Blood (Blood Glucose Test) strip USE TO TEST BLOOD GLUCOSE TWO TIMES A DAY 50 each 5   • hydrALAZINE (APRESOLINE) 25 MG tablet Take 1 tablet by mouth Every 8 (Eight) Hours As Needed (Blood pressure greater than 180 Systolic or 110 diastolic). 30 tablet 0   • isosorbide mononitrate (IMDUR) 60 MG 24 hr tablet Take 1.5 tablets by mouth Every Morning. 45 tablet 5   • lactulose (CHRONULAC) 10 GM/15ML solution      • Lancets misc 1 twice daily 60 each 5   • linaclotide (Linzess) 72 MCG capsule capsule Take 1 capsule by mouth Every Morning Before Breakfast. 30 capsule 5   • lisinopril (PRINIVIL,ZESTRIL) 10 MG tablet Take 2 tablets by mouth Daily. 60 tablet 5   • nitroglycerin (NITROSTAT) 0.4 MG SL tablet Place 1 tablet under the tongue Every 5 (Five) Minutes As Needed for Chest Pain. Take no more than 3 doses in 15 minutes. 25 tablet 5   • rosuvastatin (CRESTOR) 40 MG tablet Take 1 tablet by mouth Daily. 90 tablet 3   • vitamin D (ERGOCALCIFEROL) 1.25  MG (84095 UT) capsule capsule Take 1 capsule by mouth 1 (One) Time Per Week. 4 capsule 5     No current facility-administered medications for this visit.         ASSESSMENT/PLAN:      Ethel is a 67 y.o. female contacted today regarding refills of  Praluent 150mg/ml specialty medication(s).    Reviewed and verified with patient:       Specialty medication(s) and dose(s) confirmed: yes    Refill Questions    Flowsheet Row Most Recent Value   Changes to allergies? No   Changes to medications? No   New conditions since last clinic visit No   Unplanned office visit, urgent care, ED, or hospital admission in the last 4 weeks  No   How does patient/caregiver feel medication is working? Excellent   Financial problems or insurance changes  No   Since the previous refill, were any specialty medication doses or scheduled injections missed or delayed?  No   Does this patient require a clinical escalation to a pharmacist? No                     Follow-up: 28 day(s)     Rossy Jerome, Pharmacy Technician  Specialty Pharmacy Technician

## 2022-11-04 ENCOUNTER — SPECIALTY PHARMACY (OUTPATIENT)
Dept: PHARMACY | Facility: HOSPITAL | Age: 67
End: 2022-11-04

## 2022-11-04 ENCOUNTER — OFFICE VISIT (OUTPATIENT)
Dept: UROLOGY | Facility: CLINIC | Age: 67
End: 2022-11-04

## 2022-11-04 VITALS — BODY MASS INDEX: 23.21 KG/M2 | HEIGHT: 63 IN | WEIGHT: 131 LBS

## 2022-11-04 DIAGNOSIS — N18.32 CHRONIC RENAL FAILURE, STAGE 3B: ICD-10-CM

## 2022-11-04 DIAGNOSIS — N28.1 RENAL CYST, LEFT: Primary | ICD-10-CM

## 2022-11-04 DIAGNOSIS — Z87.440 HISTORY OF RECURRENT URINARY TRACT INFECTION: ICD-10-CM

## 2022-11-04 LAB
ALBUMIN SERPL-MCNC: 4.3 G/DL (ref 3.5–5.2)
ALBUMIN/GLOB SERPL: 1.7 G/DL
ALP SERPL-CCNC: 89 U/L (ref 39–117)
ALT SERPL W P-5'-P-CCNC: 14 U/L (ref 1–33)
ANION GAP SERPL CALCULATED.3IONS-SCNC: 11 MMOL/L (ref 5–15)
AST SERPL-CCNC: 19 U/L (ref 1–32)
BASOPHILS # BLD AUTO: 0.11 10*3/MM3 (ref 0–0.2)
BASOPHILS NFR BLD AUTO: 1.5 % (ref 0–1.5)
BILIRUB SERPL-MCNC: 0.2 MG/DL (ref 0–1.2)
BUN SERPL-MCNC: 19 MG/DL (ref 8–23)
BUN/CREAT SERPL: 14.1 (ref 7–25)
CALCIUM SPEC-SCNC: 9.9 MG/DL (ref 8.6–10.5)
CHLORIDE SERPL-SCNC: 101 MMOL/L (ref 98–107)
CO2 SERPL-SCNC: 26 MMOL/L (ref 22–29)
CREAT SERPL-MCNC: 1.35 MG/DL (ref 0.57–1)
DEPRECATED RDW RBC AUTO: 41.3 FL (ref 37–54)
EGFRCR SERPLBLD CKD-EPI 2021: 42.6 ML/MIN/1.73
EOSINOPHIL # BLD AUTO: 0.61 10*3/MM3 (ref 0–0.4)
EOSINOPHIL NFR BLD AUTO: 8.5 % (ref 0.3–6.2)
ERYTHROCYTE [DISTWIDTH] IN BLOOD BY AUTOMATED COUNT: 13.1 % (ref 12.3–15.4)
GLOBULIN UR ELPH-MCNC: 2.6 GM/DL
GLUCOSE SERPL-MCNC: 186 MG/DL (ref 65–99)
HCT VFR BLD AUTO: 43.7 % (ref 34–46.6)
HGB BLD-MCNC: 14.1 G/DL (ref 12–15.9)
IMM GRANULOCYTES # BLD AUTO: 0.02 10*3/MM3 (ref 0–0.05)
IMM GRANULOCYTES NFR BLD AUTO: 0.3 % (ref 0–0.5)
LYMPHOCYTES # BLD AUTO: 1.72 10*3/MM3 (ref 0.7–3.1)
LYMPHOCYTES NFR BLD AUTO: 24 % (ref 19.6–45.3)
MCH RBC QN AUTO: 28.4 PG (ref 26.6–33)
MCHC RBC AUTO-ENTMCNC: 32.3 G/DL (ref 31.5–35.7)
MCV RBC AUTO: 87.9 FL (ref 79–97)
MONOCYTES # BLD AUTO: 0.75 10*3/MM3 (ref 0.1–0.9)
MONOCYTES NFR BLD AUTO: 10.5 % (ref 5–12)
NEUTROPHILS NFR BLD AUTO: 3.96 10*3/MM3 (ref 1.7–7)
NEUTROPHILS NFR BLD AUTO: 55.2 % (ref 42.7–76)
NRBC BLD AUTO-RTO: 0 /100 WBC (ref 0–0.2)
PLAT MORPH BLD: NORMAL
PLATELET # BLD AUTO: 131 10*3/MM3 (ref 140–450)
PMV BLD AUTO: 12.4 FL (ref 6–12)
POTASSIUM SERPL-SCNC: 5.2 MMOL/L (ref 3.5–5.2)
PROT SERPL-MCNC: 6.9 G/DL (ref 6–8.5)
RBC # BLD AUTO: 4.97 10*6/MM3 (ref 3.77–5.28)
RBC MORPH BLD: NORMAL
SODIUM SERPL-SCNC: 138 MMOL/L (ref 136–145)
WBC MORPH BLD: NORMAL
WBC NRBC COR # BLD: 7.17 10*3/MM3 (ref 3.4–10.8)

## 2022-11-04 PROCEDURE — 36415 COLL VENOUS BLD VENIPUNCTURE: CPT

## 2022-11-04 PROCEDURE — 99213 OFFICE O/P EST LOW 20 MIN: CPT

## 2022-11-04 PROCEDURE — 85007 BL SMEAR W/DIFF WBC COUNT: CPT

## 2022-11-04 PROCEDURE — 85025 COMPLETE CBC W/AUTO DIFF WBC: CPT

## 2022-11-04 PROCEDURE — 80053 COMPREHEN METABOLIC PANEL: CPT

## 2022-11-04 NOTE — PROGRESS NOTES
Specialty Pharmacy Refill Coordination Note      Name:  Ethel Trotter  :  1955  Date:  2022         Past Medical History:   Diagnosis Date   • Arthritis    • CAD (coronary artery disease)    • COPD (chronic obstructive pulmonary disease) (HCC)    • Diabetes mellitus (HCC)    • Elevated cholesterol    • Fracture of wrist    • GERD (gastroesophageal reflux disease)    • Hepatitis C    • History of EKG 2016    ABNORMAL   • History of transfusion    • Hyperlipidemia    • Hypertension    • Myocardial infarction (HCC)     x2 last one 5 years ago   • Osteopenia    • Pancreatitis    • Stroke (HCC)        Past Surgical History:   Procedure Laterality Date   • COLONOSCOPY N/A 2019    Procedure: COLONOSCOPY;  Surgeon: Arnav Reyes MD;  Location: [facility];  Service: Gastroenterology   • CORONARY ARTERY BYPASS GRAFT     • HYSTERECTOMY         • TONSILLECTOMY         Social History     Socioeconomic History   • Marital status:    Tobacco Use   • Smoking status: Never   • Smokeless tobacco: Current     Types: Chew   Vaping Use   • Vaping Use: Never used   Substance and Sexual Activity   • Alcohol use: No   • Drug use: Yes     Types: Marijuana     Comment: OCCASIONAL   • Sexual activity: Defer       Family History   Problem Relation Age of Onset   • No Known Problems Father    • No Known Problems Mother    • Breast cancer Neg Hx        No Known Allergies    Current Outpatient Medications   Medication Sig Dispense Refill   • Acetaminophen Extra Strength 500 MG tablet      • albuterol sulfate  (90 Base) MCG/ACT inhaler Inhale 2 puffs 4 (Four) Times a Day. 18 g 3   • Alirocumab (Praluent) 150 MG/ML injection pen Inject 2 mL under the skin into the appropriate area as directed Every 28 (Twenty-Eight) Days. 2 mL 5   • Alirocumab (Praluent) 150 MG/ML injection pen Inject 2 mL under the skin into the appropriate area as directed Every 28 (Twenty-Eight) Days. 2 mL 5   • amLODIPine  (NORVASC) 10 MG tablet Take 1 tablet by mouth Every Evening. 30 tablet 5   • aspirin (aspirin) 81 MG EC tablet Take 1 tablet by mouth Daily. 30 tablet 5   • atorvastatin (LIPITOR) 80 MG tablet      • carvedilol (COREG) 12.5 MG tablet Take 1 tablet by mouth 2 (Two) Times a Day. 60 tablet 11   • cefdinir (OMNICEF) 300 MG capsule Take 1 capsule by mouth Daily. 5 capsule 0   • citalopram (CeleXA) 20 MG tablet Take 1 tablet by mouth Daily. 30 tablet 5   • conjugated estrogens (PREMARIN) 0.625 MG/GM vaginal cream 1 applicator pv twice weekly 30 g 5   • Dulaglutide (Trulicity) 1.5 MG/0.5ML solution pen-injector Inject 1.5 mg under the skin into the appropriate area as directed 1 (One) Time Per Week. 4 pen 5   • empagliflozin (Jardiance) 10 MG tablet tablet Take 1 tablet by mouth Every Morning. 30 tablet 5   • ezetimibe (ZETIA) 10 MG tablet Take 1 tablet by mouth Every Night. 30 tablet 5   • fluticasone (Flonase) 50 MCG/ACT nasal spray Administer 2 sprays both nostrils once daily 16 g 5   • Glucose Blood (Blood Glucose Test) strip USE TO TEST BLOOD GLUCOSE TWO TIMES A DAY 50 each 5   • hydrALAZINE (APRESOLINE) 25 MG tablet Take 1 tablet by mouth Every 8 (Eight) Hours As Needed (Blood pressure greater than 180 Systolic or 110 diastolic). 30 tablet 0   • isosorbide mononitrate (IMDUR) 60 MG 24 hr tablet Take 1.5 tablets by mouth Every Morning. 45 tablet 5   • lactulose (CHRONULAC) 10 GM/15ML solution      • Lancets misc 1 twice daily 60 each 5   • linaclotide (Linzess) 72 MCG capsule capsule Take 1 capsule by mouth Every Morning Before Breakfast. 30 capsule 5   • lisinopril (PRINIVIL,ZESTRIL) 10 MG tablet Take 2 tablets by mouth Daily. 60 tablet 5   • nitroglycerin (NITROSTAT) 0.4 MG SL tablet Place 1 tablet under the tongue Every 5 (Five) Minutes As Needed for Chest Pain. Take no more than 3 doses in 15 minutes. 25 tablet 5   • rosuvastatin (CRESTOR) 40 MG tablet Take 1 tablet by mouth Daily. 90 tablet 3   • vitamin D  (ERGOCALCIFEROL) 1.25 MG (76243 UT) capsule capsule Take 1 capsule by mouth 1 (One) Time Per Week. 4 capsule 5     No current facility-administered medications for this visit.         ASSESSMENT/PLAN:      Ethel is a 67 y.o. female contacted today regarding refills of  Praluent 150mg/ml specialty medication(s).    Reviewed and verified with patient:       Specialty medication(s) and dose(s) confirmed: yes    Refill Questions    Flowsheet Row Most Recent Value   Changes to allergies? No   Changes to medications? No   New conditions since last clinic visit No   Unplanned office visit, urgent care, ED, or hospital admission in the last 4 weeks  No   How does patient/caregiver feel medication is working? Very good   Financial problems or insurance changes  No   Since the previous refill, were any specialty medication doses or scheduled injections missed or delayed?  No   Does this patient require a clinical escalation to a pharmacist? No                     Follow-up: 28 day(s)     Bridget Balderas, Pharmacy Technician  Specialty Pharmacy Technician

## 2022-11-04 NOTE — PROGRESS NOTES
"Chief Complaint:    Chief Complaint   Patient presents with   • Urinary Frequency     1 month follow up       Vital Signs:   Ht 160 cm (62.99\")   Wt 59.4 kg (131 lb)   BMI 23.21 kg/m²   Body mass index is 23.21 kg/m².      HPI:  Ethel Trotter is a 67 y.o. female who presents today for follow up    History of Present Illness  Ms. Trotter presents to the clinic for a 1 month follow-up for urinary tract infection and left renal cyst.  Patient was given a course of cefdinir and states that her symptoms have significantly improved.  She denies any dysuria, difficulty urinating, frequency, urgency, or gross hematuria.  Patient has a large 8 cm cyst of the left kidney with multiple smaller renal cyst bilaterally.  I discussed with the patient careful watching and waiting.  I discussed that the cysts are benign in nature and often do not cause problems.  Patient is interested in having cyst removed or drained.  I discussed with the patient the option of interventional radiology for cyst drainage. I discussed the risks and benefits of this procedure.  I will obtain a CBC and CMP for monitoring of kidney function and hemoglobin.  I will discuss case with Dr. Elliott for best treatment.      Past Medical History:  Past Medical History:   Diagnosis Date   • Arthritis    • CAD (coronary artery disease)    • COPD (chronic obstructive pulmonary disease) (HCC)    • Diabetes mellitus (HCC)    • Elevated cholesterol    • Fracture of wrist    • GERD (gastroesophageal reflux disease)    • Hepatitis C    • History of EKG 03/25/2016    ABNORMAL   • History of transfusion    • Hyperlipidemia    • Hypertension    • Myocardial infarction (HCC)     x2 last one 5 years ago   • Osteopenia    • Pancreatitis    • Stroke (HCC)        Current Meds:  Current Outpatient Medications   Medication Sig Dispense Refill   • Acetaminophen Extra Strength 500 MG tablet      • albuterol sulfate  (90 Base) MCG/ACT inhaler Inhale 2 puffs 4 (Four) " Times a Day. 18 g 3   • Alirocumab (Praluent) 150 MG/ML injection pen Inject 2 mL under the skin into the appropriate area as directed Every 28 (Twenty-Eight) Days. 2 mL 5   • Alirocumab (Praluent) 150 MG/ML injection pen Inject 2 mL under the skin into the appropriate area as directed Every 28 (Twenty-Eight) Days. 2 mL 5   • amLODIPine (NORVASC) 10 MG tablet Take 1 tablet by mouth Every Evening. 30 tablet 5   • aspirin (aspirin) 81 MG EC tablet Take 1 tablet by mouth Daily. 30 tablet 5   • atorvastatin (LIPITOR) 80 MG tablet      • carvedilol (COREG) 12.5 MG tablet Take 1 tablet by mouth 2 (Two) Times a Day. 60 tablet 11   • cefdinir (OMNICEF) 300 MG capsule Take 1 capsule by mouth Daily. 5 capsule 0   • citalopram (CeleXA) 20 MG tablet Take 1 tablet by mouth Daily. 30 tablet 5   • conjugated estrogens (PREMARIN) 0.625 MG/GM vaginal cream 1 applicator pv twice weekly 30 g 5   • Dulaglutide (Trulicity) 1.5 MG/0.5ML solution pen-injector Inject 1.5 mg under the skin into the appropriate area as directed 1 (One) Time Per Week. 4 pen 5   • empagliflozin (Jardiance) 10 MG tablet tablet Take 1 tablet by mouth Every Morning. 30 tablet 5   • ezetimibe (ZETIA) 10 MG tablet Take 1 tablet by mouth Every Night. 30 tablet 5   • fluticasone (Flonase) 50 MCG/ACT nasal spray Administer 2 sprays both nostrils once daily 16 g 5   • Glucose Blood (Blood Glucose Test) strip USE TO TEST BLOOD GLUCOSE TWO TIMES A DAY 50 each 5   • hydrALAZINE (APRESOLINE) 25 MG tablet Take 1 tablet by mouth Every 8 (Eight) Hours As Needed (Blood pressure greater than 180 Systolic or 110 diastolic). 30 tablet 0   • isosorbide mononitrate (IMDUR) 60 MG 24 hr tablet Take 1.5 tablets by mouth Every Morning. 45 tablet 5   • lactulose (CHRONULAC) 10 GM/15ML solution      • Lancets misc 1 twice daily 60 each 5   • linaclotide (Linzess) 72 MCG capsule capsule Take 1 capsule by mouth Every Morning Before Breakfast. 30 capsule 5   • lisinopril (PRINIVIL,ZESTRIL)  10 MG tablet Take 2 tablets by mouth Daily. 60 tablet 5   • nitroglycerin (NITROSTAT) 0.4 MG SL tablet Place 1 tablet under the tongue Every 5 (Five) Minutes As Needed for Chest Pain. Take no more than 3 doses in 15 minutes. 25 tablet 5   • rosuvastatin (CRESTOR) 40 MG tablet Take 1 tablet by mouth Daily. 90 tablet 3   • vitamin D (ERGOCALCIFEROL) 1.25 MG (13089 UT) capsule capsule Take 1 capsule by mouth 1 (One) Time Per Week. 4 capsule 5     No current facility-administered medications for this visit.        Allergies:   No Known Allergies     Past Surgical History:  Past Surgical History:   Procedure Laterality Date   • COLONOSCOPY N/A 5/13/2019    Procedure: COLONOSCOPY;  Surgeon: Arnav Reyes MD;  Location: [facility];  Service: Gastroenterology   • CORONARY ARTERY BYPASS GRAFT     • HYSTERECTOMY      1998   • TONSILLECTOMY         Social History:  Social History     Socioeconomic History   • Marital status:    Tobacco Use   • Smoking status: Never   • Smokeless tobacco: Current     Types: Chew   Vaping Use   • Vaping Use: Never used   Substance and Sexual Activity   • Alcohol use: No   • Drug use: Yes     Types: Marijuana     Comment: OCCASIONAL   • Sexual activity: Defer       Family History:  Family History   Problem Relation Age of Onset   • No Known Problems Father    • No Known Problems Mother    • Breast cancer Neg Hx        Review of Systems:  Review of Systems   Constitutional: Negative for fatigue, fever and unexpected weight change.   Respiratory: Negative for chest tightness and shortness of breath.    Cardiovascular: Negative for chest pain.   Gastrointestinal: Negative for abdominal pain, constipation, diarrhea, nausea and vomiting.   Genitourinary: Negative for difficulty urinating, dysuria, frequency and urgency.   Skin: Negative for rash.   Psychiatric/Behavioral: Negative for confusion and suicidal ideas.       Physical Exam:  Physical Exam  Constitutional:       General: She is  not in acute distress.     Appearance: Normal appearance.   HENT:      Head: Normocephalic and atraumatic.      Nose: Nose normal.      Mouth/Throat:      Mouth: Mucous membranes are moist.   Eyes:      Conjunctiva/sclera: Conjunctivae normal.   Cardiovascular:      Rate and Rhythm: Normal rate and regular rhythm.      Pulses: Normal pulses.      Heart sounds: Normal heart sounds.   Pulmonary:      Effort: Pulmonary effort is normal.      Breath sounds: Normal breath sounds.   Abdominal:      General: Bowel sounds are normal.      Palpations: Abdomen is soft.      Tenderness: There is no right CVA tenderness or left CVA tenderness.   Musculoskeletal:         General: Normal range of motion.      Cervical back: Normal range of motion.   Skin:     General: Skin is warm.   Neurological:      General: No focal deficit present.      Mental Status: She is alert and oriented to person, place, and time.   Psychiatric:         Mood and Affect: Mood normal.         Behavior: Behavior normal.         Thought Content: Thought content normal.         Judgment: Judgment normal.           Recent Image (CT and/or KUB):   CT Abdomen and Pelvis: Results for orders placed during the hospital encounter of 06/10/20    CT Abdomen Pelvis With & Without Contrast    Narrative  CT ABDOMEN PELVIS W WO CONTRAST-    CLINICAL INDICATION: Hematuria      COMPARISON: 04/08/2014    TECHNIQUE: Axial images were acquired from the lung bases through the  pubic symphysis without any IV and then after IV contrast. No oral  contrast was administered.  Reformatted images were created in both the coronal and sagittal planes.    Radiation dose reduction techniques were utilized per ALARA protocol.  Automated exposure control was initiated through either or CareDose or  DoseRight software packages by  protocol.      FINDINGS:  The lung bases are clear. There are no pleural effusions.    The liver is homogeneous. There is no evidence of focal  hepatic mass    The spleen is homogeneous    There is no peripancreatic stranding or pancreatic head mass.    There is no adrenal enlargement.    Bilateral renal cysts are present. The largest is off the posterior  aspect of the left kidney measures 7 cm.  No solid mass or hydronephrosis.    Otherwise I do not see any free fluid or walled off fluid collections.    There is no bowel wall thickening or mesenteric stranding.    There is no evidence of mesenteric or retroperitoneal adenopathy    There is evidence of atherosclerotic vascular disease    Arthritic changes seen in the spine    Impression  : Bilateral renal cysts, greater on the left than on the right          This report was finalized on 6/10/2020 2:53 PM by Dr. Vaughn Aparicio MD.     CT Stone Protocol: No results found for this or any previous visit.     KUB: No results found for this or any previous visit.       Labs:  Brief Urine Lab Results  (Last result in the past 365 days)      Color   Clarity   Blood   Leuk Est   Nitrite   Protein   CREAT   Urine HCG        10/03/22 1052 Yellow   Clear   1+   Large (3+)   Positive   1+               Office Visit on 11/04/2022   Component Date Value Ref Range Status   • Glucose 11/04/2022 186 (H)  65 - 99 mg/dL Final   • BUN 11/04/2022 19  8 - 23 mg/dL Final   • Creatinine 11/04/2022 1.35 (H)  0.57 - 1.00 mg/dL Final   • Sodium 11/04/2022 138  136 - 145 mmol/L Final   • Potassium 11/04/2022 5.2  3.5 - 5.2 mmol/L Final   • Chloride 11/04/2022 101  98 - 107 mmol/L Final   • CO2 11/04/2022 26.0  22.0 - 29.0 mmol/L Final   • Calcium 11/04/2022 9.9  8.6 - 10.5 mg/dL Final   • Total Protein 11/04/2022 6.9  6.0 - 8.5 g/dL Final   • Albumin 11/04/2022 4.30  3.50 - 5.20 g/dL Final   • ALT (SGPT) 11/04/2022 14  1 - 33 U/L Final   • AST (SGOT) 11/04/2022 19  1 - 32 U/L Final   • Alkaline Phosphatase 11/04/2022 89  39 - 117 U/L Final   • Total Bilirubin 11/04/2022 0.2  0.0 - 1.2 mg/dL Final   • Globulin 11/04/2022 2.6  gm/dL  Final   • A/G Ratio 11/04/2022 1.7  g/dL Final   • BUN/Creatinine Ratio 11/04/2022 14.1  7.0 - 25.0 Final   • Anion Gap 11/04/2022 11.0  5.0 - 15.0 mmol/L Final   • eGFR 11/04/2022 42.6 (L)  >60.0 mL/min/1.73 Final    National Kidney Foundation and American Society of Nephrology (ASN) Task Force recommended calculation based on the Chronic Kidney Disease Epidemiology Collaboration (CKD-EPI) equation refit without adjustment for race.   • WBC 11/04/2022 7.17  3.40 - 10.80 10*3/mm3 Final   • RBC 11/04/2022 4.97  3.77 - 5.28 10*6/mm3 Final   • Hemoglobin 11/04/2022 14.1  12.0 - 15.9 g/dL Final   • Hematocrit 11/04/2022 43.7  34.0 - 46.6 % Final   • MCV 11/04/2022 87.9  79.0 - 97.0 fL Final   • MCH 11/04/2022 28.4  26.6 - 33.0 pg Final   • MCHC 11/04/2022 32.3  31.5 - 35.7 g/dL Final   • RDW 11/04/2022 13.1  12.3 - 15.4 % Final   • RDW-SD 11/04/2022 41.3  37.0 - 54.0 fl Final   • MPV 11/04/2022 12.4 (H)  6.0 - 12.0 fL Final   • Platelets 11/04/2022 131 (L)  140 - 450 10*3/mm3 Final   • Neutrophil % 11/04/2022 55.2  42.7 - 76.0 % Final   • Lymphocyte % 11/04/2022 24.0  19.6 - 45.3 % Final   • Monocyte % 11/04/2022 10.5  5.0 - 12.0 % Final   • Eosinophil % 11/04/2022 8.5 (H)  0.3 - 6.2 % Final   • Basophil % 11/04/2022 1.5  0.0 - 1.5 % Final   • Immature Grans % 11/04/2022 0.3  0.0 - 0.5 % Final   • Neutrophils, Absolute 11/04/2022 3.96  1.70 - 7.00 10*3/mm3 Final   • Lymphocytes, Absolute 11/04/2022 1.72  0.70 - 3.10 10*3/mm3 Final   • Monocytes, Absolute 11/04/2022 0.75  0.10 - 0.90 10*3/mm3 Final   • Eosinophils, Absolute 11/04/2022 0.61 (H)  0.00 - 0.40 10*3/mm3 Final   • Basophils, Absolute 11/04/2022 0.11  0.00 - 0.20 10*3/mm3 Final   • Immature Grans, Absolute 11/04/2022 0.02  0.00 - 0.05 10*3/mm3 Final   • nRBC 11/04/2022 0.0  0.0 - 0.2 /100 WBC Final   • RBC Morphology 11/04/2022 Normal  Normal Final   • WBC Morphology 11/04/2022 Normal  Normal Final   • Platelet Morphology 11/04/2022 Normal  Normal Final    Office Visit on 10/03/2022   Component Date Value Ref Range Status   • Color 10/03/2022 Yellow  Yellow, Straw, Dark Yellow, Pao Final   • Clarity, UA 10/03/2022 Clear  Clear Final   • Specific Gravity  10/03/2022 1.020  1.005 - 1.030 Final   • pH, Urine 10/03/2022 5.5  5.0 - 8.0 Final   • Leukocytes 10/03/2022 Large (3+) (A)  Negative Final   • Nitrite, UA 10/03/2022 Positive (A)  Negative Final   • Protein, POC 10/03/2022 1+ (A)  Negative mg/dL Final   • Glucose, UA 10/03/2022 2+ (A)  Negative mg/dL Final   • Ketones, UA 10/03/2022 Negative  Negative Final   • Urobilinogen, UA 10/03/2022 Normal  Normal, 0.2 E.U./dL Final   • Bilirubin 10/03/2022 Negative  Negative Final   • Blood, UA 10/03/2022 1+ (A)  Negative Final   • Lot Number 10/03/2022 [id number]   Final   • Expiration Date 10/03/2022 120,223   Final   • Urine Culture 10/03/2022 50,000 CFU/mL Escherichia coli (A)   Final   Office Visit on 09/16/2022   Component Date Value Ref Range Status   • Color 09/16/2022 Yellow  Yellow, Straw, Dark Yellow, Pao Final-Edited   • Clarity, UA 09/16/2022 Clear  Clear Final-Edited   • Specific Gravity  09/16/2022 1.020  1.005 - 1.030 Final-Edited   • pH, Urine 09/16/2022 5.0  5.0 - 8.0 Final-Edited   • Leukocytes 09/16/2022 Negative  Negative Final-Edited   • Nitrite, UA 09/16/2022 Negative  Negative Final-Edited   • Protein, POC 09/16/2022 Trace (A)  Negative mg/dL Final-Edited   • Glucose, UA 09/16/2022 3+ (A)  Negative mg/dL Final-Edited   • Ketones, UA 09/16/2022 Negative  Negative Final-Edited   • Urobilinogen, UA 09/16/2022 Normal  Normal, 0.2 E.U./dL Final-Edited   • Bilirubin 09/16/2022 Negative  Negative Final-Edited   • Blood, UA 09/16/2022 Negative  Negative Final-Edited   • Lot Number 09/16/2022 [id number]   Final-Edited   • Expiration Date 09/16/2022 12/21/23   Final-Edited   Admission on 09/13/2022, Discharged on 09/13/2022   Component Date Value Ref Range Status   • Color, UA 09/13/2022 Dark Yellow  (A)  Yellow, Straw Final   • Appearance, UA 09/13/2022 Cloudy (A)  Clear Final   • pH, UA 09/13/2022 <=5.0  5.0 - 8.0 Final   • Specific Gravity, UA 09/13/2022 1.026  1.005 - 1.030 Final   • Glucose, UA 09/13/2022 250 mg/dL (1+) (A)  Negative Final   • Ketones, UA 09/13/2022 Trace (A)  Negative Final   • Bilirubin, UA 09/13/2022 Negative  Negative Final   • Blood, UA 09/13/2022 Negative  Negative Final   • Protein, UA 09/13/2022 30 mg/dL (1+) (A)  Negative Final   • Leuk Esterase, UA 09/13/2022 Small (1+) (A)  Negative Final   • Nitrite, UA 09/13/2022 Negative  Negative Final   • Urobilinogen, UA 09/13/2022 0.2 E.U./dL  0.2 - 1.0 E.U./dL Final   • Glucose 09/13/2022 162 (H)  65 - 99 mg/dL Final   • BUN 09/13/2022 27 (H)  8 - 23 mg/dL Final   • Creatinine 09/13/2022 1.68 (H)  0.57 - 1.00 mg/dL Final   • Sodium 09/13/2022 138  136 - 145 mmol/L Final   • Potassium 09/13/2022 4.9  3.5 - 5.2 mmol/L Final   • Chloride 09/13/2022 102  98 - 107 mmol/L Final   • CO2 09/13/2022 25.8  22.0 - 29.0 mmol/L Final   • Calcium 09/13/2022 10.5  8.6 - 10.5 mg/dL Final   • Total Protein 09/13/2022 8.0  6.0 - 8.5 g/dL Final   • Albumin 09/13/2022 4.12  3.50 - 5.20 g/dL Final   • ALT (SGPT) 09/13/2022 15  1 - 33 U/L Final   • AST (SGOT) 09/13/2022 18  1 - 32 U/L Final   • Alkaline Phosphatase 09/13/2022 100  39 - 117 U/L Final   • Total Bilirubin 09/13/2022 0.2  0.0 - 1.2 mg/dL Final   • Globulin 09/13/2022 3.9  gm/dL Final   • A/G Ratio 09/13/2022 1.1  g/dL Final   • BUN/Creatinine Ratio 09/13/2022 16.1  7.0 - 25.0 Final   • Anion Gap 09/13/2022 10.2  5.0 - 15.0 mmol/L Final   • eGFR 09/13/2022 33.0 (L)  >60.0 mL/min/1.73 Final    National Kidney Foundation and American Society of Nephrology (ASN) Task Force recommended calculation based on the Chronic Kidney Disease Epidemiology Collaboration (CKD-EPI) equation refit without adjustment for race.   • WBC 09/13/2022 6.85  3.40 - 10.80 10*3/mm3 Final   • RBC 09/13/2022 5.05  3.77 - 5.28  10*6/mm3 Final   • Hemoglobin 09/13/2022 14.7  12.0 - 15.9 g/dL Final   • Hematocrit 09/13/2022 45.7  34.0 - 46.6 % Final   • MCV 09/13/2022 90.5  79.0 - 97.0 fL Final   • MCH 09/13/2022 29.1  26.6 - 33.0 pg Final   • MCHC 09/13/2022 32.2  31.5 - 35.7 g/dL Final   • RDW 09/13/2022 13.3  12.3 - 15.4 % Final   • RDW-SD 09/13/2022 44.2  37.0 - 54.0 fl Final   • MPV 09/13/2022 9.5  6.0 - 12.0 fL Final   • Platelets 09/13/2022 224  140 - 450 10*3/mm3 Final   • Neutrophil % 09/13/2022 58.9  42.7 - 76.0 % Final   • Lymphocyte % 09/13/2022 21.3  19.6 - 45.3 % Final   • Monocyte % 09/13/2022 10.8  5.0 - 12.0 % Final   • Eosinophil % 09/13/2022 7.4 (H)  0.3 - 6.2 % Final   • Basophil % 09/13/2022 1.2  0.0 - 1.5 % Final   • Immature Grans % 09/13/2022 0.4  0.0 - 0.5 % Final   • Neutrophils, Absolute 09/13/2022 4.03  1.70 - 7.00 10*3/mm3 Final   • Lymphocytes, Absolute 09/13/2022 1.46  0.70 - 3.10 10*3/mm3 Final   • Monocytes, Absolute 09/13/2022 0.74  0.10 - 0.90 10*3/mm3 Final   • Eosinophils, Absolute 09/13/2022 0.51 (H)  0.00 - 0.40 10*3/mm3 Final   • Basophils, Absolute 09/13/2022 0.08  0.00 - 0.20 10*3/mm3 Final   • Immature Grans, Absolute 09/13/2022 0.03  0.00 - 0.05 10*3/mm3 Final   • nRBC 09/13/2022 0.0  0.0 - 0.2 /100 WBC Final   • RBC, UA 09/13/2022 0-2  None Seen, 0-2 /HPF Final   • WBC, UA 09/13/2022 6-12 (A)  None Seen, 0-2 /HPF Final   • Bacteria, UA 09/13/2022 Trace (A)  None Seen /HPF Final   • Squamous Epithelial Cells, UA 09/13/2022 3-6 (A)  None Seen, 0-2 /HPF Final   • Hyaline Casts, UA 09/13/2022 None Seen  None Seen /LPF Final   • Mucus, UA 09/13/2022 Small/1+ (A)  None Seen, Trace /HPF Final   • Methodology 09/13/2022 Manual Light Microscopy   Final   • Urine Culture 09/13/2022 No growth   Final        Procedure: Procedures     I have reviewed and agree with the above PMH, PSH, FMH, social history, medications, allergies, and labs.     Assessment/Plan:   Problem List Items Addressed This Visit         Genitourinary and Reproductive     Renal cyst, left - Primary       Other    Chronic renal failure, stage 3b (HCC)       Health Maintenance:   Health Maintenance Due   Topic Date Due   • ZOSTER VACCINE (1 of 2) Never done   • MAMMOGRAM  02/02/2020   • DXA SCAN  02/02/2020   • DIABETIC FOOT EXAM  06/22/2020        Smoking Counseling: Patient has never smoked.  Current user of smokeless tobacco.  Counseling given however she is not ready to quit at this time.    Urine Incontinence: Patient reports that she is not currently experiencing any symptoms of urinary incontinence.    Patient was given instructions and counseling regarding her condition or for health maintenance advice. Please see specific information pulled into the AVS if appropriate.    Patient Education:   Urinary tract infection -patient reports that she is doing much better after course of antibiotics.  She denies any urinary symptoms at this time.  Left renal cyst -I discussed with the patient the use of interventional radiology for cyst drainage.  I discussed the risks and benefits of this procedure.  Patient wishes to proceed forward with drainage or removal.  I will discuss case with Dr. Elliott schedule the patient accordingly.  I will obtain a CBC and CMP to check hemoglobin and kidney function.  Patient verbalizes understanding and agrees to plan of care.    Visit Diagnoses:    ICD-10-CM ICD-9-CM   1. Renal cyst, left  N28.1 753.10   2. Chronic renal failure, stage 3b (HCC)  N18.32 585.3       Meds Ordered During Visit:  No orders of the defined types were placed in this encounter.      Follow Up Appointment: 1 month  No follow-ups on file.      This document has been electronically signed by Braulio Hamm PA-C   November 6, 2022 19:19 EST    Part of this note may be an electronic transcription/translation of spoken language to printed text using the Dragon Dictation System.

## 2022-12-01 ENCOUNTER — SPECIALTY PHARMACY (OUTPATIENT)
Dept: PHARMACY | Facility: HOSPITAL | Age: 67
End: 2022-12-01

## 2022-12-01 NOTE — PROGRESS NOTES
Specialty Pharmacy Refill Coordination Note      Name:  Ethel Trotter  :  1955  Date:  2022         Past Medical History:   Diagnosis Date   • Arthritis    • CAD (coronary artery disease)    • COPD (chronic obstructive pulmonary disease) (HCC)    • Diabetes mellitus (HCC)    • Elevated cholesterol    • Fracture of wrist    • GERD (gastroesophageal reflux disease)    • Hepatitis C    • History of EKG 2016    ABNORMAL   • History of transfusion    • Hyperlipidemia    • Hypertension    • Myocardial infarction (HCC)     x2 last one 5 years ago   • Osteopenia    • Pancreatitis    • Stroke (HCC)        Past Surgical History:   Procedure Laterality Date   • COLONOSCOPY N/A 2019    Procedure: COLONOSCOPY;  Surgeon: Arnav Reyes MD;  Location: [facility];  Service: Gastroenterology   • CORONARY ARTERY BYPASS GRAFT     • HYSTERECTOMY         • TONSILLECTOMY         Social History     Socioeconomic History   • Marital status:    Tobacco Use   • Smoking status: Never   • Smokeless tobacco: Current     Types: Chew   Vaping Use   • Vaping Use: Never used   Substance and Sexual Activity   • Alcohol use: No   • Drug use: Yes     Types: Marijuana     Comment: OCCASIONAL   • Sexual activity: Defer       Family History   Problem Relation Age of Onset   • No Known Problems Father    • No Known Problems Mother    • Breast cancer Neg Hx        No Known Allergies    Current Outpatient Medications   Medication Sig Dispense Refill   • Acetaminophen Extra Strength 500 MG tablet      • albuterol sulfate  (90 Base) MCG/ACT inhaler Inhale 2 puffs 4 (Four) Times a Day. 18 g 3   • Alirocumab (Praluent) 150 MG/ML injection pen Inject 2 mL under the skin into the appropriate area as directed Every 28 (Twenty-Eight) Days. 2 mL 5   • Alirocumab (Praluent) 150 MG/ML injection pen Inject 2 mL under the skin into the appropriate area as directed Every 28 (Twenty-Eight) Days. 2 mL 5   • amLODIPine  (NORVASC) 10 MG tablet Take 1 tablet by mouth Every Evening. 30 tablet 5   • aspirin (aspirin) 81 MG EC tablet Take 1 tablet by mouth Daily. 30 tablet 5   • atorvastatin (LIPITOR) 80 MG tablet      • carvedilol (COREG) 12.5 MG tablet Take 1 tablet by mouth 2 (Two) Times a Day. 60 tablet 11   • cefdinir (OMNICEF) 300 MG capsule Take 1 capsule by mouth Daily. 5 capsule 0   • citalopram (CeleXA) 20 MG tablet Take 1 tablet by mouth Daily. 30 tablet 5   • conjugated estrogens (PREMARIN) 0.625 MG/GM vaginal cream 1 applicator pv twice weekly 30 g 5   • Dulaglutide (Trulicity) 1.5 MG/0.5ML solution pen-injector Inject 1.5 mg under the skin into the appropriate area as directed 1 (One) Time Per Week. 4 pen 5   • empagliflozin (Jardiance) 10 MG tablet tablet Take 1 tablet by mouth Every Morning. 30 tablet 5   • ezetimibe (ZETIA) 10 MG tablet Take 1 tablet by mouth Every Night. 30 tablet 5   • fluticasone (Flonase) 50 MCG/ACT nasal spray Administer 2 sprays both nostrils once daily 16 g 5   • Glucose Blood (Blood Glucose Test) strip USE TO TEST BLOOD GLUCOSE TWO TIMES A DAY 50 each 5   • hydrALAZINE (APRESOLINE) 25 MG tablet Take 1 tablet by mouth Every 8 (Eight) Hours As Needed (Blood pressure greater than 180 Systolic or 110 diastolic). 30 tablet 0   • isosorbide mononitrate (IMDUR) 60 MG 24 hr tablet Take 1.5 tablets by mouth Every Morning. 45 tablet 5   • lactulose (CHRONULAC) 10 GM/15ML solution      • Lancets misc 1 twice daily 60 each 5   • linaclotide (Linzess) 72 MCG capsule capsule Take 1 capsule by mouth Every Morning Before Breakfast. 30 capsule 5   • lisinopril (PRINIVIL,ZESTRIL) 10 MG tablet Take 2 tablets by mouth Daily. 60 tablet 5   • nitroglycerin (NITROSTAT) 0.4 MG SL tablet Place 1 tablet under the tongue Every 5 (Five) Minutes As Needed for Chest Pain. Take no more than 3 doses in 15 minutes. 25 tablet 5   • rosuvastatin (CRESTOR) 40 MG tablet Take 1 tablet by mouth Daily. 90 tablet 3   • vitamin D  (ERGOCALCIFEROL) 1.25 MG (90472 UT) capsule capsule Take 1 capsule by mouth 1 (One) Time Per Week. 4 capsule 5     No current facility-administered medications for this visit.         ASSESSMENT/PLAN:      Ethel is a 67 y.o. female contacted today regarding refills of  Praluent 150mg/ml injection  specialty medication(s).    Reviewed and verified with patient:       Specialty medication(s) and dose(s) confirmed: yes    Refill Questions    Flowsheet Row Most Recent Value   Changes to allergies? No   Changes to medications? No   New conditions since last clinic visit No   Unplanned office visit, urgent care, ED, or hospital admission in the last 4 weeks  No   How does patient/caregiver feel medication is working? Very good   Financial problems or insurance changes  No   Since the previous refill, were any specialty medication doses or scheduled injections missed or delayed?  No   Does this patient require a clinical escalation to a pharmacist? No                     Follow-up: 28 day(s)     Bridget Balderas, Pharmacy Technician  Specialty Pharmacy Technician

## 2022-12-05 ENCOUNTER — OFFICE VISIT (OUTPATIENT)
Dept: UROLOGY | Facility: CLINIC | Age: 67
End: 2022-12-05

## 2022-12-05 VITALS — WEIGHT: 131 LBS | HEIGHT: 63 IN | BODY MASS INDEX: 23.21 KG/M2

## 2022-12-05 DIAGNOSIS — R35.0 FREQUENCY OF MICTURITION: Primary | ICD-10-CM

## 2022-12-05 DIAGNOSIS — Z87.440 HISTORY OF RECURRENT URINARY TRACT INFECTION: ICD-10-CM

## 2022-12-05 DIAGNOSIS — R31.9 URINARY TRACT INFECTION WITH HEMATURIA, SITE UNSPECIFIED: ICD-10-CM

## 2022-12-05 DIAGNOSIS — N28.1 RENAL CYST, LEFT: ICD-10-CM

## 2022-12-05 DIAGNOSIS — N39.0 URINARY TRACT INFECTION WITH HEMATURIA, SITE UNSPECIFIED: ICD-10-CM

## 2022-12-05 LAB
BILIRUB BLD-MCNC: NEGATIVE MG/DL
CLARITY, POC: CLEAR
COLOR UR: YELLOW
EXPIRATION DATE: ABNORMAL
GLUCOSE UR STRIP-MCNC: NEGATIVE MG/DL
KETONES UR QL: NEGATIVE
LEUKOCYTE EST, POC: ABNORMAL
Lab: ABNORMAL
NITRITE UR-MCNC: POSITIVE MG/ML
PH UR: 6 [PH] (ref 5–8)
PROT UR STRIP-MCNC: ABNORMAL MG/DL
RBC # UR STRIP: ABNORMAL /UL
SP GR UR: 1.01 (ref 1–1.03)
UROBILINOGEN UR QL: NORMAL

## 2022-12-05 PROCEDURE — 87186 SC STD MICRODIL/AGAR DIL: CPT

## 2022-12-05 PROCEDURE — 81003 URINALYSIS AUTO W/O SCOPE: CPT

## 2022-12-05 PROCEDURE — 87086 URINE CULTURE/COLONY COUNT: CPT

## 2022-12-05 PROCEDURE — 99213 OFFICE O/P EST LOW 20 MIN: CPT

## 2022-12-05 PROCEDURE — 80053 COMPREHEN METABOLIC PANEL: CPT

## 2022-12-05 PROCEDURE — 87077 CULTURE AEROBIC IDENTIFY: CPT

## 2022-12-05 PROCEDURE — 96372 THER/PROPH/DIAG INJ SC/IM: CPT

## 2022-12-05 RX ORDER — CEFTRIAXONE 1 G/1
1 INJECTION, POWDER, FOR SOLUTION INTRAMUSCULAR; INTRAVENOUS ONCE
Status: DISCONTINUED | OUTPATIENT
Start: 2022-12-05 | End: 2022-12-05

## 2022-12-05 RX ORDER — CEFDINIR 300 MG/1
300 CAPSULE ORAL 2 TIMES DAILY
Qty: 6 CAPSULE | Refills: 0 | Status: SHIPPED | OUTPATIENT
Start: 2022-12-05 | End: 2022-12-15

## 2022-12-05 RX ADMIN — CEFTRIAXONE 1 G: 1 INJECTION, POWDER, FOR SOLUTION INTRAMUSCULAR; INTRAVENOUS at 11:26

## 2022-12-05 NOTE — PROGRESS NOTES
"Chief Complaint:    Chief Complaint   Patient presents with   • Renal cyst     1 month follow up       Vital Signs:   Ht 160 cm (62.99\")   Wt 59.4 kg (131 lb)   BMI 23.21 kg/m²   Body mass index is 23.21 kg/m².      HPI:  Ethel Trotter is a 67 y.o. female who presents today for follow up    History of Present Illness  Ms. Trotter presents to the clinic for follow-up for recurrent urinary tract infections and polycystic kidney disease.  I discussed with her at her last visit possible of drainage of left cyst by interventional radiology. however in Dr. Elliott at this time is recommending no intervention.  I tried to call this patient several times to discuss this however she did not answer. I discussed this with the patient today and she states that she will wait to see if kidney function results are still improving.  UA today shows possible signs of infection.  It was positive for nitrites, leukocytes, and trace blood.  She reports that symptoms have returned after finishing antibiotics.  She complains of dysuria, difficulty urinating, left-sided back pain, and frequency.  At this time I am recommending 1 g injection of Rocephin in office and I will start her on a short course of cefdinir.  I will culture the patient's urine and call with results.      Past Medical History:  Past Medical History:   Diagnosis Date   • Arthritis    • CAD (coronary artery disease)    • COPD (chronic obstructive pulmonary disease) (HCC)    • Diabetes mellitus (HCC)    • Elevated cholesterol    • Fracture of wrist    • GERD (gastroesophageal reflux disease)    • Hepatitis C    • History of EKG 03/25/2016    ABNORMAL   • History of transfusion    • Hyperlipidemia    • Hypertension    • Myocardial infarction (HCC)     x2 last one 5 years ago   • Osteopenia    • Pancreatitis    • Stroke (HCC)        Current Meds:  Current Outpatient Medications   Medication Sig Dispense Refill   • Acetaminophen Extra Strength 500 MG tablet      • " albuterol sulfate  (90 Base) MCG/ACT inhaler Inhale 2 puffs 4 (Four) Times a Day. 18 g 3   • Alirocumab (Praluent) 150 MG/ML injection pen Inject 2 mL under the skin into the appropriate area as directed Every 28 (Twenty-Eight) Days. 2 mL 5   • Alirocumab (Praluent) 150 MG/ML injection pen Inject 2 mL under the skin into the appropriate area as directed Every 28 (Twenty-Eight) Days. 2 mL 5   • amLODIPine (NORVASC) 10 MG tablet Take 1 tablet by mouth Every Evening. 30 tablet 5   • aspirin (aspirin) 81 MG EC tablet Take 1 tablet by mouth Daily. 30 tablet 5   • atorvastatin (LIPITOR) 80 MG tablet      • carvedilol (COREG) 12.5 MG tablet Take 1 tablet by mouth 2 (Two) Times a Day. 60 tablet 11   • cefdinir (OMNICEF) 300 MG capsule Take 1 capsule by mouth 2 (Two) Times a Day. 6 capsule 0   • citalopram (CeleXA) 20 MG tablet Take 1 tablet by mouth Daily. 30 tablet 5   • conjugated estrogens (PREMARIN) 0.625 MG/GM vaginal cream 1 applicator pv twice weekly 30 g 5   • Dulaglutide (Trulicity) 1.5 MG/0.5ML solution pen-injector Inject 1.5 mg under the skin into the appropriate area as directed 1 (One) Time Per Week. 4 pen 5   • empagliflozin (Jardiance) 10 MG tablet tablet Take 1 tablet by mouth Every Morning. 30 tablet 5   • ezetimibe (ZETIA) 10 MG tablet Take 1 tablet by mouth Every Night. 30 tablet 5   • fluticasone (Flonase) 50 MCG/ACT nasal spray Administer 2 sprays both nostrils once daily 16 g 5   • Glucose Blood (Blood Glucose Test) strip USE TO TEST BLOOD GLUCOSE TWO TIMES A DAY 50 each 5   • hydrALAZINE (APRESOLINE) 25 MG tablet Take 1 tablet by mouth Every 8 (Eight) Hours As Needed (Blood pressure greater than 180 Systolic or 110 diastolic). 30 tablet 0   • isosorbide mononitrate (IMDUR) 60 MG 24 hr tablet Take 1.5 tablets by mouth Every Morning. 45 tablet 5   • lactulose (CHRONULAC) 10 GM/15ML solution      • Lancets misc 1 twice daily 60 each 5   • linaclotide (Linzess) 72 MCG capsule capsule Take 1  capsule by mouth Every Morning Before Breakfast. 30 capsule 5   • lisinopril (PRINIVIL,ZESTRIL) 10 MG tablet Take 2 tablets by mouth Daily. 60 tablet 5   • nitroglycerin (NITROSTAT) 0.4 MG SL tablet Place 1 tablet under the tongue Every 5 (Five) Minutes As Needed for Chest Pain. Take no more than 3 doses in 15 minutes. 25 tablet 5   • rosuvastatin (CRESTOR) 40 MG tablet Take 1 tablet by mouth Daily. 90 tablet 3   • vitamin D (ERGOCALCIFEROL) 1.25 MG (89613 UT) capsule capsule Take 1 capsule by mouth 1 (One) Time Per Week. 4 capsule 5     No current facility-administered medications for this visit.        Allergies:   No Known Allergies     Past Surgical History:  Past Surgical History:   Procedure Laterality Date   • COLONOSCOPY N/A 5/13/2019    Procedure: COLONOSCOPY;  Surgeon: Arnav Reyes MD;  Location: [facility];  Service: Gastroenterology   • CORONARY ARTERY BYPASS GRAFT     • HYSTERECTOMY      1998   • TONSILLECTOMY         Social History:  Social History     Socioeconomic History   • Marital status:    Tobacco Use   • Smoking status: Never   • Smokeless tobacco: Current     Types: Chew   Vaping Use   • Vaping Use: Never used   Substance and Sexual Activity   • Alcohol use: No   • Drug use: Yes     Types: Marijuana     Comment: OCCASIONAL   • Sexual activity: Defer       Family History:  Family History   Problem Relation Age of Onset   • No Known Problems Father    • No Known Problems Mother    • Breast cancer Neg Hx        Review of Systems:  Review of Systems   Constitutional: Negative for fatigue, fever and unexpected weight change.   Respiratory: Negative for chest tightness and shortness of breath.    Cardiovascular: Negative for chest pain.   Gastrointestinal: Negative for abdominal pain, constipation, diarrhea, nausea and vomiting.   Genitourinary: Positive for difficulty urinating, dysuria and frequency. Negative for flank pain, hematuria and urgency.   Musculoskeletal: Positive for  back pain.   Skin: Negative for rash.   Psychiatric/Behavioral: Negative for confusion and suicidal ideas.       Physical Exam:  Physical Exam    Recent Image (CT and/or KUB):   CT Abdomen and Pelvis: Results for orders placed during the hospital encounter of 06/10/20    CT Abdomen Pelvis With & Without Contrast    Narrative  CT ABDOMEN PELVIS W WO CONTRAST-    CLINICAL INDICATION: Hematuria      COMPARISON: 04/08/2014    TECHNIQUE: Axial images were acquired from the lung bases through the  pubic symphysis without any IV and then after IV contrast. No oral  contrast was administered.  Reformatted images were created in both the coronal and sagittal planes.    Radiation dose reduction techniques were utilized per ALARA protocol.  Automated exposure control was initiated through either or [employer] or  [employer] software packages by  protocol.      FINDINGS:  The lung bases are clear. There are no pleural effusions.    The liver is homogeneous. There is no evidence of focal hepatic mass    The spleen is homogeneous    There is no peripancreatic stranding or pancreatic head mass.    There is no adrenal enlargement.    Bilateral renal cysts are present. The largest is off the posterior  aspect of the left kidney measures 7 cm.  No solid mass or hydronephrosis.    Otherwise I do not see any free fluid or walled off fluid collections.    There is no bowel wall thickening or mesenteric stranding.    There is no evidence of mesenteric or retroperitoneal adenopathy    There is evidence of atherosclerotic vascular disease    Arthritic changes seen in the spine    Impression  : Bilateral renal cysts, greater on the left than on the right          This report was finalized on 6/10/2020 2:53 PM by Dr. Vaughn Aparicio MD.     CT Stone Protocol: No results found for this or any previous visit.     KUB: No results found for this or any previous visit.       Labs:  Brief Urine Lab Results  (Last result in the past 365  days)      Color   Clarity   Blood   Leuk Est   Nitrite   Protein   CREAT   Urine HCG        12/05/22 1101 Yellow   Clear   Trace   Large (3+)   Positive   Trace               Office Visit on 12/05/2022   Component Date Value Ref Range Status   • Color 12/05/2022 Yellow  Yellow, Straw, Dark Yellow, Pao Final   • Clarity, UA 12/05/2022 Clear  Clear Final   • Specific Gravity  12/05/2022 1.015  1.005 - 1.030 Final   • pH, Urine 12/05/2022 6.0  5.0 - 8.0 Final   • Leukocytes 12/05/2022 Large (3+) (A)  Negative Final   • Nitrite, UA 12/05/2022 Positive (A)  Negative Final   • Protein, POC 12/05/2022 Trace (A)  Negative mg/dL Final   • Glucose, UA 12/05/2022 Negative  Negative mg/dL Final   • Ketones, UA 12/05/2022 Negative  Negative Final   • Urobilinogen, UA 12/05/2022 Normal  Normal, 0.2 E.U./dL Final   • Bilirubin 12/05/2022 Negative  Negative Final   • Blood, UA 12/05/2022 Trace (A)  Negative Final   • Lot Number 12/05/2022 [id number]   Final   • Expiration Date 12/05/2022 2/8/24   Final   Office Visit on 11/04/2022   Component Date Value Ref Range Status   • Glucose 11/04/2022 186 (H)  65 - 99 mg/dL Final   • BUN 11/04/2022 19  8 - 23 mg/dL Final   • Creatinine 11/04/2022 1.35 (H)  0.57 - 1.00 mg/dL Final   • Sodium 11/04/2022 138  136 - 145 mmol/L Final   • Potassium 11/04/2022 5.2  3.5 - 5.2 mmol/L Final   • Chloride 11/04/2022 101  98 - 107 mmol/L Final   • CO2 11/04/2022 26.0  22.0 - 29.0 mmol/L Final   • Calcium 11/04/2022 9.9  8.6 - 10.5 mg/dL Final   • Total Protein 11/04/2022 6.9  6.0 - 8.5 g/dL Final   • Albumin 11/04/2022 4.30  3.50 - 5.20 g/dL Final   • ALT (SGPT) 11/04/2022 14  1 - 33 U/L Final   • AST (SGOT) 11/04/2022 19  1 - 32 U/L Final   • Alkaline Phosphatase 11/04/2022 89  39 - 117 U/L Final   • Total Bilirubin 11/04/2022 0.2  0.0 - 1.2 mg/dL Final   • Globulin 11/04/2022 2.6  gm/dL Final   • A/G Ratio 11/04/2022 1.7  g/dL Final   • BUN/Creatinine Ratio 11/04/2022 14.1  7.0 - 25.0 Final   •  Anion Gap 11/04/2022 11.0  5.0 - 15.0 mmol/L Final   • eGFR 11/04/2022 42.6 (L)  >60.0 mL/min/1.73 Final    National Kidney Foundation and American Society of Nephrology (ASN) Task Force recommended calculation based on the Chronic Kidney Disease Epidemiology Collaboration (CKD-EPI) equation refit without adjustment for race.   • WBC 11/04/2022 7.17  3.40 - 10.80 10*3/mm3 Final   • RBC 11/04/2022 4.97  3.77 - 5.28 10*6/mm3 Final   • Hemoglobin 11/04/2022 14.1  12.0 - 15.9 g/dL Final   • Hematocrit 11/04/2022 43.7  34.0 - 46.6 % Final   • MCV 11/04/2022 87.9  79.0 - 97.0 fL Final   • MCH 11/04/2022 28.4  26.6 - 33.0 pg Final   • MCHC 11/04/2022 32.3  31.5 - 35.7 g/dL Final   • RDW 11/04/2022 13.1  12.3 - 15.4 % Final   • RDW-SD 11/04/2022 41.3  37.0 - 54.0 fl Final   • MPV 11/04/2022 12.4 (H)  6.0 - 12.0 fL Final   • Platelets 11/04/2022 131 (L)  140 - 450 10*3/mm3 Final   • Neutrophil % 11/04/2022 55.2  42.7 - 76.0 % Final   • Lymphocyte % 11/04/2022 24.0  19.6 - 45.3 % Final   • Monocyte % 11/04/2022 10.5  5.0 - 12.0 % Final   • Eosinophil % 11/04/2022 8.5 (H)  0.3 - 6.2 % Final   • Basophil % 11/04/2022 1.5  0.0 - 1.5 % Final   • Immature Grans % 11/04/2022 0.3  0.0 - 0.5 % Final   • Neutrophils, Absolute 11/04/2022 3.96  1.70 - 7.00 10*3/mm3 Final   • Lymphocytes, Absolute 11/04/2022 1.72  0.70 - 3.10 10*3/mm3 Final   • Monocytes, Absolute 11/04/2022 0.75  0.10 - 0.90 10*3/mm3 Final   • Eosinophils, Absolute 11/04/2022 0.61 (H)  0.00 - 0.40 10*3/mm3 Final   • Basophils, Absolute 11/04/2022 0.11  0.00 - 0.20 10*3/mm3 Final   • Immature Grans, Absolute 11/04/2022 0.02  0.00 - 0.05 10*3/mm3 Final   • nRBC 11/04/2022 0.0  0.0 - 0.2 /100 WBC Final   • RBC Morphology 11/04/2022 Normal  Normal Final   • WBC Morphology 11/04/2022 Normal  Normal Final   • Platelet Morphology 11/04/2022 Normal  Normal Final   Office Visit on 10/03/2022   Component Date Value Ref Range Status   • Color 10/03/2022 Yellow  Yellow, Straw, Dark  Yellow, Pao Final   • Clarity, UA 10/03/2022 Clear  Clear Final   • Specific Gravity  10/03/2022 1.020  1.005 - 1.030 Final   • pH, Urine 10/03/2022 5.5  5.0 - 8.0 Final   • Leukocytes 10/03/2022 Large (3+) (A)  Negative Final   • Nitrite, UA 10/03/2022 Positive (A)  Negative Final   • Protein, POC 10/03/2022 1+ (A)  Negative mg/dL Final   • Glucose, UA 10/03/2022 2+ (A)  Negative mg/dL Final   • Ketones, UA 10/03/2022 Negative  Negative Final   • Urobilinogen, UA 10/03/2022 Normal  Normal, 0.2 E.U./dL Final   • Bilirubin 10/03/2022 Negative  Negative Final   • Blood, UA 10/03/2022 1+ (A)  Negative Final   • Lot Number 10/03/2022 [id number]   Final   • Expiration Date 10/03/2022 120,223   Final   • Urine Culture 10/03/2022 50,000 CFU/mL Escherichia coli (A)   Final   Office Visit on 09/16/2022   Component Date Value Ref Range Status   • Color 09/16/2022 Yellow  Yellow, Straw, Dark Yellow, Pao Final-Edited   • Clarity, UA 09/16/2022 Clear  Clear Final-Edited   • Specific Gravity  09/16/2022 1.020  1.005 - 1.030 Final-Edited   • pH, Urine 09/16/2022 5.0  5.0 - 8.0 Final-Edited   • Leukocytes 09/16/2022 Negative  Negative Final-Edited   • Nitrite, UA 09/16/2022 Negative  Negative Final-Edited   • Protein, POC 09/16/2022 Trace (A)  Negative mg/dL Final-Edited   • Glucose, UA 09/16/2022 3+ (A)  Negative mg/dL Final-Edited   • Ketones, UA 09/16/2022 Negative  Negative Final-Edited   • Urobilinogen, UA 09/16/2022 Normal  Normal, 0.2 E.U./dL Final-Edited   • Bilirubin 09/16/2022 Negative  Negative Final-Edited   • Blood, UA 09/16/2022 Negative  Negative Final-Edited   • Lot Number 09/16/2022 [id number]   Final-Edited   • Expiration Date 09/16/2022 12/21/23   Final-Edited   Admission on 09/13/2022, Discharged on 09/13/2022   Component Date Value Ref Range Status   • Color,  09/13/2022 Dark Yellow (A)  Yellow, Straw Final   • Appearance, UA 09/13/2022 Cloudy (A)  Clear Final   • pH, UA 09/13/2022 <=5.0  5.0 - 8.0  Final   • Specific Gravity, UA 09/13/2022 1.026  1.005 - 1.030 Final   • Glucose, UA 09/13/2022 250 mg/dL (1+) (A)  Negative Final   • Ketones, UA 09/13/2022 Trace (A)  Negative Final   • Bilirubin, UA 09/13/2022 Negative  Negative Final   • Blood, UA 09/13/2022 Negative  Negative Final   • Protein, UA 09/13/2022 30 mg/dL (1+) (A)  Negative Final   • Leuk Esterase, UA 09/13/2022 Small (1+) (A)  Negative Final   • Nitrite, UA 09/13/2022 Negative  Negative Final   • Urobilinogen, UA 09/13/2022 0.2 E.U./dL  0.2 - 1.0 E.U./dL Final   • Glucose 09/13/2022 162 (H)  65 - 99 mg/dL Final   • BUN 09/13/2022 27 (H)  8 - 23 mg/dL Final   • Creatinine 09/13/2022 1.68 (H)  0.57 - 1.00 mg/dL Final   • Sodium 09/13/2022 138  136 - 145 mmol/L Final   • Potassium 09/13/2022 4.9  3.5 - 5.2 mmol/L Final   • Chloride 09/13/2022 102  98 - 107 mmol/L Final   • CO2 09/13/2022 25.8  22.0 - 29.0 mmol/L Final   • Calcium 09/13/2022 10.5  8.6 - 10.5 mg/dL Final   • Total Protein 09/13/2022 8.0  6.0 - 8.5 g/dL Final   • Albumin 09/13/2022 4.12  3.50 - 5.20 g/dL Final   • ALT (SGPT) 09/13/2022 15  1 - 33 U/L Final   • AST (SGOT) 09/13/2022 18  1 - 32 U/L Final   • Alkaline Phosphatase 09/13/2022 100  39 - 117 U/L Final   • Total Bilirubin 09/13/2022 0.2  0.0 - 1.2 mg/dL Final   • Globulin 09/13/2022 3.9  gm/dL Final   • A/G Ratio 09/13/2022 1.1  g/dL Final   • BUN/Creatinine Ratio 09/13/2022 16.1  7.0 - 25.0 Final   • Anion Gap 09/13/2022 10.2  5.0 - 15.0 mmol/L Final   • eGFR 09/13/2022 33.0 (L)  >60.0 mL/min/1.73 Final    National Kidney Foundation and American Society of Nephrology (ASN) Task Force recommended calculation based on the Chronic Kidney Disease Epidemiology Collaboration (CKD-EPI) equation refit without adjustment for race.   • WBC 09/13/2022 6.85  3.40 - 10.80 10*3/mm3 Final   • RBC 09/13/2022 5.05  3.77 - 5.28 10*6/mm3 Final   • Hemoglobin 09/13/2022 14.7  12.0 - 15.9 g/dL Final   • Hematocrit 09/13/2022 45.7  34.0 - 46.6 %  Final   • MCV 09/13/2022 90.5  79.0 - 97.0 fL Final   • MCH 09/13/2022 29.1  26.6 - 33.0 pg Final   • MCHC 09/13/2022 32.2  31.5 - 35.7 g/dL Final   • RDW 09/13/2022 13.3  12.3 - 15.4 % Final   • RDW-SD 09/13/2022 44.2  37.0 - 54.0 fl Final   • MPV 09/13/2022 9.5  6.0 - 12.0 fL Final   • Platelets 09/13/2022 224  140 - 450 10*3/mm3 Final   • Neutrophil % 09/13/2022 58.9  42.7 - 76.0 % Final   • Lymphocyte % 09/13/2022 21.3  19.6 - 45.3 % Final   • Monocyte % 09/13/2022 10.8  5.0 - 12.0 % Final   • Eosinophil % 09/13/2022 7.4 (H)  0.3 - 6.2 % Final   • Basophil % 09/13/2022 1.2  0.0 - 1.5 % Final   • Immature Grans % 09/13/2022 0.4  0.0 - 0.5 % Final   • Neutrophils, Absolute 09/13/2022 4.03  1.70 - 7.00 10*3/mm3 Final   • Lymphocytes, Absolute 09/13/2022 1.46  0.70 - 3.10 10*3/mm3 Final   • Monocytes, Absolute 09/13/2022 0.74  0.10 - 0.90 10*3/mm3 Final   • Eosinophils, Absolute 09/13/2022 0.51 (H)  0.00 - 0.40 10*3/mm3 Final   • Basophils, Absolute 09/13/2022 0.08  0.00 - 0.20 10*3/mm3 Final   • Immature Grans, Absolute 09/13/2022 0.03  0.00 - 0.05 10*3/mm3 Final   • nRBC 09/13/2022 0.0  0.0 - 0.2 /100 WBC Final   • RBC, UA 09/13/2022 0-2  None Seen, 0-2 /HPF Final   • WBC, UA 09/13/2022 6-12 (A)  None Seen, 0-2 /HPF Final   • Bacteria, UA 09/13/2022 Trace (A)  None Seen /HPF Final   • Squamous Epithelial Cells, UA 09/13/2022 3-6 (A)  None Seen, 0-2 /HPF Final   • Hyaline Casts, UA 09/13/2022 None Seen  None Seen /LPF Final   • Mucus, UA 09/13/2022 Small/1+ (A)  None Seen, Trace /HPF Final   • Methodology 09/13/2022 Manual Light Microscopy   Final   • Urine Culture 09/13/2022 No growth   Final        Procedure: None  Procedures     I have reviewed and agree with the above PMH, PSH, FMH, social history, medications, allergies, and labs.     Assessment/Plan:   Problem List Items Addressed This Visit        Genitourinary and Reproductive     Renal cyst, left    Relevant Orders    Comprehensive Metabolic Panel     History of recurrent urinary tract infection    Relevant Medications    cefdinir (OMNICEF) 300 MG capsule   Other Visit Diagnoses     Frequency of micturition    -  Primary    Relevant Orders    Urine Culture - Urine, Urine, Clean Catch    Urinary tract infection with hematuria, site unspecified        Relevant Medications    cefdinir (OMNICEF) 300 MG capsule          Health Maintenance:   Health Maintenance Due   Topic Date Due   • ZOSTER VACCINE (1 of 2) Never done   • MAMMOGRAM  02/02/2020   • DXA SCAN  02/02/2020   • DIABETIC FOOT EXAM  06/22/2020        Smoking Counseling: She has never smoked or use smokeless tobacco.    Urine Incontinence: Patient reports that she is not currently experiencing any symptoms of urinary incontinence.    Patient was given instructions and counseling regarding her condition or for health maintenance advice. Please see specific information pulled into the AVS if appropriate.    Patient Education:   Left renal cyst -I discussed this with Dr. Elliott and he is recommending no intervention at this time.  Patient reports that she is still interested in possibly undergoing drainage of left renal cyst with interventional radiology.  I discussed the risks and benefits of this procedure.  Also discussed referral to Dr. Ocampo for cyst removal.  Patient states that she would like to recheck a CMP today and see if kidney function is improving before undergoing surgery.  I agree with this and I will obtain blood work at this visit.  Recurrent urinary tract infection -patient's UA today is positive for nitrites, leukocytes and trace hematuria.  She reports significant UTI symptoms as well.  I am recommending a injection of Rocephin 1 g IM in office today.  I will start the patient on a short 3-day course of cefdinir twice daily.  I will culture the patient's urine and call with results.  Advised patient to increase water intake to 2 to 3 L/day.  I also advised the use of a daily probiotic to  help with growth and control of normal urogenital oziel.  I discussed with her the use of a nightly antibiotic for urinary tract prophylaxis.  I will call her with results and discussion of further care.  Otherwise I will follow-up with her in 2 months.  Patient verbalizes understanding and agrees to plan of care.    Visit Diagnoses:    ICD-10-CM ICD-9-CM   1. Frequency of micturition  R35.0 788.41   2. Renal cyst, left  N28.1 753.10   3. History of recurrent urinary tract infection  Z87.440 V13.02   4. Urinary tract infection with hematuria, site unspecified  N39.0 599.0    R31.9 599.70       Meds Ordered During Visit:  New Medications Ordered This Visit   Medications   • cefdinir (OMNICEF) 300 MG capsule     Sig: Take 1 capsule by mouth 2 (Two) Times a Day.     Dispense:  6 capsule     Refill:  0   • cefTRIAXone (ROCEPHIN) injection 1 g       Follow Up Appointment: 2 months  No follow-ups on file.      This document has been electronically signed by Braulio Hamm PA-C   December 5, 2022 13:53 EST    Part of this note may be an electronic transcription/translation of spoken language to printed text using the Dragon Dictation System.

## 2022-12-06 LAB
ALBUMIN SERPL-MCNC: 4.1 G/DL (ref 3.5–5.2)
ALBUMIN/GLOB SERPL: 1.5 G/DL
ALP SERPL-CCNC: 83 U/L (ref 39–117)
ALT SERPL W P-5'-P-CCNC: 11 U/L (ref 1–33)
ANION GAP SERPL CALCULATED.3IONS-SCNC: 8.3 MMOL/L (ref 5–15)
AST SERPL-CCNC: 18 U/L (ref 1–32)
BILIRUB SERPL-MCNC: <0.2 MG/DL (ref 0–1.2)
BUN SERPL-MCNC: 20 MG/DL (ref 8–23)
BUN/CREAT SERPL: 13.6 (ref 7–25)
CALCIUM SPEC-SCNC: 9.7 MG/DL (ref 8.6–10.5)
CHLORIDE SERPL-SCNC: 106 MMOL/L (ref 98–107)
CO2 SERPL-SCNC: 26.7 MMOL/L (ref 22–29)
CREAT SERPL-MCNC: 1.47 MG/DL (ref 0.57–1)
EGFRCR SERPLBLD CKD-EPI 2021: 38.5 ML/MIN/1.73
GLOBULIN UR ELPH-MCNC: 2.8 GM/DL
GLUCOSE SERPL-MCNC: 141 MG/DL (ref 65–99)
POTASSIUM SERPL-SCNC: 5.7 MMOL/L (ref 3.5–5.2)
PROT SERPL-MCNC: 6.9 G/DL (ref 6–8.5)
SODIUM SERPL-SCNC: 141 MMOL/L (ref 136–145)

## 2022-12-07 ENCOUNTER — TELEPHONE (OUTPATIENT)
Dept: UROLOGY | Facility: CLINIC | Age: 67
End: 2022-12-07

## 2022-12-07 DIAGNOSIS — Z87.440 HISTORY OF RECURRENT URINARY TRACT INFECTION: Primary | ICD-10-CM

## 2022-12-07 LAB — BACTERIA SPEC AEROBE CULT: ABNORMAL

## 2022-12-07 RX ORDER — TRIMETHOPRIM 100 MG/1
TABLET ORAL
Qty: 30 TABLET | Refills: 3 | Status: SHIPPED | OUTPATIENT
Start: 2022-12-07 | End: 2022-12-15 | Stop reason: SDUPTHER

## 2022-12-07 NOTE — TELEPHONE ENCOUNTER
Called Ms. Trotter to inform her of most recent culture results.  I will start her on I will have her start taking trimethoprim 100 mg twice daily for the next 3 days after she finished her 3-day course of cefdinir.  Then she will continue this once nightly for UTI prophylaxis.  Patient verbalized understanding and agreed to plan of care.

## 2022-12-15 ENCOUNTER — OFFICE VISIT (OUTPATIENT)
Dept: FAMILY MEDICINE CLINIC | Facility: CLINIC | Age: 67
End: 2022-12-15

## 2022-12-15 VITALS
BODY MASS INDEX: 24.45 KG/M2 | HEART RATE: 92 BPM | OXYGEN SATURATION: 100 % | DIASTOLIC BLOOD PRESSURE: 108 MMHG | RESPIRATION RATE: 14 BRPM | WEIGHT: 138 LBS | SYSTOLIC BLOOD PRESSURE: 172 MMHG | HEIGHT: 63 IN | TEMPERATURE: 98.6 F

## 2022-12-15 DIAGNOSIS — E11.9 TYPE 2 DIABETES MELLITUS WITHOUT COMPLICATION, WITHOUT LONG-TERM CURRENT USE OF INSULIN: ICD-10-CM

## 2022-12-15 DIAGNOSIS — Z87.19 H/O ACUTE PANCREATITIS: ICD-10-CM

## 2022-12-15 DIAGNOSIS — M85.80 OSTEOPENIA, UNSPECIFIED LOCATION: ICD-10-CM

## 2022-12-15 DIAGNOSIS — E78.2 MIXED HYPERLIPIDEMIA: ICD-10-CM

## 2022-12-15 DIAGNOSIS — Z87.440 HISTORY OF RECURRENT URINARY TRACT INFECTION: ICD-10-CM

## 2022-12-15 DIAGNOSIS — I25.10 CORONARY ARTERY DISEASE INVOLVING NATIVE CORONARY ARTERY OF NATIVE HEART WITHOUT ANGINA PECTORIS: ICD-10-CM

## 2022-12-15 DIAGNOSIS — I87.2 CHRONIC VENOUS INSUFFICIENCY: ICD-10-CM

## 2022-12-15 DIAGNOSIS — K21.9 GASTROESOPHAGEAL REFLUX DISEASE WITHOUT ESOPHAGITIS: ICD-10-CM

## 2022-12-15 DIAGNOSIS — R76.8 HEPATITIS C ANTIBODY TEST POSITIVE: ICD-10-CM

## 2022-12-15 DIAGNOSIS — I25.10 ASCVD (ARTERIOSCLEROTIC CARDIOVASCULAR DISEASE): ICD-10-CM

## 2022-12-15 DIAGNOSIS — J44.9 COPD MIXED TYPE: ICD-10-CM

## 2022-12-15 DIAGNOSIS — N20.0 NEPHROLITHIASIS: ICD-10-CM

## 2022-12-15 DIAGNOSIS — N95.1 MENOPAUSAL SYMPTOM: ICD-10-CM

## 2022-12-15 DIAGNOSIS — G45.9 TIA (TRANSIENT ISCHEMIC ATTACK): ICD-10-CM

## 2022-12-15 DIAGNOSIS — J30.2 CHRONIC SEASONAL ALLERGIC RHINITIS: Primary | ICD-10-CM

## 2022-12-15 DIAGNOSIS — E55.9 VITAMIN D DEFICIENCY DISEASE: ICD-10-CM

## 2022-12-15 DIAGNOSIS — Z72.0 DIPS TOBACCO: ICD-10-CM

## 2022-12-15 DIAGNOSIS — N18.32 CHRONIC RENAL FAILURE, STAGE 3B: ICD-10-CM

## 2022-12-15 DIAGNOSIS — Z00.00 HEALTHCARE MAINTENANCE: ICD-10-CM

## 2022-12-15 DIAGNOSIS — M54.59 MECHANICAL LOW BACK PAIN: ICD-10-CM

## 2022-12-15 DIAGNOSIS — K59.09 CHRONIC CONSTIPATION: ICD-10-CM

## 2022-12-15 DIAGNOSIS — R94.39 ABNORMAL STRESS TEST: ICD-10-CM

## 2022-12-15 DIAGNOSIS — F41.8 DEPRESSION WITH ANXIETY: ICD-10-CM

## 2022-12-15 DIAGNOSIS — I10 ESSENTIAL HYPERTENSION: ICD-10-CM

## 2022-12-15 DIAGNOSIS — N28.1 RENAL CYST, LEFT: ICD-10-CM

## 2022-12-15 PROCEDURE — 99215 OFFICE O/P EST HI 40 MIN: CPT | Performed by: GENERAL PRACTICE

## 2022-12-15 PROCEDURE — 81003 URINALYSIS AUTO W/O SCOPE: CPT | Performed by: GENERAL PRACTICE

## 2022-12-15 RX ORDER — ROSUVASTATIN CALCIUM 40 MG/1
40 TABLET, COATED ORAL DAILY
Qty: 30 TABLET | Refills: 5 | Status: SHIPPED | OUTPATIENT
Start: 2022-12-15 | End: 2023-04-03 | Stop reason: SDUPTHER

## 2022-12-15 RX ORDER — LISINOPRIL 20 MG/1
20 TABLET ORAL DAILY
Qty: 30 TABLET | Refills: 5 | Status: SHIPPED | OUTPATIENT
Start: 2022-12-15 | End: 2023-04-03 | Stop reason: SDUPTHER

## 2022-12-15 RX ORDER — ISOSORBIDE MONONITRATE 60 MG/1
90 TABLET, EXTENDED RELEASE ORAL EVERY MORNING
Qty: 45 TABLET | Refills: 5 | Status: SHIPPED | OUTPATIENT
Start: 2022-12-15 | End: 2023-04-03 | Stop reason: SDUPTHER

## 2022-12-15 RX ORDER — ASPIRIN 81 MG/1
81 TABLET ORAL DAILY
Qty: 30 TABLET | Refills: 5 | Status: SHIPPED | OUTPATIENT
Start: 2022-12-15 | End: 2023-04-03 | Stop reason: SDUPTHER

## 2022-12-15 RX ORDER — CONJUGATED ESTROGENS 0.62 MG/G
CREAM VAGINAL 2 TIMES WEEKLY
Qty: 30 G | Refills: 5 | Status: SHIPPED | OUTPATIENT
Start: 2022-12-15

## 2022-12-15 RX ORDER — DULAGLUTIDE 1.5 MG/.5ML
1.5 INJECTION, SOLUTION SUBCUTANEOUS WEEKLY
Qty: 2 ML | Refills: 5 | Status: SHIPPED | OUTPATIENT
Start: 2022-12-15 | End: 2023-04-03 | Stop reason: SDUPTHER

## 2022-12-15 RX ORDER — FLUCONAZOLE 150 MG/1
150 TABLET ORAL EVERY OTHER DAY
Qty: 5 TABLET | Refills: 0 | Status: SHIPPED | OUTPATIENT
Start: 2022-12-15 | End: 2022-12-23

## 2022-12-15 RX ORDER — NITROGLYCERIN 0.4 MG/1
0.4 TABLET SUBLINGUAL
Qty: 25 TABLET | Refills: 5 | Status: SHIPPED | OUTPATIENT
Start: 2022-12-15

## 2022-12-15 RX ORDER — TRIMETHOPRIM 100 MG/1
TABLET ORAL
Qty: 30 TABLET | Refills: 5 | Status: SHIPPED | OUTPATIENT
Start: 2022-12-15 | End: 2023-04-03 | Stop reason: SDUPTHER

## 2022-12-15 RX ORDER — ALBUTEROL SULFATE 90 UG/1
2 AEROSOL, METERED RESPIRATORY (INHALATION)
Qty: 18 G | Refills: 3 | Status: SHIPPED | OUTPATIENT
Start: 2022-12-15 | End: 2023-04-03 | Stop reason: SDUPTHER

## 2022-12-15 RX ORDER — CITALOPRAM 20 MG/1
20 TABLET ORAL DAILY
Qty: 30 TABLET | Refills: 5 | Status: SHIPPED | OUTPATIENT
Start: 2022-12-15 | End: 2023-04-03 | Stop reason: SDUPTHER

## 2022-12-15 RX ORDER — EZETIMIBE 10 MG/1
10 TABLET ORAL NIGHTLY
Qty: 30 TABLET | Refills: 5 | Status: SHIPPED | OUTPATIENT
Start: 2022-12-15 | End: 2023-04-03 | Stop reason: SDUPTHER

## 2022-12-15 RX ORDER — AMLODIPINE BESYLATE 10 MG/1
10 TABLET ORAL EVERY EVENING
Qty: 30 TABLET | Refills: 5 | Status: SHIPPED | OUTPATIENT
Start: 2022-12-15 | End: 2023-04-03 | Stop reason: SDUPTHER

## 2022-12-15 RX ORDER — CARVEDILOL 12.5 MG/1
12.5 TABLET ORAL 2 TIMES DAILY
Qty: 60 TABLET | Refills: 5 | Status: SHIPPED | OUTPATIENT
Start: 2022-12-15 | End: 2023-04-03 | Stop reason: SDUPTHER

## 2022-12-15 NOTE — PROGRESS NOTES
Subjective   Ethel Trotter is a 67 y.o. female.     Chief Complaint  She returns for a scheduled reassessment of multiple medical problems including coronary artery disease, type 2 diabetes mellitus, hyperlipidemia, essential hypertension, chronic renal failure, polycystic kidney disease, and chronic back pain    History of Present Illness     Coronary artery disease  She has a history of previous M.I. and CABG surgery.  She continues to deny any chest pain and there is no history of any palpitations, lightheadedness, shortness of breath, calf pain, or swelling the ankles. She remains on low-dose ASA.  She underwent a nuclear stress test on 5/3/2022 with no evidence of inducible ischemia.  Her left ventricular ejection fraction was estimated at 37%.  She underwent an echocardiogram on 8/3/2022 with mild aortic calcification and regurgitation, mild to moderate MVR, mild TVR, and an estimated EF of 51 to 55%.  She ran out of nearly all of her medications over 2 months ago  Lab Results   Component Value Date    WBC 7.17 11/04/2022    HGB 14.1 11/04/2022    HCT 43.7 11/04/2022    MCV 87.9 11/04/2022     (L) 11/04/2022     Episode of Visual Loss  She experienced a 4 to 5 second episode of complete vision loss in her right eye several years ago. This occurred during a period of anxiety and was associated with a generalized headache.  She continues to deny any further episodes and has had no weakness, numbness, tingling, or difficulty talking or understanding what is said to her.      Type 2 Diabetes Mellitus  She continues to deny paresthesias of the feet, visual disturbances, polydipsia, polyuria, hypoglycemia or foot ulcerations.  Evaluation to date has been a hemoglobin A1c.  Current treatments include metformin and sitagliptin (together as janumet) empagliflozin, and dulaglutide.  She ran out of all of these over 2 months ago  Lab Results   Component Value Date    HGBA1C 7.80 (H) 08/03/2022     Lab Results    Component Value Date    MICROALBUR 4.8 08/03/2022   .  Lab Results   Component Value Date    TOAOTWRF90 827 08/03/2022     Hyperlipidemia  Her compliance with treatment remains quite good. She is currently taking rosuvastatin and ezetimibe.  She did well with repatha but has been unable to get through her insurance  Lab Results   Component Value Date    CHOL 111 08/03/2022    CHLPL 247 (H) 03/10/2016    TRIG 201 (H) 08/03/2022    HDL 41 08/03/2022    LDL 38 08/03/2022     Essential Hypertension  Home blood pressure readings: not doing. She continues to deny any chest pain, palpitations, dyspnea, orthopnea, paroxysmal nocturnal dyspnea or peripheral edema. Current antihypertensive medications includes lisinopril, metoprolol, amlodipine.  Lab Results   Component Value Date    GLUCOSE 141 (H) 12/05/2022    BUN 20 12/05/2022    CREATININE 1.47 (H) 12/05/2022    EGFRIFNONA 33 (L) 10/26/2021    BCR 13.6 12/05/2022    K 5.7 (H) 12/05/2022    CO2 26.7 12/05/2022    CALCIUM 9.7 12/05/2022    ALBUMIN 4.10 12/05/2022    LABIL2 1.4 (L) 03/10/2016    AST 18 12/05/2022    ALT 11 12/05/2022     Lab Results   Component Value Date    ALKPHOS 83 12/05/2022     Chronic Renal Failure  This has been contributed to hypertensive and diabetic nephropathy along with possible PCKD.  She is aware to avoid any NSAIDs.  Renal ultrasound performed on 5/10/2021 revealed a left renal cyst but no other significant abnormalities.  Noncontrast CT of the abdomen and pelvis performed on 9/23/2022 was reported as showing bilateral renal cysts that were more numerous and larger on the left, evidence of atherosclerotic disease, and osteoarthritic changes of the spine    Low Back Pain   She has a long history of low back pain worse over the last few years. The pain is described as an intermittent left lower ache.  This radiates to her left flank, left lateral hip, and left posterior thigh.  The pain in her back is worse than that elsewhere.  She has been  unable to identify any precipitating, exacerbating, or relieving factors.  She needs to deny any changes in her strength, sensation, or bowel/bladder control.  There is no history of any change in her bowel habits and she continues to deny any hematochezia or melena.  She admits to urinary frequency, nocturia, and intermittent dysuria but continues to deny any hematuria.  There is no history of any vaginal bleeding, fever, chills, or night sweats. Colonoscopy performed on 5/13/2019 by Dr. Reyes was reported as showing mild diverticulosis and several tubular adenomas were removed.  Ultrasound of the abdomen performed on 2/26/2020 revealed a 7.44 cm left renal cyst.  She underwent a urology assessment with Dr. Givens on 5/29/2020 who felt that it was a Bosniak type I.  CT of the abdomen and pelvis performed on 6/10/2020 was reported as showing bilateral renal cysts the largest of which was in the posterior left kidney and measured 7 cm.  Atherosclerotic vascular disease and osteoarthritic changes of the spine were also noted. X-rays of the left hip performed on 2/26/2020 were unremarkable.  MRI of the lumbar spine performed on 11/18/2020 was reported as showing degenerative disc disease with moderate to severe right neuroforaminal narrowing at L4-5     Depression with Anxiety  She has a long history of intermittent depression, nervousness, and difficulty sleeping.  She continues to deny any change in her concentration or memory and there is no history of any suicidal ideation.  She is prescribed citalopram but like the rest of her medicines ran out of this over 2 months ago  Lab Results   Component Value Date    TSH 1.070 08/03/2022     Labs  Most recent vitamin D 50.1    The following portions of the patient's history were reviewed and updated as appropriate: allergies, current medications, past medical history, past social history and problem list.    Review of Systems   Constitutional: Positive for fatigue.  Negative for appetite change, chills, fever and unexpected weight change.   HENT: Positive for congestion and rhinorrhea. Negative for ear pain, postnasal drip, sneezing and sore throat.    Eyes: Negative for visual disturbance.   Respiratory: Negative for cough, shortness of breath and wheezing.    Cardiovascular: Negative for chest pain, palpitations and leg swelling.   Gastrointestinal: Negative for abdominal pain, anal bleeding, blood in stool, constipation, diarrhea, nausea and vomiting.   Endocrine: Negative for polydipsia and polyuria.   Genitourinary: Positive for frequency and vaginal pain (burning and irritation). Negative for dysuria, hematuria and urgency.   Musculoskeletal: Positive for arthralgias and back pain. Negative for myalgias.   Skin: Negative for rash.   Neurological: Negative for weakness, numbness and headaches.   Psychiatric/Behavioral: Positive for sleep disturbance. Negative for dysphoric mood and suicidal ideas. The patient is nervous/anxious.      Objective   Physical Exam  Constitutional:       General: She is not in acute distress.     Appearance: Normal appearance. She is well-developed. She is not diaphoretic.      Comments: Alert and oriented. No apparent distress. No pallor, jaundice, diaphoresis, or cyanosis.   HENT:      Head: Atraumatic.      Right Ear: Ear canal and external ear normal. Tympanic membrane is scarred.      Left Ear: Ear canal and external ear normal. Tympanic membrane is scarred.      Mouth/Throat:      Lips: No lesions.      Mouth: No oral lesions.      Pharynx: No oropharyngeal exudate or posterior oropharyngeal erythema.   Eyes:      Conjunctiva/sclera: Conjunctivae normal.   Neck:      Thyroid: No thyroid mass or thyromegaly.      Vascular: No carotid bruit or JVD.      Trachea: Trachea normal. No tracheal deviation.   Cardiovascular:      Rate and Rhythm: Normal rate and regular rhythm.      Heart sounds: Normal heart sounds, S1 normal and S2 normal. No  murmur heard.    No gallop.   Pulmonary:      Effort: Pulmonary effort is normal.      Breath sounds: Normal breath sounds.   Chest:      Chest wall: No tenderness.   Abdominal:      General: Bowel sounds are normal. There is no distension.   Musculoskeletal:      Lumbar back: Tenderness (left lumbar paraspinal muscle tenderness) present. Negative right straight leg raise test and negative left straight leg raise test.      Right lower leg: No edema.      Left lower leg: No edema.      Comments: Negative straight leg raise. No peripheral joint redness or warmth.   Lymphadenopathy:      Head:      Right side of head: No submental, submandibular, tonsillar, preauricular, posterior auricular or occipital adenopathy.      Left side of head: No submental, submandibular, tonsillar, preauricular, posterior auricular or occipital adenopathy.      Cervical: No cervical adenopathy.      Upper Body:      Right upper body: No supraclavicular adenopathy.      Left upper body: No supraclavicular adenopathy.   Skin:     General: Skin is warm.      Coloration: Skin is not cyanotic, jaundiced or pale.      Findings: No rash.      Nails: There is no clubbing.   Neurological:      Mental Status: She is alert and oriented to person, place, and time.      Cranial Nerves: No cranial nerve deficit.      Motor: No tremor.      Coordination: Coordination normal.      Gait: Gait normal.   Psychiatric:         Attention and Perception: Attention normal.         Mood and Affect: Mood normal.         Speech: Speech normal.         Behavior: Behavior normal.         Thought Content: Thought content normal. Thought content does not include suicidal ideation.       Assessment & Plan   Problems Addressed this Visit        Allergies and Adverse Reactions    Chronic seasonal allergic rhinitis       Cardiac and Vasculature    Chronic venous insufficiency    Coronary artery disease involving native coronary artery  Reminded regarding risk factor  modification with an emphasis on tobacco cessation.  Continue low-dose ASA  Follow-up with cardiology    Relevant Medications    nitroglycerin (NITROSTAT) 0.4 MG SL tablet    isosorbide mononitrate (IMDUR) 60 MG 24 hr tablet    carvedilol (COREG) 12.5 MG tablet    aspirin 81 MG EC tablet    amLODIPine (NORVASC) 10 MG tablet    Essential hypertension   Hypertension: poorly controlled. Evidence of target organ damage: coronary artery disease, transient ischemic attack and chronic renal failure stage IIIb.  Encouraged to continue to work on diet and exercise plan.   Will resume medication.  Prescriptions printed    Relevant Medications    lisinopril (PRINIVIL,ZESTRIL) 20 MG tablet    carvedilol (COREG) 12.5 MG tablet    amLODIPine (NORVASC) 10 MG tablet    Mixed hyperlipidemia  As above.   Continue current medication.    Relevant Medications    rosuvastatin (CRESTOR) 40 MG tablet    ezetimibe (ZETIA) 10 MG tablet       Endocrine and Metabolic    Type 2 diabetes mellitus without complication, without long-term current use of insulin (HCC)  Diabetes mellitus Type II, under unknown control.   Encouraged to continue to pursue ADA diet  Encouraged aerobic exercise.  Given recent urinary tract issues will discontinue empagliflozin but resume all of her medication  Updated labs to be drawn at her return    Relevant Medications    Dulaglutide (Trulicity) 1.5 MG/0.5ML solution pen-injector    Vitamin D deficiency disease       Gastrointestinal Abdominal     Chronic constipation    Relevant Medications    linaclotide (Linzess) 72 MCG capsule capsule    Gastroesophageal reflux disease without esophagitis    H/O acute pancreatitis    Hepatitis C antibody test positive       Genitourinary and Reproductive     History of recurrent urinary tract infection  With probable candidal vaginitis  Urine sent for analysis and culture if indicated  Will start on fluconazole in the meantime  Follow-up with urology    Relevant Medications     trimethoprim (TRIMPEX) 100 MG tablet    fluconazole (DIFLUCAN) 150 MG tablet    Other Relevant Orders    Urinalysis With Culture If Indicated - Urine, Clean Catch    Menopausal symptom    Relevant Medications    Estrogens Conjugated (Premarin) 0.625 MG/GM vaginal cream    Nephrolithiasis    Renal cyst, left  Follow up with urology       Health Encounters    Healthcare maintenance  Patient has already received a flu shot fall.  Reminded that she is due for Shingrix  She remains uninterested in COVID-19 vaccination or in an updated mammogram or DEXA scan       Mental Health    Depression with anxiety  Significant situational component.   Supportive therapy.   Will resume medication    Relevant Medications    citalopram (CeleXA) 20 MG tablet       Musculoskeletal and Injuries    Mechanical low back pain  Reminded regarding symptomatic treatment.     Osteopenia       Neuro    TIA (transient ischemic attack)  As above.   Continue current medication.       Pulmonary and Pneumonias    COPD mixed type ([facility])    Relevant Medications    albuterol sulfate  (90 Base) MCG/ACT inhaler       Tobacco    Dips tobacco       Other    Chronic renal failure, stage 3b ([facility])  Reminded to avoid any NSAIDs prescription or OTC  Will continue to monitor         Diagnoses       Codes Comments    Chronic seasonal allergic rhinitis    -  Primary ICD-10-CM: J30.2  ICD-9-CM: 477.8     Mixed hyperlipidemia     ICD-10-CM: E78.2  ICD-9-CM: 272.2     Essential hypertension     ICD-10-CM: I10  ICD-9-CM: 401.9     Coronary artery disease involving native coronary artery of native heart without angina pectoris     ICD-10-CM: I25.10  ICD-9-CM: 414.01     Chronic venous insufficiency     ICD-10-CM: I87.2  ICD-9-CM: 459.81     Vitamin D deficiency disease     ICD-10-CM: E55.9  ICD-9-CM: 268.9     Type 2 diabetes mellitus without complication, without long-term current use of insulin (HCC)     ICD-10-CM: E11.9  ICD-9-CM: 250.00     Hepatitis C antibody  test positive     ICD-10-CM: R76.8  ICD-9-CM: 795.79     H/O acute pancreatitis     ICD-10-CM: Z87.19  ICD-9-CM: V12.79     Gastroesophageal reflux disease without esophagitis     ICD-10-CM: K21.9  ICD-9-CM: 530.81     Chronic constipation     ICD-10-CM: K59.09  ICD-9-CM: 564.00     Renal cyst, left     ICD-10-CM: N28.1  ICD-9-CM: 753.10     Nephrolithiasis     ICD-10-CM: N20.0  ICD-9-CM: 592.0     Healthcare maintenance     ICD-10-CM: Z00.00  ICD-9-CM: V70.0     Depression with anxiety     ICD-10-CM: F41.8  ICD-9-CM: 300.4     Osteopenia, unspecified location     ICD-10-CM: M85.80  ICD-9-CM: 733.90     Mechanical low back pain     ICD-10-CM: M54.59  ICD-9-CM: 724.2     TIA (transient ischemic attack)     ICD-10-CM: G45.9  ICD-9-CM: 435.9     Dips tobacco     ICD-10-CM: Z72.0  ICD-9-CM: 305.1     Chronic renal failure, stage 3b (HCC)     ICD-10-CM: N18.32  ICD-9-CM: 585.3     COPD mixed type (HCC)     ICD-10-CM: J44.9  ICD-9-CM: 496     History of recurrent urinary tract infection     ICD-10-CM: Z87.440  ICD-9-CM: V13.02     Menopausal symptom     ICD-10-CM: N95.1  ICD-9-CM: 627.2     ASCVD (arteriosclerotic cardiovascular disease), status post previous MI, status post CABG (three-vessel) about 9 years ago.     ICD-10-CM: I25.10  ICD-9-CM: 429.2, 440.9     Abnormal stress test     ICD-10-CM: R94.39  ICD-9-CM: 794.39           I spent 44 minutes caring for Ethel Urbina on this date of service. This time includes time spent by me in the following activities:reviewing tests, performing a medically appropriate examination and/or evaluation , counseling and educating the patient/family/caregiver, ordering medications, tests, or procedures and documenting information in the medical record

## 2022-12-16 LAB
BILIRUB UR QL STRIP: NEGATIVE
CLARITY UR: CLEAR
COLOR UR: YELLOW
GLUCOSE UR STRIP-MCNC: ABNORMAL MG/DL
HGB UR QL STRIP.AUTO: NEGATIVE
KETONES UR QL STRIP: NEGATIVE
LEUKOCYTE ESTERASE UR QL STRIP.AUTO: NEGATIVE
NITRITE UR QL STRIP: NEGATIVE
PH UR STRIP.AUTO: 6 [PH] (ref 5–8)
PROT UR QL STRIP: ABNORMAL
SP GR UR STRIP: 1.01 (ref 1–1.03)
UROBILINOGEN UR QL STRIP: ABNORMAL

## 2022-12-20 ENCOUNTER — SPECIALTY PHARMACY (OUTPATIENT)
Dept: PHARMACY | Facility: HOSPITAL | Age: 67
End: 2022-12-20

## 2022-12-20 NOTE — PROGRESS NOTES
Specialty Pharmacy Refill Coordination Note      Name:  Ethel Trotter  :  1955  Date:  2022         Past Medical History:   Diagnosis Date   • Arthritis    • CAD (coronary artery disease)    • COPD (chronic obstructive pulmonary disease) (HCC)    • Diabetes mellitus (HCC)    • Elevated cholesterol    • Fracture of wrist    • GERD (gastroesophageal reflux disease)    • Hepatitis C    • History of EKG 2016    ABNORMAL   • History of transfusion    • Hyperlipidemia    • Hypertension    • Myocardial infarction (HCC)     x2 last one 5 years ago   • Osteopenia    • Pancreatitis    • Stroke (HCC)        Past Surgical History:   Procedure Laterality Date   • COLONOSCOPY N/A 2019    Procedure: COLONOSCOPY;  Surgeon: Arnav Reyes MD;  Location: [facility];  Service: Gastroenterology   • CORONARY ARTERY BYPASS GRAFT     • HYSTERECTOMY         • TONSILLECTOMY         Social History     Socioeconomic History   • Marital status:    Tobacco Use   • Smoking status: Never   • Smokeless tobacco: Current     Types: Chew   Vaping Use   • Vaping Use: Never used   Substance and Sexual Activity   • Alcohol use: No   • Drug use: Yes     Types: Marijuana     Comment: OCCASIONAL   • Sexual activity: Defer       Family History   Problem Relation Age of Onset   • No Known Problems Father    • No Known Problems Mother    • Breast cancer Neg Hx        No Known Allergies    Current Outpatient Medications   Medication Sig Dispense Refill   • Acetaminophen Extra Strength 500 MG tablet      • albuterol sulfate  (90 Base) MCG/ACT inhaler Inhale 2 puffs 4 (Four) Times a Day. 18 g 3   • Alirocumab (Praluent) 150 MG/ML injection pen Inject 2 mL under the skin into the appropriate area as directed Every 28 (Twenty-Eight) Days. 2 mL 5   • Alirocumab (Praluent) 150 MG/ML injection pen Inject 2 mL under the skin into the appropriate area as directed Every 28 (Twenty-Eight) Days. 2 mL 5   • amLODIPine  (NORVASC) 10 MG tablet Take 1 tablet by mouth Every Evening. 30 tablet 5   • aspirin 81 MG EC tablet Take 1 tablet by mouth Daily. 30 tablet 5   • carvedilol (COREG) 12.5 MG tablet Take 1 tablet by mouth 2 (Two) Times a Day. 60 tablet 5   • citalopram (CeleXA) 20 MG tablet Take 1 tablet by mouth Daily. 30 tablet 5   • Dulaglutide (Trulicity) 1.5 MG/0.5ML solution pen-injector Inject 1.5 mg under the skin into the appropriate area as directed 1 (One) Time Per Week. 2 mL 5   • Estrogens Conjugated (Premarin) 0.625 MG/GM vaginal cream Insert  into the vagina 2 (Two) Times a Week. 30 g 5   • ezetimibe (ZETIA) 10 MG tablet Take 1 tablet by mouth Every Night. 30 tablet 5   • fluconazole (DIFLUCAN) 150 MG tablet Take 1 tablet by mouth Every Other Day. 5 tablet 0   • fluticasone (Flonase) 50 MCG/ACT nasal spray Administer 2 sprays both nostrils once daily 16 g 5   • Glucose Blood (Blood Glucose Test) strip USE TO TEST BLOOD GLUCOSE TWO TIMES A DAY 50 each 5   • isosorbide mononitrate (IMDUR) 60 MG 24 hr tablet Take 1.5 tablets by mouth Every Morning. 45 tablet 5   • Lancets misc 1 twice daily 60 each 5   • linaclotide (Linzess) 72 MCG capsule capsule Take 1 capsule by mouth Every Morning Before Breakfast. 30 capsule 5   • lisinopril (PRINIVIL,ZESTRIL) 20 MG tablet Take 1 tablet by mouth Daily. 30 tablet 5   • nitroglycerin (NITROSTAT) 0.4 MG SL tablet Place 1 tablet under the tongue Every 5 (Five) Minutes As Needed for Chest Pain. Take no more than 3 doses in 15 minutes. 25 tablet 5   • rosuvastatin (CRESTOR) 40 MG tablet Take 1 tablet by mouth Daily. 30 tablet 5   • trimethoprim (TRIMPEX) 100 MG tablet Take one tablet by mouth twice daily for 3 days then once nightly. 30 tablet 5     No current facility-administered medications for this visit.         ASSESSMENT/PLAN:      Ethel is a 67 y.o. female contacted today regarding refills of  Praluent 150mg/ml injection  specialty medication(s).    Reviewed and verified with  patient:       Specialty medication(s) and dose(s) confirmed: yes    Refill Questions    Flowsheet Row Most Recent Value   Changes to allergies? No   Changes to medications? No   New conditions since last clinic visit No   Unplanned office visit, urgent care, ED, or hospital admission in the last 4 weeks  No   How does patient/caregiver feel medication is working? Very good   Financial problems or insurance changes  No   Since the previous refill, were any specialty medication doses or scheduled injections missed or delayed?  No   Does this patient require a clinical escalation to a pharmacist? No                     Follow-up: 28 day(s)     Bridget Balderas, Pharmacy Technician  Specialty Pharmacy Technician

## 2022-12-23 ENCOUNTER — SPECIALTY PHARMACY (OUTPATIENT)
Dept: PHARMACY | Facility: HOSPITAL | Age: 67
End: 2022-12-23

## 2022-12-23 RX ORDER — ALIROCUMAB 150 MG/ML
300 INJECTION, SOLUTION SUBCUTANEOUS
Qty: 2 ML | Refills: 5 | Status: SHIPPED | OUTPATIENT
Start: 2022-12-23

## 2022-12-23 NOTE — PROGRESS NOTES
[facility]  Lipid Management Program - [facility]       Ethel Trotter is a 67 y.o. female referred to the [facility] by Moe Avelar for clinical pharmacy and specialty pharmacy management of [facility].  Ethel Trotter is treated for clinical ASCVD and currently takes Lipitor and Zetia and will be switching to Crestor when she runs out of her current supply of Lipitor.  In the past, Pt has tried other statins, as well as Repatha but reports that her pharmacy could not get it any longer. The patient denies any allergies to latex.      Patient denies issues giving herself the injection, stating she either gives it to herself in her thigh or her  gives it in her arm. She has not missed any doses and is not having any side effects.     Patient stated she has been having a lot of kidney issues and was told to not make a follow-up appointment with cardiology until she was cleared from the kidney doctor. She states she's been having UTIs and has already taken 2 rounds of antibiotics.     Relevant Past Medical History and Comorbidities  Past Medical History:   Diagnosis Date   • Arthritis    • CAD (coronary artery disease)    • COPD (chronic obstructive pulmonary disease) (HCC)    • Diabetes mellitus (HCC)    • Elevated cholesterol    • Fracture of wrist    • GERD (gastroesophageal reflux disease)    • Hepatitis C    • History of EKG 03/25/2016    ABNORMAL   • History of transfusion    • Hyperlipidemia    • Hypertension    • Myocardial infarction (HCC)     x2 last one 5 years ago   • Osteopenia    • Pancreatitis    • Stroke (HCC)      Social History     Socioeconomic History   • Marital status:    Tobacco Use   • Smoking status: Never   • Smokeless tobacco: Current     Types: Chew   Vaping Use   • Vaping Use: Never used   Substance and Sexual Activity   • Alcohol use: No   • Drug use: Yes     Types: Marijuana     Comment: OCCASIONAL   • Sexual activity: Defer        Allergies  Patient has no known allergies.    Current Medication List    Current Outpatient Medications:   •  Acetaminophen Extra Strength 500 MG tablet, Take 500 mg by mouth Every 8 (Eight) Hours As Needed for Mild Pain or Moderate Pain., Disp: , Rfl:   •  albuterol sulfate  (90 Base) MCG/ACT inhaler, Inhale 2 puffs 4 (Four) Times a Day., Disp: 18 g, Rfl: 3  •  Alirocumab (Praluent) 150 MG/ML injection pen, Inject 2 mL under the skin into the appropriate area as directed Every 28 (Twenty-Eight) Days., Disp: 2 mL, Rfl: 5  •  Alirocumab (Praluent) 150 MG/ML injection pen, Inject 2 mL under the skin into the appropriate area as directed Every 28 (Twenty-Eight) Days., Disp: 2 mL, Rfl: 5  •  amLODIPine (NORVASC) 10 MG tablet, Take 1 tablet by mouth Every Evening., Disp: 30 tablet, Rfl: 5  •  aspirin 81 MG EC tablet, Take 1 tablet by mouth Daily., Disp: 30 tablet, Rfl: 5  •  carvedilol (COREG) 12.5 MG tablet, Take 1 tablet by mouth 2 (Two) Times a Day., Disp: 60 tablet, Rfl: 5  •  citalopram (CeleXA) 20 MG tablet, Take 1 tablet by mouth Daily., Disp: 30 tablet, Rfl: 5  •  Dulaglutide (Trulicity) 1.5 MG/0.5ML solution pen-injector, Inject 1.5 mg under the skin into the appropriate area as directed 1 (One) Time Per Week., Disp: 2 mL, Rfl: 5  •  ezetimibe (ZETIA) 10 MG tablet, Take 1 tablet by mouth Every Night., Disp: 30 tablet, Rfl: 5  •  fluticasone (Flonase) 50 MCG/ACT nasal spray, Administer 2 sprays both nostrils once daily, Disp: 16 g, Rfl: 5  •  Glucose Blood (Blood Glucose Test) strip, USE TO TEST BLOOD GLUCOSE TWO TIMES A DAY, Disp: 50 each, Rfl: 5  •  isosorbide mononitrate (IMDUR) 60 MG 24 hr tablet, Take 1.5 tablets by mouth Every Morning., Disp: 45 tablet, Rfl: 5  •  Lancets misc, 1 twice daily, Disp: 60 each, Rfl: 5  •  linaclotide (Linzess) 72 MCG capsule capsule, Take 1 capsule by mouth Every Morning Before Breakfast., Disp: 30 capsule, Rfl: 5  •  lisinopril (PRINIVIL,ZESTRIL) 20 MG tablet,  Take 1 tablet by mouth Daily., Disp: 30 tablet, Rfl: 5  •  nitroglycerin (NITROSTAT) 0.4 MG SL tablet, Place 1 tablet under the tongue Every 5 (Five) Minutes As Needed for Chest Pain. Take no more than 3 doses in 15 minutes., Disp: 25 tablet, Rfl: 5  •  rosuvastatin (CRESTOR) 40 MG tablet, Take 1 tablet by mouth Daily., Disp: 30 tablet, Rfl: 5  •  trimethoprim (TRIMPEX) 100 MG tablet, Take one tablet by mouth twice daily for 3 days then once nightly., Disp: 30 tablet, Rfl: 5  •  Estrogens Conjugated (Premarin) 0.625 MG/GM vaginal cream, Insert  into the vagina 2 (Two) Times a Week. (Patient taking differently: Insert  into the vagina 2 (Two) Times a Week. Pt has not started yet), Disp: 30 g, Rfl: 5  No current facility-administered medications for this visit.    Drug Interactions  None With Praluent    Relevant Laboratory Values  Lab Results   Component Value Date    CHOL 111 08/03/2022    CHLPL 247 (H) 03/10/2016    TRIG 201 (H) 08/03/2022    HDL 41 08/03/2022    LDL 38 08/03/2022     Adverse Effects  Reviewed with patient and education on management provided.     • Sore Throat  • Injection site pain  • Itching or irritation   • Flu-like symptoms  • Signs of an allergic reaction     Immunizations  While there are no immunizations that you need to get specifically because you are on this drug, it is important to keep up with your recommended routine vaccinations.     Adherence and Self-Administration    • Barriers to Patient Adherence and/or Self-Administration: None  • Methods for Supporting Patient Adherence and/or Self-Administration: None Required     Goals of Therapy  • Patient Goals of Therapy: To adhere to medication dosing schedule  • Clinical Goals or Therapeutic Targets, If Applicable: LDL Reduction      Attestation  I attest that the initiated specialty medication(s) are appropriate for the patient based on my assessment. If the prescribed therapy is at any point deemed not appropriate based on the  current or future assessments, a consultation will be initiated with the patient's specialty care provider to determine the best course of action. The revised plan of therapy will be documented along with any additional patient education provided.       Assessment & Plan    Will re-order Praluent 300 mg every 28 days.     Patient cancelled last appointment in September with Akilah Mrorison. Patient stated cardiology told her not to schedule follow-up until clearance from kidney doctor. Reached out to Akilah to ensure Praluent is still appropriate for the patient and to inquire about follow-up for the patient. Patient is to continue on Praluent and follow-up with cardiology.     Reached out to patient to inform her to make a follow-up appointment with cardiology. Discussed with patient she needs to keep regular follow-up with cardiology, patient agreeable and understood. Will set a one time clinical outreach to ensure patient made appointment.     The patient would like to receive specialty mail-out services to the physical address below.      Physical Address  [address] [place] [id number]    Will follow up with patient in 6 months, or sooner if needed.      Thank you,     Desiree Saldana [facility]  12/23/22  16:00 EST

## 2022-12-29 RX ORDER — ALIROCUMAB 150 MG/ML
300 INJECTION, SOLUTION SUBCUTANEOUS
Qty: 2 ML | Refills: 0 | Status: SHIPPED | OUTPATIENT
Start: 2022-12-29 | End: 2023-01-18 | Stop reason: SDUPTHER

## 2023-01-03 ENCOUNTER — SPECIALTY PHARMACY (OUTPATIENT)
Dept: PHARMACY | Facility: HOSPITAL | Age: 68
End: 2023-01-03

## 2023-01-16 ENCOUNTER — SPECIALTY PHARMACY (OUTPATIENT)
Dept: PHARMACY | Facility: HOSPITAL | Age: 68
End: 2023-01-16
Payer: MEDICARE

## 2023-01-16 NOTE — PROGRESS NOTES
Specialty Pharmacy Refill Coordination Note      Name:  Ethel Trotter  :  1955  Date:  2023         Past Medical History:   Diagnosis Date   • Arthritis    • CAD (coronary artery disease)    • COPD (chronic obstructive pulmonary disease) (HCC)    • Diabetes mellitus (HCC)    • Elevated cholesterol    • Fracture of wrist    • GERD (gastroesophageal reflux disease)    • Hepatitis C    • History of EKG 2016    ABNORMAL   • History of transfusion    • Hyperlipidemia    • Hypertension    • Myocardial infarction (HCC)     x2 last one 5 years ago   • Osteopenia    • Pancreatitis    • Stroke (HCC)        Past Surgical History:   Procedure Laterality Date   • COLONOSCOPY N/A 2019    Procedure: COLONOSCOPY;  Surgeon: Arnav Reyes MD;  Location: [facility];  Service: Gastroenterology   • CORONARY ARTERY BYPASS GRAFT     • HYSTERECTOMY         • TONSILLECTOMY         Social History     Socioeconomic History   • Marital status:    Tobacco Use   • Smoking status: Never   • Smokeless tobacco: Current     Types: Chew   Vaping Use   • Vaping Use: Never used   Substance and Sexual Activity   • Alcohol use: No   • Drug use: Yes     Types: Marijuana     Comment: OCCASIONAL   • Sexual activity: Defer       Family History   Problem Relation Age of Onset   • No Known Problems Father    • No Known Problems Mother    • Breast cancer Neg Hx        No Known Allergies    Current Outpatient Medications   Medication Sig Dispense Refill   • Acetaminophen Extra Strength 500 MG tablet Take 500 mg by mouth Every 8 (Eight) Hours As Needed for Mild Pain or Moderate Pain.     • albuterol sulfate  (90 Base) MCG/ACT inhaler Inhale 2 puffs 4 (Four) Times a Day. 18 g 3   • Alirocumab (Praluent) 150 MG/ML injection pen Inject 2 mL under the skin into the appropriate area as directed Every 28 (Twenty-Eight) Days. 2 mL 5   • Alirocumab (Praluent) 150 MG/ML injection pen Inject 2 mL under the skin into  the appropriate area as directed Every 28 (Twenty-Eight) Days. 2 mL 5   • Alirocumab (Praluent) 150 MG/ML injection pen Inject 2 mL under the skin into the appropriate area as directed Every 28 (Twenty-Eight) Days. 2 mL 0   • amLODIPine (NORVASC) 10 MG tablet Take 1 tablet by mouth Every Evening. 30 tablet 5   • aspirin 81 MG EC tablet Take 1 tablet by mouth Daily. 30 tablet 5   • carvedilol (COREG) 12.5 MG tablet Take 1 tablet by mouth 2 (Two) Times a Day. 60 tablet 5   • citalopram (CeleXA) 20 MG tablet Take 1 tablet by mouth Daily. 30 tablet 5   • Dulaglutide (Trulicity) 1.5 MG/0.5ML solution pen-injector Inject 1.5 mg under the skin into the appropriate area as directed 1 (One) Time Per Week. 2 mL 5   • Estrogens Conjugated (Premarin) 0.625 MG/GM vaginal cream Insert  into the vagina 2 (Two) Times a Week. (Patient taking differently: Insert  into the vagina 2 (Two) Times a Week. Pt has not started yet) 30 g 5   • ezetimibe (ZETIA) 10 MG tablet Take 1 tablet by mouth Every Night. 30 tablet 5   • fluticasone (Flonase) 50 MCG/ACT nasal spray Administer 2 sprays both nostrils once daily 16 g 5   • Glucose Blood (Blood Glucose Test) strip USE TO TEST BLOOD GLUCOSE TWO TIMES A DAY 50 each 5   • isosorbide mononitrate (IMDUR) 60 MG 24 hr tablet Take 1.5 tablets by mouth Every Morning. 45 tablet 5   • Lancets misc 1 twice daily 60 each 5   • linaclotide (Linzess) 72 MCG capsule capsule Take 1 capsule by mouth Every Morning Before Breakfast. 30 capsule 5   • lisinopril (PRINIVIL,ZESTRIL) 20 MG tablet Take 1 tablet by mouth Daily. 30 tablet 5   • nitroglycerin (NITROSTAT) 0.4 MG SL tablet Place 1 tablet under the tongue Every 5 (Five) Minutes As Needed for Chest Pain. Take no more than 3 doses in 15 minutes. 25 tablet 5   • rosuvastatin (CRESTOR) 40 MG tablet Take 1 tablet by mouth Daily. 30 tablet 5   • trimethoprim (TRIMPEX) 100 MG tablet Take one tablet by mouth twice daily for 3 days then once nightly. 30 tablet 5      No current facility-administered medications for this visit.         ASSESSMENT/PLAN:      Ethel is a 67 y.o. female contacted today regarding refills of  Praluent 150mg/ml injection  specialty medication(s).    Reviewed and verified with patient:       Specialty medication(s) and dose(s) confirmed: yes    Refill Questions    Flowsheet Row Most Recent Value   Changes to allergies? No   Changes to medications? No   New conditions since last clinic visit No   Unplanned office visit, urgent care, ED, or hospital admission in the last 4 weeks  No   How does patient/caregiver feel medication is working? Very good   Financial problems or insurance changes  No   Since the previous refill, were any specialty medication doses or scheduled injections missed or delayed?  No   Does this patient require a clinical escalation to a pharmacist? No                     Follow-up: 28 day(s)     Bridget Balderas, Pharmacy Technician  Specialty Pharmacy Technician

## 2023-02-10 ENCOUNTER — SPECIALTY PHARMACY (OUTPATIENT)
Dept: PHARMACY | Facility: HOSPITAL | Age: 68
End: 2023-02-10
Payer: MEDICARE

## 2023-02-10 NOTE — PROGRESS NOTES
Specialty Pharmacy Refill Coordination Note      Name:  Ethel Trotter  :  1955  Date:  2/10/2023         Past Medical History:   Diagnosis Date   • Arthritis    • CAD (coronary artery disease)    • COPD (chronic obstructive pulmonary disease) (HCC)    • Diabetes mellitus (HCC)    • Elevated cholesterol    • Fracture of wrist    • GERD (gastroesophageal reflux disease)    • Hepatitis C    • History of EKG 2016    ABNORMAL   • History of transfusion    • Hyperlipidemia    • Hypertension    • Myocardial infarction (HCC)     x2 last one 5 years ago   • Osteopenia    • Pancreatitis    • Stroke (HCC)        Past Surgical History:   Procedure Laterality Date   • COLONOSCOPY N/A 2019    Procedure: COLONOSCOPY;  Surgeon: Arnav Reyes MD;  Location: [facility];  Service: Gastroenterology   • CORONARY ARTERY BYPASS GRAFT     • HYSTERECTOMY         • TONSILLECTOMY         Social History     Socioeconomic History   • Marital status:    Tobacco Use   • Smoking status: Never   • Smokeless tobacco: Current     Types: Chew   Vaping Use   • Vaping Use: Never used   Substance and Sexual Activity   • Alcohol use: No   • Drug use: Yes     Types: Marijuana     Comment: OCCASIONAL   • Sexual activity: Defer       Family History   Problem Relation Age of Onset   • No Known Problems Father    • No Known Problems Mother    • Breast cancer Neg Hx        No Known Allergies    Current Outpatient Medications   Medication Sig Dispense Refill   • Acetaminophen Extra Strength 500 MG tablet Take 500 mg by mouth Every 8 (Eight) Hours As Needed for Mild Pain or Moderate Pain.     • albuterol sulfate  (90 Base) MCG/ACT inhaler Inhale 2 puffs 4 (Four) Times a Day. 18 g 3   • Alirocumab (Praluent) 150 MG/ML injection pen Inject 2 mL under the skin into the appropriate area as directed Every 28 (Twenty-Eight) Days. 2 mL 5   • amLODIPine (NORVASC) 10 MG tablet Take 1 tablet by mouth Every Evening. 30  tablet 5   • aspirin 81 MG EC tablet Take 1 tablet by mouth Daily. 30 tablet 5   • carvedilol (COREG) 12.5 MG tablet Take 1 tablet by mouth 2 (Two) Times a Day. 60 tablet 5   • citalopram (CeleXA) 20 MG tablet Take 1 tablet by mouth Daily. 30 tablet 5   • Dulaglutide (Trulicity) 1.5 MG/0.5ML solution pen-injector Inject 1.5 mg under the skin into the appropriate area as directed 1 (One) Time Per Week. 2 mL 5   • Estrogens Conjugated (Premarin) 0.625 MG/GM vaginal cream Insert  into the vagina 2 (Two) Times a Week. (Patient taking differently: Insert  into the vagina 2 (Two) Times a Week. Pt has not started yet) 30 g 5   • ezetimibe (ZETIA) 10 MG tablet Take 1 tablet by mouth Every Night. 30 tablet 5   • fluticasone (Flonase) 50 MCG/ACT nasal spray Administer 2 sprays both nostrils once daily 16 g 5   • Glucose Blood (Blood Glucose Test) strip USE TO TEST BLOOD GLUCOSE TWO TIMES A DAY 50 each 5   • isosorbide mononitrate (IMDUR) 60 MG 24 hr tablet Take 1.5 tablets by mouth Every Morning. 45 tablet 5   • Lancets misc 1 twice daily 60 each 5   • linaclotide (Linzess) 72 MCG capsule capsule Take 1 capsule by mouth Every Morning Before Breakfast. 30 capsule 5   • lisinopril (PRINIVIL,ZESTRIL) 20 MG tablet Take 1 tablet by mouth Daily. 30 tablet 5   • nitroglycerin (NITROSTAT) 0.4 MG SL tablet Place 1 tablet under the tongue Every 5 (Five) Minutes As Needed for Chest Pain. Take no more than 3 doses in 15 minutes. 25 tablet 5   • rosuvastatin (CRESTOR) 40 MG tablet Take 1 tablet by mouth Daily. 30 tablet 5   • trimethoprim (TRIMPEX) 100 MG tablet Take one tablet by mouth twice daily for 3 days then once nightly. 30 tablet 5     No current facility-administered medications for this visit.         ASSESSMENT/PLAN:      Ethel is a 67 y.o. female contacted today regarding refills of  Praluent 150mg/ml injection  specialty medication(s).    Reviewed and verified with patient:       Specialty medication(s) and dose(s)  confirmed: yes    Refill Questions    Flowsheet Row Most Recent Value   Changes to allergies? No   Changes to medications? Yes   New conditions since last clinic visit No   Unplanned office visit, urgent care, ED, or hospital admission in the last 4 weeks  No   How does patient/caregiver feel medication is working? Very good   Financial problems or insurance changes  No   Since the previous refill, were any specialty medication doses or scheduled injections missed or delayed?  No   Does this patient require a clinical escalation to a pharmacist? Yes                     Follow-up: 28 day(s)     Bridget Balderas, Pharmacy Technician  Specialty Pharmacy Technician

## 2023-02-10 NOTE — PROGRESS NOTES
I called and spoke with this patient. She seemed to be confused about the changes made at her last visit with her PCP on 12/15/22. She had originally reported to Lauren Balderas, pharmacy technician, that she was told to stop Lisinopril, Lactulose, and Hydralazine. She reported that she was supposed to start Jardiance, Zetia, Crestor, ASA and citalopram at that time, but that she just recently started taking them. When I called her, I went over each medication change (Lisinopril increased to 20mg and Premarin increased to twice weekly), new start (Crestor) and medications that were stopped (Atorvastatin, Jardiance, Vitamin D, Hydralazine, and Lactulose). Once I went through it with her, she confirmed that those are the changes that she had made. There are no new drug-drug interactions with these medications and pt is ok to proceed with Praluent therapy.     Jacqueline Lugo [facility]  02/10/23  11:47 EST

## 2023-03-03 ENCOUNTER — OFFICE VISIT (OUTPATIENT)
Dept: CARDIOLOGY | Facility: CLINIC | Age: 68
End: 2023-03-03
Payer: MEDICARE

## 2023-03-03 VITALS
WEIGHT: 141.6 LBS | SYSTOLIC BLOOD PRESSURE: 157 MMHG | HEIGHT: 63 IN | OXYGEN SATURATION: 94 % | DIASTOLIC BLOOD PRESSURE: 83 MMHG | BODY MASS INDEX: 25.09 KG/M2 | HEART RATE: 57 BPM

## 2023-03-03 DIAGNOSIS — I10 ESSENTIAL HYPERTENSION: Primary | ICD-10-CM

## 2023-03-03 DIAGNOSIS — I25.10 ASCVD (ARTERIOSCLEROTIC CARDIOVASCULAR DISEASE): ICD-10-CM

## 2023-03-03 DIAGNOSIS — E78.2 MIXED HYPERLIPIDEMIA: ICD-10-CM

## 2023-03-03 DIAGNOSIS — R06.02 SHORTNESS OF BREATH: ICD-10-CM

## 2023-03-03 PROCEDURE — 99214 OFFICE O/P EST MOD 30 MIN: CPT | Performed by: NURSE PRACTITIONER

## 2023-03-03 RX ORDER — ALIROCUMAB 150 MG/ML
300 INJECTION, SOLUTION SUBCUTANEOUS
Qty: 2 ML | Refills: 5 | Status: SHIPPED | OUTPATIENT
Start: 2023-03-03 | End: 2023-03-07 | Stop reason: SDUPTHER

## 2023-03-03 RX ORDER — EMPAGLIFLOZIN 10 MG/1
TABLET, FILM COATED ORAL
COMMUNITY
Start: 2023-02-03 | End: 2023-04-03 | Stop reason: SDUPTHER

## 2023-03-03 NOTE — PROGRESS NOTES
[facility]             RAY Quintana Paul Christen, MD  Ethel Trotter  1955 03/03/2023    Patient Active Problem List   Diagnosis   • Depression with anxiety   • COPD mixed type (HCC)   • Essential hypertension   • Mixed hyperlipidemia   • Type 2 diabetes mellitus without complication, without long-term current use of insulin (HCC)   • ASCVD (arteriosclerotic cardiovascular disease)   • Vitamin D deficiency disease   • Osteopenia   • Hepatitis C antibody test positive   • H/O acute pancreatitis   • Gastroesophageal reflux disease without esophagitis   • Chronic venous insufficiency   • Chronic seasonal allergic rhinitis   • Dips tobacco   • Encounter for immunization   • Healthcare maintenance   • Mechanical low back pain   • Encounter for screening mammogram for breast cancer   • Chronic constipation   • TIA (transient ischemic attack)   • Chronic renal failure, stage 3b (HCC)   • Renal cyst, left   • Nephrolithiasis   • Menopausal symptom   • History of recurrent urinary tract infection   • Shortness of breath       Dear Sacha Montez MD:    Subjective     Chief Complaint   Patient presents with   • Follow-up     6 months   • Shortness of Breath   • Edema     Abn        HPI:     This is a 67 y.o. female with known past medical history of known coronary artery disease with previous MI status post three-vessel CABG around 9 years ago at [facility] in [facility], diabetes mellitus type 2, essential hypertension, history of TIA, chronic kidney disease and COPD.    Ethel Trotter presents today for routine cardiology follow up.  Patient states she has been doing overall well since her last visit.  Denies any chest pain.  Does state she has been having some allergies with drainage and feels like this is contributing to when she lies flat she feels like she is smothering and has to prop up.  Reports some abdominal swelling  over the last few weeks for which she is scheduled to see her primary doctor for primary further work up.  States she followed with nephrology for a couple visits but has not seen them recently.  Recent CMP in December 2022 shows kidney function normal with a creatinine 1.47 which is overall stable.  Blood pressure mildly elevated in the office today.    • Diagnostic Testing  1. Nuclear stress test 5/2022: Mild mostly fixed basal to mid inferolateral defect with slight reversibility consistent with inferolateral infarction and above areas with mild shelley-infarction ischemia, TID 0.96.  2. Echocardiogram 8/2022: EF 51 to 55% with mild to moderate mitral valve regurgitation and mild tricuspid valve regurgitation     All other systems were reviewed and were negative.    Patient Active Problem List   Diagnosis   • Depression with anxiety   • COPD mixed type ([facility])   • Essential hypertension   • Mixed hyperlipidemia   • Type 2 diabetes mellitus without complication, without long-term current use of insulin ([facility])   • ASCVD (arteriosclerotic cardiovascular disease)   • Vitamin D deficiency disease   • Osteopenia   • Hepatitis C antibody test positive   • H/O acute pancreatitis   • Gastroesophageal reflux disease without esophagitis   • Chronic venous insufficiency   • Chronic seasonal allergic rhinitis   • Dips tobacco   • Encounter for immunization   • Healthcare maintenance   • Mechanical low back pain   • Encounter for screening mammogram for breast cancer   • Chronic constipation   • TIA (transient ischemic attack)   • Chronic renal failure, stage 3b (HCC)   • Renal cyst, left   • Nephrolithiasis   • Menopausal symptom   • History of recurrent urinary tract infection   • Shortness of breath       family history includes No Known Problems in her father and mother.     reports that she has never smoked. Her smokeless tobacco use includes chew. She reports current drug use. Drug: Marijuana. She reports that she does not  drink alcohol.    No Known Allergies      Current Outpatient Medications:   •  Acetaminophen Extra Strength 500 MG tablet, Take 1 tablet by mouth Every 8 (Eight) Hours As Needed for Mild Pain or Moderate Pain., Disp: , Rfl:   •  albuterol sulfate  (90 Base) MCG/ACT inhaler, Inhale 2 puffs 4 (Four) Times a Day., Disp: 18 g, Rfl: 3  •  Alirocumab (Praluent) 150 MG/ML injection pen, Inject 2 mL under the skin into the appropriate area as directed Every 28 (Twenty-Eight) Days., Disp: 2 mL, Rfl: 5  •  amLODIPine (NORVASC) 10 MG tablet, Take 1 tablet by mouth Every Evening., Disp: 30 tablet, Rfl: 5  •  aspirin 81 MG EC tablet, Take 1 tablet by mouth Daily., Disp: 30 tablet, Rfl: 5  •  carvedilol (COREG) 12.5 MG tablet, Take 1 tablet by mouth 2 (Two) Times a Day., Disp: 60 tablet, Rfl: 5  •  citalopram (CeleXA) 20 MG tablet, Take 1 tablet by mouth Daily., Disp: 30 tablet, Rfl: 5  •  Dulaglutide (Trulicity) 1.5 MG/0.5ML solution pen-injector, Inject 1.5 mg under the skin into the appropriate area as directed 1 (One) Time Per Week., Disp: 2 mL, Rfl: 5  •  ezetimibe (ZETIA) 10 MG tablet, Take 1 tablet by mouth Every Night., Disp: 30 tablet, Rfl: 5  •  fluticasone (Flonase) 50 MCG/ACT nasal spray, Administer 2 sprays both nostrils once daily, Disp: 16 g, Rfl: 5  •  Glucose Blood (Blood Glucose Test) strip, USE TO TEST BLOOD GLUCOSE TWO TIMES A DAY, Disp: 50 each, Rfl: 5  •  isosorbide mononitrate (IMDUR) 60 MG 24 hr tablet, Take 1.5 tablets by mouth Every Morning., Disp: 45 tablet, Rfl: 5  •  Jardiance 10 MG tablet tablet, , Disp: , Rfl:   •  Lancets misc, 1 twice daily, Disp: 60 each, Rfl: 5  •  linaclotide (Linzess) 72 MCG capsule capsule, Take 1 capsule by mouth Every Morning Before Breakfast., Disp: 30 capsule, Rfl: 5  •  lisinopril (PRINIVIL,ZESTRIL) 20 MG tablet, Take 1 tablet by mouth Daily., Disp: 30 tablet, Rfl: 5  •  nitroglycerin (NITROSTAT) 0.4 MG SL tablet, Place 1 tablet under the tongue Every 5 (Five)  Minutes As Needed for Chest Pain. Take no more than 3 doses in 15 minutes., Disp: 25 tablet, Rfl: 5  •  rosuvastatin (CRESTOR) 40 MG tablet, Take 1 tablet by mouth Daily., Disp: 30 tablet, Rfl: 5  •  trimethoprim (TRIMPEX) 100 MG tablet, Take one tablet by mouth twice daily for 3 days then once nightly., Disp: 30 tablet, Rfl: 5  •  Estrogens Conjugated (Premarin) 0.625 MG/GM vaginal cream, Insert  into the vagina 2 (Two) Times a Week. (Patient taking differently: Insert  into the vagina 2 (Two) Times a Week. Pt has not started yet), Disp: 30 g, Rfl: 5      Physical Exam:  I have reviewed the patient's current vital signs as documented in the patient's EMR.   Vitals:    03/03/23 0943   BP: 157/83   Pulse: 57   SpO2: 94%     Body mass index is 25.09 kg/m².       03/03/23  0943   Weight: 64.2 kg (141 lb 9.6 oz)      Physical Exam  Constitutional:       General: She is not in acute distress.     Appearance: Normal appearance. She is well-developed and normal weight.   HENT:      Head: Normocephalic and atraumatic.   Eyes:      General: Lids are normal.      Conjunctiva/sclera: Conjunctivae normal.      Pupils: Pupils are equal, round, and reactive to light.   Neck:      Vascular: No carotid bruit or JVD.   Cardiovascular:      Rate and Rhythm: Normal rate and regular rhythm.      Pulses: Normal pulses.      Heart sounds: Normal heart sounds, S1 normal and S2 normal. No murmur heard.  Pulmonary:      Effort: Pulmonary effort is normal. No respiratory distress.      Breath sounds: Normal breath sounds. No wheezing.   Abdominal:      General: Bowel sounds are normal. There is no distension.      Palpations: Abdomen is soft. There is no hepatomegaly or splenomegaly.      Tenderness: There is no abdominal tenderness.   Musculoskeletal:         General: No swelling. Normal range of motion.      Cervical back: Normal range of motion and neck supple.      Right lower leg: No edema.      Left lower leg: No edema.   Skin:      General: Skin is warm and dry.      Coloration: Skin is not jaundiced.      Findings: No rash.   Neurological:      General: No focal deficit present.      Mental Status: She is alert and oriented to person, place, and time. Mental status is at baseline.   Psychiatric:         Mood and Affect: Mood normal.         Speech: Speech normal.         Behavior: Behavior normal.         Thought Content: Thought content normal.         Judgment: Judgment normal.            DATA REVIEWED:     TTE/MIMA:  Results for orders placed during the hospital encounter of 08/03/22    Adult Transthoracic Echo Complete w/ Color, Spectral and Contrast if necessary per protocol    Interpretation Summary  · Normal left ventricular cavity size and wall thickness noted. All left ventricular wall segments contract normally.  · Left ventricular ejection fraction appears to be 51 - 55%.  · The aortic valve is abnormal in structure. There is mild calcification of the aortic valve mainly affecting the right coronary cusp(s). The aortic valve appears trileaflet. Mild aortic valve regurgitation is present. No aortic valve stenosis is present.  · There is posterior mitral leaflet thickening present. Mild to moderate mitral valve regurgitation is present. No significant mitral valve stenosis is present.  · Mild tricuspid valve regurgitation is present. Estimated right ventricular systolic pressure from tricuspid regurgitation is mildly elevated (35-45 mmHg).  · There is no evidence of pericardial effusion.      Laboratory evaluations:    Lab Results   Component Value Date    GLUCOSE 141 (H) 12/05/2022    BUN 20 12/05/2022    CREATININE 1.47 (H) 12/05/2022    EGFRIFNONA 33 (L) 10/26/2021    BCR 13.6 12/05/2022    K 5.7 (H) 12/05/2022    CO2 26.7 12/05/2022    CALCIUM 9.7 12/05/2022    ALBUMIN 4.10 12/05/2022    LABIL2 1.4 (L) 03/10/2016    AST 18 12/05/2022    ALT 11 12/05/2022     Lab Results   Component Value Date    WBC 7.17 11/04/2022    HGB 14.1  11/04/2022    HCT 43.7 11/04/2022    MCV 87.9 11/04/2022     (L) 11/04/2022     Lab Results   Component Value Date    CHOL 111 08/03/2022    CHLPL 247 (H) 03/10/2016    TRIG 201 (H) 08/03/2022    HDL 41 08/03/2022    LDL 38 08/03/2022     Lab Results   Component Value Date    TSH 1.070 08/03/2022     Lab Results   Component Value Date    HGBA1C 7.80 (H) 08/03/2022     Lab Results   Component Value Date    ALT 11 12/05/2022     Lab Results   Component Value Date    HGBA1C 7.80 (H) 08/03/2022    HGBA1C 8.90 (H) 04/28/2022    HGBA1C 6.57 (H) 10/26/2021     Lab Results   Component Value Date    MICROALBUR 4.8 08/03/2022    CREATININE 1.47 (H) 12/05/2022     Lab Results   Component Value Date    FERRITIN 66.22 06/30/2020     No results found for: INR, PROTIME        --------------------------------------------------------------------------------------------------------------------------    ASSESSMENT/PLAN:      Diagnosis Plan   1. Essential hypertension        2. Mixed hyperlipidemia        3. Shortness of breath        4. ASCVD (arteriosclerotic cardiovascular disease)            1. Essential hypertension  • Blood pressure mildly elevated in the office today.  I have asked her to keep a check on her blood pressure and and reduce sodium intake.  Could consider increasing lisinopril at next visit if blood pressure remains elevated.  Recent CMP showed stable creatinine at 1.47.    2. Hyperlipidemia  • Recently panel showed LDL at goal.  Continue statin, Zetia and Praluent.    3.  ASCVD  · Patient denies any chest pain.  Continue with medical management for now with aspirin, Coreg, Zetia, Imdur, lisinopril and Crestor.    4.  Shortness of breath  · Patient reports shortness of breath when she lies flat.  Does not appear to be in any acute heart failure today.  I did offer chest x-ray to evaluate for any acute findings however she states this is likely related to her postnasal drainage from her allergies.  I did  advise her to follow-up with PCP if this worsens.    This document has been @Electronically signed by RAY Quintana, 03/03/23, 8:57 AM EST.       Dictated Utilizing Dragon Dictation: Part of this note may be an electronic transcription/translation of spoken language to printed text using the Dragon Dictation System.    Follow-up appointment and medication changes provided in hand delivered After Visit Summary as well as reviewed in the room.

## 2023-03-07 ENCOUNTER — SPECIALTY PHARMACY (OUTPATIENT)
Dept: PHARMACY | Facility: HOSPITAL | Age: 68
End: 2023-03-07
Payer: MEDICARE

## 2023-03-07 NOTE — PROGRESS NOTES
Specialty Pharmacy Refill Coordination Note      Name:  Ethel Trotter  :  1955  Date:  3/7/2023         Past Medical History:   Diagnosis Date   • Arthritis    • CAD (coronary artery disease)    • COPD (chronic obstructive pulmonary disease) (HCC)    • Diabetes mellitus (HCC)    • Elevated cholesterol    • Fracture of wrist    • GERD (gastroesophageal reflux disease)    • Hepatitis C    • History of EKG 2016    ABNORMAL   • History of transfusion    • Hyperlipidemia    • Hypertension    • Myocardial infarction (HCC)     x2 last one 5 years ago   • Osteopenia    • Pancreatitis    • Stroke (HCC)        Past Surgical History:   Procedure Laterality Date   • COLONOSCOPY N/A 2019    Procedure: COLONOSCOPY;  Surgeon: Arnav Reyes MD;  Location: [facility];  Service: Gastroenterology   • CORONARY ARTERY BYPASS GRAFT     • HYSTERECTOMY         • TONSILLECTOMY         Social History     Socioeconomic History   • Marital status:    Tobacco Use   • Smoking status: Never   • Smokeless tobacco: Current     Types: Chew   Vaping Use   • Vaping Use: Never used   Substance and Sexual Activity   • Alcohol use: No   • Drug use: Yes     Types: Marijuana     Comment: OCCASIONAL   • Sexual activity: Defer       Family History   Problem Relation Age of Onset   • No Known Problems Father    • No Known Problems Mother    • Breast cancer Neg Hx        No Known Allergies    Current Outpatient Medications   Medication Sig Dispense Refill   • Acetaminophen Extra Strength 500 MG tablet Take 1 tablet by mouth Every 8 (Eight) Hours As Needed for Mild Pain or Moderate Pain.     • albuterol sulfate  (90 Base) MCG/ACT inhaler Inhale 2 puffs 4 (Four) Times a Day. 18 g 3   • Alirocumab (Praluent) 150 MG/ML injection pen Inject 2 mL under the skin into the appropriate area as directed Every 28 (Twenty-Eight) Days. 2 mL 5   • amLODIPine (NORVASC) 10 MG tablet Take 1 tablet by mouth Every Evening. 30  tablet 5   • aspirin 81 MG EC tablet Take 1 tablet by mouth Daily. 30 tablet 5   • carvedilol (COREG) 12.5 MG tablet Take 1 tablet by mouth 2 (Two) Times a Day. 60 tablet 5   • citalopram (CeleXA) 20 MG tablet Take 1 tablet by mouth Daily. 30 tablet 5   • Dulaglutide (Trulicity) 1.5 MG/0.5ML solution pen-injector Inject 1.5 mg under the skin into the appropriate area as directed 1 (One) Time Per Week. 2 mL 5   • Estrogens Conjugated (Premarin) 0.625 MG/GM vaginal cream Insert  into the vagina 2 (Two) Times a Week. (Patient taking differently: Insert  into the vagina 2 (Two) Times a Week. Pt has not started yet) 30 g 5   • ezetimibe (ZETIA) 10 MG tablet Take 1 tablet by mouth Every Night. 30 tablet 5   • fluticasone (Flonase) 50 MCG/ACT nasal spray Administer 2 sprays both nostrils once daily 16 g 5   • Glucose Blood (Blood Glucose Test) strip USE TO TEST BLOOD GLUCOSE TWO TIMES A DAY 50 each 5   • isosorbide mononitrate (IMDUR) 60 MG 24 hr tablet Take 1.5 tablets by mouth Every Morning. 45 tablet 5   • Jardiance 10 MG tablet tablet      • Lancets misc 1 twice daily 60 each 5   • linaclotide (Linzess) 72 MCG capsule capsule Take 1 capsule by mouth Every Morning Before Breakfast. 30 capsule 5   • lisinopril (PRINIVIL,ZESTRIL) 20 MG tablet Take 1 tablet by mouth Daily. 30 tablet 5   • nitroglycerin (NITROSTAT) 0.4 MG SL tablet Place 1 tablet under the tongue Every 5 (Five) Minutes As Needed for Chest Pain. Take no more than 3 doses in 15 minutes. 25 tablet 5   • rosuvastatin (CRESTOR) 40 MG tablet Take 1 tablet by mouth Daily. 30 tablet 5   • trimethoprim (TRIMPEX) 100 MG tablet Take one tablet by mouth twice daily for 3 days then once nightly. 30 tablet 5     No current facility-administered medications for this visit.         ASSESSMENT/PLAN:      Ethel is a 67 y.o. female contacted today regarding refills of  Praluent 150mg/ml injection  specialty medication(s).    Reviewed and verified with patient:        Specialty medication(s) and dose(s) confirmed: yes    Refill Questions    Flowsheet Row Most Recent Value   Changes to allergies? No   Changes to medications? No   New conditions since last clinic visit No   Unplanned office visit, urgent care, ED, or hospital admission in the last 4 weeks  No   How does patient/caregiver feel medication is working? Very good   Financial problems or insurance changes  No   Since the previous refill, were any specialty medication doses or scheduled injections missed or delayed?  No   Does this patient require a clinical escalation to a pharmacist? No                     Follow-up: 28 day(s)     Bridget Balderas, Pharmacy Technician  Specialty Pharmacy Technician

## 2023-04-03 ENCOUNTER — OFFICE VISIT (OUTPATIENT)
Dept: FAMILY MEDICINE CLINIC | Facility: CLINIC | Age: 68
End: 2023-04-03
Payer: MEDICARE

## 2023-04-03 VITALS
DIASTOLIC BLOOD PRESSURE: 75 MMHG | RESPIRATION RATE: 14 BRPM | WEIGHT: 139 LBS | BODY MASS INDEX: 24.63 KG/M2 | HEIGHT: 63 IN | SYSTOLIC BLOOD PRESSURE: 178 MMHG | OXYGEN SATURATION: 97 % | HEART RATE: 73 BPM | TEMPERATURE: 98.6 F

## 2023-04-03 DIAGNOSIS — N28.1 RENAL CYST, LEFT: ICD-10-CM

## 2023-04-03 DIAGNOSIS — R94.39 ABNORMAL STRESS TEST: ICD-10-CM

## 2023-04-03 DIAGNOSIS — I87.2 CHRONIC VENOUS INSUFFICIENCY: ICD-10-CM

## 2023-04-03 DIAGNOSIS — N18.32 CHRONIC RENAL FAILURE, STAGE 3B: ICD-10-CM

## 2023-04-03 DIAGNOSIS — G45.9 TIA (TRANSIENT ISCHEMIC ATTACK): ICD-10-CM

## 2023-04-03 DIAGNOSIS — K21.9 GASTROESOPHAGEAL REFLUX DISEASE WITHOUT ESOPHAGITIS: ICD-10-CM

## 2023-04-03 DIAGNOSIS — Z87.19 H/O ACUTE PANCREATITIS: ICD-10-CM

## 2023-04-03 DIAGNOSIS — Z87.440 HISTORY OF RECURRENT URINARY TRACT INFECTION: ICD-10-CM

## 2023-04-03 DIAGNOSIS — Z72.0 DIPS TOBACCO: ICD-10-CM

## 2023-04-03 DIAGNOSIS — I25.10 CORONARY ARTERY DISEASE INVOLVING NATIVE CORONARY ARTERY OF NATIVE HEART WITHOUT ANGINA PECTORIS: ICD-10-CM

## 2023-04-03 DIAGNOSIS — I25.10 ASCVD (ARTERIOSCLEROTIC CARDIOVASCULAR DISEASE): ICD-10-CM

## 2023-04-03 DIAGNOSIS — J44.9 COPD MIXED TYPE: ICD-10-CM

## 2023-04-03 DIAGNOSIS — M85.80 OSTEOPENIA, UNSPECIFIED LOCATION: ICD-10-CM

## 2023-04-03 DIAGNOSIS — N20.0 NEPHROLITHIASIS: ICD-10-CM

## 2023-04-03 DIAGNOSIS — E11.9 TYPE 2 DIABETES MELLITUS WITHOUT COMPLICATION, WITHOUT LONG-TERM CURRENT USE OF INSULIN: ICD-10-CM

## 2023-04-03 DIAGNOSIS — E78.2 MIXED HYPERLIPIDEMIA: ICD-10-CM

## 2023-04-03 DIAGNOSIS — M54.59 MECHANICAL LOW BACK PAIN: ICD-10-CM

## 2023-04-03 DIAGNOSIS — K59.09 CHRONIC CONSTIPATION: ICD-10-CM

## 2023-04-03 DIAGNOSIS — J30.2 CHRONIC SEASONAL ALLERGIC RHINITIS: Primary | ICD-10-CM

## 2023-04-03 DIAGNOSIS — N95.1 MENOPAUSAL SYMPTOM: ICD-10-CM

## 2023-04-03 DIAGNOSIS — E55.9 VITAMIN D DEFICIENCY DISEASE: ICD-10-CM

## 2023-04-03 DIAGNOSIS — Z00.00 HEALTHCARE MAINTENANCE: ICD-10-CM

## 2023-04-03 DIAGNOSIS — I10 ESSENTIAL HYPERTENSION: ICD-10-CM

## 2023-04-03 DIAGNOSIS — F41.8 DEPRESSION WITH ANXIETY: ICD-10-CM

## 2023-04-03 PROBLEM — R06.02 SHORTNESS OF BREATH: Status: RESOLVED | Noted: 2023-03-03 | Resolved: 2023-04-03

## 2023-04-03 PROCEDURE — 82607 VITAMIN B-12: CPT | Performed by: GENERAL PRACTICE

## 2023-04-03 PROCEDURE — 82306 VITAMIN D 25 HYDROXY: CPT | Performed by: GENERAL PRACTICE

## 2023-04-03 PROCEDURE — 84443 ASSAY THYROID STIM HORMONE: CPT | Performed by: GENERAL PRACTICE

## 2023-04-03 PROCEDURE — 80053 COMPREHEN METABOLIC PANEL: CPT | Performed by: GENERAL PRACTICE

## 2023-04-03 PROCEDURE — 85025 COMPLETE CBC W/AUTO DIFF WBC: CPT | Performed by: GENERAL PRACTICE

## 2023-04-03 PROCEDURE — 83036 HEMOGLOBIN GLYCOSYLATED A1C: CPT | Performed by: GENERAL PRACTICE

## 2023-04-03 PROCEDURE — 80061 LIPID PANEL: CPT | Performed by: GENERAL PRACTICE

## 2023-04-03 RX ORDER — LISINOPRIL 20 MG/1
20 TABLET ORAL DAILY
Qty: 30 TABLET | Refills: 5 | Status: SHIPPED | OUTPATIENT
Start: 2023-04-03

## 2023-04-03 RX ORDER — CITALOPRAM 20 MG/1
20 TABLET ORAL DAILY
Qty: 30 TABLET | Refills: 5 | Status: SHIPPED | OUTPATIENT
Start: 2023-04-03

## 2023-04-03 RX ORDER — ISOSORBIDE MONONITRATE 60 MG/1
90 TABLET, EXTENDED RELEASE ORAL EVERY MORNING
Qty: 45 TABLET | Refills: 5 | Status: SHIPPED | OUTPATIENT
Start: 2023-04-03

## 2023-04-03 RX ORDER — EZETIMIBE 10 MG/1
10 TABLET ORAL NIGHTLY
Qty: 30 TABLET | Refills: 5 | Status: SHIPPED | OUTPATIENT
Start: 2023-04-03

## 2023-04-03 RX ORDER — TRIMETHOPRIM 100 MG/1
TABLET ORAL
Qty: 30 TABLET | Refills: 5 | Status: SHIPPED | OUTPATIENT
Start: 2023-04-03

## 2023-04-03 RX ORDER — EMPAGLIFLOZIN 10 MG/1
10 TABLET, FILM COATED ORAL DAILY
Qty: 30 TABLET | Refills: 5 | Status: SHIPPED | OUTPATIENT
Start: 2023-04-03

## 2023-04-03 RX ORDER — ASPIRIN 81 MG/1
81 TABLET ORAL DAILY
Qty: 30 TABLET | Refills: 5 | Status: SHIPPED | OUTPATIENT
Start: 2023-04-03

## 2023-04-03 RX ORDER — AMLODIPINE BESYLATE 10 MG/1
10 TABLET ORAL EVERY EVENING
Qty: 30 TABLET | Refills: 5 | Status: SHIPPED | OUTPATIENT
Start: 2023-04-03

## 2023-04-03 RX ORDER — ROSUVASTATIN CALCIUM 40 MG/1
40 TABLET, COATED ORAL DAILY
Qty: 30 TABLET | Refills: 5 | Status: SHIPPED | OUTPATIENT
Start: 2023-04-03

## 2023-04-03 RX ORDER — CARVEDILOL 12.5 MG/1
12.5 TABLET ORAL 2 TIMES DAILY
Qty: 60 TABLET | Refills: 5 | Status: SHIPPED | OUTPATIENT
Start: 2023-04-03

## 2023-04-03 RX ORDER — ALBUTEROL SULFATE 90 UG/1
2 AEROSOL, METERED RESPIRATORY (INHALATION)
Qty: 18 G | Refills: 3 | Status: SHIPPED | OUTPATIENT
Start: 2023-04-03

## 2023-04-03 RX ORDER — DULAGLUTIDE 1.5 MG/.5ML
1.5 INJECTION, SOLUTION SUBCUTANEOUS WEEKLY
Qty: 2 ML | Refills: 5 | Status: SHIPPED | OUTPATIENT
Start: 2023-04-03

## 2023-04-03 RX ORDER — FLUTICASONE PROPIONATE 50 MCG
SPRAY, SUSPENSION (ML) NASAL
Qty: 16 G | Refills: 5 | Status: SHIPPED | OUTPATIENT
Start: 2023-04-03

## 2023-04-03 NOTE — PROGRESS NOTES
The ABCs of the Annual Wellness Visit  Subsequent Medicare Wellness Visit    Chief Complaint  Subsequent Medicare Wellness Visit    Subjective    History of Present Illness:  Ethel Trotter is a 67 y.o. female who presents for a Subsequent Medicare Wellness Visit.    Coronary artery disease  She has a history of previous M.I. and CABG surgery.  She continues to deny any chest pain and there is no history of any palpitations, lightheadedness, shortness of breath, calf pain, or swelling the ankles. She remains on low-dose ASA.  She underwent a nuclear stress test on 5/3/2022 with no evidence of inducible ischemia.  Her left ventricular ejection fraction was estimated at 37%.  She underwent an echocardiogram on 8/3/2022 with mild aortic calcification and regurgitation, mild to moderate MVR, mild TVR, and an estimated EF of 51 to 55%.  She has had difficulty getting some of her medications due to the cost of her co-pays.  She continues to be followed by cardiology and is scheduled to undergo a reassessment with RAY Quintana on 6/5/2023    Episode of Visual Loss  She experienced a 4 to 5 second episode of complete vision loss in her right eye several years ago. This occurred during a period of anxiety and was associated with a generalized headache.  She continues to deny any further episodes and has had no weakness, numbness, tingling, or difficulty talking or understanding what is said to her.      Type 2 Diabetes Mellitus  She continues to deny paresthesias of the feet, visual disturbances, polydipsia, polyuria, hypoglycemia or foot ulcerations.  Evaluation to date has been a hemoglobin A1c.  Current treatments include empagliflozin, and dulaglutide.      Hyperlipidemia  Her compliance with treatment remains quite good. She is currently taking rosuvastatin, ezetimibe, and praluent.      Essential Hypertension  Home blood pressure readings: not doing. She continues to deny any chest pain, palpitations,  dyspnea, orthopnea, paroxysmal nocturnal dyspnea or peripheral edema. Current antihypertensive medications includes lisinopril, carvedilol, amlodipine.    Chronic Renal Failure  This has been contributed to hypertensive and diabetic nephropathy along with possible PCKD.  She is aware to avoid any NSAIDs.  Renal ultrasound performed on 5/10/2021 revealed a left renal cyst but no other significant abnormalities.  Noncontrast CT of the abdomen and pelvis performed on 9/23/2022 was reported as showing bilateral renal cysts that were more numerous and larger on the left, evidence of atherosclerotic disease, and osteoarthritic changes of the spine    Low Back Pain   She has a long history of low back pain worse over the last few years. The pain is described as an intermittent left lower ache.  This radiates to her left flank, left lateral hip, and left posterior thigh.  The pain in her back is worse than that elsewhere.  She has been unable to identify any precipitating, exacerbating, or relieving factors.  She needs to deny any changes in her strength, sensation, or bowel/bladder control.  There is no history of any change in her bowel habits and she continues to deny any hematochezia or melena.  She admits to urinary frequency, nocturia, and intermittent dysuria but continues to deny any hematuria.  There is no history of any vaginal bleeding, fever, chills, or night sweats. Colonoscopy performed on 5/13/2019 by Dr. Reyes was reported as showing mild diverticulosis and several tubular adenomas were removed.  Ultrasound of the abdomen performed on 2/26/2020 revealed a 7.44 cm left renal cyst.  She underwent a urology assessment with Dr. Givens on 5/29/2020 who felt that it was a Bosniak type I.  CT of the abdomen and pelvis performed on 6/10/2020 was reported as showing bilateral renal cysts the largest of which was in the posterior left kidney and measured 7 cm.  Atherosclerotic vascular disease and osteoarthritic  changes of the spine were also noted. X-rays of the left hip performed on 2/26/2020 were unremarkable.  MRI of the lumbar spine performed on 11/18/2020 was reported as showing degenerative disc disease with moderate to severe right neuroforaminal narrowing at L4-5     Depression with Anxiety  She has a long history of intermittent depression, nervousness, and difficulty sleeping.  She continues to deny any change in her concentration or memory and there is no history of any suicidal ideation.  She is prescribed citalopram     The following portions of the patient's history were reviewed and   updated as appropriate: allergies, current medications, past family history, past medical history, past social history, past surgical history and problem list.    Compared to one year ago, the patient feels her physical   health is the same.    Compared to one year ago, the patient feels her mental   health is the same.    Recent Hospitalizations:  She was not admitted to the hospital during the last year.     Current Medical Providers:  Patient Care Team:  Sacha Montez MD as PCP - General  Sacha Montez MD as PCP - Family Medicine    Outpatient Medications Prior to Visit   Medication Sig Dispense Refill   • Acetaminophen Extra Strength 500 MG tablet Take 1 tablet by mouth Every 8 (Eight) Hours As Needed for Mild Pain or Moderate Pain.     • Alirocumab (Praluent) 150 MG/ML injection pen Inject 2 mL under the skin into the appropriate area as directed Every 28 (Twenty-Eight) Days. 2 mL 5   • Estrogens Conjugated (Premarin) 0.625 MG/GM vaginal cream Insert  into the vagina 2 (Two) Times a Week. (Patient taking differently: Insert  into the vagina 2 (Two) Times a Week. Pt has not started yet) 30 g 5   • Glucose Blood (Blood Glucose Test) strip USE TO TEST BLOOD GLUCOSE TWO TIMES A DAY 50 each 5   • Lancets misc 1 twice daily 60 each 5   • nitroglycerin (NITROSTAT) 0.4 MG SL tablet Place 1 tablet under the  tongue Every 5 (Five) Minutes As Needed for Chest Pain. Take no more than 3 doses in 15 minutes. 25 tablet 5   • albuterol sulfate  (90 Base) MCG/ACT inhaler Inhale 2 puffs 4 (Four) Times a Day. 18 g 3   • amLODIPine (NORVASC) 10 MG tablet Take 1 tablet by mouth Every Evening. 30 tablet 5   • aspirin 81 MG EC tablet Take 1 tablet by mouth Daily. 30 tablet 5   • carvedilol (COREG) 12.5 MG tablet Take 1 tablet by mouth 2 (Two) Times a Day. 60 tablet 5   • citalopram (CeleXA) 20 MG tablet Take 1 tablet by mouth Daily. 30 tablet 5   • Dulaglutide (Trulicity) 1.5 MG/0.5ML solution pen-injector Inject 1.5 mg under the skin into the appropriate area as directed 1 (One) Time Per Week. 2 mL 5   • ezetimibe (ZETIA) 10 MG tablet Take 1 tablet by mouth Every Night. 30 tablet 5   • fluticasone (Flonase) 50 MCG/ACT nasal spray Administer 2 sprays both nostrils once daily 16 g 5   • isosorbide mononitrate (IMDUR) 60 MG 24 hr tablet Take 1.5 tablets by mouth Every Morning. 45 tablet 5   • Jardiance 10 MG tablet tablet      • linaclotide (Linzess) 72 MCG capsule capsule Take 1 capsule by mouth Every Morning Before Breakfast. 30 capsule 5   • lisinopril (PRINIVIL,ZESTRIL) 20 MG tablet Take 1 tablet by mouth Daily. 30 tablet 5   • rosuvastatin (CRESTOR) 40 MG tablet Take 1 tablet by mouth Daily. 30 tablet 5   • trimethoprim (TRIMPEX) 100 MG tablet Take one tablet by mouth twice daily for 3 days then once nightly. 30 tablet 5     No facility-administered medications prior to visit.     No opioid medication identified on active medication list. I have reviewed chart for other potential  high risk medication/s and harmful drug interactions in the elderly.          Aspirin is on active medication list. Aspirin use is indicated based on review of current medical condition/s. Pros and cons of this therapy have been discussed today. Benefits of this medication outweigh potential harm.  Patient has been encouraged to continue taking  this medication.  .      Patient Active Problem List   Diagnosis   • Depression with anxiety   • COPD mixed type   • Essential hypertension   • Mixed hyperlipidemia   • Type 2 diabetes mellitus without complication, without long-term current use of insulin   • ASCVD (arteriosclerotic cardiovascular disease)   • Vitamin D deficiency disease   • Osteopenia   • Hepatitis C antibody test positive   • H/O acute pancreatitis   • Gastroesophageal reflux disease without esophagitis   • Chronic venous insufficiency   • Chronic seasonal allergic rhinitis   • Dips tobacco   • Encounter for immunization   • Healthcare maintenance   • Mechanical low back pain   • Encounter for screening mammogram for breast cancer   • Chronic constipation   • TIA (transient ischemic attack)   • Chronic renal failure, stage 3b   • Renal cyst, left   • Nephrolithiasis   • Menopausal symptom   • History of recurrent urinary tract infection     Advance Care Planning  Advance Directive is not on file.  ACP discussion was held with the patient during this visit. Patient does not have an advance directive, information provided.    Review of Systems   Constitutional: Positive for fatigue. Negative for appetite change, chills, diaphoresis and fever.   HENT: Positive for congestion, ear pain (Intermittent bilateral ear fullness) and rhinorrhea. Negative for postnasal drip, sinus pressure, sneezing, sore throat, tinnitus and voice change.    Eyes: Negative for visual disturbance.   Respiratory: Negative for cough, shortness of breath and wheezing.    Cardiovascular: Negative for chest pain, palpitations and leg swelling.   Gastrointestinal: Negative for abdominal pain, blood in stool, constipation, diarrhea, nausea and vomiting.   Endocrine: Negative for polydipsia and polyuria.   Genitourinary: Positive for frequency. Negative for dysuria, hematuria and urgency.   Musculoskeletal: Positive for arthralgias and back pain. Negative for joint swelling and  "myalgias.   Skin: Negative for rash.   Neurological: Negative for weakness, numbness and confusion.   Psychiatric/Behavioral: Positive for dysphoric mood and sleep disturbance. Negative for decreased concentration and suicidal ideas. The patient is nervous/anxious.         Objective    Vitals:    04/03/23 1130   BP: 178/75   Pulse: 73   Resp: 14   Temp: 98.6 °F (37 °C)   TempSrc: Temporal   SpO2: 97%   Weight: 63 kg (139 lb)   Height: 160 cm (62.99\")     Estimated body mass index is 24.63 kg/m² as calculated from the following:    Height as of this encounter: 160 cm (62.99\").    Weight as of this encounter: 63 kg (139 lb).    BMI is within normal parameters. No other follow-up for BMI required.    Does the patient have evidence of cognitive impairment? No    Physical Exam  Constitutional:       General: She is not in acute distress.     Appearance: Normal appearance. She is well-developed. She is not diaphoretic.      Comments: Alert and oriented. No apparent distress. No pallor, jaundice, diaphoresis, or cyanosis.   HENT:      Head: Atraumatic.      Right Ear: Ear canal and external ear normal. Tympanic membrane is scarred.      Left Ear: Ear canal and external ear normal. Tympanic membrane is scarred.      Mouth/Throat:      Lips: No lesions.      Mouth: Mucous membranes are moist. No oral lesions.      Pharynx: No oropharyngeal exudate or posterior oropharyngeal erythema.   Eyes:      General: Lids are normal.      Extraocular Movements: Extraocular movements intact.      Conjunctiva/sclera: Conjunctivae normal.      Pupils: Pupils are equal.   Neck:      Thyroid: No thyroid mass or thyromegaly.      Vascular: No carotid bruit or JVD.      Trachea: Trachea normal. No tracheal deviation.   Cardiovascular:      Rate and Rhythm: Normal rate and regular rhythm.      Heart sounds: Normal heart sounds, S1 normal and S2 normal. No murmur heard.    No gallop.   Pulmonary:      Effort: Pulmonary effort is normal.      " Breath sounds: Normal breath sounds.   Chest:      Chest wall: No tenderness.   Abdominal:      General: Bowel sounds are normal. There is no distension or abdominal bruit.      Palpations: Abdomen is soft. There is no hepatomegaly, splenomegaly or mass.      Tenderness: There is no abdominal tenderness.      Hernia: No hernia is present.   Musculoskeletal:      Lumbar back: Negative right straight leg raise test and negative left straight leg raise test.      Right lower leg: No edema.      Left lower leg: No edema.      Comments: Negative straight leg raise. No peripheral joint redness or warmth.   Lymphadenopathy:      Head:      Right side of head: No submental, submandibular, tonsillar, preauricular, posterior auricular or occipital adenopathy.      Left side of head: No submental, submandibular, tonsillar, preauricular, posterior auricular or occipital adenopathy.      Cervical: No cervical adenopathy.      Upper Body:      Right upper body: No supraclavicular adenopathy.      Left upper body: No supraclavicular adenopathy.   Skin:     General: Skin is warm.      Coloration: Skin is not cyanotic, jaundiced or pale.      Findings: No rash.      Nails: There is no clubbing.   Neurological:      Mental Status: She is alert and oriented to person, place, and time.      Cranial Nerves: No cranial nerve deficit, dysarthria or facial asymmetry.      Sensory: No sensory deficit.      Motor: No tremor.      Coordination: Coordination normal.      Gait: Gait normal.   Psychiatric:         Attention and Perception: Attention normal.         Mood and Affect: Mood normal.         Speech: Speech normal.         Behavior: Behavior normal.         Thought Content: Thought content normal. Thought content does not include suicidal ideation.       HEALTH RISK ASSESSMENT    Smoking Status:  Social History     Tobacco Use   Smoking Status Never   • Passive exposure: Never   Smokeless Tobacco Current   • Types: Chew     Alcohol  Consumption:  Social History     Substance and Sexual Activity   Alcohol Use No     Fall Risk Screen:  STEADI Fall Risk Assessment was completed, and patient is at LOW risk for falls.Assessment completed on:4/3/2023    Depression Screening:  PHQ-2/PHQ-9 Depression Screening 4/3/2023   Retired PHQ-9 Total Score -   Retired Total Score -   Little Interest or Pleasure in Doing Things 0-->not at all   Feeling Down, Depressed or Hopeless 0-->not at all   PHQ-9: Brief Depression Severity Measure Score 0     Health Habits and Functional and Cognitive Screening:  Functional & Cognitive Status 4/3/2023   Do you have difficulty preparing food and eating? No   Do you have difficulty bathing yourself, getting dressed or grooming yourself? No   Do you have difficulty using the toilet? No   Do you have difficulty moving around from place to place? No   Do you have trouble with steps or getting out of a bed or a chair? No   Current Diet Well Balanced Diet   Dental Exam Not up to date   Eye Exam Not up to date   Exercise (times per week) 7 times per week   Current Exercises Include Yard Work;Walking   Do you need help using the phone?  No   Are you deaf or do you have serious difficulty hearing?  No   Do you need help with transportation? No   Do you need help shopping? No   Do you need help preparing meals?  No   Do you need help with housework?  No   Do you need help with laundry? No   Do you need help taking your medications? No   Do you need help managing money? No   Do you ever drive or ride in a car without wearing a seat belt? No   Have you felt unusual stress, anger or loneliness in the last month? No   Who do you live with? Spouse   If you need help, do you have trouble finding someone available to you? No   Have you been bothered in the last four weeks by sexual problems? No   Do you have difficulty concentrating, remembering or making decisions? No     Age-appropriate Screening Schedule:  Refer to the list below for  future screening recommendations based on patient's age, sex and/or medical conditions. Orders for these recommended tests are listed in the plan section. The patient has been provided with a written plan.    Health Maintenance   Topic Date Due   • COVID-19 Vaccine (1) Never done   • ZOSTER VACCINE (1 of 2) Never done   • DXA SCAN  02/02/2020   • DIABETIC FOOT EXAM  06/22/2020   • HEMOGLOBIN A1C  02/03/2023   • DIABETIC EYE EXAM  03/15/2023   • MAMMOGRAM  12/15/2023 (Originally 2/2/2020)   • INFLUENZA VACCINE  08/01/2023   • LIPID PANEL  08/03/2023   • URINE MICROALBUMIN  08/03/2023   • ANNUAL WELLNESS VISIT  04/03/2024   • COLORECTAL CANCER SCREENING  05/13/2024   • TDAP/TD VACCINES (4 - Td or Tdap) 10/22/2024   • HEPATITIS C SCREENING  Completed   • Pneumococcal Vaccine 65+  Completed   • PAP SMEAR  Discontinued        Assessment & Plan   CMS Preventative Services Quick Reference  Risk Factors Identified During Encounter  Chronic Pain: Natural history and expected course discussed. Questions answered.  Depression/Dysphoria: Current medication is effective, no change recommended  Fall Risk-High or Moderate: Discussed Fall Prevention in the home  Immunizations Discussed/Encouraged: Tdap, Shingrix and COVID19  Tobacco Use/Dependance Risk (use dotphrase .tobaccocessation for documentation)  The above risks/problems have been discussed with the patient.  Follow up actions/plans if indicated are seen below in the Assessment/Plan Section.  Pertinent information has been shared with the patient in the After Visit Summary.    Diagnoses and all orders for this visit:    1. Chronic seasonal allergic rhinitis   Continue current medication  Encouraged to report if any worse or if any new symptoms or concerns.  -     fluticasone (Flonase) 50 MCG/ACT nasal spray; Administer 2 sprays both nostrils once daily  Dispense: 16 g; Refill: 5    2. Essential hypertension    Hypertension: elevated today. Evidence of target organ damage:  coronary artery disease and chronic kidney disease.  Encouraged to continue to work on diet and exercise plan.   Continue current medication  -     Comprehensive Metabolic Panel  -     lisinopril (PRINIVIL,ZESTRIL) 20 MG tablet; Take 1 tablet by mouth Daily.  Dispense: 30 tablet; Refill: 5  -     amLODIPine (NORVASC) 10 MG tablet; Take 1 tablet by mouth Every Evening.  Dispense: 30 tablet; Refill: 5    3. Mixed hyperlipidemia  As above.   Continue current medication.  -     Comprehensive Metabolic Panel  -     Lipid Panel  -     rosuvastatin (CRESTOR) 40 MG tablet; Take 1 tablet by mouth Daily.  Dispense: 30 tablet; Refill: 5  -     ezetimibe (ZETIA) 10 MG tablet; Take 1 tablet by mouth Every Night.  Dispense: 30 tablet; Refill: 5    4. ASCVD (arteriosclerotic cardiovascular disease)  Reminded regarding risk factor modification with an emphasis on tobacco cessation.  Continue low-dose ASA  Follow up with cardiology   -     CBC & Differential  -     carvedilol (COREG) 12.5 MG tablet; Take 1 tablet by mouth 2 (Two) Times a Day.  Dispense: 60 tablet; Refill: 5    5. Chronic venous insufficiency    6. Type 2 diabetes mellitus without complication, without long-term current use of insulin  Diabetes mellitus Type II, under unknown control.   Encouraged to continue to pursue ADA diet  Encouraged aerobic exercise.  Continue current medication  Updated labs drawn.  -     Comprehensive Metabolic Panel  -     TSH  -     Hemoglobin A1c  -     Vitamin B12  -     MicroAlbumin, Urine, Random - Urine, Clean Catch  -     Jardiance 10 MG tablet tablet; Take 1 tablet by mouth Daily.  Dispense: 30 tablet; Refill: 5  -     Dulaglutide (Trulicity) 1.5 MG/0.5ML solution pen-injector; Inject 1.5 mg under the skin into the appropriate area as directed 1 (One) Time Per Week.  Dispense: 2 mL; Refill: 5    7. Vitamin D deficiency disease  -     Vitamin D,25-Hydroxy    8. H/O acute pancreatitis    9. Gastroesophageal reflux disease without  esophagitis    10. Chronic constipation  -     linaclotide (Linzess) 72 MCG capsule capsule; Take 1 capsule by mouth Every Morning Before Breakfast.  Dispense: 30 capsule; Refill: 5    11. Renal cyst, left    12. Nephrolithiasis    13. Menopausal symptom    14. Healthcare maintenance  Patient remains uninterested in COVID-19 vaccination or in breast cancer screening  Reminded that she is due for Shingrix    15. Depression with anxiety  Significant situational component.   Supportive therapy.   Continue current medication.  Encouraged to report if any worse or if any new symptoms or concerns.  -     TSH  -     citalopram (CeleXA) 20 MG tablet; Take 1 tablet by mouth Daily.  Dispense: 30 tablet; Refill: 5    16. Osteopenia, unspecified location    17. Mechanical low back pain    18. TIA (transient ischemic attack)  As above.   Continue current medication.  -     CBC & Differential    19. COPD mixed type  -     albuterol sulfate  (90 Base) MCG/ACT inhaler; Inhale 2 puffs 4 (Four) Times a Day.  Dispense: 18 g; Refill: 3    20. Dips tobacco  Lengthy discussion regarding the potential sequela of continued tobacco use and the options with respect to cessation.  Patient uninterested in pursuing at present but will consider.    21. Chronic renal failure, stage 3b   Reminded to avoid any NSAIDs prescription or OTC  Continue current medication  -     Comprehensive Metabolic Panel    22. History of recurrent urinary tract infection  -     trimethoprim (TRIMPEX) 100 MG tablet; Take one tablet by mouth twice daily for 3 days then once nightly.  Dispense: 30 tablet; Refill: 5    23. Coronary artery disease involving native coronary artery of native heart without angina pectoris  -     isosorbide mononitrate (IMDUR) 60 MG 24 hr tablet; Take 1.5 tablets by mouth Every Morning.  Dispense: 45 tablet; Refill: 5  -     aspirin 81 MG EC tablet; Take 1 tablet by mouth Daily.  Dispense: 30 tablet; Refill: 5    Follow Up:   Return in  about 4 months (around 7/21/2023).     An After Visit Summary and PPPS were made available to the patient.

## 2023-04-03 NOTE — PATIENT INSTRUCTIONS
Advance Directive  Advance directives are legal documents that allow you to make decisions about your health care and medical treatment in case you become unable to communicate for yourself. Advance directives let your wishes be known to family, friends, and health care providers.  Discussing and writing advance directives should happen over time rather than all at once. Advance directives can be changed and updated at any time. There are different types of advance directives, such as:  Medical power of .  Living will.  Do not resuscitate (DNR) order or do not attempt resuscitation (DNAR) order.  Health care proxy and medical power of   A health care proxy is also called a health care agent. This person is appointed to make medical decisions for you when you are unable to make decisions for yourself. Generally, people ask a trusted friend or family member to act as their proxy and represent their preferences. Make sure you have an agreement with your trusted person to act as your proxy. A proxy may have to make a medical decision on your behalf if your wishes are not known.  A medical power of , also called a durable power of  for health care, is a legal document that names your health care proxy. Depending on the laws in your state, the document may need to be:  Signed.  Notarized.  Dated.  Copied.  Witnessed.  Incorporated into your medical record.  You may also want to appoint a trusted person to manage your money in the event you are unable to do so. This is called a durable power of  for finances. It is a separate legal document from the durable power of  for health care. You may choose your health care proxy or someone different to act as your agent in money matters.  If you do not appoint a proxy, or there is a concern that the proxy is not acting in your best interest, a court may appoint a guardian to act on your behalf.  Living will  A living will is a set of  instructions that state your wishes about medical care when you cannot express them yourself. Health care providers should keep a copy of your living will in your medical record. You may want to give a copy to family members or friends. To alert caregivers in case of an emergency, you can place a card in your wallet to let them know that you have a living will and where they can find it. A living will is used if you become:  Terminally ill.  Disabled.  Unable to communicate or make decisions.  The following decisions should be included in your living will:  To use or not to use life support equipment, such as dialysis machines and breathing machines (ventilators).  Whether you want a DNR or DNAR order. This tells health care providers not to use cardiopulmonary resuscitation (CPR) if breathing or heartbeat stops.  To use or not to use tube feeding.  To be given or not to be given food and fluids.  Whether you want comfort (palliative) care when the goal becomes comfort rather than a cure.  Whether you want to donate your organs and tissues.  A living will does not give instructions for distributing your money and property if you should pass away.  DNR or DNAR  A DNR or DNAR order is a request not to have CPR in the event that your heart stops beating or you stop breathing. If a DNR or DNAR order has not been made and shared, a health care provider will try to help any patient whose heart has stopped or who has stopped breathing. If you plan to have surgery, talk with your health care provider about how your DNR or DNAR order will be followed if problems occur.  What if I do not have an advance directive?  Some states assign family decision makers to act on your behalf if you do not have an advance directive. Each state has its own laws about advance directives. You may want to check with your health care provider, , or state representative about the laws in your state.  Summary  Advance directives are legal  documents that allow you to make decisions about your health care and medical treatment in case you become unable to communicate for yourself.  The process of discussing and writing advance directives should happen over time. You can change and update advance directives at any time.  Advance directives may include a medical power of , a living will, and a DNR or DNAR order.  This information is not intended to replace advice given to you by your health care provider. Make sure you discuss any questions you have with your health care provider.  Document Revised: 09/21/2021 Document Reviewed: 09/21/2021  [employer] Patient Education © 2022 [employer]  Fall Prevention in the Home, Adult  Falls can cause injuries and can happen to people of all ages. There are many things you can do to make your home safe and to help prevent falls. Ask for help when making these changes.  What actions can I take to prevent falls?  General Instructions  Use good lighting in all rooms. Replace any light bulbs that burn out.  Turn on the lights in dark areas. Use night-lights.  Keep items that you use often in easy-to-reach places. Lower the shelves around your home if needed.  Set up your furniture so you have a clear path. Avoid moving your furniture around.  Do not have throw rugs or other things on the floor that can make you trip.  Avoid walking on wet floors.  If any of your floors are uneven, fix them.  Add color or contrast paint or tape to clearly alana and help you see:  Grab bars or handrails.  First and last steps of staircases.  Where the edge of each step is.  If you use a stepladder:  Make sure that it is fully opened. Do not climb a closed stepladder.  Make sure the sides of the stepladder are locked in place.  Ask someone to hold the stepladder while you use it.  Know where your pets are when moving through your home.  What can I do in the bathroom?     Keep the floor dry. Clean up any water on the floor right  away.  Remove soap buildup in the tub or shower.  Use nonskid mats or decals on the floor of the tub or shower.  Attach bath mats securely with double-sided, nonslip rug tape.  If you need to sit down in the shower, use a plastic, nonslip stool.  Install grab bars by the toilet and in the tub and shower. Do not use towel bars as grab bars.  What can I do in the bedroom?  Make sure that you have a light by your bed that is easy to reach.  Do not use any sheets or blankets for your bed that hang to the floor.  Have a firm chair with side arms that you can use for support when you get dressed.  What can I do in the kitchen?  Clean up any spills right away.  If you need to reach something above you, use a step stool with a grab bar.  Keep electrical cords out of the way.  Do not use floor polish or wax that makes floors slippery.  What can I do with my stairs?  Do not leave any items on the stairs.  Make sure that you have a light switch at the top and the bottom of the stairs.  Make sure that there are handrails on both sides of the stairs. Fix handrails that are broken or loose.  Install nonslip stair treads on all your stairs.  Avoid having throw rugs at the top or bottom of the stairs.  Choose a carpet that does not hide the edge of the steps on the stairs.  Check carpeting to make sure that it is firmly attached to the stairs. Fix carpet that is loose or worn.  What can I do on the outside of my home?  Use bright outdoor lighting.  Fix the edges of walkways and driveways and fix any cracks.  Remove anything that might make you trip as you walk through a door, such as a raised step or threshold.  Trim any bushes or trees on paths to your home.  Check to see if handrails are loose or broken and that both sides of all steps have handrails.  Install guardrails along the edges of any raised decks and porches.  Clear paths of anything that can make you trip, such as tools or rocks.  Have leaves, snow, or ice cleared  regularly.  Use sand or salt on paths during winter.  Clean up any spills in your garage right away. This includes grease or oil spills.  What other actions can I take?  Wear shoes that:  Have a low heel. Do not wear high heels.  Have rubber bottoms.  Feel good on your feet and fit well.  Are closed at the toe. Do not wear open-toe sandals.  Use tools that help you move around if needed. These include:  Canes.  Walkers.  Scooters.  Crutches.  Review your medicines with your doctor. Some medicines can make you feel dizzy. This can increase your chance of falling.  Ask your doctor what else you can do to help prevent falls.  Where to find more information  Centers for Disease Control and Prevention, [place]: www.cdc.gov  National [place] on Aging: www.madhuri.nih.gov  Contact a doctor if:  You are afraid of falling at home.  You feel weak, drowsy, or dizzy at home.  You fall at home.  Summary  There are many simple things that you can do to make your home safe and to help prevent falls.  Ways to make your home safe include removing things that can make you trip and installing grab bars in the bathroom.  Ask for help when making these changes in your home.  This information is not intended to replace advice given to you by your health care provider. Make sure you discuss any questions you have with your health care provider.  Document Revised: 09/19/2022 Document Reviewed: 07/21/2021  [employer] Patient Education © 2022 [employer]  Heart Disease Prevention  Heart disease is the leading cause of death in the world. Coronary artery disease is the most common cause of heart disease. This condition results when cholesterol and other substances (plaque) build up inside the walls of the blood vessels that supply your heart muscle (arteries). This buildup in arteries is called atherosclerosis. You can take actions to lower your risk of heart disease.  How can heart disease affect me?  Heart disease can cause many unpleasant  symptoms and complications, such as:  Chest pain (angina).  Reduced or blocked blood flow to your heart. This can cause:  Irregular heartbeats (arrhythmias).  Heart attack.  Heart failure.  What can increase my risk?  The following factors may make you more likely to develop this condition:  High blood pressure (hypertension).  High cholesterol.  A diet high in saturated fats or trans fats.  Obesity.  Diabetes.  Having a family history of heart disease.  Certain lifestyle factors, including:  Smoking.  Lack of physical activity.  Drinking too much alcohol.  What actions can I take to prevent heart disease?  Nutrition    Follow a heart-healthy eating plan as told by your health care provider. Examples include the DASH eating plan. DASH stands for Dietary Approaches to Stop Hypertension.  Generally, it is recommended that you:  Eat less salt (sodium). Ask your health care provider how much sodium is safe for you. Most people should have less than 2,300 mg each day.  Limit unhealthy fats, such as saturated and trans fats, in your diet. You can do this by eating low-fat dairy products, eating less red meat, and avoiding processed foods.  Eat healthy fats (omega-3 fatty acids). These are found in fish, such as mackerel or salmon.  Eat more fruits and vegetables. You should try to fill one-half of your plate with fruits and vegetables at each meal.  Eat more whole grains.  Avoid foods and drinks that have added sugars. Try to limit how much added sugar you have to:  Less than 25 grams a day for women.  Less than 36 grams a day for men.  Lifestyle    Get regular exercise. This is one of the most important things you can do for your health. Generally, it is recommended that you:  Exercise for at least 30 minutes on most days of the week (150 minutes each week). This should be exercise that causes your heart to beat faster (aerobic exercise).  Add strength exercises on at least 2 days each week.  Do not use any products that  contain nicotine or tobacco. These products include cigarettes, chewing tobacco, and vaping devices, such as e-cigarettes. These can damage your heart and blood vessels. If you need help quitting, ask your health care provider.  Alcohol use  Do not drink alcohol if:  Your health care provider tells you not to drink.  You are pregnant, may be pregnant, or are planning to become pregnant.  If you drink alcohol:  Limit how much you have to:  0-1 drink a day for women.  0-2 drinks a day for men.  Know how much alcohol is in your drink. In the [place] one drink equals one 12 oz bottle of beer (355 mL), one 5 oz glass of wine (148 mL), or one 1½ oz glass of hard liquor (44 mL).  Medicines  Take over-the-counter and prescription medicines only as told by your health care provider.  Work with your health care provider to find out whether it is safe and beneficial for you to take aspirin daily. Make sure that you understand how much to take and what form to take.  Depending on your risk factors, your health care provider may prescribe medicines to lower your risk of heart disease or to control related conditions. You may take medicine to:  Lower cholesterol.  Control blood pressure.  Control diabetes.  General information  Keep your blood pressure under control, as recommended by your health care provider. For most healthy people, the upper number of their blood pressure (systolic) should be no higher than 120, and the lower number (diastolic) no higher than 80. Treatment may be needed if your blood pressure is higher than 130/80.  Have your blood pressure checked at least every 2 years. Your health care provider may check your blood pressure more often if you have high blood pressure.  After age 20, have your cholesterol checked every 4-6 years. If you have risk factors for heart disease, you may need to have it checked more often. Treatment may be needed if your cholesterol is high.  Have your body mass index (BMI) checked  every year. Your health care provider can calculate your BMI from your height and weight.  Check your waist circumference. It should be:  No more than 35 inches (89 cm) for women who are not pregnant.  No more than 40 inches (102 cm) for men.  Work with your health care provider to lose weight, if needed, or to maintain a healthy weight.  Where to find more information:  Centers for Disease Control and Prevention: www.cdc.gov/heartdisease  American Heart Association: www.heart.org  Summary  Heart disease is the leading cause of death in the world.  Heart disease can cause chest pain, abnormal heart rhythms, heart attack, and heart failure.  Some of the risk factors for heart disease include high blood pressure, high cholesterol, and smoking.  You can take actions to lower your chances of developing heart disease. Work with your health care provider to reduce your risk by following a heart-healthy diet, being physically active, and controlling your weight, blood pressure, and cholesterol level.  This information is not intended to replace advice given to you by your health care provider. Make sure you discuss any questions you have with your health care provider.  Document Revised: 08/17/2022 Document Reviewed: 08/17/2022  [employer] Patient Education © 2022 [employer]  Colorectal Cancer Screening  Colorectal cancer screening is a group of tests that are used to check for colorectal cancer before symptoms develop. Colorectal refers to the colon and rectum. The colon and rectum are located at the end of the digestive tract and carry stool (feces) out of the body.  Who should have screening?  All adults who are 45-75 years old should have screening. Your health care provider may recommend screening before age 45. You will have tests every 1-10 years, depending on your results and the type of screening test. Screening recommendations for adults who are 76-85 years old vary depending on a person's health. People older  than age 85 should no longer get colorectal cancer screening.  You may have screening tests starting before age 45, or more often than other people, if you have any of these risk factors:  A personal or family history of colorectal cancer or abnormal growths known as polyps in your colon.  Inflammatory bowel disease, such as ulcerative colitis or Crohn's disease.  A history of having radiation treatment to the abdomen or the area between the hip bones (pelvic area) for cancer.  A type of genetic syndrome that is passed from parent to child (hereditary), such as:  Jansen syndrome.  Familial adenomatous polyposis.  Turcot syndrome.  Peutz-Jeghers syndrome.  MUTYH-associated polyposis (MAP).  A personal history of diabetes.  Types of tests  There are several types of colorectal screening tests. You may have one or more of the following:  Guaiac-based fecal occult blood testing. For this test, a stool sample is checked for hidden (occult) blood, which could be a sign of colorectal cancer.  Fecal immunochemical test (FIT). For this test, a stool sample is checked for blood, which could be a sign of colorectal cancer.  Stool DNA test. For this test, a stool sample is checked for blood and changes in DNA that could lead to colorectal cancer.  Sigmoidoscopy. During this test, a thin, flexible tube with a camera on the end, called a sigmoidoscope, is used to examine the rectum and the lower colon.  Colonoscopy. During this test, a long, flexible tube with a camera on the end, called a colonoscope, is used to examine the entire colon and rectum. Also, sometimes a tissue sample is taken to be looked at under a microscope (biopsy) or small polyps are removed during this test.  Virtual colonoscopy. Instead of a colonoscope, this type of colonoscopy uses a CT scan to take pictures of the colon and rectum. A CT scan is a type of X-ray that is made using computers.  What are the benefits of screening?  Screening reduces your risk  for colorectal cancer and can help identify cancer at an early stage, when the cancer can be removed or treated more easily. It is common for polyps to form in the lining of the colon, especially as you age. These polyps may be cancerous or become cancerous over time. Screening can identify these polyps.  What are the risks of screening?  Generally, these are safe tests. However, problems may occur, including:  The need for more tests to confirm results from a stool sample test. Stool sample tests have fewer risks than other types of screening tests.  Being exposed to low levels of radiation, if you had a test involving X-rays. This may slightly increase your cancer risk. The benefit of detecting cancer outweighs the slight increase in risk.  Bleeding, damage to the intestine, or infection caused by a sigmoidoscopy or colonoscopy.  A reaction to medicines given during a sigmoidoscopy or colonoscopy.  Talk with your health care provider to understand your risk for colorectal cancer and to make a screening plan that is right for you.  Questions to ask your health care provider  When should I start colorectal cancer screening?  What is my risk for colorectal cancer?  How often do I need screening?  Which screening tests do I need?  How do I get my test results?  What do my results mean?  Where to find more information  Learn more about colorectal cancer screening from:  The American Cancer Society: cancer.org  National Cancer [place]: cancer.gov  Summary  Colorectal cancer screening is a group of tests used to check for colorectal cancer before symptoms develop.  All adults who are 45-75 years old should have screening. Your health care provider may recommend screening before age 45.  You may have screening tests starting before age 45, or more often than other people, if you have certain risk factors.  Screening reduces your risk for colorectal cancer and can help identify cancer at an early stage, when the cancer  can be removed or treated more easily.  Talk with your health care provider to understand your risk for colorectal cancer and to make a screening plan that is right for you.  This information is not intended to replace advice given to you by your health care provider. Make sure you discuss any questions you have with your health care provider.  Document Revised: 04/07/2021 Document Reviewed: 04/07/2021  [employer] Patient Education © 2022 [employer]  Mammogram  A mammogram is an X-ray of the breasts. This procedure can screen for and detect any changes that may indicate breast cancer. Mammograms are regularly done beginning at age 40 for women with average risk. A man may have a mammogram if he has a lump or swelling in his breast tissue.  A mammogram can also identify other changes and variations in the breast, such as:  Inflammation of the breast tissue (mastitis).  An infected area that contains a collection of pus (abscess).  A fluid-filled sac (cyst).  Tumors that are not cancerous (benign).  Fibrocystic changes. This is when breast tissue becomes denser and can make the tissue feel rope-like or uneven under the skin.  Women at higher risk for breast cancer need earlier and more comprehensive screening for abnormal changes. Breast tomosynthesis, or three-dimensional (3D) mammography, and digital breast tomosynthesis are advanced forms of imaging that create 3D pictures of the breasts.  Tell a health care provider:  About any allergies you have.  If you have breast implants.  If you have had previous breast disease, biopsy, or surgery.  If you have a family history of breast cancer.  If you are breastfeeding.  Whether you are pregnant or may be pregnant.  What are the risks?  Generally, this is a safe procedure. However, problems may occur, including:  Exposure to radiation. Radiation levels are very low with this test.  The need for more tests.  The mammogram fails to detect certain cancers or the results are  misinterpreted.  Difficulty with detecting breast cancer in women with dense breasts.  What happens before the procedure?  Schedule your test about 1-2 weeks after your menstrual period if you are menstruating. This is usually when your breasts are the least tender.  If you have had a mammogram done at a different facility in the past, get the mammogram X-rays or have them sent to your current exam facility. The new and old images will be compared.  Wash your breasts and underarms on the day of the test.  Do not wear deodorants, perfumes, lotions, or powders anywhere on your body on the day of the test.  Remove any jewelry from your neck.  Wear clothes that you can change into and out of easily.  What happens during the procedure?    You will undress from the waist up and put on a gown that opens in the front.  You will  front of the X-ray machine.  Each breast will be placed between two plastic or glass plates. The plates will compress your breast for a few seconds. Try to stay as relaxed as possible. This procedure does not cause any harm to your breasts. Any discomfort you feel will be very brief.  X-rays will be taken from different angles of each breast.  The procedure may vary among health care providers and hospitals.  What can I expect after the procedure?  The mammogram will be examined by a specialist (radiologist).  You may need to repeat certain parts of the test, depending on the quality of the images. This is done if the radiologist needs a better view of the breast tissue.  You may resume your normal activities.  It is up to you to get the results of your procedure. Ask your health care provider, or the department that is doing the procedure, when your results will be ready.  Summary  A mammogram is an X-ray of the breasts. This procedure can screen for and detect any changes that may indicate breast cancer.  A man may have a mammogram if he has a lump or swelling in his breast tissue.  If you  have had a mammogram done at a different facility in the past, get the mammogram X-rays or have them sent to your current exam facility in order to compare them.  Schedule your test about 1-2 weeks after your menstrual period if you are menstruating.  Ask when your test results will be ready. Make sure you get your test results.  This information is not intended to replace advice given to you by your health care provider. Make sure you discuss any questions you have with your health care provider.  Document Revised: 08/31/2022 Document Reviewed: 10/18/2021  Elsevier Patient Education © 2022 [employer] Inc.

## 2023-04-04 DIAGNOSIS — I10 ESSENTIAL HYPERTENSION: Primary | ICD-10-CM

## 2023-04-04 DIAGNOSIS — E87.5 HYPERKALEMIA: ICD-10-CM

## 2023-04-04 LAB
25(OH)D3 SERPL-MCNC: 31.6 NG/ML (ref 30–100)
ALBUMIN SERPL-MCNC: 4.3 G/DL (ref 3.5–5.2)
ALBUMIN/GLOB SERPL: 1.6 G/DL
ALP SERPL-CCNC: 101 U/L (ref 39–117)
ALT SERPL W P-5'-P-CCNC: 11 U/L (ref 1–33)
ANION GAP SERPL CALCULATED.3IONS-SCNC: 5 MMOL/L (ref 5–15)
AST SERPL-CCNC: 20 U/L (ref 1–32)
BASOPHILS # BLD AUTO: 0.07 10*3/MM3 (ref 0–0.2)
BASOPHILS NFR BLD AUTO: 1 % (ref 0–1.5)
BILIRUB SERPL-MCNC: 0.2 MG/DL (ref 0–1.2)
BUN SERPL-MCNC: 17 MG/DL (ref 8–23)
BUN/CREAT SERPL: 11.1 (ref 7–25)
CALCIUM SPEC-SCNC: 9.6 MG/DL (ref 8.6–10.5)
CHLORIDE SERPL-SCNC: 104 MMOL/L (ref 98–107)
CHOLEST SERPL-MCNC: 137 MG/DL (ref 0–200)
CO2 SERPL-SCNC: 28 MMOL/L (ref 22–29)
CREAT SERPL-MCNC: 1.53 MG/DL (ref 0.57–1)
DEPRECATED RDW RBC AUTO: 42.9 FL (ref 37–54)
EGFRCR SERPLBLD CKD-EPI 2021: 36.7 ML/MIN/1.73
EOSINOPHIL # BLD AUTO: 0.65 10*3/MM3 (ref 0–0.4)
EOSINOPHIL NFR BLD AUTO: 8.9 % (ref 0.3–6.2)
ERYTHROCYTE [DISTWIDTH] IN BLOOD BY AUTOMATED COUNT: 13.1 % (ref 12.3–15.4)
GLOBULIN UR ELPH-MCNC: 2.7 GM/DL
GLUCOSE SERPL-MCNC: 182 MG/DL (ref 65–99)
HBA1C MFR BLD: 7.4 % (ref 4.8–5.6)
HCT VFR BLD AUTO: 44 % (ref 34–46.6)
HDLC SERPL-MCNC: 46 MG/DL (ref 40–60)
HGB BLD-MCNC: 14 G/DL (ref 12–15.9)
IMM GRANULOCYTES # BLD AUTO: 0.03 10*3/MM3 (ref 0–0.05)
IMM GRANULOCYTES NFR BLD AUTO: 0.4 % (ref 0–0.5)
LDLC SERPL CALC-MCNC: 51 MG/DL (ref 0–100)
LDLC/HDLC SERPL: 0.86 {RATIO}
LYMPHOCYTES # BLD AUTO: 2.07 10*3/MM3 (ref 0.7–3.1)
LYMPHOCYTES NFR BLD AUTO: 28.4 % (ref 19.6–45.3)
MCH RBC QN AUTO: 28.7 PG (ref 26.6–33)
MCHC RBC AUTO-ENTMCNC: 31.8 G/DL (ref 31.5–35.7)
MCV RBC AUTO: 90.2 FL (ref 79–97)
MONOCYTES # BLD AUTO: 0.59 10*3/MM3 (ref 0.1–0.9)
MONOCYTES NFR BLD AUTO: 8.1 % (ref 5–12)
NEUTROPHILS NFR BLD AUTO: 3.87 10*3/MM3 (ref 1.7–7)
NEUTROPHILS NFR BLD AUTO: 53.2 % (ref 42.7–76)
NRBC BLD AUTO-RTO: 0 /100 WBC (ref 0–0.2)
PLATELET # BLD AUTO: 161 10*3/MM3 (ref 140–450)
PMV BLD AUTO: 11.3 FL (ref 6–12)
POTASSIUM SERPL-SCNC: 6 MMOL/L (ref 3.5–5.2)
PROT SERPL-MCNC: 7 G/DL (ref 6–8.5)
RBC # BLD AUTO: 4.88 10*6/MM3 (ref 3.77–5.28)
SODIUM SERPL-SCNC: 137 MMOL/L (ref 136–145)
TRIGL SERPL-MCNC: 257 MG/DL (ref 0–150)
TSH SERPL DL<=0.05 MIU/L-ACNC: 1.07 UIU/ML (ref 0.27–4.2)
VIT B12 BLD-MCNC: 708 PG/ML (ref 211–946)
VLDLC SERPL-MCNC: 40 MG/DL (ref 5–40)
WBC NRBC COR # BLD: 7.28 10*3/MM3 (ref 3.4–10.8)

## 2023-04-05 NOTE — PROGRESS NOTES
Spoke with pt about the following per Dr. Montez. VH      -- Potassium is high Patient needs to stop lisinopril, avoid potassium containing foods (orange juice, bananas, tomatoes) and have another blood test Friday or Monday

## 2023-04-10 ENCOUNTER — LAB (OUTPATIENT)
Dept: FAMILY MEDICINE CLINIC | Facility: CLINIC | Age: 68
End: 2023-04-10
Payer: MEDICARE

## 2023-04-10 ENCOUNTER — SPECIALTY PHARMACY (OUTPATIENT)
Dept: PHARMACY | Facility: HOSPITAL | Age: 68
End: 2023-04-10
Payer: MEDICARE

## 2023-04-10 DIAGNOSIS — I10 ESSENTIAL HYPERTENSION: ICD-10-CM

## 2023-04-10 DIAGNOSIS — E87.5 HYPERKALEMIA: ICD-10-CM

## 2023-04-10 LAB
ALBUMIN UR-MCNC: 19.8 MG/DL
ANION GAP SERPL CALCULATED.3IONS-SCNC: 9 MMOL/L (ref 5–15)
BUN SERPL-MCNC: 15 MG/DL (ref 8–23)
BUN/CREAT SERPL: 10.9 (ref 7–25)
CALCIUM SPEC-SCNC: 9.9 MG/DL (ref 8.6–10.5)
CHLORIDE SERPL-SCNC: 107 MMOL/L (ref 98–107)
CO2 SERPL-SCNC: 27 MMOL/L (ref 22–29)
CREAT SERPL-MCNC: 1.38 MG/DL (ref 0.57–1)
EGFRCR SERPLBLD CKD-EPI 2021: 41.5 ML/MIN/1.73
GLUCOSE SERPL-MCNC: 148 MG/DL (ref 65–99)
POTASSIUM SERPL-SCNC: 5 MMOL/L (ref 3.5–5.2)
SODIUM SERPL-SCNC: 143 MMOL/L (ref 136–145)

## 2023-04-10 PROCEDURE — 80048 BASIC METABOLIC PNL TOTAL CA: CPT | Performed by: GENERAL PRACTICE

## 2023-04-10 PROCEDURE — 82043 UR ALBUMIN QUANTITATIVE: CPT | Performed by: GENERAL PRACTICE

## 2023-04-10 NOTE — PROGRESS NOTES
Specialty Pharmacy Refill Coordination Note      Name:  Ethel Trotter  :  1955  Date:  4/10/2023         Past Medical History:   Diagnosis Date   • Arthritis    • CAD (coronary artery disease)    • COPD (chronic obstructive pulmonary disease)    • Diabetes mellitus    • Elevated cholesterol    • Fracture of wrist    • GERD (gastroesophageal reflux disease)    • Hepatitis C    • History of EKG 2016    ABNORMAL   • History of transfusion    • Hyperlipidemia    • Hypertension    • Myocardial infarction     x2 last one 5 years ago   • Osteopenia    • Pancreatitis    • Stroke        Past Surgical History:   Procedure Laterality Date   • COLONOSCOPY N/A 2019    Procedure: COLONOSCOPY;  Surgeon: Arnav Reyes MD;  Location: [facility];  Service: Gastroenterology   • CORONARY ARTERY BYPASS GRAFT     • HYSTERECTOMY         • TONSILLECTOMY         Social History     Socioeconomic History   • Marital status:    Tobacco Use   • Smoking status: Never     Passive exposure: Never   • Smokeless tobacco: Current     Types: Chew   Vaping Use   • Vaping Use: Never used   Substance and Sexual Activity   • Alcohol use: No   • Drug use: Yes     Types: Marijuana     Comment: OCCASIONAL   • Sexual activity: Defer       Family History   Problem Relation Age of Onset   • No Known Problems Father    • No Known Problems Mother    • Breast cancer Neg Hx        No Known Allergies    Current Outpatient Medications   Medication Sig Dispense Refill   • Acetaminophen Extra Strength 500 MG tablet Take 1 tablet by mouth Every 8 (Eight) Hours As Needed for Mild Pain or Moderate Pain.     • albuterol sulfate  (90 Base) MCG/ACT inhaler Inhale 2 puffs 4 (Four) Times a Day. 18 g 3   • Alirocumab (Praluent) 150 MG/ML injection pen Inject 2 mL under the skin into the appropriate area as directed Every 28 (Twenty-Eight) Days. 2 mL 5   • amLODIPine (NORVASC) 10 MG tablet Take 1 tablet by mouth Every Evening. 30  tablet 5   • aspirin 81 MG EC tablet Take 1 tablet by mouth Daily. 30 tablet 5   • carvedilol (COREG) 12.5 MG tablet Take 1 tablet by mouth 2 (Two) Times a Day. 60 tablet 5   • citalopram (CeleXA) 20 MG tablet Take 1 tablet by mouth Daily. 30 tablet 5   • Dulaglutide (Trulicity) 1.5 MG/0.5ML solution pen-injector Inject 1.5 mg under the skin into the appropriate area as directed 1 (One) Time Per Week. 2 mL 5   • Estrogens Conjugated (Premarin) 0.625 MG/GM vaginal cream Insert  into the vagina 2 (Two) Times a Week. (Patient taking differently: Insert  into the vagina 2 (Two) Times a Week. Pt has not started yet) 30 g 5   • ezetimibe (ZETIA) 10 MG tablet Take 1 tablet by mouth Every Night. 30 tablet 5   • fluticasone (Flonase) 50 MCG/ACT nasal spray Administer 2 sprays both nostrils once daily 16 g 5   • Glucose Blood (Blood Glucose Test) strip USE TO TEST BLOOD GLUCOSE TWO TIMES A DAY 50 each 5   • isosorbide mononitrate (IMDUR) 60 MG 24 hr tablet Take 1.5 tablets by mouth Every Morning. 45 tablet 5   • Jardiance 10 MG tablet tablet Take 1 tablet by mouth Daily. 30 tablet 5   • Lancets misc 1 twice daily 60 each 5   • linaclotide (Linzess) 72 MCG capsule capsule Take 1 capsule by mouth Every Morning Before Breakfast. 30 capsule 5   • lisinopril (PRINIVIL,ZESTRIL) 20 MG tablet Take 1 tablet by mouth Daily. 30 tablet 5   • nitroglycerin (NITROSTAT) 0.4 MG SL tablet Place 1 tablet under the tongue Every 5 (Five) Minutes As Needed for Chest Pain. Take no more than 3 doses in 15 minutes. 25 tablet 5   • rosuvastatin (CRESTOR) 40 MG tablet Take 1 tablet by mouth Daily. 30 tablet 5   • trimethoprim (TRIMPEX) 100 MG tablet Take one tablet by mouth twice daily for 3 days then once nightly. 30 tablet 5     No current facility-administered medications for this visit.         ASSESSMENT/PLAN:      Ethel is a 67 y.o. female contacted today regarding refills of  Praluent 150mg injection  specialty medication(s).    Reviewed and  verified with patient:       Specialty medication(s) and dose(s) confirmed: yes    Refill Questions    Flowsheet Row Most Recent Value   Changes to allergies? No   Changes to medications? No   New conditions since last clinic visit No   Unplanned office visit, urgent care, ED, or hospital admission in the last 4 weeks  No   How does patient/caregiver feel medication is working? Very good   Financial problems or insurance changes  No   Since the previous refill, were any specialty medication doses or scheduled injections missed or delayed?  No   Does this patient require a clinical escalation to a pharmacist? No                     Follow-up: 28 day(s)     Bridget Balderas, Pharmacy Technician  Specialty Pharmacy Technician

## 2023-04-12 NOTE — PROGRESS NOTES
Spoke with pt about the following per Dr. Montez. VH    -- Please let patient know that her labs are better.  She should continue to avoid potassium containing foods

## 2023-06-05 ENCOUNTER — OFFICE VISIT (OUTPATIENT)
Dept: CARDIOLOGY | Facility: CLINIC | Age: 68
End: 2023-06-05
Payer: MEDICARE

## 2023-06-05 VITALS
OXYGEN SATURATION: 96 % | WEIGHT: 135.8 LBS | BODY MASS INDEX: 24.06 KG/M2 | HEIGHT: 63 IN | HEART RATE: 51 BPM | DIASTOLIC BLOOD PRESSURE: 82 MMHG | SYSTOLIC BLOOD PRESSURE: 181 MMHG

## 2023-06-05 DIAGNOSIS — I25.10 ASCVD (ARTERIOSCLEROTIC CARDIOVASCULAR DISEASE): ICD-10-CM

## 2023-06-05 DIAGNOSIS — I10 ESSENTIAL HYPERTENSION: ICD-10-CM

## 2023-06-05 DIAGNOSIS — E78.2 MIXED HYPERLIPIDEMIA: Primary | ICD-10-CM

## 2023-06-05 PROCEDURE — 3079F DIAST BP 80-89 MM HG: CPT | Performed by: NURSE PRACTITIONER

## 2023-06-05 PROCEDURE — 99214 OFFICE O/P EST MOD 30 MIN: CPT | Performed by: NURSE PRACTITIONER

## 2023-06-05 PROCEDURE — 1159F MED LIST DOCD IN RCRD: CPT | Performed by: NURSE PRACTITIONER

## 2023-06-05 PROCEDURE — 3077F SYST BP >= 140 MM HG: CPT | Performed by: NURSE PRACTITIONER

## 2023-06-05 PROCEDURE — 1160F RVW MEDS BY RX/DR IN RCRD: CPT | Performed by: NURSE PRACTITIONER

## 2023-06-05 RX ORDER — LISINOPRIL 40 MG/1
40 TABLET ORAL DAILY
Qty: 60 TABLET | Refills: 4 | Status: SHIPPED | OUTPATIENT
Start: 2023-06-05

## 2023-06-05 NOTE — PROGRESS NOTES
[facility]             JoRAY Felix Paul Christen, MD  Ethel Trotter  2023    Patient Active Problem List   Diagnosis    Depression with anxiety    COPD mixed type    Essential hypertension    Mixed hyperlipidemia    Type 2 diabetes mellitus without complication, without long-term current use of insulin    ASCVD (arteriosclerotic cardiovascular disease)    Vitamin D deficiency disease    Osteopenia    Hepatitis C antibody test positive    H/O acute pancreatitis    Gastroesophageal reflux disease without esophagitis    Chronic venous insufficiency    Chronic seasonal allergic rhinitis    Dips tobacco    Encounter for immunization    Healthcare maintenance    Mechanical low back pain    Encounter for screening mammogram for breast cancer    Chronic constipation    TIA (transient ischemic attack)    Chronic renal failure, stage 3b    Renal cyst, left    Nephrolithiasis    Menopausal symptom    History of recurrent urinary tract infection       Dear Sacha Montez MD:    Subjective     Chief Complaint   Patient presents with    Follow-up     ROUTINE       HPI:     This is a 67 y.o. female with known past medical history of known coronary artery disease with previous MI status post three-vessel CABG around 9 years ago at [facility] in [facility], diabetes mellitus type 2, essential hypertension, history of TIA, chronic kidney disease and COPD.    Ethel Trotter presents today for routine cardiology follow up.  Patient states she has been doing overall well since her last visit however she did have a traumatic event in her life for which her granddaughter  last week and she has been really depressed.  She states this is some her blood pressure has been very uncontrolled but she does report taking her medications.  Recent lab work showed slight improvement of kidney function.  Cholesterol showed LDL at goal.   TSH was normal.  Denies any chest pain, shortness of breath or palpitations.    Diagnostic Testing  Nuclear stress test 5/2022: Mild mostly fixed basal to mid inferolateral defect with slight reversibility consistent with inferolateral infarction and above areas with mild shelley-infarction ischemia, TID 0.96.  Echocardiogram 8/2022: EF 51 to 55% with mild to moderate mitral valve regurgitation and mild tricuspid valve regurgitation     All other systems were reviewed and were negative.    Patient Active Problem List   Diagnosis    Depression with anxiety    COPD mixed type    Essential hypertension    Mixed hyperlipidemia    Type 2 diabetes mellitus without complication, without long-term current use of insulin    ASCVD (arteriosclerotic cardiovascular disease)    Vitamin D deficiency disease    Osteopenia    Hepatitis C antibody test positive    H/O acute pancreatitis    Gastroesophageal reflux disease without esophagitis    Chronic venous insufficiency    Chronic seasonal allergic rhinitis    Dips tobacco    Encounter for immunization    Healthcare maintenance    Mechanical low back pain    Encounter for screening mammogram for breast cancer    Chronic constipation    TIA (transient ischemic attack)    Chronic renal failure, stage 3b    Renal cyst, left    Nephrolithiasis    Menopausal symptom    History of recurrent urinary tract infection       family history includes No Known Problems in her father and mother.     reports that she has never smoked. She has never been exposed to tobacco smoke. Her smokeless tobacco use includes chew. She reports current drug use. Drug: Marijuana. She reports that she does not drink alcohol.    No Known Allergies      Current Outpatient Medications:     Acetaminophen Extra Strength 500 MG tablet, Take 1 tablet by mouth Every 8 (Eight) Hours As Needed for Mild Pain or Moderate Pain., Disp: , Rfl:     albuterol sulfate  (90 Base) MCG/ACT inhaler, Inhale 2 puffs 4 (Four) Times  a Day., Disp: 18 g, Rfl: 3    Alirocumab (Praluent) 150 MG/ML injection pen, Inject 2 mL under the skin into the appropriate area as directed Every 28 (Twenty-Eight) Days., Disp: 2 mL, Rfl: 5    amLODIPine (NORVASC) 10 MG tablet, Take 1 tablet by mouth Every Evening., Disp: 30 tablet, Rfl: 5    aspirin 81 MG EC tablet, Take 1 tablet by mouth Daily., Disp: 30 tablet, Rfl: 5    carvedilol (COREG) 12.5 MG tablet, Take 1 tablet by mouth 2 (Two) Times a Day., Disp: 60 tablet, Rfl: 5    Dulaglutide (Trulicity) 1.5 MG/0.5ML solution pen-injector, Inject 1.5 mg under the skin into the appropriate area as directed 1 (One) Time Per Week., Disp: 2 mL, Rfl: 5    ezetimibe (ZETIA) 10 MG tablet, Take 1 tablet by mouth Every Night., Disp: 30 tablet, Rfl: 5    fluticasone (Flonase) 50 MCG/ACT nasal spray, Administer 2 sprays both nostrils once daily, Disp: 16 g, Rfl: 5    Glucose Blood (Blood Glucose Test) strip, USE TO TEST BLOOD GLUCOSE TWO TIMES A DAY, Disp: 50 each, Rfl: 5    isosorbide mononitrate (IMDUR) 60 MG 24 hr tablet, Take 1.5 tablets by mouth Every Morning., Disp: 45 tablet, Rfl: 5    Jardiance 10 MG tablet tablet, Take 1 tablet by mouth Daily., Disp: 30 tablet, Rfl: 5    Lancets misc, 1 twice daily, Disp: 60 each, Rfl: 5    linaclotide (Linzess) 72 MCG capsule capsule, Take 1 capsule by mouth Every Morning Before Breakfast., Disp: 30 capsule, Rfl: 5    lisinopril (PRINIVIL,ZESTRIL) 40 MG tablet, Take 1 tablet by mouth Daily., Disp: 60 tablet, Rfl: 4    nitroglycerin (NITROSTAT) 0.4 MG SL tablet, Place 1 tablet under the tongue Every 5 (Five) Minutes As Needed for Chest Pain. Take no more than 3 doses in 15 minutes., Disp: 25 tablet, Rfl: 5    rosuvastatin (CRESTOR) 40 MG tablet, Take 1 tablet by mouth Daily., Disp: 30 tablet, Rfl: 5    trimethoprim (TRIMPEX) 100 MG tablet, Take one tablet by mouth twice daily for 3 days then once nightly., Disp: 30 tablet, Rfl: 5      Physical Exam:  I have reviewed the patient's  current vital signs as documented in the patient's EMR.   Vitals:    06/05/23 0913   BP: (!) 181/82   Pulse: 51   SpO2: 96%     Body mass index is 24.06 kg/m².       06/05/23 0913   Weight: 61.6 kg (135 lb 12.8 oz)      Physical Exam  Constitutional:       General: She is not in acute distress.     Appearance: Normal appearance. She is well-developed and normal weight.   HENT:      Head: Normocephalic and atraumatic.   Eyes:      General: Lids are normal.      Conjunctiva/sclera: Conjunctivae normal.      Pupils: Pupils are equal, round, and reactive to light.   Neck:      Vascular: No carotid bruit or JVD.   Cardiovascular:      Rate and Rhythm: Normal rate and regular rhythm.      Pulses: Normal pulses.      Heart sounds: Normal heart sounds, S1 normal and S2 normal. No murmur heard.  Pulmonary:      Effort: Pulmonary effort is normal. No respiratory distress.      Breath sounds: Normal breath sounds. No wheezing.   Abdominal:      General: Bowel sounds are normal. There is no distension.      Palpations: Abdomen is soft. There is no hepatomegaly or splenomegaly.      Tenderness: There is no abdominal tenderness.   Musculoskeletal:         General: No swelling. Normal range of motion.      Cervical back: Normal range of motion and neck supple.      Right lower leg: No edema.      Left lower leg: No edema.   Skin:     General: Skin is warm and dry.      Coloration: Skin is not jaundiced.      Findings: No rash.   Neurological:      General: No focal deficit present.      Mental Status: She is alert and oriented to person, place, and time. Mental status is at baseline.   Psychiatric:         Mood and Affect: Mood normal.         Speech: Speech normal.         Behavior: Behavior normal.         Thought Content: Thought content normal.         Judgment: Judgment normal.          DATA REVIEWED:     TTE/MIMA:  Results for orders placed during the hospital encounter of 08/03/22    Adult Transthoracic Echo Complete w/  Color, Spectral and Contrast if necessary per protocol    Interpretation Summary  · Normal left ventricular cavity size and wall thickness noted. All left ventricular wall segments contract normally.  · Left ventricular ejection fraction appears to be 51 - 55%.  · The aortic valve is abnormal in structure. There is mild calcification of the aortic valve mainly affecting the right coronary cusp(s). The aortic valve appears trileaflet. Mild aortic valve regurgitation is present. No aortic valve stenosis is present.  · There is posterior mitral leaflet thickening present. Mild to moderate mitral valve regurgitation is present. No significant mitral valve stenosis is present.  · Mild tricuspid valve regurgitation is present. Estimated right ventricular systolic pressure from tricuspid regurgitation is mildly elevated (35-45 mmHg).  · There is no evidence of pericardial effusion.      Laboratory evaluations:    Lab Results   Component Value Date    GLUCOSE 148 (H) 04/10/2023    BUN 15 04/10/2023    CREATININE 1.38 (H) 04/10/2023    EGFRIFNONA 33 (L) 10/26/2021    BCR 10.9 04/10/2023    K 5.0 04/10/2023    CO2 27.0 04/10/2023    CALCIUM 9.9 04/10/2023    ALBUMIN 4.3 04/03/2023    LABIL2 1.4 (L) 03/10/2016    AST 20 04/03/2023    ALT 11 04/03/2023     Lab Results   Component Value Date    WBC 7.28 04/03/2023    HGB 14.0 04/03/2023    HCT 44.0 04/03/2023    MCV 90.2 04/03/2023     04/03/2023     Lab Results   Component Value Date    CHOL 137 04/03/2023    CHLPL 247 (H) 03/10/2016    TRIG 257 (H) 04/03/2023    HDL 46 04/03/2023    LDL 51 04/03/2023     Lab Results   Component Value Date    TSH 1.070 04/03/2023     Lab Results   Component Value Date    HGBA1C 7.40 (H) 04/03/2023     Lab Results   Component Value Date    ALT 11 04/03/2023     Lab Results   Component Value Date    HGBA1C 7.40 (H) 04/03/2023    HGBA1C 7.80 (H) 08/03/2022    HGBA1C 8.90 (H) 04/28/2022     Lab Results   Component Value Date    MICROALBUR  19.8 04/10/2023    CREATININE 1.38 (H) 04/10/2023     Lab Results   Component Value Date    FERRITIN 66.22 06/30/2020     No results found for: INR, PROTIME        --------------------------------------------------------------------------------------------------------------------------    ASSESSMENT/PLAN:      Diagnosis Plan   1. Mixed hyperlipidemia        2. Essential hypertension  lisinopril (PRINIVIL,ZESTRIL) 40 MG tablet      3. ASCVD (arteriosclerotic cardiovascular disease)            ASCVD  Denies any chest pain.  Continue medical management with aspirin, Coreg, Zetia, Imdur, lisinopril and Crestor.    Hyperlipidemia  Recent lipid panel showed LDL at goal.  Continue statin, Zetia and Praluent.    3.  Essential hypertension  Blood pressure elevated in the office today.  Patient states she recently suffered a great loss in her family and blood pressure has been elevated ever since.  Recent kidney function showed mild improvement.  Increase lisinopril to 40 mg.  I have asked her to keep a log of her blood pressure and if it remains elevated to call our office and we will adjust medicines accordingly.      This document has been @Electronically signed by RAY Quintana, 06/05/23, 9:17 AM EDT.       Dictated Utilizing Dragon Dictation: Part of this note may be an electronic transcription/translation of spoken language to printed text using the Dragon Dictation System.    Follow-up appointment and medication changes provided in hand delivered After Visit Summary as well as reviewed in the room.

## 2023-06-14 ENCOUNTER — SPECIALTY PHARMACY (OUTPATIENT)
Dept: PHARMACY | Facility: HOSPITAL | Age: 68
End: 2023-06-14
Payer: MEDICARE

## 2023-06-14 RX ORDER — ALIROCUMAB 150 MG/ML
300 INJECTION, SOLUTION SUBCUTANEOUS
Qty: 2 ML | Refills: 5 | Status: SHIPPED | OUTPATIENT
Start: 2023-06-14

## 2023-06-14 RX ORDER — ERGOCALCIFEROL 1.25 MG/1
1 CAPSULE ORAL WEEKLY
COMMUNITY
Start: 2023-06-01

## 2023-06-14 RX ORDER — EZETIMIBE 10 MG/1
10 TABLET ORAL NIGHTLY
COMMUNITY

## 2023-06-14 NOTE — PROGRESS NOTES
[facility]  Lipid Management Program - PCSK9i       Ethel Trotter is a 67 y.o. female referred to the [facility] by Dr. Rosa for clinical pharmacy and specialty pharmacy management of PCSK9i.  Ethel Trotter is treated for clinical ASCVD and currently takes Crestor, Zetia, and Praluent. In the past, Pt has tried other statins, as well as Repatha but reports that her pharmacy could not get it any longer. The patient denies any allergies to latex.      Patient reports that she is doing well with her Praluent. She denies any issues giving herself the injections. She states that she normally has her  give them to her in the back of the arm. Patient also denies any side effects.    Patient recently had a cardiology appointment on 6/5/23 with Jo Blake. Most recent LDL on 4/3/23 was 51. Cardiology wishes to continue Praluent at this time.    Patient states that she is currently out of Zetia.    Relevant Past Medical History and Comorbidities  Past Medical History:   Diagnosis Date    Arthritis     CAD (coronary artery disease)     COPD (chronic obstructive pulmonary disease)     Diabetes mellitus     Elevated cholesterol     Fracture of wrist     GERD (gastroesophageal reflux disease)     Hepatitis C     History of EKG 03/25/2016    ABNORMAL    History of transfusion     Hyperlipidemia     Hypertension     Myocardial infarction     x2 last one 5 years ago    Osteopenia     Pancreatitis     Stroke      Social History     Socioeconomic History    Marital status:    Tobacco Use    Smoking status: Never     Passive exposure: Never    Smokeless tobacco: Current     Types: Chew   Vaping Use    Vaping Use: Never used   Substance and Sexual Activity    Alcohol use: No    Drug use: Yes     Types: Marijuana     Comment: OCCASIONAL    Sexual activity: Defer       Allergies  Patient has no known allergies.    Current Medication List    Current Outpatient Medications:      Acetaminophen Extra Strength 500 MG tablet, Take 1 tablet by mouth Every 8 (Eight) Hours As Needed for Mild Pain or Moderate Pain., Disp: , Rfl:     albuterol sulfate  (90 Base) MCG/ACT inhaler, Inhale 2 puffs 4 (Four) Times a Day. (Patient taking differently: Inhale 2 puffs Every 4 (Four) Hours As Needed.), Disp: 18 g, Rfl: 3    Alirocumab (Praluent) 150 MG/ML injection pen, Inject 2 mL under the skin into the appropriate area as directed Every 28 (Twenty-Eight) Days., Disp: 2 mL, Rfl: 5    amLODIPine (NORVASC) 10 MG tablet, Take 1 tablet by mouth Every Evening., Disp: 30 tablet, Rfl: 5    aspirin 81 MG EC tablet, Take 1 tablet by mouth Daily., Disp: 30 tablet, Rfl: 5    carvedilol (COREG) 12.5 MG tablet, Take 1 tablet by mouth 2 (Two) Times a Day., Disp: 60 tablet, Rfl: 5    Dulaglutide (Trulicity) 1.5 MG/0.5ML solution pen-injector, Inject 1.5 mg under the skin into the appropriate area as directed 1 (One) Time Per Week., Disp: 2 mL, Rfl: 5    fluticasone (Flonase) 50 MCG/ACT nasal spray, Administer 2 sprays both nostrils once daily, Disp: 16 g, Rfl: 5    Glucose Blood (Blood Glucose Test) strip, USE TO TEST BLOOD GLUCOSE TWO TIMES A DAY, Disp: 50 each, Rfl: 5    isosorbide mononitrate (IMDUR) 60 MG 24 hr tablet, Take 1.5 tablets by mouth Every Morning., Disp: 45 tablet, Rfl: 5    Jardiance 10 MG tablet tablet, Take 1 tablet by mouth Daily., Disp: 30 tablet, Rfl: 5    Lancets misc, 1 twice daily, Disp: 60 each, Rfl: 5    linaclotide (Linzess) 72 MCG capsule capsule, Take 1 capsule by mouth Every Morning Before Breakfast., Disp: 30 capsule, Rfl: 5    lisinopril (PRINIVIL,ZESTRIL) 40 MG tablet, Take 1 tablet by mouth Daily., Disp: 60 tablet, Rfl: 4    nitroglycerin (NITROSTAT) 0.4 MG SL tablet, Place 1 tablet under the tongue Every 5 (Five) Minutes As Needed for Chest Pain. Take no more than 3 doses in 15 minutes., Disp: 25 tablet, Rfl: 5    rosuvastatin (CRESTOR) 40 MG tablet, Take 1 tablet by mouth Daily.,  Disp: 30 tablet, Rfl: 5    trimethoprim (TRIMPEX) 100 MG tablet, Take one tablet by mouth twice daily for 3 days then once nightly., Disp: 30 tablet, Rfl: 5    vitamin D (ERGOCALCIFEROL) 1.25 MG (28145 UT) capsule capsule, Take 1 capsule by mouth 1 (One) Time Per Week., Disp: , Rfl:     Drug Interactions  None With Praluent    Relevant Laboratory Values  Lab Results   Component Value Date    CHOL 137 04/03/2023    CHLPL 247 (H) 03/10/2016    TRIG 257 (H) 04/03/2023    HDL 46 04/03/2023    LDL 51 04/03/2023     Adverse Effects  Reviewed with patient and education on management provided.     Sore Throat  Injection site pain  Itching or irritation   Flu-like symptoms  Signs of an allergic reaction     Immunizations  While there are no immunizations that you need to get specifically because you are on this drug, it is important to keep up with your recommended routine vaccinations.     Adherence and Self-Administration    Barriers to Patient Adherence and/or Self-Administration: None  Methods for Supporting Patient Adherence and/or Self-Administration: None Required     Goals of Therapy  Patient Goals of Therapy: To adhere to medication dosing schedule  Clinical Goals or Therapeutic Targets, If Applicable: LDL Reduction      Attestation  I attest that the initiated specialty medication(s) are appropriate for the patient based on my assessment. If the prescribed therapy is at any point deemed not appropriate based on the current or future assessments, a consultation will be initiated with the patient's specialty care provider to determine the best course of action. The revised plan of therapy will be documented along with any additional patient education provided.       Assessment & Plan    Will re-order Praluent 300 mg every 28 days.     Patient will continue to follow-up with cardiology. Next appointment scheduled for 10/5/23.    Spoke with [facility] and patient does have refills on her Zetia. Notified  patient that it is available to pickup.    The patient would like to continue receiving specialty mail-out services.    Will follow up with patient in 6 months, or sooner if needed.      Thank you,     Reema Vazquez, PharmD  06/14/23  15:00 EDT

## 2023-06-19 ENCOUNTER — OFFICE VISIT (OUTPATIENT)
Dept: FAMILY MEDICINE CLINIC | Facility: CLINIC | Age: 68
End: 2023-06-19
Payer: MEDICARE

## 2023-06-19 VITALS
RESPIRATION RATE: 12 BRPM | DIASTOLIC BLOOD PRESSURE: 68 MMHG | WEIGHT: 134.6 LBS | SYSTOLIC BLOOD PRESSURE: 128 MMHG | BODY MASS INDEX: 23.85 KG/M2 | OXYGEN SATURATION: 95 % | TEMPERATURE: 98.4 F | HEART RATE: 66 BPM | HEIGHT: 63 IN

## 2023-06-19 DIAGNOSIS — I87.2 CHRONIC VENOUS INSUFFICIENCY: ICD-10-CM

## 2023-06-19 DIAGNOSIS — J44.9 COPD MIXED TYPE: ICD-10-CM

## 2023-06-19 DIAGNOSIS — K85.90 RECURRENT ACUTE PANCREATITIS: ICD-10-CM

## 2023-06-19 DIAGNOSIS — F41.8 DEPRESSION WITH ANXIETY: ICD-10-CM

## 2023-06-19 DIAGNOSIS — M85.80 OSTEOPENIA, UNSPECIFIED LOCATION: ICD-10-CM

## 2023-06-19 DIAGNOSIS — G45.9 TIA (TRANSIENT ISCHEMIC ATTACK): ICD-10-CM

## 2023-06-19 DIAGNOSIS — Z00.00 HEALTHCARE MAINTENANCE: ICD-10-CM

## 2023-06-19 DIAGNOSIS — E11.9 TYPE 2 DIABETES MELLITUS WITHOUT COMPLICATION, WITHOUT LONG-TERM CURRENT USE OF INSULIN: ICD-10-CM

## 2023-06-19 DIAGNOSIS — I25.10 ASCVD (ARTERIOSCLEROTIC CARDIOVASCULAR DISEASE): ICD-10-CM

## 2023-06-19 DIAGNOSIS — N28.1 RENAL CYST, LEFT: ICD-10-CM

## 2023-06-19 DIAGNOSIS — N95.1 MENOPAUSAL SYMPTOM: ICD-10-CM

## 2023-06-19 DIAGNOSIS — M54.59 MECHANICAL LOW BACK PAIN: ICD-10-CM

## 2023-06-19 DIAGNOSIS — E78.2 MIXED HYPERLIPIDEMIA: ICD-10-CM

## 2023-06-19 DIAGNOSIS — N18.32 CHRONIC RENAL FAILURE, STAGE 3B: ICD-10-CM

## 2023-06-19 DIAGNOSIS — J30.2 CHRONIC SEASONAL ALLERGIC RHINITIS: Primary | ICD-10-CM

## 2023-06-19 DIAGNOSIS — K21.9 GASTROESOPHAGEAL REFLUX DISEASE WITHOUT ESOPHAGITIS: ICD-10-CM

## 2023-06-19 DIAGNOSIS — R97.8 ELEVATED CA 19-9 LEVEL: ICD-10-CM

## 2023-06-19 DIAGNOSIS — Z72.0 DIPS TOBACCO: ICD-10-CM

## 2023-06-19 DIAGNOSIS — K59.09 CHRONIC CONSTIPATION: ICD-10-CM

## 2023-06-19 DIAGNOSIS — E55.9 VITAMIN D DEFICIENCY DISEASE: ICD-10-CM

## 2023-06-19 DIAGNOSIS — I10 ESSENTIAL HYPERTENSION: ICD-10-CM

## 2023-06-19 DIAGNOSIS — Z87.19 H/O ACUTE PANCREATITIS: ICD-10-CM

## 2023-06-19 RX ORDER — DULAGLUTIDE 1.5 MG/.5ML
INJECTION, SOLUTION SUBCUTANEOUS
Qty: 2 ML | Refills: 5 | OUTPATIENT
Start: 2023-06-19

## 2023-06-19 RX ORDER — GABAPENTIN 100 MG/1
200 CAPSULE ORAL NIGHTLY
Qty: 60 CAPSULE | Refills: 2 | Status: SHIPPED | OUTPATIENT
Start: 2023-06-19

## 2023-06-19 RX ORDER — ERGOCALCIFEROL 1.25 MG/1
CAPSULE ORAL
Qty: 4 CAPSULE | Refills: 5 | OUTPATIENT
Start: 2023-06-19

## 2023-06-19 NOTE — PROGRESS NOTES
Subjective   Ethel Trotter is a 67 y.o. female.     Chief Complaint  Hospital follow-up    History of Present Illness     Hospital Follow-Up  She was hospitalized at [facility] since last here with another episode of pancreatitis.  She denies any alcohol use whatsoever and has had no evidence of gallstones.  While complete records are unavailable, CT in hospital apparently suggested a pancreatic mass and she was found to have an elevated CA 19 9.  MRI of the abdomen with and without contrast performed following her return home revealed no evidence of pancreatic disease.  Bilateral renal cysts were noted with the largest about the left inferior pole of the kidney at 6 and 7.1 cm.  Minimal atherosclerotic changes of the aorta were also seen.  She is currently followed by Dr. Chapa and has been recommended an endoscopic ultrasound of the pancreas at  but is uninterested in pursuing this.  She denies any abdominal pain since her return home and has been eating well with no nausea, vomiting, change in her bowel habits, hematochezia, or melena.  There is no history of any dysuria or hematuria and she denies any fever, chills, or night sweats.    Coronary artery disease  She has a history of previous M.I. and CABG surgery.  She continues to deny any chest pain and there is no history of any palpitations, lightheadedness, shortness of breath, calf pain, or swelling the ankles. She remains on low-dose ASA.  She underwent a nuclear stress test on 5/3/2022 with no evidence of inducible ischemia.  Her left ventricular ejection fraction was estimated at 37%.  She underwent an echocardiogram on 8/3/2022 with mild aortic calcification and regurgitation, mild to moderate MVR, mild TVR, and an estimated EF of 51 to 55%.  She has had difficulty getting some of her medications due to the cost of her co-pays.  She continues to be followed by cardiology and underwent a reassessment with RAY Quintana on 6/5/2023 with  an increase in the dose of her lisinopril to 40 daily  Lab Results   Component Value Date    WBC 7.28 04/03/2023    HGB 14.0 04/03/2023    HCT 44.0 04/03/2023    MCV 90.2 04/03/2023     04/03/2023     Episode of Visual Loss  She experienced a 4 to 5 second episode of complete vision loss in her right eye several years ago. This occurred during a period of anxiety and was associated with a generalized headache.  She continues to deny any further episodes and has had no weakness, numbness, tingling, or difficulty talking or understanding what is said to her.      Type 2 Diabetes Mellitus  She continues to deny paresthesias of the feet, visual disturbances, polydipsia, polyuria, hypoglycemia or foot ulcerations.  Evaluation to date has been a hemoglobin A1c.  Current treatments include empagliflozin, and dulaglutide.    Lab Results   Component Value Date    HGBA1C 7.40 (H) 04/03/2023     Lab Results   Component Value Date    MICROALBUR 19.8 04/10/2023     Lab Results   Component Value Date    [id number] 708 04/03/2023     Hyperlipidemia  Her compliance with treatment has been fair. She is currently taking rosuvastatin, ezetimibe, and praluent.    Lab Results   Component Value Date    CHOL 137 04/03/2023    CHLPL 247 (H) 03/10/2016    TRIG 257 (H) 04/03/2023    HDL 46 04/03/2023    LDL 51 04/03/2023     Essential Hypertension  Home blood pressure readings: not doing. She continues to deny any chest pain, palpitations, dyspnea, orthopnea, paroxysmal nocturnal dyspnea or peripheral edema. Current antihypertensive medications includes lisinopril, carvedilol, amlodipine.  Lab Results   Component Value Date    GLUCOSE 148 (H) 04/10/2023    BUN 15 04/10/2023    CREATININE 1.38 (H) 04/10/2023    EGFR 41.5 (L) 04/10/2023    BCR 10.9 04/10/2023    K 5.0 04/10/2023    CO2 27.0 04/10/2023    CALCIUM 9.9 04/10/2023    ALBUMIN 4.3 04/03/2023    BILITOT 0.2 04/03/2023    AST 20 04/03/2023    ALT 11 04/03/2023     Lab Results    Component Value Date    ALKPHOS 101 04/03/2023     Chronic Renal Failure  This has been contributed to hypertensive and diabetic nephropathy along with possible PCKD.  She is aware to avoid any NSAIDs.      Low Back Pain   She has a long history of low back pain worse over the last few years. The pain is described as an intermittent left lower ache.  This radiates to her left flank, left lateral hip, and left posterior thigh.  The pain in her back is worse than that elsewhere.  She has been unable to identify any precipitating, exacerbating, or relieving factors.  She needs to deny any changes in her strength, sensation, or bowel/bladder control.  There is no history of any change in her bowel habits and she continues to deny any hematochezia or melena.  She admits to urinary frequency, nocturia, and intermittent dysuria but continues to deny any hematuria.  There is no history of any vaginal bleeding, fever, chills, or night sweats. Colonoscopy performed on 5/13/2019 by Dr. Reyes was reported as showing mild diverticulosis and several tubular adenomas were removed.  Ultrasound of the abdomen performed on 2/26/2020 revealed a 7.44 cm left renal cyst.  She underwent a urology assessment with Dr. Givens on 5/29/2020 who felt that it was a Bosniak type I.  CT of the abdomen and pelvis performed on 6/10/2020 was reported as showing bilateral renal cysts the largest of which was in the posterior left kidney and measured 7 cm.  Atherosclerotic vascular disease and osteoarthritic changes of the spine were also noted. X-rays of the left hip performed on 2/26/2020 were unremarkable.  MRI of the lumbar spine performed on 11/18/2020 was reported as showing degenerative disc disease with moderate to severe right neuroforaminal narrowing at L4-5     Depression with Anxiety  She has a long history of intermittent depression, nervousness, and difficulty sleeping.  She continues to deny any change in her concentration or memory  and there is no history of any suicidal ideation.  She lost a granddaughter to cancer since last here.  This has been difficult but she has a supportive family and .  She is prescribed citalopram   Lab Results   Component Value Date    TSH 1.070 04/03/2023     Labs  Most recent vitamin D 31.6    The following portions of the patient's history were reviewed and updated as appropriate: allergies, current medications, past medical history, past social history, and problem list.    Review of Systems   Constitutional:  Positive for fatigue. Negative for appetite change, chills, fever and unexpected weight change.   HENT:  Positive for congestion and rhinorrhea. Negative for ear pain, postnasal drip, sneezing and sore throat.    Eyes:  Negative for visual disturbance.   Respiratory:  Negative for cough, shortness of breath and wheezing.    Cardiovascular:  Negative for chest pain, palpitations and leg swelling.   Gastrointestinal:  Negative for abdominal pain, anal bleeding, blood in stool, constipation, diarrhea, nausea and vomiting.   Endocrine: Negative for polydipsia and polyuria.   Genitourinary:  Positive for frequency. Negative for dysuria, hematuria and urgency.   Musculoskeletal:  Positive for arthralgias and back pain. Negative for myalgias.   Skin:  Negative for rash.   Neurological:  Negative for weakness, numbness and headaches.   Psychiatric/Behavioral:  Positive for decreased concentration, dysphoric mood and sleep disturbance. Negative for suicidal ideas. The patient is nervous/anxious.      Objective   Physical Exam  Constitutional:       General: She is not in acute distress.     Appearance: Normal appearance. She is well-developed. She is not diaphoretic.      Comments: Accompanied by her .  Alert and oriented. No apparent distress. No pallor, jaundice, diaphoresis, or cyanosis.   HENT:      Head: Atraumatic.      Right Ear: Tympanic membrane, ear canal and external ear normal. Tympanic  membrane is scarred.      Left Ear: Tympanic membrane, ear canal and external ear normal. Tympanic membrane is scarred.      Mouth/Throat:      Lips: No lesions.      Mouth: Mucous membranes are moist. No oral lesions.      Pharynx: No oropharyngeal exudate or posterior oropharyngeal erythema.   Eyes:      General: Lids are normal.      Extraocular Movements: Extraocular movements intact.      Conjunctiva/sclera: Conjunctivae normal.      Pupils: Pupils are equal.   Neck:      Thyroid: No thyroid mass or thyromegaly.      Vascular: No carotid bruit or JVD.      Trachea: Trachea normal. No tracheal deviation.   Cardiovascular:      Rate and Rhythm: Normal rate and regular rhythm.      Heart sounds: Normal heart sounds, S1 normal and S2 normal. No murmur heard.    No gallop.   Pulmonary:      Effort: Pulmonary effort is normal.      Breath sounds: Normal breath sounds.   Chest:      Chest wall: No tenderness.   Abdominal:      General: Bowel sounds are normal. There is no distension or abdominal bruit.      Palpations: Abdomen is soft. There is no hepatomegaly, splenomegaly or mass.      Tenderness: There is no abdominal tenderness. There is no right CVA tenderness or left CVA tenderness.      Hernia: No hernia is present.   Musculoskeletal:      Right lower leg: No edema.      Left lower leg: No edema.   Lymphadenopathy:      Head:      Right side of head: No submental, submandibular, tonsillar, preauricular, posterior auricular or occipital adenopathy.      Left side of head: No submental, submandibular, tonsillar, preauricular, posterior auricular or occipital adenopathy.      Cervical: No cervical adenopathy.      Upper Body:      Right upper body: No supraclavicular adenopathy.      Left upper body: No supraclavicular adenopathy.   Skin:     General: Skin is warm.      Coloration: Skin is not cyanotic, jaundiced or pale.      Findings: No rash.      Nails: There is no clubbing.   Neurological:      Mental Status:  She is alert and oriented to person, place, and time.      Cranial Nerves: No cranial nerve deficit, dysarthria or facial asymmetry.      Sensory: No sensory deficit.      Motor: No tremor.      Coordination: Coordination normal.      Gait: Gait normal.   Psychiatric:         Attention and Perception: Attention normal.         Mood and Affect: Mood normal.         Speech: Speech normal.         Behavior: Behavior normal.         Thought Content: Thought content normal. Thought content does not include suicidal ideation.     Assessment & Plan   Problems Addressed this Visit          Allergies and Adverse Reactions    Chronic seasonal allergic rhinitis        Cardiac and Vasculature    ASCVD (arteriosclerotic cardiovascular disease)  Reminded regarding risk factor modification with an emphasis on tobacco cessation.  Continue low-dose ASA  Follow up with cardiology     Chronic venous insufficiency    Essential hypertension   Hypertension: at goal. Evidence of target organ damage: coronary artery disease, atherosclerosis of the aorta on imaging, chronic kidney disease, and transient ischemic attack.  Encouraged to continue to work on diet and exercise plan.   Continue current medication    Mixed hyperlipidemia  As above.   Continue current medication.       Endocrine and Metabolic    Type 2 diabetes mellitus without complication, without long-term current use of insulin  Diabetes mellitus Type II, under fair control.   Encouraged to continue to pursue ADA diet  Encouraged aerobic exercise.  Given history of recurrent pancreatitis will discontinue dulaglutide  Reviewed options going forward.  Patient like to work harder on lifestyle modification prior to the addition of insulin  Updated labs will be drawn at her return    Vitamin D deficiency disease       Gastrointestinal Abdominal     Chronic constipation    Gastroesophageal reflux disease without esophagitis        Recurrent acute pancreatitis  As above  We will try  to obtain further records from [facility]       Genitourinary and Reproductive     Menopausal symptom    Renal cyst, left       Health Encounters    Healthcare maintenance  We will discuss an updated mammogram and DEXA scan again at her return       Hematology and Neoplasia    Elevated CA 19-9 level  Reviewed the potential limitations of this blood test alone  Reviewed options going forward including another GI opinion locally and/or a follow-up CT.  Patient will consider while further records are obtained from [facility]       Mental [facility]    Depression with anxiety  Significant situational component.   Supportive therapy.   Continue current medication.  Encouraged to report if any worse or if any new symptoms or concerns.       Musculoskeletal and Injuries    Mechanical low back pain  Reminded regarding symptomatic treatment.   Reviewed options and agreed on a trial of gabapentin at night    Relevant Medications    gabapentin (NEURONTIN) 100 MG capsule    Osteopenia       Neuro    TIA (transient ischemic attack)  As above.   Continue current medication.       Pulmonary and Pneumonias    COPD mixed type       Tobacco    Dips tobacco       Other    Chronic renal failure, stage 3b  Reminded to avoid any NSAIDs prescription or OTC  Will continue to monitor     Diagnoses         Codes Comments    Chronic seasonal allergic rhinitis    -  Primary ICD-10-CM: J30.2  ICD-9-CM: 477.8     Essential hypertension     ICD-10-CM: I10  ICD-9-CM: 401.9     Mixed hyperlipidemia     ICD-10-CM: E78.2  ICD-9-CM: 272.2     ASCVD (arteriosclerotic cardiovascular disease)     ICD-10-CM: I25.10  ICD-9-CM: 429.2, 440.9     Chronic venous insufficiency     ICD-10-CM: I87.2  ICD-9-CM: 459.81     Type 2 diabetes mellitus without complication, without long-term current use of insulin     ICD-10-CM: E11.9  ICD-9-CM: 250.00     Vitamin D deficiency disease     ICD-10-CM: E55.9  ICD-9-CM: 268.9     H/O acute pancreatitis      ICD-10-CM: Z87.19  ICD-9-CM: V12.79     Gastroesophageal reflux disease without esophagitis     ICD-10-CM: K21.9  ICD-9-CM: 530.81     Chronic constipation     ICD-10-CM: K59.09  ICD-9-CM: 564.00     Renal cyst, left     ICD-10-CM: N28.1  ICD-9-CM: 753.10     Menopausal symptom     ICD-10-CM: N95.1  ICD-9-CM: 627.2     Healthcare maintenance     ICD-10-CM: Z00.00  ICD-9-CM: V70.0     Depression with anxiety     ICD-10-CM: F41.8  ICD-9-CM: 300.4     Osteopenia, unspecified location     ICD-10-CM: M85.80  ICD-9-CM: 733.90     Mechanical low back pain     ICD-10-CM: M54.59  ICD-9-CM: 724.2     TIA (transient ischemic attack)     ICD-10-CM: G45.9  ICD-9-CM: 435.9     COPD mixed type     ICD-10-CM: J44.9  ICD-9-CM: 496     Dips tobacco     ICD-10-CM: Z72.0  ICD-9-CM: 305.1     Chronic renal failure, stage 3b     ICD-10-CM: N18.32  ICD-9-CM: 585.3     Recurrent acute pancreatitis     ICD-10-CM: K85.90  ICD-9-CM: 577.0     Elevated CA 19-9 level     ICD-10-CM: R97.8  ICD-9-CM: 795.89           I spent 42 minutes caring for Ethel Trotter on this date of service. This time includes time spent by me in the following activities:reviewing tests, performing a medically appropriate examination and/or evaluation , counseling and educating the patient/family/caregiver, ordering medications, tests, or procedures and documenting information in the medical record

## 2023-08-03 ENCOUNTER — OFFICE VISIT (OUTPATIENT)
Dept: FAMILY MEDICINE CLINIC | Facility: CLINIC | Age: 68
End: 2023-08-03
Payer: MEDICARE

## 2023-08-03 VITALS
TEMPERATURE: 98.6 F | WEIGHT: 132 LBS | OXYGEN SATURATION: 99 % | HEIGHT: 63 IN | DIASTOLIC BLOOD PRESSURE: 72 MMHG | HEART RATE: 80 BPM | SYSTOLIC BLOOD PRESSURE: 138 MMHG | RESPIRATION RATE: 14 BRPM | BODY MASS INDEX: 23.39 KG/M2

## 2023-08-03 DIAGNOSIS — E11.9 TYPE 2 DIABETES MELLITUS WITHOUT COMPLICATION, WITHOUT LONG-TERM CURRENT USE OF INSULIN: ICD-10-CM

## 2023-08-03 DIAGNOSIS — K59.09 CHRONIC CONSTIPATION: ICD-10-CM

## 2023-08-03 DIAGNOSIS — N18.32 CHRONIC RENAL FAILURE, STAGE 3B: ICD-10-CM

## 2023-08-03 DIAGNOSIS — F41.8 DEPRESSION WITH ANXIETY: ICD-10-CM

## 2023-08-03 DIAGNOSIS — M54.59 MECHANICAL LOW BACK PAIN: ICD-10-CM

## 2023-08-03 DIAGNOSIS — E55.9 VITAMIN D DEFICIENCY DISEASE: ICD-10-CM

## 2023-08-03 DIAGNOSIS — R97.8 ELEVATED CA 19-9 LEVEL: ICD-10-CM

## 2023-08-03 DIAGNOSIS — J44.9 COPD MIXED TYPE: ICD-10-CM

## 2023-08-03 DIAGNOSIS — R21 RASH: ICD-10-CM

## 2023-08-03 DIAGNOSIS — I25.10 ASCVD (ARTERIOSCLEROTIC CARDIOVASCULAR DISEASE): ICD-10-CM

## 2023-08-03 DIAGNOSIS — E78.2 MIXED HYPERLIPIDEMIA: ICD-10-CM

## 2023-08-03 DIAGNOSIS — I10 ESSENTIAL HYPERTENSION: ICD-10-CM

## 2023-08-03 DIAGNOSIS — Z00.00 HEALTHCARE MAINTENANCE: ICD-10-CM

## 2023-08-03 DIAGNOSIS — Z51.81 ENCOUNTER FOR THERAPEUTIC DRUG LEVEL MONITORING: ICD-10-CM

## 2023-08-03 DIAGNOSIS — M85.80 OSTEOPENIA, UNSPECIFIED LOCATION: ICD-10-CM

## 2023-08-03 DIAGNOSIS — G45.9 TIA (TRANSIENT ISCHEMIC ATTACK): ICD-10-CM

## 2023-08-03 DIAGNOSIS — K85.90 RECURRENT ACUTE PANCREATITIS: ICD-10-CM

## 2023-08-03 DIAGNOSIS — K21.9 GASTROESOPHAGEAL REFLUX DISEASE WITHOUT ESOPHAGITIS: ICD-10-CM

## 2023-08-03 DIAGNOSIS — K86.89 PANCREATIC MASS: ICD-10-CM

## 2023-08-03 DIAGNOSIS — J30.2 CHRONIC SEASONAL ALLERGIC RHINITIS: Primary | ICD-10-CM

## 2023-08-03 DIAGNOSIS — Z72.0 DIPS TOBACCO: ICD-10-CM

## 2023-08-03 DIAGNOSIS — R76.8 HEPATITIS C ANTIBODY TEST POSITIVE: ICD-10-CM

## 2023-08-03 PROCEDURE — 85025 COMPLETE CBC W/AUTO DIFF WBC: CPT | Performed by: GENERAL PRACTICE

## 2023-08-03 PROCEDURE — 82043 UR ALBUMIN QUANTITATIVE: CPT | Performed by: GENERAL PRACTICE

## 2023-08-03 PROCEDURE — 80053 COMPREHEN METABOLIC PANEL: CPT | Performed by: GENERAL PRACTICE

## 2023-08-03 PROCEDURE — 83036 HEMOGLOBIN GLYCOSYLATED A1C: CPT | Performed by: GENERAL PRACTICE

## 2023-08-03 PROCEDURE — 80061 LIPID PANEL: CPT | Performed by: GENERAL PRACTICE

## 2023-08-03 RX ORDER — ALBUTEROL SULFATE 90 UG/1
2 AEROSOL, METERED RESPIRATORY (INHALATION)
Qty: 18 G | Refills: 5 | Status: SHIPPED | OUTPATIENT
Start: 2023-08-03

## 2023-08-03 RX ORDER — CLOTRIMAZOLE AND BETAMETHASONE DIPROPIONATE 10; .64 MG/G; MG/G
1 CREAM TOPICAL 3 TIMES DAILY PRN
Qty: 45 G | Refills: 0 | Status: SHIPPED | OUTPATIENT
Start: 2023-08-03

## 2023-08-04 LAB
ALBUMIN SERPL-MCNC: 4.4 G/DL (ref 3.5–5.2)
ALBUMIN UR-MCNC: 10.7 MG/DL
ALBUMIN/GLOB SERPL: 1.6 G/DL
ALP SERPL-CCNC: 107 U/L (ref 39–117)
ALT SERPL W P-5'-P-CCNC: 8 U/L (ref 1–33)
ANION GAP SERPL CALCULATED.3IONS-SCNC: 7.6 MMOL/L (ref 5–15)
AST SERPL-CCNC: 16 U/L (ref 1–32)
BASOPHILS # BLD AUTO: 0.08 10*3/MM3 (ref 0–0.2)
BASOPHILS NFR BLD AUTO: 1 % (ref 0–1.5)
BILIRUB SERPL-MCNC: 0.4 MG/DL (ref 0–1.2)
BUN SERPL-MCNC: 21 MG/DL (ref 8–23)
BUN/CREAT SERPL: 13.3 (ref 7–25)
CALCIUM SPEC-SCNC: 9.8 MG/DL (ref 8.6–10.5)
CHLORIDE SERPL-SCNC: 104 MMOL/L (ref 98–107)
CHOLEST SERPL-MCNC: 151 MG/DL (ref 0–200)
CO2 SERPL-SCNC: 26.4 MMOL/L (ref 22–29)
CREAT SERPL-MCNC: 1.58 MG/DL (ref 0.57–1)
DEPRECATED RDW RBC AUTO: 40.9 FL (ref 37–54)
EGFRCR SERPLBLD CKD-EPI 2021: 35.3 ML/MIN/1.73
EOSINOPHIL # BLD AUTO: 0.79 10*3/MM3 (ref 0–0.4)
EOSINOPHIL NFR BLD AUTO: 9.9 % (ref 0.3–6.2)
ERYTHROCYTE [DISTWIDTH] IN BLOOD BY AUTOMATED COUNT: 12.7 % (ref 12.3–15.4)
GLOBULIN UR ELPH-MCNC: 2.7 GM/DL
GLUCOSE SERPL-MCNC: 282 MG/DL (ref 65–99)
HBA1C MFR BLD: 8.9 % (ref 4.8–5.6)
HCT VFR BLD AUTO: 44.9 % (ref 34–46.6)
HDLC SERPL-MCNC: 41 MG/DL (ref 40–60)
HGB BLD-MCNC: 14.3 G/DL (ref 12–15.9)
IMM GRANULOCYTES # BLD AUTO: 0.01 10*3/MM3 (ref 0–0.05)
IMM GRANULOCYTES NFR BLD AUTO: 0.1 % (ref 0–0.5)
LDLC SERPL CALC-MCNC: 75 MG/DL (ref 0–100)
LDLC/HDLC SERPL: 1.67 {RATIO}
LYMPHOCYTES # BLD AUTO: 1.66 10*3/MM3 (ref 0.7–3.1)
LYMPHOCYTES NFR BLD AUTO: 20.9 % (ref 19.6–45.3)
MCH RBC QN AUTO: 28 PG (ref 26.6–33)
MCHC RBC AUTO-ENTMCNC: 31.8 G/DL (ref 31.5–35.7)
MCV RBC AUTO: 88 FL (ref 79–97)
MONOCYTES # BLD AUTO: 0.75 10*3/MM3 (ref 0.1–0.9)
MONOCYTES NFR BLD AUTO: 9.4 % (ref 5–12)
NEUTROPHILS NFR BLD AUTO: 4.65 10*3/MM3 (ref 1.7–7)
NEUTROPHILS NFR BLD AUTO: 58.7 % (ref 42.7–76)
NRBC BLD AUTO-RTO: 0 /100 WBC (ref 0–0.2)
PLATELET # BLD AUTO: 158 10*3/MM3 (ref 140–450)
PMV BLD AUTO: 11.2 FL (ref 6–12)
POTASSIUM SERPL-SCNC: 5 MMOL/L (ref 3.5–5.2)
PROT SERPL-MCNC: 7.1 G/DL (ref 6–8.5)
RBC # BLD AUTO: 5.1 10*6/MM3 (ref 3.77–5.28)
SODIUM SERPL-SCNC: 138 MMOL/L (ref 136–145)
TRIGL SERPL-MCNC: 207 MG/DL (ref 0–150)
VLDLC SERPL-MCNC: 35 MG/DL (ref 5–40)
WBC NRBC COR # BLD: 7.94 10*3/MM3 (ref 3.4–10.8)

## 2023-08-04 RX ORDER — DULAGLUTIDE 1.5 MG/.5ML
INJECTION, SOLUTION SUBCUTANEOUS
Qty: 2 ML | Refills: 5 | Status: SHIPPED | OUTPATIENT
Start: 2023-08-04

## 2023-08-04 RX ORDER — ERGOCALCIFEROL 1.25 MG/1
CAPSULE ORAL
Qty: 4 CAPSULE | Refills: 5 | Status: SHIPPED | OUTPATIENT
Start: 2023-08-04

## 2023-08-25 ENCOUNTER — HOSPITAL ENCOUNTER (OUTPATIENT)
Dept: MRI IMAGING | Facility: HOSPITAL | Age: 68
Discharge: HOME | End: 2023-08-25
Admitting: GENERAL PRACTICE
Payer: MEDICARE

## 2023-08-25 DIAGNOSIS — K86.89 PANCREATIC MASS: ICD-10-CM

## 2023-08-25 DIAGNOSIS — R97.8 ELEVATED CA 19-9 LEVEL: ICD-10-CM

## 2023-08-25 PROCEDURE — 74181 MRI ABDOMEN W/O CONTRAST: CPT

## 2023-09-01 NOTE — PROGRESS NOTES
Sent letter     -- Please let patient know that MRI pancreas normal - no mass She has large cysts on the left kidney - probably no significant change from previous

## 2023-09-08 ENCOUNTER — SPECIALTY PHARMACY (OUTPATIENT)
Dept: PHARMACY | Facility: HOSPITAL | Age: 68
End: 2023-09-08
Payer: MEDICARE

## 2023-09-08 NOTE — PROGRESS NOTES
Specialty Pharmacy Refill Coordination Note      Name:  Ethel Trotter  :  1955  Date:  2023         Past Medical History:   Diagnosis Date    Arthritis     CAD (coronary artery disease)     COPD (chronic obstructive pulmonary disease)     Diabetes mellitus     Elevated cholesterol     Fracture of wrist     GERD (gastroesophageal reflux disease)     Hepatitis C     History of EKG 2016    ABNORMAL    History of transfusion     Hyperlipidemia     Hypertension     Myocardial infarction     x2 last one 5 years ago    Osteopenia     Pancreatitis     Stroke        Past Surgical History:   Procedure Laterality Date    COLONOSCOPY N/A 2019    Procedure: COLONOSCOPY;  Surgeon: Arnav Reyes MD;  Location: [facility];  Service: Gastroenterology    CORONARY ARTERY BYPASS GRAFT      HYSTERECTOMY          TONSILLECTOMY         Social History     Socioeconomic History    Marital status:    Tobacco Use    Smoking status: Never     Passive exposure: Never    Smokeless tobacco: Current     Types: Chew   Vaping Use    Vaping Use: Never used   Substance and Sexual Activity    Alcohol use: No    Drug use: Yes     Types: Marijuana     Comment: OCCASIONAL    Sexual activity: Defer       Family History   Problem Relation Age of Onset    No Known Problems Father     No Known Problems Mother     Breast cancer Neg Hx        No Known Allergies    Current Outpatient Medications   Medication Sig Dispense Refill    albuterol sulfate  (90 Base) MCG/ACT inhaler Inhale 2 puffs 4 (Four) Times a Day. 18 g 5    Alirocumab (Praluent) 150 MG/ML injection pen Inject 2 mL under the skin into the appropriate area as directed Every 28 (Twenty-Eight) Days. 2 mL 5    amLODIPine (NORVASC) 10 MG tablet Take 1 tablet by mouth Every Evening. 30 tablet 5    aspirin 81 MG EC tablet Take 1 tablet by mouth Daily. 30 tablet 5    carvedilol (COREG) 12.5 MG tablet Take 1 tablet by mouth 2 (Two) Times a Day. 60 tablet  5    clotrimazole-betamethasone (LOTRISONE) 1-0.05 % cream Apply 1 application  topically to the appropriate area as directed 3 (Three) Times a Day As Needed (rash). 45 g 0    ezetimibe (ZETIA) 10 MG tablet Take 1 tablet by mouth Every Night.      fluticasone (FLONASE) 50 MCG/ACT nasal spray USE 2 SPRAYS IN EACH NOSTRIL ONCE DAILY 16 g 5    gabapentin (NEURONTIN) 100 MG capsule Take 2 capsules by mouth Every Night. 1 tablet bid and 3 at bedtime 60 capsule 2    Glucose Blood (Blood Glucose Test) strip USE TO TEST BLOOD GLUCOSE TWO TIMES A DAY 50 each 5    isosorbide mononitrate (IMDUR) 60 MG 24 hr tablet Take 1.5 tablets by mouth Every Morning. 45 tablet 5    Jardiance 10 MG tablet tablet TAKE 1 TABLET BY MOUTH EVERY MORNING. 30 tablet 5    Lancets misc 1 twice daily 60 each 5    linaclotide (Linzess) 72 MCG capsule capsule Take 1 capsule by mouth Every Morning Before Breakfast. 30 capsule 5    lisinopril (PRINIVIL,ZESTRIL) 40 MG tablet Take 1 tablet by mouth Daily. 60 tablet 4    nitroglycerin (NITROSTAT) 0.4 MG SL tablet Place 1 tablet under the tongue Every 5 (Five) Minutes As Needed for Chest Pain. Take no more than 3 doses in 15 minutes. 25 tablet 5    rosuvastatin (CRESTOR) 40 MG tablet Take 1 tablet by mouth Daily. 30 tablet 5    trimethoprim (TRIMPEX) 100 MG tablet Take one tablet by mouth twice daily for 3 days then once nightly. 30 tablet 5    Trulicity 1.5 MG/0.5ML solution pen-injector INJECT 1.5 MG UNDER THE SKIN INTO THE APPROPRIATE AREA ONCE WEEKLY AS DIRECTED 2 mL 5    vitamin D (ERGOCALCIFEROL) 1.25 MG (10265 UT) capsule capsule TAKE 1 CAPSULE BY MOUTH ONCE WEEKLY 4 capsule 5     No current facility-administered medications for this visit.         ASSESSMENT/PLAN:      Ethel is a 67 y.o. female contacted today regarding refills of  Praluent specialty medication(s).    Reviewed and verified with patient:       Specialty medication(s) and dose(s) confirmed: yes    Refill Questions      Flowsheet Row  Most Recent Value   Changes to allergies? No   Changes to medications? No   New conditions since last clinic visit No   Unplanned office visit, urgent care, ED, or hospital admission in the last 4 weeks  No   How does patient/caregiver feel medication is working? Very good   Financial problems or insurance changes  No   Since the previous refill, were any specialty medication doses or scheduled injections missed or delayed?  No   Does this patient require a clinical escalation to a pharmacist? No                       Follow-up: 90 day(s)     Tara Chong Pharmacy Technician  Specialty Pharmacy Technician

## 2023-09-09 DIAGNOSIS — I25.10 CORONARY ARTERY DISEASE INVOLVING NATIVE CORONARY ARTERY OF NATIVE HEART WITHOUT ANGINA PECTORIS: ICD-10-CM

## 2023-09-11 DIAGNOSIS — M54.59 MECHANICAL LOW BACK PAIN: ICD-10-CM

## 2023-09-11 RX ORDER — GABAPENTIN 100 MG/1
CAPSULE ORAL
Qty: 60 CAPSULE | Refills: 2 | Status: SHIPPED | OUTPATIENT
Start: 2023-09-11

## 2023-09-11 RX ORDER — NITROGLYCERIN 0.4 MG/1
TABLET SUBLINGUAL
Qty: 25 TABLET | Refills: 5 | Status: SHIPPED | OUTPATIENT
Start: 2023-09-11

## 2023-09-24 DIAGNOSIS — J44.9 COPD MIXED TYPE: ICD-10-CM

## 2023-09-25 RX ORDER — ALBUTEROL SULFATE 90 UG/1
AEROSOL, METERED RESPIRATORY (INHALATION)
Qty: 18 G | Refills: 5 | Status: SHIPPED | OUTPATIENT
Start: 2023-09-25

## 2023-09-29 DIAGNOSIS — K59.09 CHRONIC CONSTIPATION: ICD-10-CM

## 2023-09-29 DIAGNOSIS — I25.10 ASCVD (ARTERIOSCLEROTIC CARDIOVASCULAR DISEASE): ICD-10-CM

## 2023-09-29 DIAGNOSIS — R94.39 ABNORMAL STRESS TEST: ICD-10-CM

## 2023-09-29 DIAGNOSIS — I25.10 CORONARY ARTERY DISEASE INVOLVING NATIVE CORONARY ARTERY OF NATIVE HEART WITHOUT ANGINA PECTORIS: ICD-10-CM

## 2023-09-29 DIAGNOSIS — E78.2 MIXED HYPERLIPIDEMIA: ICD-10-CM

## 2023-09-29 RX ORDER — ROSUVASTATIN CALCIUM 40 MG/1
TABLET, COATED ORAL
Qty: 30 TABLET | Refills: 5 | Status: SHIPPED | OUTPATIENT
Start: 2023-09-29

## 2023-09-29 RX ORDER — EZETIMIBE 10 MG/1
TABLET ORAL
Qty: 30 TABLET | Refills: 5 | Status: SHIPPED | OUTPATIENT
Start: 2023-09-29

## 2023-09-29 RX ORDER — CARVEDILOL 12.5 MG/1
TABLET ORAL
Qty: 60 TABLET | Refills: 5 | Status: SHIPPED | OUTPATIENT
Start: 2023-09-29

## 2023-09-29 RX ORDER — LISINOPRIL 40 MG/1
40 TABLET ORAL DAILY
Qty: 30 TABLET | Refills: 5 | Status: SHIPPED | OUTPATIENT
Start: 2023-09-29

## 2023-09-29 RX ORDER — CITALOPRAM 20 MG/1
TABLET ORAL
Qty: 30 TABLET | Refills: 5 | Status: SHIPPED | OUTPATIENT
Start: 2023-09-29

## 2023-09-29 RX ORDER — ISOSORBIDE MONONITRATE 60 MG/1
TABLET, EXTENDED RELEASE ORAL
Qty: 45 TABLET | Refills: 5 | Status: SHIPPED | OUTPATIENT
Start: 2023-09-29

## 2023-09-29 RX ORDER — ASPIRIN 81 MG/1
TABLET, COATED ORAL
Qty: 30 TABLET | Refills: 5 | Status: SHIPPED | OUTPATIENT
Start: 2023-09-29

## 2023-09-29 RX ORDER — LINACLOTIDE 72 UG/1
CAPSULE, GELATIN COATED ORAL
Qty: 30 CAPSULE | Refills: 5 | Status: SHIPPED | OUTPATIENT
Start: 2023-09-29

## 2023-11-09 ENCOUNTER — OFFICE VISIT (OUTPATIENT)
Dept: FAMILY MEDICINE CLINIC | Facility: CLINIC | Age: 68
End: 2023-11-09
Payer: MEDICARE

## 2023-11-09 VITALS
RESPIRATION RATE: 14 BRPM | HEIGHT: 63 IN | SYSTOLIC BLOOD PRESSURE: 168 MMHG | TEMPERATURE: 98.6 F | DIASTOLIC BLOOD PRESSURE: 92 MMHG | BODY MASS INDEX: 23.92 KG/M2 | HEART RATE: 64 BPM | OXYGEN SATURATION: 98 % | WEIGHT: 135 LBS

## 2023-11-09 DIAGNOSIS — J30.2 CHRONIC SEASONAL ALLERGIC RHINITIS: Primary | ICD-10-CM

## 2023-11-09 DIAGNOSIS — J44.9 COPD MIXED TYPE: ICD-10-CM

## 2023-11-09 DIAGNOSIS — N20.0 NEPHROLITHIASIS: ICD-10-CM

## 2023-11-09 DIAGNOSIS — K59.09 CHRONIC CONSTIPATION: ICD-10-CM

## 2023-11-09 DIAGNOSIS — Z00.00 HEALTHCARE MAINTENANCE: ICD-10-CM

## 2023-11-09 DIAGNOSIS — M54.59 MECHANICAL LOW BACK PAIN: ICD-10-CM

## 2023-11-09 DIAGNOSIS — R94.39 ABNORMAL STRESS TEST: ICD-10-CM

## 2023-11-09 DIAGNOSIS — K85.90 RECURRENT ACUTE PANCREATITIS: ICD-10-CM

## 2023-11-09 DIAGNOSIS — R97.8 ELEVATED CA 19-9 LEVEL: ICD-10-CM

## 2023-11-09 DIAGNOSIS — K86.89 PANCREATIC MASS: ICD-10-CM

## 2023-11-09 DIAGNOSIS — N18.32 CHRONIC RENAL FAILURE, STAGE 3B: ICD-10-CM

## 2023-11-09 DIAGNOSIS — I10 ESSENTIAL HYPERTENSION: ICD-10-CM

## 2023-11-09 DIAGNOSIS — I25.10 ASCVD (ARTERIOSCLEROTIC CARDIOVASCULAR DISEASE): ICD-10-CM

## 2023-11-09 DIAGNOSIS — K21.9 GASTROESOPHAGEAL REFLUX DISEASE WITHOUT ESOPHAGITIS: ICD-10-CM

## 2023-11-09 DIAGNOSIS — I25.10 CORONARY ARTERY DISEASE INVOLVING NATIVE CORONARY ARTERY OF NATIVE HEART WITHOUT ANGINA PECTORIS: ICD-10-CM

## 2023-11-09 DIAGNOSIS — E11.9 TYPE 2 DIABETES MELLITUS WITHOUT COMPLICATION, WITHOUT LONG-TERM CURRENT USE OF INSULIN: ICD-10-CM

## 2023-11-09 DIAGNOSIS — E55.9 VITAMIN D DEFICIENCY DISEASE: ICD-10-CM

## 2023-11-09 DIAGNOSIS — M85.80 OSTEOPENIA, UNSPECIFIED LOCATION: ICD-10-CM

## 2023-11-09 DIAGNOSIS — Z23 ENCOUNTER FOR IMMUNIZATION: ICD-10-CM

## 2023-11-09 DIAGNOSIS — Z72.0 DIPS TOBACCO: ICD-10-CM

## 2023-11-09 DIAGNOSIS — R76.8 HEPATITIS C ANTIBODY TEST POSITIVE: ICD-10-CM

## 2023-11-09 DIAGNOSIS — I87.2 CHRONIC VENOUS INSUFFICIENCY: ICD-10-CM

## 2023-11-09 DIAGNOSIS — F41.8 DEPRESSION WITH ANXIETY: ICD-10-CM

## 2023-11-09 DIAGNOSIS — G45.9 TIA (TRANSIENT ISCHEMIC ATTACK): ICD-10-CM

## 2023-11-09 DIAGNOSIS — E78.2 MIXED HYPERLIPIDEMIA: ICD-10-CM

## 2023-11-09 PROBLEM — R21 RASH: Status: RESOLVED | Noted: 2023-08-03 | Resolved: 2023-11-09

## 2023-11-09 PROCEDURE — 80053 COMPREHEN METABOLIC PANEL: CPT | Performed by: GENERAL PRACTICE

## 2023-11-09 PROCEDURE — 85025 COMPLETE CBC W/AUTO DIFF WBC: CPT | Performed by: GENERAL PRACTICE

## 2023-11-09 PROCEDURE — 83036 HEMOGLOBIN GLYCOSYLATED A1C: CPT | Performed by: GENERAL PRACTICE

## 2023-11-09 PROCEDURE — 80061 LIPID PANEL: CPT | Performed by: GENERAL PRACTICE

## 2023-11-09 RX ORDER — ISOSORBIDE MONONITRATE 60 MG/1
90 TABLET, EXTENDED RELEASE ORAL DAILY
Qty: 45 TABLET | Refills: 5 | Status: SHIPPED | OUTPATIENT
Start: 2023-11-09

## 2023-11-09 RX ORDER — GABAPENTIN 100 MG/1
200 CAPSULE ORAL NIGHTLY
Qty: 60 CAPSULE | Refills: 2 | Status: SHIPPED | OUTPATIENT
Start: 2023-11-09

## 2023-11-09 RX ORDER — LISINOPRIL 40 MG/1
40 TABLET ORAL DAILY
Qty: 30 TABLET | Refills: 5 | Status: SHIPPED | OUTPATIENT
Start: 2023-11-09

## 2023-11-09 RX ORDER — FLUTICASONE PROPIONATE 50 MCG
SPRAY, SUSPENSION (ML) NASAL
Qty: 16 G | Refills: 5 | Status: SHIPPED | OUTPATIENT
Start: 2023-11-09

## 2023-11-09 RX ORDER — ROSUVASTATIN CALCIUM 40 MG/1
40 TABLET, COATED ORAL DAILY
Qty: 30 TABLET | Refills: 5 | Status: SHIPPED | OUTPATIENT
Start: 2023-11-09

## 2023-11-09 RX ORDER — ALBUTEROL SULFATE 90 UG/1
2 AEROSOL, METERED RESPIRATORY (INHALATION) 4 TIMES DAILY
Qty: 18 G | Refills: 5 | Status: SHIPPED | OUTPATIENT
Start: 2023-11-09

## 2023-11-09 RX ORDER — AMLODIPINE BESYLATE 10 MG/1
10 TABLET ORAL EVERY EVENING
Qty: 30 TABLET | Refills: 5 | Status: SHIPPED | OUTPATIENT
Start: 2023-11-09

## 2023-11-09 RX ORDER — CITALOPRAM 20 MG/1
20 TABLET ORAL DAILY
Qty: 30 TABLET | Refills: 5 | Status: SHIPPED | OUTPATIENT
Start: 2023-11-09

## 2023-11-09 RX ORDER — ALIROCUMAB 150 MG/ML
300 INJECTION, SOLUTION SUBCUTANEOUS
Qty: 2 ML | Refills: 5 | Status: SHIPPED | OUTPATIENT
Start: 2023-11-09

## 2023-11-09 RX ORDER — ASPIRIN 81 MG/1
81 TABLET ORAL DAILY
Qty: 30 TABLET | Refills: 5 | Status: SHIPPED | OUTPATIENT
Start: 2023-11-09

## 2023-11-09 RX ORDER — EZETIMIBE 10 MG/1
10 TABLET ORAL
Qty: 30 TABLET | Refills: 5 | Status: SHIPPED | OUTPATIENT
Start: 2023-11-09

## 2023-11-09 RX ORDER — CARVEDILOL 12.5 MG/1
12.5 TABLET ORAL 2 TIMES DAILY
Qty: 60 TABLET | Refills: 5 | Status: SHIPPED | OUTPATIENT
Start: 2023-11-09

## 2023-11-09 NOTE — PROGRESS NOTES
Subjective   Ethel Trotter is a 68 y.o. female.     Chief Complaint  She returns for a scheduled reassessment of multiple medical problems including recurrent pancreatitis, type 2 diabetes mellitus, hyperlipidemia, essential hypertension, coronary artery disease, chronic renal failure, chronic low back pain, and chronic anxiety    History of Present Illness     Recurrent Pancreatitis  She was discharged from [facility] on 4/30/2023 following another episode of pancreatitis. CT performed in hospital was reported as showing pancreatitis with a possible underlying malignancy, and her CA 19-9 returned slightly elevated.  MRI of the abdomen with and without contrast performed on 6/12/2023 revealed no evidence of pancreatic disease.  Bilateral renal cysts were noted with the largest about the left inferior pole of the kidney at 6 and 7.1 cm.  Minimal atherosclerotic changes of the aorta were also seen.  She continues to deny any further abdominal pain, and has been eating well with no nausea, vomiting, change in her bowel habits, hematochezia, or melena.  There is no history of any fever, chills, or night sweats.  MRCP performed on 8/25/2023 was unchanged.    Type 2 Diabetes Mellitus  She continues to deny paresthesias of the feet, visual disturbances, polydipsia, polyuria, hypoglycemia or foot ulcerations.  She remains on empagliflozin alone at present  Lab Results   Component Value Date    HGBA1C 8.90 (H) 08/03/2023     Lab Results   Component Value Date    MICROALBUR 10.7 08/03/2023     Hyperlipidemia  Her compliance with treatment has been fair. She remains on rosuvastatin, ezetimibe, and praluent.    Lab Results   Component Value Date    CHOL 151 08/03/2023    CHLPL 247 (H) 03/10/2016    TRIG 207 (H) 08/03/2023    HDL 41 08/03/2023    LDL 75 08/03/2023     Essential Hypertension  She continues to deny any chest pain, palpitations, dyspnea, orthopnea, paroxysmal nocturnal dyspnea or peripheral edema.  She  remains on lisinopril, carvedilol, amlodipine.  Lab Results   Component Value Date    GLUCOSE 282 (H) 08/03/2023    BUN 21 08/03/2023    CREATININE 1.58 (H) 08/03/2023    EGFR 35.3 (L) 08/03/2023    BCR 13.3 08/03/2023    K 5.0 08/03/2023    CO2 26.4 08/03/2023    CALCIUM 9.8 08/03/2023    ALBUMIN 4.4 08/03/2023    BILITOT 0.4 08/03/2023    AST 16 08/03/2023    ALT 8 08/03/2023     Lab Results   Component Value Date    ALKPHOS 107 08/03/2023     Coronary artery disease  She has a history of previous M.I. and CABG surgery.  She continues to deny any chest pain and there is no history of any palpitations, lightheadedness, shortness of breath, calf pain, or swelling of the ankles. She remains on low-dose ASA.  She underwent a nuclear stress test on 5/3/2022 with no evidence of inducible ischemia.  Her left ventricular ejection fraction was estimated at 37%.  She underwent an echocardiogram on 8/3/2022 with mild aortic calcification and regurgitation, mild to moderate MVR, mild TVR, and an estimated EF of 51 to 55%.  She missed her reassessment with RAY Quintana and has yet to reschedule  Lab Results   Component Value Date    WBC 7.94 08/03/2023    HGB 14.3 08/03/2023    HCT 44.9 08/03/2023    MCV 88.0 08/03/2023     08/03/2023     Episode of Visual Loss  She experienced a 4 to 5 second episode of complete vision loss in her right eye several years ago. This occurred during a period of anxiety and was associated with a generalized headache.  She continues to deny any further episodes and has had no weakness, numbness, tingling, or difficulty talking or understanding what is said to her.      Chronic Renal Failure  This has been contributed to hypertensive and diabetic nephropathy along with possible PCKD.  She is aware to avoid any NSAIDs.      Low Back Pain   She has a long history of low back pain worse over the last few years. The pain is described as an intermittent left lower ache.  This  occasionally radiates to her left flank, left lateral hip, and left posterior thigh.  The pain in her back is worse than that elsewhere.  She has been unable to identify any precipitating, exacerbating, or relieving factors.  She continues to deny any changes in her strength, sensation, or bowel/bladder control. X-rays of the left hip performed on 2/26/2020 were unremarkable.  MRI of the lumbar spine performed on 11/18/2020 was reported as showing degenerative disc disease with moderate to severe right neuroforaminal narrowing at L4-5     Depression with Anxiety  She has a long history of intermittent depression, nervousness, and difficulty sleeping.  She continues to deny any change in her concentration or memory and there is no history of any suicidal ideation.  She lost a granddaughter to cancer this year.  This has been difficult but she has a supportive family and .  She is prescribed citalopram     The following portions of the patient's history were reviewed and updated as appropriate: allergies, current medications, past medical history, past social history, and problem list.    Review of Systems   Constitutional:  Positive for fatigue. Negative for appetite change, chills, fever and unexpected weight change.   HENT:  Positive for congestion and rhinorrhea. Negative for ear pain, postnasal drip, sneezing and sore throat.    Eyes:  Negative for visual disturbance.   Respiratory:  Negative for cough, shortness of breath and wheezing.    Cardiovascular:  Negative for chest pain, palpitations and leg swelling.   Gastrointestinal:  Negative for abdominal pain, anal bleeding, blood in stool, constipation, diarrhea, nausea and vomiting.   Endocrine: Negative for polydipsia and polyuria.   Genitourinary:  Positive for frequency. Negative for dysuria, hematuria and urgency.   Musculoskeletal:  Positive for arthralgias and back pain. Negative for myalgias.   Skin:  Negative for rash.   Neurological:  Negative  for weakness, numbness and headaches.   Psychiatric/Behavioral:  Positive for decreased concentration, dysphoric mood and sleep disturbance. Negative for suicidal ideas. The patient is nervous/anxious.      Objective   Physical Exam  Constitutional:       General: She is not in acute distress.     Appearance: Normal appearance. She is well-developed. She is not diaphoretic.      Comments: Bright and in fair spirits. No apparent distress. No pallor, jaundice, diaphoresis, or cyanosis.   HENT:      Head: Atraumatic.      Right Ear: Tympanic membrane, ear canal and external ear normal. Tympanic membrane is scarred.      Left Ear: Tympanic membrane, ear canal and external ear normal. Tympanic membrane is scarred.      Mouth/Throat:      Lips: No lesions.      Mouth: Mucous membranes are moist. No oral lesions.      Pharynx: No oropharyngeal exudate or posterior oropharyngeal erythema.   Eyes:      General: Lids are normal.      Extraocular Movements: Extraocular movements intact.      Conjunctiva/sclera: Conjunctivae normal.      Pupils: Pupils are equal.   Neck:      Thyroid: No thyroid mass or thyromegaly.      Vascular: No carotid bruit or JVD.      Trachea: Trachea normal. No tracheal deviation.   Cardiovascular:      Rate and Rhythm: Normal rate and regular rhythm.      Heart sounds: Normal heart sounds, S1 normal and S2 normal. No murmur heard.     No gallop.   Pulmonary:      Effort: Pulmonary effort is normal.      Breath sounds: Normal breath sounds.   Chest:      Chest wall: No tenderness.   Abdominal:      General: Bowel sounds are normal. There is no distension or abdominal bruit.      Palpations: Abdomen is soft. There is no hepatomegaly, splenomegaly or mass.      Tenderness: There is no abdominal tenderness. There is no right CVA tenderness or left CVA tenderness.      Hernia: No hernia is present.   Musculoskeletal:      Right lower leg: No edema.      Left lower leg: No edema.   Lymphadenopathy:       Head:      Right side of head: No submental, submandibular, tonsillar, preauricular, posterior auricular or occipital adenopathy.      Left side of head: No submental, submandibular, tonsillar, preauricular, posterior auricular or occipital adenopathy.      Cervical: No cervical adenopathy.      Upper Body:      Right upper body: No supraclavicular adenopathy.      Left upper body: No supraclavicular adenopathy.   Skin:     General: Skin is warm.      Coloration: Skin is not cyanotic, jaundiced or pale.      Findings: No rash.      Nails: There is no clubbing.   Neurological:      Mental Status: She is alert and oriented to person, place, and time.      Cranial Nerves: No cranial nerve deficit, dysarthria or facial asymmetry.      Sensory: No sensory deficit.      Motor: No tremor.      Coordination: Coordination normal.      Gait: Gait normal.   Psychiatric:         Attention and Perception: Attention normal.         Mood and Affect: Mood normal.         Speech: Speech normal.         Behavior: Behavior normal.         Thought Content: Thought content normal. Thought content does not include suicidal ideation.       Assessment & Plan   Problems Addressed this Visit          Allergies and Adverse Reactions    Chronic seasonal allergic rhinitis     Relevant Medications    fluticasone (FLONASE) 50 MCG/ACT nasal spray       Cardiac and Vasculature    ASCVD (arteriosclerotic cardiovascular disease)  Reminded regarding risk factor modification with an emphasis on tobacco cessation.  Continue low-dose ASA.  Follow up with cardiology     Relevant Medications    aspirin (Aspirin Low Dose) 81 MG EC tablet    carvedilol (COREG) 12.5 MG tablet    isosorbide mononitrate (IMDUR) 60 MG 24 hr tablet    Other Relevant Orders    CBC & Differential    Chronic venous insufficiency    Essential hypertension   Hypertension: elevated today. Evidence of target organ damage: coronary artery disease and transient ischemic  attack.  Encouraged to continue to work on diet and exercise plan.   Continue current medication    Relevant Medications    amLODIPine (NORVASC) 10 MG tablet    carvedilol (COREG) 12.5 MG tablet    lisinopril (PRINIVIL,ZESTRIL) 40 MG tablet    Other Relevant Orders    Comprehensive Metabolic Panel    Mixed hyperlipidemia  As above.   Continue current medication.    Relevant Medications    Alirocumab (Praluent) 150 MG/ML injection pen    ezetimibe (ZETIA) 10 MG tablet    rosuvastatin (CRESTOR) 40 MG tablet    Other Relevant Orders    Comprehensive Metabolic Panel    Lipid Panel       Endocrine and Metabolic    Type 2 diabetes mellitus without complication, without long-term current use of insulin  Diabetes mellitus Type II, under inadequate control.   Encouraged to continue to pursue ADA diet  Encouraged aerobic exercise.  Continue current medication  Updated labs drawn.    Relevant Medications    empagliflozin (Jardiance) 10 MG tablet tablet    Other Relevant Orders    Comprehensive Metabolic Panel    Hemoglobin A1c    Vitamin D deficiency disease       Gastrointestinal Abdominal     Chronic constipation    Relevant Medications    linaclotide (Linzess) 72 MCG capsule capsule    Other Relevant Orders    CBC & Differential    Gastroesophageal reflux disease without esophagitis    Relevant Orders    CBC & Differential    Hepatitis C antibody test positive        Recurrent acute pancreatitis       Genitourinary and Reproductive     Nephrolithiasis       Health Encounters    Healthcare maintenance  Flu shot administered.  Reviewed the potential benefits of RSV vaccination.  We will discuss Shingrix and an updated Tdap again at her return  She remains uninterested in a mammogram    Relevant Orders    Fluzone High-Dose 65+yrs (Completed)       Hematology and Neoplasia    Elevated CA 19-9 level       Mental Health    Depression with anxiety  Stable.  Supportive therapy.   Continue current medication.  Encouraged to report  if any worse or if any new symptoms or concerns    Relevant Medications    citalopram (CeleXA) 20 MG tablet       Musculoskeletal and Injuries    Mechanical low back pain  Reminded regarding symptomatic treatment.   Continue current medication  Encouraged to report if any worse or if any new symptoms or concerns    Relevant Medications    gabapentin (NEURONTIN) 100 MG capsule    Osteopenia  We will discuss an updated DEXA scan at her return       Neuro    TIA (transient ischemic attack)  As above.   Continue current medication.       Pulmonary and Pneumonias    COPD mixed type   COPD is stable.  Encouraged to remain as active as symptoms allow for    Relevant Medications    fluticasone (FLONASE) 50 MCG/ACT nasal spray    albuterol sulfate HFA (Ventolin HFA) 108 (90 Base) MCG/ACT inhaler       Tobacco    Dips tobacco       Other    Chronic renal failure, stage 3b  Reminded to avoid any NSAIDs prescription or OTC  Continue current medication  Will continue to monitor    Relevant Orders    CBC & Differential    Comprehensive Metabolic Panel       Diagnoses         Codes Comments    Chronic seasonal allergic rhinitis    -  Primary ICD-10-CM: J30.2  ICD-9-CM: 477.8     Mixed hyperlipidemia     ICD-10-CM: E78.2  ICD-9-CM: 272.2     Essential hypertension     ICD-10-CM: I10  ICD-9-CM: 401.9     Chronic venous insufficiency     ICD-10-CM: I87.2  ICD-9-CM: 459.81     ASCVD (arteriosclerotic cardiovascular disease)     ICD-10-CM: I25.10  ICD-9-CM: 429.2, 440.9     Vitamin D deficiency disease     ICD-10-CM: E55.9  ICD-9-CM: 268.9     Type 2 diabetes mellitus without complication, without long-term current use of insulin     ICD-10-CM: E11.9  ICD-9-CM: 250.00     Recurrent acute pancreatitis     ICD-10-CM: K85.90  ICD-9-CM: 577.0     Pancreatic mass     ICD-10-CM: K86.89  ICD-9-CM: 577.8     Hepatitis C antibody test positive     ICD-10-CM: R76.8  ICD-9-CM: 795.79     Gastroesophageal reflux disease without esophagitis      ICD-10-CM: K21.9  ICD-9-CM: 530.81     Chronic constipation     ICD-10-CM: K59.09  ICD-9-CM: 564.00     Nephrolithiasis     ICD-10-CM: N20.0  ICD-9-CM: 592.0     Healthcare maintenance     ICD-10-CM: Z00.00  ICD-9-CM: V70.0     Depression with anxiety     ICD-10-CM: F41.8  ICD-9-CM: 300.4     Osteopenia, unspecified location     ICD-10-CM: M85.80  ICD-9-CM: 733.90     Mechanical low back pain     ICD-10-CM: M54.59  ICD-9-CM: 724.2     TIA (transient ischemic attack)     ICD-10-CM: G45.9  ICD-9-CM: 435.9     COPD mixed type     ICD-10-CM: J44.9  ICD-9-CM: 496     Dips tobacco     ICD-10-CM: Z72.0  ICD-9-CM: 305.1     Chronic renal failure, stage 3b     ICD-10-CM: N18.32  ICD-9-CM: 585.3     Elevated CA 19-9 level     ICD-10-CM: R97.8  ICD-9-CM: 795.89     Encounter for immunization     ICD-10-CM: Z23  ICD-9-CM: V03.89     Coronary artery disease involving native coronary artery of native heart without angina pectoris     ICD-10-CM: I25.10  ICD-9-CM: 414.01     ASCVD (arteriosclerotic cardiovascular disease), status post previous MI, status post CABG (three-vessel) about 9 years ago.     ICD-10-CM: I25.10  ICD-9-CM: 429.2, 440.9     Abnormal stress test     ICD-10-CM: R94.39  ICD-9-CM: 794.39

## 2023-11-10 LAB
ALBUMIN SERPL-MCNC: 4 G/DL (ref 3.5–5.2)
ALBUMIN/GLOB SERPL: 1.4 G/DL
ALP SERPL-CCNC: 98 U/L (ref 39–117)
ALT SERPL W P-5'-P-CCNC: 10 U/L (ref 1–33)
ANION GAP SERPL CALCULATED.3IONS-SCNC: 8 MMOL/L (ref 5–15)
AST SERPL-CCNC: 18 U/L (ref 1–32)
BASOPHILS # BLD AUTO: 0.08 10*3/MM3 (ref 0–0.2)
BASOPHILS NFR BLD AUTO: 1.3 % (ref 0–1.5)
BILIRUB SERPL-MCNC: 0.2 MG/DL (ref 0–1.2)
BUN SERPL-MCNC: 23 MG/DL (ref 8–23)
BUN/CREAT SERPL: 13.3 (ref 7–25)
CALCIUM SPEC-SCNC: 9.1 MG/DL (ref 8.6–10.5)
CHLORIDE SERPL-SCNC: 108 MMOL/L (ref 98–107)
CHOLEST SERPL-MCNC: 191 MG/DL (ref 0–200)
CO2 SERPL-SCNC: 26 MMOL/L (ref 22–29)
CREAT SERPL-MCNC: 1.73 MG/DL (ref 0.57–1)
DEPRECATED RDW RBC AUTO: 42.3 FL (ref 37–54)
EGFRCR SERPLBLD CKD-EPI 2021: 31.5 ML/MIN/1.73
EOSINOPHIL # BLD AUTO: 0.88 10*3/MM3 (ref 0–0.4)
EOSINOPHIL NFR BLD AUTO: 14.8 % (ref 0.3–6.2)
ERYTHROCYTE [DISTWIDTH] IN BLOOD BY AUTOMATED COUNT: 13.6 % (ref 12.3–15.4)
GLOBULIN UR ELPH-MCNC: 2.8 GM/DL
GLUCOSE SERPL-MCNC: 257 MG/DL (ref 65–99)
HBA1C MFR BLD: 9.9 % (ref 4.8–5.6)
HCT VFR BLD AUTO: 38.7 % (ref 34–46.6)
HDLC SERPL-MCNC: 37 MG/DL (ref 40–60)
HGB BLD-MCNC: 12.5 G/DL (ref 12–15.9)
IMM GRANULOCYTES # BLD AUTO: 0.02 10*3/MM3 (ref 0–0.05)
IMM GRANULOCYTES NFR BLD AUTO: 0.3 % (ref 0–0.5)
LDLC SERPL CALC-MCNC: 101 MG/DL (ref 0–100)
LDLC/HDLC SERPL: 2.47 {RATIO}
LYMPHOCYTES # BLD AUTO: 1.47 10*3/MM3 (ref 0.7–3.1)
LYMPHOCYTES NFR BLD AUTO: 24.7 % (ref 19.6–45.3)
MCH RBC QN AUTO: 27.8 PG (ref 26.6–33)
MCHC RBC AUTO-ENTMCNC: 32.3 G/DL (ref 31.5–35.7)
MCV RBC AUTO: 86 FL (ref 79–97)
MONOCYTES # BLD AUTO: 0.5 10*3/MM3 (ref 0.1–0.9)
MONOCYTES NFR BLD AUTO: 8.4 % (ref 5–12)
NEUTROPHILS NFR BLD AUTO: 3.01 10*3/MM3 (ref 1.7–7)
NEUTROPHILS NFR BLD AUTO: 50.5 % (ref 42.7–76)
NRBC BLD AUTO-RTO: 0 /100 WBC (ref 0–0.2)
PLATELET # BLD AUTO: 129 10*3/MM3 (ref 140–450)
PMV BLD AUTO: 11.1 FL (ref 6–12)
POTASSIUM SERPL-SCNC: 5.4 MMOL/L (ref 3.5–5.2)
PROT SERPL-MCNC: 6.8 G/DL (ref 6–8.5)
RBC # BLD AUTO: 4.5 10*6/MM3 (ref 3.77–5.28)
SODIUM SERPL-SCNC: 142 MMOL/L (ref 136–145)
TRIGL SERPL-MCNC: 313 MG/DL (ref 0–150)
VLDLC SERPL-MCNC: 53 MG/DL (ref 5–40)
WBC NRBC COR # BLD: 5.96 10*3/MM3 (ref 3.4–10.8)

## 2023-11-16 ENCOUNTER — SPECIALTY PHARMACY (OUTPATIENT)
Dept: PHARMACY | Facility: HOSPITAL | Age: 68
End: 2023-11-16
Payer: MEDICARE

## 2023-11-16 DIAGNOSIS — E78.2 MIXED HYPERLIPIDEMIA: ICD-10-CM

## 2023-11-16 RX ORDER — ALIROCUMAB 150 MG/ML
300 INJECTION, SOLUTION SUBCUTANEOUS
Qty: 2 ML | Refills: 5 | Status: SHIPPED | OUTPATIENT
Start: 2023-11-16

## 2023-11-16 NOTE — PROGRESS NOTES
[facility]  Lipid Management Program - PCSK9i       Ethel Trotter is a 68 y.o. female referred to the [facility] by Dr. Rosa for clinical pharmacy and specialty pharmacy management of PCSK9i.  Ethel Trotter is treated for clinical ASCVD and currently takes Crestor, Zetia, and Praluent. In the past, Pt has tried other statins, as well as Repatha but reports that her pharmacy could not get it any longer. The patient denies any allergies to latex.      Patient reports that she is doing well with her Praluent, giving 2 shots in one day (300mg dose). She denies any issues giving herself the injections. She states that she normally has her  give them to her in the back of the arm. Patient also denies any side effects. She reports that she has forgotten to take some doses here recently and could have missed a whole dose altogether. She reports that she tried to use the calendar to help keep track of everything, but still struggles to take her medicine on time.       Relevant Past Medical History and Comorbidities  Past Medical History:   Diagnosis Date    Arthritis     CAD (coronary artery disease)     COPD (chronic obstructive pulmonary disease)     Diabetes mellitus     Elevated cholesterol     Fracture of wrist     GERD (gastroesophageal reflux disease)     Hepatitis C     History of EKG 03/25/2016    ABNORMAL    History of transfusion     Hyperlipidemia     Hypertension     Myocardial infarction     x2 last one 5 years ago    Osteopenia     Pancreatitis     Stroke      Social History     Socioeconomic History    Marital status:    Tobacco Use    Smoking status: Never     Passive exposure: Never    Smokeless tobacco: Current     Types: Chew   Vaping Use    Vaping Use: Never used   Substance and Sexual Activity    Alcohol use: No    Drug use: Yes     Types: Marijuana     Comment: OCCASIONAL    Sexual activity: Defer       Allergies  Patient has no known  allergies.    Current Medication List    Current Outpatient Medications:     albuterol sulfate HFA (Ventolin HFA) 108 (90 Base) MCG/ACT inhaler, Inhale 2 puffs 4 (Four) Times a Day., Disp: 18 g, Rfl: 5    Alirocumab (Praluent) 150 MG/ML injection pen, Inject 2 mL under the skin into the appropriate area as directed Every 28 (Twenty-Eight) Days., Disp: 2 mL, Rfl: 5    amLODIPine (NORVASC) 10 MG tablet, Take 1 tablet by mouth Every Evening., Disp: 30 tablet, Rfl: 5    aspirin (Aspirin Low Dose) 81 MG EC tablet, Take 1 tablet by mouth Daily., Disp: 30 tablet, Rfl: 5    carvedilol (COREG) 12.5 MG tablet, Take 1 tablet by mouth 2 (Two) Times a Day., Disp: 60 tablet, Rfl: 5    citalopram (CeleXA) 20 MG tablet, Take 1 tablet by mouth Daily., Disp: 30 tablet, Rfl: 5    empagliflozin (Jardiance) 10 MG tablet tablet, Take 1 tablet by mouth Every Morning., Disp: 30 tablet, Rfl: 5    ezetimibe (ZETIA) 10 MG tablet, Take 1 tablet by mouth every night at bedtime., Disp: 30 tablet, Rfl: 5    fluticasone (FLONASE) 50 MCG/ACT nasal spray, USE 2 SPRAYS IN EACH NOSTRIL ONCE DAILY, Disp: 16 g, Rfl: 5    gabapentin (NEURONTIN) 100 MG capsule, Take 2 capsules by mouth Every Night., Disp: 60 capsule, Rfl: 2    Glucose Blood (Blood Glucose Test) strip, USE TO TEST BLOOD GLUCOSE TWO TIMES A DAY, Disp: 50 each, Rfl: 5    isosorbide mononitrate (IMDUR) 60 MG 24 hr tablet, Take 1.5 tablets by mouth Daily., Disp: 45 tablet, Rfl: 5    Lancets misc, 1 twice daily, Disp: 60 each, Rfl: 5    linaclotide (Linzess) 72 MCG capsule capsule, Take 1 capsule by mouth Every Morning Before Breakfast., Disp: 30 capsule, Rfl: 5    lisinopril (PRINIVIL,ZESTRIL) 40 MG tablet, Take 1 tablet by mouth Daily., Disp: 30 tablet, Rfl: 5    nitroglycerin (NITROSTAT) 0.4 MG SL tablet, DISSOLVE 1 TABLET UNDER THE TONGUE EVERY 5 MINUTES AS NEEDED FOR CHEST PAIN. DO NOT EXCEED A TOTAL OF 3 DOSES IN 15 MINUTES., Disp: 25 tablet, Rfl: 5    rosuvastatin (CRESTOR) 40 MG tablet,  Take 1 tablet by mouth Daily., Disp: 30 tablet, Rfl: 5    trimethoprim (TRIMPEX) 100 MG tablet, Take one tablet by mouth twice daily for 3 days then once nightly., Disp: 30 tablet, Rfl: 5    vitamin D (ERGOCALCIFEROL) 1.25 MG (31133 UT) capsule capsule, TAKE 1 CAPSULE BY MOUTH ONCE WEEKLY, Disp: 4 capsule, Rfl: 5    Drug Interactions  None With Praluent    Relevant Laboratory Values  Lab Results   Component Value Date    CHOL 191 11/09/2023    CHLPL 247 (H) 03/10/2016    TRIG 313 (H) 11/09/2023    HDL 37 (L) 11/09/2023     (H) 11/09/2023       Attestation  I attest that the initiated specialty medication(s) are appropriate for the patient based on my assessment. If the prescribed therapy is at any point deemed not appropriate based on the current or future assessments, a consultation will be initiated with the patient's specialty care provider to determine the best course of action. The revised plan of therapy will be documented along with any additional patient education provided.       Assessment & Plan  Patient's most recent LDL was 101mg/dL on 11/9/23 which is not at goal for ASCVD. Her previous LDLs have been at goal or closer to goal on 4/2023 (51) and 8/2023 (75). She reports that she has been taking doses late and may have missed a dose, but could not tell me anything specific on either.     She was last seen by cardiology on 6/5/23 where Praluent, along with Zetia and her statin were continued. Patient does not have a follow-up scheduled and reports that she will not be following up with him any more. Patient was also recently seen by PCP Sacha Montez who prescribed Praluent as well. I have reached out to Dr. Montez for a new referral to be put on file.     Will re-order Praluent 300 mg every 28 days, advising patient to adhere to dosing schedule. Patient would like to continue to receive via specialty mail-out services.     I counseled the patient on the importance of taking this medication on a  routine basis. I encouraged her to be adherent. I went over how to alana the date she gave it and how to count out 4 weeks to the next due date. I recommended that she make it bold on the calendar so that it would be more noticable. I also gave the recommendation that she could give her dose on the 1st of every month, even though it may be 3 days late sometime, this would be easier for her to remember.  Will follow-up in 1 month     Patient will continue to follow-up with Dr. Montez on 2/20/24.     Will follow up with patient in 6 months, or sooner if needed.      Thank you,     Jacqueline Lugo [facility]  11/16/23  11:26 EST

## 2023-11-22 DIAGNOSIS — E11.9 TYPE 2 DIABETES MELLITUS WITHOUT COMPLICATION, WITHOUT LONG-TERM CURRENT USE OF INSULIN: Primary | ICD-10-CM

## 2023-11-22 RX ORDER — INSULIN DEGLUDEC 200 U/ML
10 INJECTION, SOLUTION SUBCUTANEOUS DAILY
Qty: 3 ML | Refills: 0 | Status: SHIPPED | OUTPATIENT
Start: 2023-11-22

## 2023-11-22 RX ORDER — PEN NEEDLE, DIABETIC 32GX 5/32"
1 NEEDLE, DISPOSABLE MISCELLANEOUS DAILY
Qty: 30 EACH | Refills: 5 | Status: SHIPPED | OUTPATIENT
Start: 2023-11-22

## 2023-11-29 ENCOUNTER — SPECIALTY PHARMACY (OUTPATIENT)
Dept: PHARMACY | Facility: HOSPITAL | Age: 68
End: 2023-11-29
Payer: MEDICARE

## 2023-11-29 NOTE — PROGRESS NOTES
Specialty Pharmacy Refill Coordination Note     Ethel is a 68 y.o. female contacted today regarding refills of  Praluent specialty medication(s).    Reviewed and verified with patient:       Specialty medication(s) and dose(s) confirmed: yes    Refill Questions      Flowsheet Row Most Recent Value   Changes to allergies? No   Changes to medications? No   New conditions since last clinic visit No   Unplanned office visit, urgent care, ED, or hospital admission in the last 4 weeks  No   How does patient/caregiver feel medication is working? Very good   Financial problems or insurance changes  No   Since the previous refill, were any specialty medication doses or scheduled injections missed or delayed?  No   Does this patient require a clinical escalation to a pharmacist? No                       Follow-up: 28 day(s)     Tara Chong, Pharmacy Technician  Specialty Pharmacy Technician

## 2023-12-15 ENCOUNTER — TELEPHONE (OUTPATIENT)
Dept: FAMILY MEDICINE CLINIC | Facility: CLINIC | Age: 68
End: 2023-12-15

## 2023-12-15 ENCOUNTER — READMISSION MANAGEMENT (OUTPATIENT)
Dept: CALL CENTER | Facility: HOSPITAL | Age: 68
End: 2023-12-15
Payer: MEDICARE

## 2023-12-15 NOTE — OUTREACH NOTE
Prep Survey      Flowsheet Row Responses   [facility] facility patient discharged from? Non-BH   Is LACE score < 7 ? Non-BH Discharge   Eligibility [facility]   [facility]   Date of Discharge 12/15/23   Discharge Disposition Home or Self Care   Discharge diagnosis Pancreatitis   Does the patient have one of the following disease processes/diagnoses(primary or secondary)? Other   Prep survey completed? Yes            Dorys SANTORO - Registered Nurse

## 2023-12-15 NOTE — TELEPHONE ENCOUNTER
Caller: BRANDON JEONG    Relationship to patient:     Best call back number: [phone]    New or established patient?  [] New  [x] Established    Date of discharge: [id number]    Facility discharged from: [facility]    Diagnosis/Symptoms: PANCREATITIS    Length of stay (If applicable): ??    Specialty Only: Did you see a [facility] health provider?    [] Yes  [x] No        TCM BRANDON SPEARS DC [id number], DX PANCREATITIS

## 2023-12-16 ENCOUNTER — TRANSITIONAL CARE MANAGEMENT TELEPHONE ENCOUNTER (OUTPATIENT)
Dept: CALL CENTER | Facility: HOSPITAL | Age: 68
End: 2023-12-16
Payer: MEDICARE

## 2023-12-16 NOTE — OUTREACH NOTE
Call Center TCM Note      Flowsheet Row Responses   [facility] patient discharged from? Non-BH   Does the patient have one of the following disease processes/diagnoses(primary or secondary)? Other   TCM attempt successful? No   Unsuccessful attempts Attempt 1  [No updated verbal release on file from PCP group]   Call Status --  [Mailbox full and cannot accept messages]            Pao Motta RN    12/16/2023, 10:42 EST

## 2023-12-17 ENCOUNTER — TRANSITIONAL CARE MANAGEMENT TELEPHONE ENCOUNTER (OUTPATIENT)
Dept: CALL CENTER | Facility: HOSPITAL | Age: 68
End: 2023-12-17
Payer: MEDICARE

## 2023-12-17 NOTE — OUTREACH NOTE
Call Center TCM Note      Flowsheet Row Responses   [facility] patient discharged from? Non-   Does the patient have one of the following disease processes/diagnoses(primary or secondary)? Other   TCM attempt successful? Yes  [None]   Call start time 0906   Call end time 0907   Discharge diagnosis Pancreatitis   Meds reviewed with patient/caregiver? Yes   Is the patient having any side effects they believe may be caused by any medication additions or changes? No   Does the patient have all medications ordered at discharge? Yes   Is the patient taking all medications as directed (includes completed medication regime)? Yes   Comments 12/21/2023  4:30 PM   Does the patient have an appointment with their PCP within 7-14 days of discharge? Yes   Has home health visited the patient within 72 hours of discharge? N/A   Psychosocial issues? No   Did the patient receive a copy of their discharge instructions? Yes   Nursing interventions Reviewed instructions with patient   What is the patient's perception of their health status since discharge? Improving   TCM call completed? Yes   Call end time 0907            Pooja Julian RN    12/17/2023, 09:08 EST

## 2023-12-21 ENCOUNTER — OFFICE VISIT (OUTPATIENT)
Dept: FAMILY MEDICINE CLINIC | Facility: CLINIC | Age: 68
End: 2023-12-21
Payer: MEDICARE

## 2023-12-21 VITALS
WEIGHT: 131 LBS | TEMPERATURE: 98.6 F | HEIGHT: 63 IN | OXYGEN SATURATION: 96 % | RESPIRATION RATE: 14 BRPM | HEART RATE: 67 BPM | BODY MASS INDEX: 23.21 KG/M2 | DIASTOLIC BLOOD PRESSURE: 92 MMHG | SYSTOLIC BLOOD PRESSURE: 152 MMHG

## 2023-12-21 DIAGNOSIS — I10 ESSENTIAL HYPERTENSION: ICD-10-CM

## 2023-12-21 DIAGNOSIS — N20.0 NEPHROLITHIASIS: ICD-10-CM

## 2023-12-21 DIAGNOSIS — Z00.00 HEALTHCARE MAINTENANCE: ICD-10-CM

## 2023-12-21 DIAGNOSIS — N18.32 CHRONIC RENAL FAILURE, STAGE 3B: ICD-10-CM

## 2023-12-21 DIAGNOSIS — E11.9 TYPE 2 DIABETES MELLITUS WITHOUT COMPLICATION, WITHOUT LONG-TERM CURRENT USE OF INSULIN: ICD-10-CM

## 2023-12-21 DIAGNOSIS — K21.9 GASTROESOPHAGEAL REFLUX DISEASE WITHOUT ESOPHAGITIS: ICD-10-CM

## 2023-12-21 DIAGNOSIS — E78.2 MIXED HYPERLIPIDEMIA: ICD-10-CM

## 2023-12-21 DIAGNOSIS — M54.59 MECHANICAL LOW BACK PAIN: ICD-10-CM

## 2023-12-21 DIAGNOSIS — F41.8 DEPRESSION WITH ANXIETY: ICD-10-CM

## 2023-12-21 DIAGNOSIS — I87.2 CHRONIC VENOUS INSUFFICIENCY: ICD-10-CM

## 2023-12-21 DIAGNOSIS — G45.9 TIA (TRANSIENT ISCHEMIC ATTACK): ICD-10-CM

## 2023-12-21 DIAGNOSIS — R97.8 ELEVATED CA 19-9 LEVEL: ICD-10-CM

## 2023-12-21 DIAGNOSIS — J30.2 CHRONIC SEASONAL ALLERGIC RHINITIS: Primary | ICD-10-CM

## 2023-12-21 DIAGNOSIS — K59.09 CHRONIC CONSTIPATION: ICD-10-CM

## 2023-12-21 DIAGNOSIS — N28.1 RENAL CYST, LEFT: ICD-10-CM

## 2023-12-21 DIAGNOSIS — Z72.0 DIPS TOBACCO: ICD-10-CM

## 2023-12-21 DIAGNOSIS — J44.9 COPD MIXED TYPE: ICD-10-CM

## 2023-12-21 DIAGNOSIS — N95.1 MENOPAUSAL SYMPTOM: ICD-10-CM

## 2023-12-21 DIAGNOSIS — E55.9 VITAMIN D DEFICIENCY DISEASE: ICD-10-CM

## 2023-12-21 DIAGNOSIS — M85.80 OSTEOPENIA, UNSPECIFIED LOCATION: ICD-10-CM

## 2023-12-21 DIAGNOSIS — I25.10 ASCVD (ARTERIOSCLEROTIC CARDIOVASCULAR DISEASE): ICD-10-CM

## 2023-12-21 RX ORDER — PEN NEEDLE, DIABETIC 32GX 5/32"
1 NEEDLE, DISPOSABLE MISCELLANEOUS DAILY
Qty: 30 EACH | Refills: 5 | Status: SHIPPED | OUTPATIENT
Start: 2023-12-21 | End: 2023-12-21 | Stop reason: SDUPTHER

## 2023-12-21 RX ORDER — INSULIN DEGLUDEC 200 U/ML
10 INJECTION, SOLUTION SUBCUTANEOUS DAILY
Qty: 3 ML | Refills: 5 | Status: SHIPPED | OUTPATIENT
Start: 2023-12-21 | End: 2023-12-21 | Stop reason: SDUPTHER

## 2023-12-21 RX ORDER — PEN NEEDLE, DIABETIC 32GX 5/32"
1 NEEDLE, DISPOSABLE MISCELLANEOUS DAILY
Qty: 30 EACH | Refills: 5 | Status: SHIPPED | OUTPATIENT
Start: 2023-12-21

## 2023-12-21 RX ORDER — INSULIN DEGLUDEC 200 U/ML
10 INJECTION, SOLUTION SUBCUTANEOUS DAILY
Qty: 3 ML | Refills: 5 | Status: SHIPPED | OUTPATIENT
Start: 2023-12-21

## 2023-12-21 NOTE — PROGRESS NOTES
Subjective   Ethel Trotter is a 68 y.o. female.     Chief Complaint  Hospital follow-up    History of Present Illness     Hospital Follow-Up  She was discharged from [facility] on 12/15/2023 following a 3-day admission for recurrent abdominal pain.  There were no changes made in her medications on discharge.  While records have yet to be received, she apparently underwent an updated MRI of the pancreas this morning, and will be returning to see Dr. Chapa next month.  She denies any further abdominal pain.  She has made some changes in her diet, but overall is uncertain how she should be eating.  There is no history of any nausea, vomiting, change in her bowel habits, hematochezia, or melena, and she continues to deny any dysuria, hematuria, fever, or chills    Type 2 Diabetes Mellitus  She continues to deny paresthesias of the feet, visual disturbances, polydipsia, polyuria, hypoglycemia or foot ulcerations.  She was to start insulin degludec 10 units daily since last here, but has never received this from her pharmacy.  She remains on empagliflozin   Lab Results   Component Value Date    HGBA1C 9.90 (H) 11/09/2023     Hyperlipidemia  Her compliance with treatment has been fair. She remains on rosuvastatin, ezetimibe, and praluent.    Lab Results   Component Value Date    CHOL 191 11/09/2023    CHLPL 247 (H) 03/10/2016    TRIG 313 (H) 11/09/2023    HDL 37 (L) 11/09/2023     (H) 11/09/2023     Essential Hypertension  She continues to deny any chest pain, palpitations, dyspnea, orthopnea, paroxysmal nocturnal dyspnea or peripheral edema.  She remains on lisinopril, carvedilol, amlodipine.  Lab Results   Component Value Date    GLUCOSE 257 (H) 11/09/2023    BUN 23 11/09/2023    CREATININE 1.73 (H) 11/09/2023    EGFR 31.5 (L) 11/09/2023    BCR 13.3 11/09/2023    K 5.4 (H) 11/09/2023    CO2 26.0 11/09/2023    CALCIUM 9.1 11/09/2023    ALBUMIN 4.0 11/09/2023    BILITOT 0.2 11/09/2023    AST 18 11/09/2023     ALT 10 11/09/2023     Lab Results   Component Value Date    ALKPHOS 98 11/09/2023     Coronary artery disease  She has a history of previous M.I. and CABG surgery.  She continues to deny any chest pain and there is no history of any palpitations, lightheadedness, shortness of breath, calf pain, or swelling of the ankles. She remains on low-dose ASA.  She underwent a nuclear stress test on 5/3/2022 with no evidence of inducible ischemia.  Her left ventricular ejection fraction was estimated at 37%.  She underwent an echocardiogram on 8/3/2022 with mild aortic calcification and regurgitation, mild to moderate MVR, mild TVR, and an estimated EF of 51 to 55%.  She missed her reassessment with RAY Quintana and has yet to reschedule  Lab Results   Component Value Date    WBC 5.96 11/09/2023    HGB 12.5 11/09/2023    HCT 38.7 11/09/2023    MCV 86.0 11/09/2023     (L) 11/09/2023     Episode of Visual Loss  She experienced a 4 to 5 second episode of complete vision loss in her right eye several years ago. This occurred during a period of anxiety and was associated with a generalized headache.  She continues to deny any further episodes and has had no weakness, numbness, tingling, or difficulty talking or understanding what is said to her.      Chronic Renal Failure  This has been contributed to hypertensive and diabetic nephropathy along with possible PCKD.  She is aware to avoid any NSAIDs.      Low Back Pain   She has a long history of low back pain worse over the last few years. The pain is described as an intermittent left lower ache.  This occasionally radiates to her left flank, left lateral hip, and left posterior thigh.  The pain in her back is worse than that elsewhere.  She has been unable to identify any precipitating, exacerbating, or relieving factors.  She continues to deny any changes in her strength, sensation, or bowel/bladder control. X-rays of the left hip performed on 2/26/2020 were  unremarkable.  MRI of the lumbar spine performed on 11/18/2020 was reported as showing degenerative disc disease with moderate to severe right neuroforaminal narrowing at L4-5     Depression with Anxiety  She has a long history of intermittent depression, nervousness, and difficulty sleeping.  She continues to deny any change in her concentration or memory and there is no history of any suicidal ideation.  She lost a granddaughter to cancer this year.  This has been difficult but she has a supportive family and .  She is prescribed citalopram     The following portions of the patient's history were reviewed and updated as appropriate: allergies, current medications, past medical history, past social history, and problem list.    Review of Systems   Constitutional:  Positive for fatigue. Negative for chills and fever.   HENT:  Positive for congestion and rhinorrhea. Negative for ear pain and sore throat.    Eyes:  Negative for visual disturbance.   Respiratory:  Negative for cough, shortness of breath and wheezing.    Cardiovascular:  Negative for chest pain, palpitations and leg swelling.   Gastrointestinal:  Negative for abdominal pain, blood in stool, constipation, diarrhea, nausea and vomiting.   Genitourinary:  Positive for frequency. Negative for dysuria and hematuria.   Musculoskeletal:  Positive for arthralgias and back pain. Negative for joint swelling and myalgias.   Skin:  Negative for rash.   Neurological:  Negative for dizziness, weakness and numbness.   Psychiatric/Behavioral:  Positive for decreased concentration, sleep disturbance and depressed mood. Negative for suicidal ideas. The patient is nervous/anxious.      Objective   Physical Exam  Constitutional:       General: She is not in acute distress.     Appearance: Normal appearance. She is well-developed. She is not diaphoretic.      Comments: Bright and in fair spirits. No apparent distress. No pallor, jaundice, diaphoresis, or cyanosis.    HENT:      Head: Atraumatic.      Right Ear: Tympanic membrane, ear canal and external ear normal. Tympanic membrane is scarred.      Left Ear: Tympanic membrane, ear canal and external ear normal. Tympanic membrane is scarred.      Mouth/Throat:      Lips: No lesions.      Mouth: Mucous membranes are moist. No oral lesions.      Pharynx: No oropharyngeal exudate or posterior oropharyngeal erythema.   Eyes:      General: Lids are normal.      Extraocular Movements: Extraocular movements intact.      Conjunctiva/sclera: Conjunctivae normal.      Pupils: Pupils are equal.   Neck:      Thyroid: No thyroid mass or thyromegaly.      Vascular: No carotid bruit or JVD.      Trachea: Trachea normal. No tracheal deviation.   Cardiovascular:      Rate and Rhythm: Normal rate and regular rhythm.      Heart sounds: Normal heart sounds, S1 normal and S2 normal. No murmur heard.     No gallop.   Pulmonary:      Effort: Pulmonary effort is normal.      Breath sounds: Normal breath sounds.   Chest:      Chest wall: No tenderness.   Abdominal:      General: Bowel sounds are normal. There is no distension or abdominal bruit.      Palpations: Abdomen is soft. There is no hepatomegaly, splenomegaly or mass.      Tenderness: There is no abdominal tenderness. There is no right CVA tenderness or left CVA tenderness.      Hernia: No hernia is present.   Musculoskeletal:      Right lower leg: No edema.      Left lower leg: No edema.   Lymphadenopathy:      Head:      Right side of head: No submental, submandibular, tonsillar, preauricular, posterior auricular or occipital adenopathy.      Left side of head: No submental, submandibular, tonsillar, preauricular, posterior auricular or occipital adenopathy.      Cervical: No cervical adenopathy.      Upper Body:      Right upper body: No supraclavicular adenopathy.      Left upper body: No supraclavicular adenopathy.   Skin:     General: Skin is warm.      Coloration: Skin is not cyanotic,  jaundiced or pale.      Findings: No rash.      Nails: There is no clubbing.   Neurological:      Mental Status: She is alert and oriented to person, place, and time.      Cranial Nerves: No cranial nerve deficit, dysarthria or facial asymmetry.      Sensory: No sensory deficit.      Motor: No tremor.      Coordination: Coordination normal.      Gait: Gait normal.   Psychiatric:         Attention and Perception: Attention normal.         Mood and Affect: Mood normal.         Speech: Speech normal.         Behavior: Behavior normal.         Thought Content: Thought content normal. Thought content does not include suicidal ideation.       Assessment & Plan   Problems Addressed this Visit          Allergies and Adverse Reactions    Chronic seasonal allergic rhinitis        Cardiac and Vasculature    ASCVD (arteriosclerotic cardiovascular disease)  Reminded regarding risk factor modification with an emphasis on tobacco cessation.  Continue low-dose ASA.    Chronic venous insufficiency    Essential hypertension   Hypertension: at goal. Evidence of target organ damage: coronary artery disease and chronic kidney disease.  Encouraged to continue to work on diet and exercise plan.   Continue current medication    Mixed hyperlipidemia  As above.   Continue current medication.       Endocrine and Metabolic    Type 2 diabetes mellitus without complication, without long-term current use of insulin  Diabetes mellitus Type II, under inadequate control.   Encouraged to continue to pursue ADA diet  Encouraged aerobic exercise.  Patient will start on insulin degludec  Remaining medication continued  Will arrange diabetic education with RAY Culver, [facility]    Relevant Medications    Insulin Degludec (Tresiba FlexTouch) 200 UNIT/ML solution pen-injector pen injection    Insulin Pen Needle (Insupen Pen Needles) 32G X 4 MM misc    Vitamin D deficiency disease       Gastrointestinal Abdominal     Chronic constipation     Gastroesophageal reflux disease without esophagitis       Genitourinary and Reproductive     Menopausal symptom    Nephrolithiasis    Renal cyst, left       Health Encounters    Healthcare maintenance  Patient has already received a flu shot fall.  Recommended an RSV along with an updated COVID-19 shot       Hematology and Neoplasia    Elevated CA 19-9 level  Follow up with GI        Mental Health    Depression with anxiety  Significant situational component.   Supportive therapy.   Continue current medication.  Encouraged to report if any worse or if any new symptoms or concerns.       Musculoskeletal and Injuries    Mechanical low back pain    Osteopenia       Neuro    TIA (transient ischemic attack)  As above.   Continue current medication.       Pulmonary and Pneumonias    COPD mixed type   COPD is stable.  Encouraged to remain as active as symptoms allow for  Continue current medication       Tobacco    Dips tobacco       Other    Chronic renal failure, stage 3b  Reminded to avoid any NSAIDs prescription or OTC  Continue current medication  Will continue to monitor       Diagnoses         Codes Comments    Chronic seasonal allergic rhinitis    -  Primary ICD-10-CM: J30.2  ICD-9-CM: 477.8     Mixed hyperlipidemia     ICD-10-CM: E78.2  ICD-9-CM: 272.2     Essential hypertension     ICD-10-CM: I10  ICD-9-CM: 401.9     Chronic venous insufficiency     ICD-10-CM: I87.2  ICD-9-CM: 459.81     ASCVD (arteriosclerotic cardiovascular disease)     ICD-10-CM: I25.10  ICD-9-CM: 429.2, 440.9     Vitamin D deficiency disease     ICD-10-CM: E55.9  ICD-9-CM: 268.9     Type 2 diabetes mellitus without complication, without long-term current use of insulin     ICD-10-CM: E11.9  ICD-9-CM: 250.00     Gastroesophageal reflux disease without esophagitis     ICD-10-CM: K21.9  ICD-9-CM: 530.81     Chronic constipation     ICD-10-CM: K59.09  ICD-9-CM: 564.00     Renal cyst, left     ICD-10-CM: N28.1  ICD-9-CM: 753.10     Nephrolithiasis      ICD-10-CM: N20.0  ICD-9-CM: 592.0     Menopausal symptom     ICD-10-CM: N95.1  ICD-9-CM: 627.2     Healthcare maintenance     ICD-10-CM: Z00.00  ICD-9-CM: V70.0     Elevated CA 19-9 level     ICD-10-CM: R97.8  ICD-9-CM: 795.89     Depression with anxiety     ICD-10-CM: F41.8  ICD-9-CM: 300.4     Osteopenia, unspecified location     ICD-10-CM: M85.80  ICD-9-CM: 733.90     Mechanical low back pain     ICD-10-CM: M54.59  ICD-9-CM: 724.2     TIA (transient ischemic attack)     ICD-10-CM: G45.9  ICD-9-CM: 435.9     COPD mixed type     ICD-10-CM: J44.9  ICD-9-CM: 496     Dips tobacco     ICD-10-CM: Z72.0  ICD-9-CM: 305.1     Chronic renal failure, stage 3b     ICD-10-CM: N18.32  ICD-9-CM: 585.3

## 2024-01-26 DIAGNOSIS — M54.59 MECHANICAL LOW BACK PAIN: ICD-10-CM

## 2024-01-26 RX ORDER — GABAPENTIN 100 MG/1
CAPSULE ORAL
Qty: 60 CAPSULE | Refills: 0 | Status: SHIPPED | OUTPATIENT
Start: 2024-01-26

## 2024-02-05 ENCOUNTER — SPECIALTY PHARMACY (OUTPATIENT)
Dept: PHARMACY | Facility: HOSPITAL | Age: 69
End: 2024-02-05
Payer: MEDICARE

## 2024-02-05 NOTE — PROGRESS NOTES
Specialty Pharmacy Refill Coordination Note     Ethel is a 68 y.o. female contacted today regarding refills of  Praluent 150 MG/ML specialty medication(s).    Reviewed and verified with patient:       Specialty medication(s) and dose(s) confirmed: yes    Refill Questions      Flowsheet Row Most Recent Value   Changes to allergies? No   Changes to medications? No   New conditions or infections since last clinic visit No   Unplanned office visit, urgent care, ED, or hospital admission in the last 4 weeks  No   How does patient/caregiver feel medication is working? Very good   Financial problems or insurance changes  No   Since the previous refill, were any specialty medication doses or scheduled injections missed or delayed?  No   Does this patient require a clinical escalation to a pharmacist? No            Delivery Questions      Flowsheet Row Most Recent Value   Copay verified? Yes   Copay amount 0.00   Copay form of payment No copayment ($0)                   Follow-up: 84 day(s)     Pratibha Latham, Pharmacy Technician  Specialty Pharmacy Technician

## 2024-02-20 ENCOUNTER — OFFICE VISIT (OUTPATIENT)
Dept: FAMILY MEDICINE CLINIC | Facility: CLINIC | Age: 69
End: 2024-02-20
Payer: MEDICARE

## 2024-02-20 DIAGNOSIS — N95.1 MENOPAUSAL SYMPTOM: ICD-10-CM

## 2024-02-20 DIAGNOSIS — J44.9 COPD MIXED TYPE: ICD-10-CM

## 2024-02-20 DIAGNOSIS — Z87.440 HISTORY OF RECURRENT URINARY TRACT INFECTION: ICD-10-CM

## 2024-02-20 DIAGNOSIS — K59.09 CHRONIC CONSTIPATION: ICD-10-CM

## 2024-02-20 DIAGNOSIS — E78.2 MIXED HYPERLIPIDEMIA: ICD-10-CM

## 2024-02-20 DIAGNOSIS — Z00.00 HEALTHCARE MAINTENANCE: ICD-10-CM

## 2024-02-20 DIAGNOSIS — M85.80 OSTEOPENIA, UNSPECIFIED LOCATION: ICD-10-CM

## 2024-02-20 DIAGNOSIS — N18.32 CHRONIC RENAL FAILURE, STAGE 3B: ICD-10-CM

## 2024-02-20 DIAGNOSIS — K21.9 GASTROESOPHAGEAL REFLUX DISEASE WITHOUT ESOPHAGITIS: ICD-10-CM

## 2024-02-20 DIAGNOSIS — I87.2 CHRONIC VENOUS INSUFFICIENCY: ICD-10-CM

## 2024-02-20 DIAGNOSIS — E11.9 TYPE 2 DIABETES MELLITUS WITHOUT COMPLICATION, WITHOUT LONG-TERM CURRENT USE OF INSULIN: ICD-10-CM

## 2024-02-20 DIAGNOSIS — E55.9 VITAMIN D DEFICIENCY DISEASE: ICD-10-CM

## 2024-02-20 DIAGNOSIS — R97.8 ELEVATED CA 19-9 LEVEL: ICD-10-CM

## 2024-02-20 DIAGNOSIS — Z72.0 DIPS TOBACCO: ICD-10-CM

## 2024-02-20 DIAGNOSIS — K85.90 RECURRENT ACUTE PANCREATITIS: ICD-10-CM

## 2024-02-20 DIAGNOSIS — N28.1 RENAL CYST, LEFT: ICD-10-CM

## 2024-02-20 DIAGNOSIS — M54.59 MECHANICAL LOW BACK PAIN: ICD-10-CM

## 2024-02-20 DIAGNOSIS — J30.2 CHRONIC SEASONAL ALLERGIC RHINITIS: Primary | ICD-10-CM

## 2024-02-20 DIAGNOSIS — N20.0 NEPHROLITHIASIS: ICD-10-CM

## 2024-02-20 DIAGNOSIS — I10 ESSENTIAL HYPERTENSION: ICD-10-CM

## 2024-02-20 DIAGNOSIS — Z79.899 HIGH RISK MEDICATION USE: ICD-10-CM

## 2024-02-20 DIAGNOSIS — I25.10 ASCVD (ARTERIOSCLEROTIC CARDIOVASCULAR DISEASE): ICD-10-CM

## 2024-02-20 DIAGNOSIS — F41.8 DEPRESSION WITH ANXIETY: ICD-10-CM

## 2024-02-20 DIAGNOSIS — G45.9 TIA (TRANSIENT ISCHEMIC ATTACK): ICD-10-CM

## 2024-02-20 LAB
EXPIRATION DATE: ABNORMAL
HBA1C MFR BLD: 8.9 % (ref 4.5–5.7)
Lab: ABNORMAL

## 2024-02-20 NOTE — PROGRESS NOTES
Subjective   Ethel Trotter is a 68 y.o. female.     Chief Complaint  She returns for a scheduled reassessment of multiple medical problems including type 2 diabetes mellitus, hyperlipidemia, essential hypertension, coronary artery disease, chronic renal failure, elevated CA 19-9 level, chronic low back pain, and depression with anxiety    History of Present Illness     Type 2 Diabetes Mellitus  She continues to deny paresthesias of the feet, visual disturbances, polydipsia, polyuria, hypoglycemia or foot ulcerations.  She has started on insulin degludec 10 units daily with no apparent side effects.  She remains on empagliflozin   Lab Results   Component Value Date    HGBA1C 8.9 (A) 02/20/2024     Hyperlipidemia  Her compliance with treatment has been fair. She remains on rosuvastatin, ezetimibe, and praluent.      Essential Hypertension  She continues to deny any chest pain, palpitations, dyspnea, orthopnea, paroxysmal nocturnal dyspnea or peripheral edema.  She remains on lisinopril, carvedilol, amlodipine.      Coronary artery disease  She has a history of previous M.I. and CABG surgery.  She continues to deny any chest pain and there is no history of any palpitations, lightheadedness, shortness of breath, calf pain, or swelling of the ankles. She remains on low-dose ASA.  She underwent an echocardiogram on 8/3/2022 with mild aortic calcification and regurgitation, mild to moderate MVR, mild TVR, and an estimated EF of 51 to 55%.  She is not followed by cardiology at present    Episode of Visual Loss  She experienced a 4 to 5 second episode of complete vision loss in her right eye several years ago. This occurred during a period of anxiety and was associated with a generalized headache.  She continues to deny any further episodes and has had no weakness, numbness, tingling, or difficulty talking or understanding what is said to her.      Chronic Renal Failure  This has been contributed to hypertensive and  diabetic nephropathy along with possible PCKD.  She is aware to avoid any NSAIDs.  She is not followed by nephrology at present    Elevated CA 19-9 Level  She denies any further abdominal pain.  While records have yet to be received, she apparently underwent an updated MRI of the pancreas late last year.  She was to undergo a GI reassessment with Dr. Chapa afterward but missed this and has yet to reschedule.  She continues to deny any nausea, vomiting, change in her bowel habits, hematochezia, or melena    Low Back Pain   She has a long history of low back pain worse over the last few years. The pain is described as an intermittent left lower ache.  This occasionally radiates to her left flank, left lateral hip, and left posterior thigh.  The pain in her back is worse than that elsewhere.  She has been unable to identify any precipitating, exacerbating, or relieving factors.  She continues to deny any changes in her strength, sensation, or bowel/bladder control. X-rays of the left hip performed on 2/26/2020 were unremarkable.  MRI of the lumbar spine performed on 11/18/2020 was reported as showing degenerative disc disease with moderate to severe right neuroforaminal narrowing at L4-5     Depression with Anxiety  She has a long history of intermittent depression, nervousness, and difficulty sleeping.  She continues to deny any change in her concentration or memory and there is no history of any suicidal ideation.  She lost a granddaughter to cancer this year.  This has been difficult but she has a supportive family and .  She is prescribed citalopram     The following portions of the patient's history were reviewed and updated as appropriate: allergies, current medications, past medical history, past social history, and problem list.    Review of Systems   Constitutional:  Negative for chills, fatigue and fever.   HENT:  Negative for congestion, ear pain, rhinorrhea and sore throat.    Eyes:  Negative for  visual disturbance.   Respiratory:  Negative for cough, shortness of breath and wheezing.    Cardiovascular:  Negative for chest pain, palpitations and leg swelling.   Gastrointestinal:  Negative for abdominal pain, blood in stool, constipation, diarrhea, nausea and vomiting.   Genitourinary:  Positive for frequency. Negative for dysuria, hematuria, urgency and vaginal bleeding.   Musculoskeletal:  Negative for arthralgias, back pain, joint swelling and myalgias.   Skin:  Negative for rash.   Neurological:  Negative for dizziness, weakness and numbness.   Psychiatric/Behavioral:  Positive for decreased concentration. Negative for sleep disturbance, suicidal ideas and depressed mood. The patient is not nervous/anxious.      Objective   Physical Exam  Constitutional:       General: She is not in acute distress.     Appearance: Normal appearance. She is well-developed. She is not diaphoretic.      Comments: Bright and in fair spirits. No apparent distress. No pallor, jaundice, diaphoresis, or cyanosis.   HENT:      Head: Atraumatic.      Right Ear: Tympanic membrane, ear canal and external ear normal. Tympanic membrane is scarred.      Left Ear: Tympanic membrane, ear canal and external ear normal. Tympanic membrane is scarred.      Mouth/Throat:      Lips: No lesions.      Mouth: Mucous membranes are moist. No oral lesions.      Pharynx: No oropharyngeal exudate or posterior oropharyngeal erythema.   Eyes:      General: Lids are normal.      Extraocular Movements: Extraocular movements intact.      Conjunctiva/sclera: Conjunctivae normal.      Pupils: Pupils are equal.   Neck:      Thyroid: No thyroid mass or thyromegaly.      Vascular: No carotid bruit or JVD.      Trachea: Trachea normal. No tracheal deviation.   Cardiovascular:      Rate and Rhythm: Normal rate and regular rhythm.      Heart sounds: Normal heart sounds, S1 normal and S2 normal. No murmur heard.     No gallop.   Pulmonary:      Effort: Pulmonary  effort is normal.      Breath sounds: Normal breath sounds.   Chest:      Chest wall: No tenderness.   Abdominal:      General: Bowel sounds are normal. There is no distension or abdominal bruit.      Palpations: Abdomen is soft. There is no hepatomegaly, splenomegaly or mass.      Tenderness: There is no abdominal tenderness. There is no right CVA tenderness or left CVA tenderness.      Hernia: No hernia is present.   Musculoskeletal:      Right lower leg: No edema.      Left lower leg: No edema.   Lymphadenopathy:      Head:      Right side of head: No submental, submandibular, tonsillar, preauricular, posterior auricular or occipital adenopathy.      Left side of head: No submental, submandibular, tonsillar, preauricular, posterior auricular or occipital adenopathy.      Cervical: No cervical adenopathy.      Upper Body:      Right upper body: No supraclavicular adenopathy.      Left upper body: No supraclavicular adenopathy.   Skin:     General: Skin is warm.      Coloration: Skin is not cyanotic, jaundiced or pale.      Findings: No rash.      Nails: There is no clubbing.   Neurological:      Mental Status: She is alert and oriented to person, place, and time.      Cranial Nerves: No cranial nerve deficit, dysarthria or facial asymmetry.      Sensory: No sensory deficit.      Motor: No tremor.      Coordination: Coordination normal.      Gait: Gait normal.   Psychiatric:         Attention and Perception: Attention normal.         Mood and Affect: Mood normal.         Speech: Speech normal.         Behavior: Behavior normal.         Thought Content: Thought content normal. Thought content does not include suicidal ideation.       Assessment & Plan   Problems Addressed this Visit          Allergies and Adverse Reactions    Chronic seasonal allergic rhinitis        Cardiac and Vasculature    ASCVD (arteriosclerotic cardiovascular disease)  Reminded regarding risk factor modification with an emphasis on tobacco  cessation.  Continue low-dose ASA.    Chronic venous insufficiency    Essential hypertension   Hypertension: elevated today.  She does not appear to take her medication consistently.  Evidence of target organ damage: coronary artery disease, chronic kidney disease, and transient ischemic attack.  Encouraged to continue to work on diet and exercise plan.   Continue current medication  Reminded of the importance of medication compliance    Mixed hyperlipidemia  As above.   Continue current medication.       Endocrine and Metabolic    Type 2 diabetes mellitus without complication, without long-term current use of insulin  Diabetes mellitus Type II, under better control.   Encouraged to continue to pursue ADA diet  Encouraged aerobic exercise.  Insulin degludec will be increased to 15 units daily  Updated labs will be drawn at her return.    Relevant Medications    Insulin Degludec (Tresiba FlexTouch) 200 UNIT/ML solution pen-injector pen injection    Insulin Pen Needle (Insupen Pen Needles) 32G X 4 MM misc    Other Relevant Orders    POC Glycosylated Hemoglobin (Hb A1C) (Completed)    Vitamin D deficiency disease       Gastrointestinal Abdominal     Chronic constipation    Gastroesophageal reflux disease without esophagitis    Recurrent acute pancreatitis       Genitourinary and Reproductive     History of recurrent urinary tract infection    Menopausal symptom    Nephrolithiasis    Renal cyst, left       Health Encounters    Healthcare maintenance  Reminded that she is due for RSV and shingles vaccination  She remains uninterested in COVID-19 vaccination or in breast cancer screening  We will discuss an updated colonoscopy at her return       Hematology and Neoplasia    Elevated CA 19-9 level  Reminded to follow-up with GI  We will try to obtain a copy of her most recent MRI       Mental Health    Depression with anxiety       Musculoskeletal and Injuries    Mechanical low back pain  Reminded regarding symptomatic  treatment.   Continue current medication  Encouraged to report if any worse or if any new symptoms or concerns.    Relevant Medications    gabapentin (NEURONTIN) 100 MG capsule    Osteopenia       Neuro    TIA (transient ischemic attack)  As above.       Pulmonary and Pneumonias    COPD mixed type       Tobacco    Dips tobacco       Other    Chronic renal failure, stage 3b  Reminded to avoid any NSAIDs prescription or OTC  Continue current medication  Will continue to monitor     Diagnoses         Codes Comments    Chronic seasonal allergic rhinitis    -  Primary ICD-10-CM: J30.2  ICD-9-CM: 477.8     Mixed hyperlipidemia     ICD-10-CM: E78.2  ICD-9-CM: 272.2     Essential hypertension     ICD-10-CM: I10  ICD-9-CM: 401.9     Chronic venous insufficiency     ICD-10-CM: I87.2  ICD-9-CM: 459.81     ASCVD (arteriosclerotic cardiovascular disease)     ICD-10-CM: I25.10  ICD-9-CM: 429.2, 440.9     Vitamin D deficiency disease     ICD-10-CM: E55.9  ICD-9-CM: 268.9     Type 2 diabetes mellitus without complication, without long-term current use of insulin     ICD-10-CM: E11.9  ICD-9-CM: 250.00     Recurrent acute pancreatitis     ICD-10-CM: K85.90  ICD-9-CM: 577.0     Gastroesophageal reflux disease without esophagitis     ICD-10-CM: K21.9  ICD-9-CM: 530.81     Chronic constipation     ICD-10-CM: K59.09  ICD-9-CM: 564.00     Renal cyst, left     ICD-10-CM: N28.1  ICD-9-CM: 753.10     Nephrolithiasis     ICD-10-CM: N20.0  ICD-9-CM: 592.0     Menopausal symptom     ICD-10-CM: N95.1  ICD-9-CM: 627.2     History of recurrent urinary tract infection     ICD-10-CM: Z87.440  ICD-9-CM: V13.02     Healthcare maintenance     ICD-10-CM: Z00.00  ICD-9-CM: V70.0     Elevated CA 19-9 level     ICD-10-CM: R97.8  ICD-9-CM: 795.89     Depression with anxiety     ICD-10-CM: F41.8  ICD-9-CM: 300.4     Osteopenia, unspecified location     ICD-10-CM: M85.80  ICD-9-CM: 733.90     Mechanical low back pain     ICD-10-CM: M54.59  ICD-9-CM: 724.2      TIA (transient ischemic attack)     ICD-10-CM: G45.9  ICD-9-CM: 435.9     COPD mixed type     ICD-10-CM: J44.9  ICD-9-CM: 496     Dips tobacco     ICD-10-CM: Z72.0  ICD-9-CM: 305.1     Chronic renal failure, stage 3b     ICD-10-CM: N18.32  ICD-9-CM: 585.3     High risk medication use     ICD-10-CM: Z79.899  ICD-9-CM: V58.69

## 2024-02-21 VITALS
BODY MASS INDEX: 23.39 KG/M2 | HEIGHT: 63 IN | OXYGEN SATURATION: 98 % | HEART RATE: 62 BPM | DIASTOLIC BLOOD PRESSURE: 104 MMHG | WEIGHT: 132 LBS | SYSTOLIC BLOOD PRESSURE: 165 MMHG | RESPIRATION RATE: 14 BRPM | TEMPERATURE: 97.7 F

## 2024-02-21 RX ORDER — PEN NEEDLE, DIABETIC 32GX 5/32"
1 NEEDLE, DISPOSABLE MISCELLANEOUS DAILY
Qty: 50 EACH | Refills: 5 | Status: SHIPPED | OUTPATIENT
Start: 2024-02-21

## 2024-02-21 RX ORDER — PEN NEEDLE, DIABETIC 32GX 5/32"
1 NEEDLE, DISPOSABLE MISCELLANEOUS DAILY
Qty: 50 EACH | Refills: 5 | Status: SHIPPED | OUTPATIENT
Start: 2024-02-21 | End: 2024-02-21 | Stop reason: SDUPTHER

## 2024-02-21 RX ORDER — GABAPENTIN 100 MG/1
200 CAPSULE ORAL NIGHTLY
Qty: 60 CAPSULE | Refills: 2 | Status: SHIPPED | OUTPATIENT
Start: 2024-02-21

## 2024-02-21 RX ORDER — INSULIN DEGLUDEC 200 U/ML
15 INJECTION, SOLUTION SUBCUTANEOUS DAILY
Qty: 3 ML | Refills: 5 | Status: SHIPPED | OUTPATIENT
Start: 2024-02-21

## 2024-03-26 DIAGNOSIS — E11.9 TYPE 2 DIABETES MELLITUS WITHOUT COMPLICATION, WITHOUT LONG-TERM CURRENT USE OF INSULIN: ICD-10-CM

## 2024-03-26 DIAGNOSIS — J30.2 CHRONIC SEASONAL ALLERGIC RHINITIS: ICD-10-CM

## 2024-03-26 RX ORDER — EMPAGLIFLOZIN 10 MG/1
10 TABLET, FILM COATED ORAL EVERY MORNING
Qty: 30 TABLET | Refills: 5 | Status: SHIPPED | OUTPATIENT
Start: 2024-03-26

## 2024-03-26 RX ORDER — FLUTICASONE PROPIONATE 50 MCG
SPRAY, SUSPENSION (ML) NASAL
Qty: 16 G | Refills: 5 | Status: SHIPPED | OUTPATIENT
Start: 2024-03-26

## 2024-04-23 ENCOUNTER — LAB (OUTPATIENT)
Dept: FAMILY MEDICINE CLINIC | Facility: CLINIC | Age: 69
End: 2024-04-23
Payer: MEDICARE

## 2024-04-23 DIAGNOSIS — E78.2 MIXED HYPERLIPIDEMIA: ICD-10-CM

## 2024-04-23 LAB
CHOLEST SERPL-MCNC: 72 MG/DL (ref 0–200)
HDLC SERPL-MCNC: 57 MG/DL (ref 40–60)
LDLC SERPL CALC-MCNC: <5 MG/DL (ref 0–100)
LDLC/HDLC SERPL: 0.07 {RATIO}
TRIGL SERPL-MCNC: 54 MG/DL (ref 0–150)
VLDLC SERPL-MCNC: NORMAL MG/DL

## 2024-04-23 PROCEDURE — 36415 COLL VENOUS BLD VENIPUNCTURE: CPT | Performed by: GENERAL PRACTICE

## 2024-04-23 PROCEDURE — 80061 LIPID PANEL: CPT | Performed by: GENERAL PRACTICE

## 2024-04-26 DIAGNOSIS — I10 ESSENTIAL HYPERTENSION: ICD-10-CM

## 2024-04-26 RX ORDER — LISINOPRIL 40 MG/1
40 TABLET ORAL DAILY
Qty: 30 TABLET | Refills: 5 | Status: SHIPPED | OUTPATIENT
Start: 2024-04-26

## 2024-05-02 DIAGNOSIS — E78.2 MIXED HYPERLIPIDEMIA: ICD-10-CM

## 2024-05-06 DIAGNOSIS — E78.2 MIXED HYPERLIPIDEMIA: Primary | ICD-10-CM

## 2024-05-06 DIAGNOSIS — I25.10 CORONARY ARTERY DISEASE INVOLVING NATIVE CORONARY ARTERY OF NATIVE HEART WITHOUT ANGINA PECTORIS: ICD-10-CM

## 2024-05-06 RX ORDER — NITROGLYCERIN 0.4 MG/1
TABLET SUBLINGUAL
Qty: 25 TABLET | Refills: 5 | Status: SHIPPED | OUTPATIENT
Start: 2024-05-06

## 2024-05-10 RX ORDER — ALIROCUMAB 150 MG/ML
300 INJECTION, SOLUTION SUBCUTANEOUS
Qty: 2 ML | Refills: 5 | Status: SHIPPED | OUTPATIENT
Start: 2024-05-10 | End: 2024-05-13 | Stop reason: SDUPTHER

## 2024-05-13 ENCOUNTER — SPECIALTY PHARMACY (OUTPATIENT)
Dept: PHARMACY | Facility: HOSPITAL | Age: 69
End: 2024-05-13
Payer: MEDICARE

## 2024-05-13 RX ORDER — ALIROCUMAB 150 MG/ML
300 INJECTION, SOLUTION SUBCUTANEOUS
Qty: 2 ML | Refills: 5 | Status: SHIPPED | OUTPATIENT
Start: 2024-05-13

## 2024-05-13 NOTE — PROGRESS NOTES
[facility]  Lipid Management Program - PCSK9i       Ethel Trotter is a 68 y.o. female referred to the [facility] by Dr. Rosa for clinical pharmacy and specialty pharmacy management of PCSK9i.  Ethel Trotter is treated for clinical ASCVD and currently takes Crestor, Zetia, and Praluent. In the past, Pt has tried other statins, as well as Repatha but reports that her pharmacy could not get it any longer. The patient denies any allergies to latex.      Patient reports that she is doing well with her Praluent, giving 2 shots in one day (300mg dose). She denies any side effects, missed doses, or difficulty administering medication. She reports that she recently switched to administering in the thigh and likes it better.      Relevant Past Medical History and Comorbidities  Past Medical History:   Diagnosis Date    Arthritis     CAD (coronary artery disease)     COPD (chronic obstructive pulmonary disease)     Diabetes mellitus     Elevated cholesterol     Fracture of wrist     GERD (gastroesophageal reflux disease)     Hepatitis C     History of EKG 03/25/2016    ABNORMAL    History of transfusion     Hyperlipidemia     Hypertension     Myocardial infarction     x2 last one 5 years ago    Osteopenia     Pancreatitis     Stroke      Social History     Socioeconomic History    Marital status:    Tobacco Use    Smoking status: Never     Passive exposure: Never    Smokeless tobacco: Current     Types: Chew   Vaping Use    Vaping status: Never Used   Substance and Sexual Activity    Alcohol use: No    Drug use: Yes     Types: Marijuana     Comment: OCCASIONAL    Sexual activity: Defer       Allergies  Patient has no known allergies.    Current Medication List    Current Outpatient Medications:     albuterol sulfate HFA (Ventolin HFA) 108 (90 Base) MCG/ACT inhaler, Inhale 2 puffs 4 (Four) Times a Day., Disp: 18 g, Rfl: 5    Alirocumab (Praluent) 150 MG/ML injection pen, Inject  2 mL under the skin into the appropriate area as directed Every 28 (Twenty-Eight) Days., Disp: 2 mL, Rfl: 5    amLODIPine (NORVASC) 10 MG tablet, Take 1 tablet by mouth Every Evening., Disp: 30 tablet, Rfl: 5    aspirin (Aspirin Low Dose) 81 MG EC tablet, Take 1 tablet by mouth Daily., Disp: 30 tablet, Rfl: 5    carvedilol (COREG) 12.5 MG tablet, Take 1 tablet by mouth 2 (Two) Times a Day., Disp: 60 tablet, Rfl: 5    citalopram (CeleXA) 20 MG tablet, Take 1 tablet by mouth Daily., Disp: 30 tablet, Rfl: 5    ezetimibe (ZETIA) 10 MG tablet, Take 1 tablet by mouth every night at bedtime., Disp: 30 tablet, Rfl: 5    fluticasone (FLONASE) 50 MCG/ACT nasal spray, USE 2 SPRAYS IN EACH NOSTRIL ONCE DAILY, Disp: 16 g, Rfl: 5    gabapentin (NEURONTIN) 100 MG capsule, Take 2 capsules by mouth Every Night., Disp: 60 capsule, Rfl: 2    Glucose Blood (Blood Glucose Test) strip, USE TO TEST BLOOD GLUCOSE TWO TIMES A DAY, Disp: 50 each, Rfl: 5    Insulin Degludec (Tresiba FlexTouch) 200 UNIT/ML solution pen-injector pen injection, Inject 15 Units under the skin into the appropriate area as directed Daily., Disp: 3 mL, Rfl: 5    Insulin Pen Needle (Insupen Pen Needles) 32G X 4 MM misc, Use 1 each Daily., Disp: 50 each, Rfl: 5    isosorbide mononitrate (IMDUR) 60 MG 24 hr tablet, Take 1.5 tablets by mouth Daily., Disp: 45 tablet, Rfl: 5    Jardiance 10 MG tablet tablet, TAKE 1 TABLET BY MOUTH EVERY MORNING., Disp: 30 tablet, Rfl: 5    Lancets misc, 1 twice daily, Disp: 60 each, Rfl: 5    linaclotide (Linzess) 72 MCG capsule capsule, Take 1 capsule by mouth Every Morning Before Breakfast., Disp: 30 capsule, Rfl: 5    lisinopril (PRINIVIL,ZESTRIL) 40 MG tablet, TAKE ONE TABLET BY MOUTH EVERY DAY, Disp: 30 tablet, Rfl: 5    nitroglycerin (NITROSTAT) 0.4 MG SL tablet, DISSOLVE 1 TABLET UNDER THE TONGUE EVERY 5 MINUTES AS NEEDED FOR CHEST PAIN. DO NOT EXCEED A TOTAL OF 3 DOSES IN 15 MINUTES., Disp: 25 tablet, Rfl: 5    rosuvastatin  (CRESTOR) 40 MG tablet, Take 1 tablet by mouth Daily., Disp: 30 tablet, Rfl: 5    trimethoprim (TRIMPEX) 100 MG tablet, Take one tablet by mouth twice daily for 3 days then once nightly., Disp: 30 tablet, Rfl: 5    vitamin D (ERGOCALCIFEROL) 1.25 MG (67341 UT) capsule capsule, TAKE 1 CAPSULE BY MOUTH ONCE WEEKLY, Disp: 4 capsule, Rfl: 5    Drug Interactions  None With Praluent    Relevant Laboratory Values  Lab Results   Component Value Date    CHOL 72 04/23/2024    CHLPL 247 (H) 03/10/2016    TRIG 54 04/23/2024    HDL 57 04/23/2024    LDL <5 04/23/2024       Assessment & Plan    Most recent LDL was < 5 on 4/23/24. Patient last saw Dr. Montez on 2/20/24 and Praluent was continued.    Will continue Praluent 300 mg every 4 weeks.    Patient will continue to follow-up with Dr. Montez. Next appointment scheduled for 5/21/24.    Will follow up with patient in 6 months, or sooner if needed.      Thank you,     Reema Vazquez, PharmD  05/13/24  09:39 EDT

## 2024-05-21 ENCOUNTER — OFFICE VISIT (OUTPATIENT)
Dept: FAMILY MEDICINE CLINIC | Facility: CLINIC | Age: 69
End: 2024-05-21
Payer: MEDICARE

## 2024-05-21 DIAGNOSIS — J44.9 COPD MIXED TYPE: ICD-10-CM

## 2024-05-21 DIAGNOSIS — E78.2 MIXED HYPERLIPIDEMIA: ICD-10-CM

## 2024-05-21 DIAGNOSIS — R94.39 ABNORMAL STRESS TEST: ICD-10-CM

## 2024-05-21 DIAGNOSIS — I87.2 CHRONIC VENOUS INSUFFICIENCY: ICD-10-CM

## 2024-05-21 DIAGNOSIS — N95.1 MENOPAUSAL SYMPTOM: ICD-10-CM

## 2024-05-21 DIAGNOSIS — I10 ESSENTIAL HYPERTENSION: ICD-10-CM

## 2024-05-21 DIAGNOSIS — J30.2 CHRONIC SEASONAL ALLERGIC RHINITIS: ICD-10-CM

## 2024-05-21 DIAGNOSIS — I25.10 CORONARY ARTERY DISEASE INVOLVING NATIVE CORONARY ARTERY OF NATIVE HEART WITHOUT ANGINA PECTORIS: ICD-10-CM

## 2024-05-21 DIAGNOSIS — R97.8 ELEVATED CA 19-9 LEVEL: ICD-10-CM

## 2024-05-21 DIAGNOSIS — K59.09 CHRONIC CONSTIPATION: ICD-10-CM

## 2024-05-21 DIAGNOSIS — K21.9 GASTROESOPHAGEAL REFLUX DISEASE WITHOUT ESOPHAGITIS: ICD-10-CM

## 2024-05-21 DIAGNOSIS — I25.10 ASCVD (ARTERIOSCLEROTIC CARDIOVASCULAR DISEASE): ICD-10-CM

## 2024-05-21 DIAGNOSIS — G45.9 TIA (TRANSIENT ISCHEMIC ATTACK): Primary | ICD-10-CM

## 2024-05-21 DIAGNOSIS — E11.9 TYPE 2 DIABETES MELLITUS WITHOUT COMPLICATION, WITHOUT LONG-TERM CURRENT USE OF INSULIN: ICD-10-CM

## 2024-05-21 DIAGNOSIS — N20.0 NEPHROLITHIASIS: ICD-10-CM

## 2024-05-21 DIAGNOSIS — F41.8 DEPRESSION WITH ANXIETY: ICD-10-CM

## 2024-05-21 DIAGNOSIS — M54.59 MECHANICAL LOW BACK PAIN: ICD-10-CM

## 2024-05-21 DIAGNOSIS — N18.32 CHRONIC RENAL FAILURE, STAGE 3B: ICD-10-CM

## 2024-05-21 DIAGNOSIS — E55.9 VITAMIN D DEFICIENCY DISEASE: ICD-10-CM

## 2024-05-21 DIAGNOSIS — Z72.0 DIPS TOBACCO: ICD-10-CM

## 2024-05-21 DIAGNOSIS — B83.9 WORMS IN STOOL: ICD-10-CM

## 2024-05-21 DIAGNOSIS — K85.90 RECURRENT ACUTE PANCREATITIS: ICD-10-CM

## 2024-05-21 DIAGNOSIS — M85.80 OSTEOPENIA, UNSPECIFIED LOCATION: ICD-10-CM

## 2024-05-21 DIAGNOSIS — Z00.00 HEALTHCARE MAINTENANCE: ICD-10-CM

## 2024-05-21 PROCEDURE — 80061 LIPID PANEL: CPT | Performed by: GENERAL PRACTICE

## 2024-05-21 PROCEDURE — 85025 COMPLETE CBC W/AUTO DIFF WBC: CPT | Performed by: GENERAL PRACTICE

## 2024-05-21 PROCEDURE — 82306 VITAMIN D 25 HYDROXY: CPT | Performed by: GENERAL PRACTICE

## 2024-05-21 PROCEDURE — 82043 UR ALBUMIN QUANTITATIVE: CPT | Performed by: GENERAL PRACTICE

## 2024-05-21 PROCEDURE — 84443 ASSAY THYROID STIM HORMONE: CPT | Performed by: GENERAL PRACTICE

## 2024-05-21 PROCEDURE — 83036 HEMOGLOBIN GLYCOSYLATED A1C: CPT | Performed by: GENERAL PRACTICE

## 2024-05-21 PROCEDURE — 86301 IMMUNOASSAY TUMOR CA 19-9: CPT | Performed by: GENERAL PRACTICE

## 2024-05-21 PROCEDURE — 82607 VITAMIN B-12: CPT | Performed by: GENERAL PRACTICE

## 2024-05-21 PROCEDURE — 80053 COMPREHEN METABOLIC PANEL: CPT | Performed by: GENERAL PRACTICE

## 2024-05-21 RX ORDER — ISOSORBIDE MONONITRATE 60 MG/1
90 TABLET, EXTENDED RELEASE ORAL DAILY
Qty: 45 TABLET | Refills: 5 | Status: SHIPPED | OUTPATIENT
Start: 2024-05-21

## 2024-05-21 RX ORDER — LANCETS 30 GAUGE
EACH MISCELLANEOUS
Qty: 60 EACH | Refills: 5 | Status: SHIPPED | OUTPATIENT
Start: 2024-05-21

## 2024-05-21 RX ORDER — GLUCOSAMINE HCL/CHONDROITIN SU 500-400 MG
CAPSULE ORAL
Qty: 50 EACH | Refills: 5 | Status: SHIPPED | OUTPATIENT
Start: 2024-05-21

## 2024-05-21 RX ORDER — AMLODIPINE BESYLATE 10 MG/1
10 TABLET ORAL EVERY EVENING
Qty: 30 TABLET | Refills: 5 | Status: SHIPPED | OUTPATIENT
Start: 2024-05-21

## 2024-05-21 RX ORDER — GABAPENTIN 100 MG/1
200 CAPSULE ORAL NIGHTLY
Qty: 60 CAPSULE | Refills: 2 | Status: SHIPPED | OUTPATIENT
Start: 2024-05-21

## 2024-05-21 RX ORDER — FLUTICASONE PROPIONATE 50 MCG
SPRAY, SUSPENSION (ML) NASAL
Qty: 16 G | Refills: 5 | Status: SHIPPED | OUTPATIENT
Start: 2024-05-21

## 2024-05-21 RX ORDER — ALBUTEROL SULFATE 90 UG/1
2 AEROSOL, METERED RESPIRATORY (INHALATION) EVERY 4 HOURS PRN
Qty: 18 G | Refills: 5 | Status: SHIPPED | OUTPATIENT
Start: 2024-05-21

## 2024-05-21 RX ORDER — ALBUTEROL SULFATE 90 UG/1
AEROSOL, METERED RESPIRATORY (INHALATION)
Qty: 18 G | Refills: 5 | Status: SHIPPED | OUTPATIENT
Start: 2024-05-21 | End: 2024-05-21 | Stop reason: SDUPTHER

## 2024-05-21 RX ORDER — ROSUVASTATIN CALCIUM 40 MG/1
40 TABLET, COATED ORAL DAILY
Qty: 30 TABLET | Refills: 5 | Status: SHIPPED | OUTPATIENT
Start: 2024-05-21

## 2024-05-21 RX ORDER — LISINOPRIL 40 MG/1
40 TABLET ORAL DAILY
Qty: 30 TABLET | Refills: 5 | Status: SHIPPED | OUTPATIENT
Start: 2024-05-21

## 2024-05-21 RX ORDER — ASPIRIN 81 MG/1
81 TABLET ORAL DAILY
Qty: 30 TABLET | Refills: 5 | Status: SHIPPED | OUTPATIENT
Start: 2024-05-21

## 2024-05-21 RX ORDER — CITALOPRAM 20 MG/1
20 TABLET ORAL DAILY
Qty: 30 TABLET | Refills: 5 | Status: SHIPPED | OUTPATIENT
Start: 2024-05-21

## 2024-05-21 RX ORDER — CARVEDILOL 12.5 MG/1
12.5 TABLET ORAL 2 TIMES DAILY
Qty: 60 TABLET | Refills: 5 | Status: SHIPPED | OUTPATIENT
Start: 2024-05-21

## 2024-05-21 RX ORDER — PEN NEEDLE, DIABETIC 32GX 5/32"
1 NEEDLE, DISPOSABLE MISCELLANEOUS DAILY
Qty: 50 EACH | Refills: 5 | Status: SHIPPED | OUTPATIENT
Start: 2024-05-21

## 2024-05-21 RX ORDER — INSULIN DEGLUDEC 200 U/ML
15 INJECTION, SOLUTION SUBCUTANEOUS DAILY
Qty: 3 ML | Refills: 5 | Status: SHIPPED | OUTPATIENT
Start: 2024-05-21

## 2024-05-21 RX ORDER — EZETIMIBE 10 MG/1
10 TABLET ORAL
Qty: 30 TABLET | Refills: 5 | Status: SHIPPED | OUTPATIENT
Start: 2024-05-21

## 2024-05-21 NOTE — PROGRESS NOTES
The ABCs of the Annual Wellness Visit  Subsequent Medicare Wellness Visit    Subjective    Ethel Trotter is a 68 y.o. female who presents for a Subsequent Medicare Wellness Visit.    The following portions of the patient's history were reviewed and   updated as appropriate: allergies, current medications, past family history, past medical history, past social history, past surgical history, and problem list.    Compared to one year ago, the patient feels her physical   health is better.    Compared to one year ago, the patient feels her mental   health is better.    Recent Hospitalizations:  She was admitted within the past 365 days at [facility].     Current Medical Providers:  Patient Care Team:  Sacha Montez MD as PCP - General  Sacha Montez MD as PCP - Family Medicine    Outpatient Medications Prior to Visit   Medication Sig Dispense Refill    Alirocumab (Praluent) 150 MG/ML injection pen Inject 2 mL under the skin into the appropriate area as directed Every 28 (Twenty-Eight) Days. 2 mL 5    nitroglycerin (NITROSTAT) 0.4 MG SL tablet DISSOLVE 1 TABLET UNDER THE TONGUE EVERY 5 MINUTES AS NEEDED FOR CHEST PAIN. DO NOT EXCEED A TOTAL OF 3 DOSES IN 15 MINUTES. 25 tablet 5    amLODIPine (NORVASC) 10 MG tablet Take 1 tablet by mouth Every Evening. 30 tablet 5    aspirin (Aspirin Low Dose) 81 MG EC tablet Take 1 tablet by mouth Daily. 30 tablet 5    carvedilol (COREG) 12.5 MG tablet Take 1 tablet by mouth 2 (Two) Times a Day. 60 tablet 5    citalopram (CeleXA) 20 MG tablet Take 1 tablet by mouth Daily. 30 tablet 5    ezetimibe (ZETIA) 10 MG tablet Take 1 tablet by mouth every night at bedtime. 30 tablet 5    fluticasone (FLONASE) 50 MCG/ACT nasal spray USE 2 SPRAYS IN EACH NOSTRIL ONCE DAILY 16 g 5    gabapentin (NEURONTIN) 100 MG capsule Take 2 capsules by mouth Every Night. 60 capsule 2    Glucose Blood (Blood Glucose Test) strip USE TO TEST BLOOD GLUCOSE TWO TIMES A DAY 50  each 5    Insulin Degludec (Tresiba FlexTouch) 200 UNIT/ML solution pen-injector pen injection Inject 15 Units under the skin into the appropriate area as directed Daily. 3 mL 5    Insulin Pen Needle (Insupen Pen Needles) 32G X 4 MM misc Use 1 each Daily. 50 each 5    isosorbide mononitrate (IMDUR) 60 MG 24 hr tablet Take 1.5 tablets by mouth Daily. 45 tablet 5    Jardiance 10 MG tablet tablet TAKE 1 TABLET BY MOUTH EVERY MORNING. 30 tablet 5    Lancets misc 1 twice daily 60 each 5    linaclotide (Linzess) 72 MCG capsule capsule Take 1 capsule by mouth Every Morning Before Breakfast. 30 capsule 5    lisinopril (PRINIVIL,ZESTRIL) 40 MG tablet TAKE ONE TABLET BY MOUTH EVERY DAY 30 tablet 5    rosuvastatin (CRESTOR) 40 MG tablet Take 1 tablet by mouth Daily. 30 tablet 5    Ventolin  (90 Base) MCG/ACT inhaler INHALE TWO PUFFS BY MOUTH FOUR TIMES DAILY 18 g 5    trimethoprim (TRIMPEX) 100 MG tablet Take one tablet by mouth twice daily for 3 days then once nightly. (Patient not taking: Reported on 5/21/2024) 30 tablet 5    vitamin D (ERGOCALCIFEROL) 1.25 MG (01213 UT) capsule capsule TAKE 1 CAPSULE BY MOUTH ONCE WEEKLY (Patient not taking: Reported on 5/21/2024) 4 capsule 5     No facility-administered medications prior to visit.     No opioid medication identified on active medication list. I have reviewed chart for other potential  high risk medication/s and harmful drug interactions in the elderly.        Aspirin is on active medication list. Aspirin use is indicated based on review of current medical condition/s. Pros and cons of this therapy have been discussed today. Benefits of this medication outweigh potential harm.  Patient has been encouraged to continue taking this medication.  .    Patient Active Problem List   Diagnosis    Depression with anxiety    COPD mixed type    Essential hypertension    Mixed hyperlipidemia    Type 2 diabetes mellitus without complication, without long-term current use of  "insulin    ASCVD (arteriosclerotic cardiovascular disease)    Vitamin D deficiency disease    Osteopenia    Hepatitis C antibody test positive    Gastroesophageal reflux disease without esophagitis    Chronic venous insufficiency    Chronic seasonal allergic rhinitis    Dips tobacco    Encounter for immunization    Healthcare maintenance    Mechanical low back pain    Encounter for screening mammogram for breast cancer    Chronic constipation    TIA (transient ischemic attack)    Chronic renal failure, stage 3b    Renal cyst, left    Nephrolithiasis    Menopausal symptom    History of recurrent urinary tract infection    Recurrent acute pancreatitis    Elevated CA 19-9 level    Worms in stool     Advance Care Planning   Advance Care Planning     Advance Directive is not on file.  ACP discussion was held with the patient during this visit. Patient does not have an advance directive, information provided.     Objective    Vitals:    24 1129   BP: 168/100   Pulse: 72   Resp: 14   Temp: 98.2 °F (36.8 °C)   TempSrc: Temporal   SpO2: 98%   Weight: 62.1 kg (137 lb)   Height: 160 cm (63\")     Estimated body mass index is 24.27 kg/m² as calculated from the following:    Height as of this encounter: 160 cm (63\").    Weight as of this encounter: 62.1 kg (137 lb).    BMI is within normal parameters. No other follow-up for BMI required.    Does the patient have evidence of cognitive impairment? No       HEALTH RISK ASSESSMENT    Smoking Status:  Social History     Tobacco Use   Smoking Status Never    Passive exposure: Never   Smokeless Tobacco Current    Types: Chew     Alcohol Consumption:  Social History     Substance and Sexual Activity   Alcohol Use No     Fall Risk Screen:  STEADI Fall Risk Assessment was completed, and patient is at LOW risk for falls.Assessment completed on:2024    Depression Screenin/21/2024    11:33 AM   PHQ-2/PHQ-9 Depression Screening   Little Interest or Pleasure in Doing Things " 0-->not at all   Feeling Down, Depressed or Hopeless 0-->not at all   PHQ-9: Brief Depression Severity Measure Score 0     Health Habits and Functional and Cognitive Screenin/21/2024    11:32 AM   Functional & Cognitive Status   Do you have difficulty preparing food and eating? No   Do you have difficulty bathing yourself, getting dressed or grooming yourself? No   Do you have difficulty using the toilet? No   Do you have difficulty moving around from place to place? No   Do you have trouble with steps or getting out of a bed or a chair? No   Current Diet Well Balanced Diet   Dental Exam Not up to date   Eye Exam Not up to date   Exercise (times per week) 7 times per week   Current Exercises Include House Cleaning   Do you need help using the phone?  No   Are you deaf or do you have serious difficulty hearing?  No   Do you need help to go to places out of walking distance? No   Do you need help shopping? No   Do you need help preparing meals?  No   Do you need help with housework?  No   Do you need help with laundry? No   Do you need help taking your medications? No   Do you need help managing money? No   Do you ever drive or ride in a car without wearing a seat belt? No   Have you felt unusual stress, anger or loneliness in the last month? No   Who do you live with? Spouse   If you need help, do you have trouble finding someone available to you? No   Have you been bothered in the last four weeks by sexual problems? No   Do you have difficulty concentrating, remembering or making decisions? No     Age-appropriate Screening Schedule:  Refer to the list below for future screening recommendations based on patient's age, sex and/or medical conditions. Orders for these recommended tests are listed in the plan section. The patient has been provided with a written plan.    Health Maintenance   Topic Date Due    ZOSTER VACCINE (1 of 2) Never done    RSV Vaccine - Adults (1 - 1-dose 60+ series) Never done    DXA  SCAN  02/02/2020    DIABETIC FOOT EXAM  06/21/2020    DIABETIC EYE EXAM  03/15/2023    COVID-19 Vaccine (1 - 2023-24 season) Never done    COLORECTAL CANCER SCREENING  05/13/2024    MAMMOGRAM  02/21/2025 (Originally 2/2/2020)    INFLUENZA VACCINE  08/01/2024    TDAP/TD VACCINES (4 - Td or Tdap) 10/22/2024    HEMOGLOBIN A1C  11/21/2024    ANNUAL WELLNESS VISIT  05/21/2025    LIPID PANEL  05/21/2025    URINE MICROALBUMIN  05/21/2025    HEPATITIS C SCREENING  Completed    Pneumococcal Vaccine 65+  Completed    PAP SMEAR  Discontinued        CMS Preventative Services Quick Reference  Risk Factors Identified During Encounter  Chronic Pain: Natural history and expected course discussed. Questions answered.  Depression/Dysphoria:  Supportive therapy. Encouraged to report if any worse or if any new symptoms  Fall Risk-High or Moderate: Discussed Fall Prevention in the home  Immunizations Discussed/Encouraged: Tdap, Shingrix, COVID19, and RSV (Respiratory Syncytial Virus)  Tobacco Use/Dependance Risk (use dotphrase .tobaccocessation for documentation)  The above risks/problems have been discussed with the patient.  Pertinent information has been shared with the patient in the After Visit Summary.  An After Visit Summary and PPPS were made available to the patient.    Follow Up:   Next Medicare Wellness visit to be scheduled in 1 year.       Additional E&M Note during same encounter follows:  Patient has multiple medical problems which are significant and separately identifiable that require additional work above and beyond the Medicare Wellness Visit.      Chief Complaint  She returns for a scheduled reassessment of multiple medical problems including type 2 diabetes mellitus, hyperlipidemia, essential hypertension, coronary artery disease, chronic renal failure, elevated CA 19-9 level, chronic low back pain, depression with anxiety, and possible warms    Subjective        HPI    Worms  She states that she has seen worms in  her stool on and off for the last few weeks.  She states that these are white and generally 3 to 4 inches in length.  She states she is also seeing them moving under her skin.  She admits to intermittent constipation, but denies any nausea, vomiting, abdominal pain, hematochezia, or melena.  There is no history of any rash, fever, or chills.  She apparently had worms as a child.  She walks outdoors barefoot.  She has municipal water.  She states her  has been fine    Type 2 Diabetes Mellitus  She continues to deny paresthesias of the feet, visual disturbances, polydipsia, polyuria, hypoglycemia or foot ulcerations.  She is on insulin degludec 15 units daily with no apparent side effects.  She remains on empagliflozin     Hyperlipidemia  Her compliance with treatment has been fair. She remains on praluent, but does not appear to be taking rosuvastatin and ezetimibe at present.      Essential Hypertension  She continues to deny any chest pain, palpitations, dyspnea, orthopnea, paroxysmal nocturnal dyspnea or peripheral edema.  She remains on lisinopril, carvedilol, amlodipine.  She has yet to take her morning medication    Coronary artery disease  She has a history of previous M.I. and CABG surgery.  She continues to deny any chest pain and there is no history of any palpitations, lightheadedness, shortness of breath, calf pain, or swelling of the ankles. She remains on low-dose ASA.  She underwent an echocardiogram on 8/3/2022 with mild aortic calcification and regurgitation, mild to moderate MVR, mild TVR, and an estimated EF of 51 to 55%.  She is not followed by cardiology at present    Episode of Visual Loss  She experienced a 4 to 5 second episode of complete vision loss in her right eye several years ago. This occurred during a period of anxiety and was associated with a generalized headache.  She continues to deny any further episodes and has had no weakness, numbness, tingling, or difficulty talking or  understanding what is said to her.      Chronic Renal Failure  This has been contributed to hypertensive and diabetic nephropathy along with possible PCKD.  She is aware to avoid any NSAIDs.  She is not followed by nephrology at present    Elevated CA 19-9 Level  She denies any further abdominal pain.  While records have yet to be received, she apparently underwent an updated MRI of the pancreas late last year.  She was to undergo a GI reassessment with Dr. Chapa afterward but missed this and has decided not to reschedule.  She continues to deny any nausea, vomiting, change in her bowel habits, hematochezia, or melena    Low Back Pain   She has a long history of low back pain worse over the last few years. The pain is described as an intermittent left lower ache.  This occasionally radiates to her left flank, left lateral hip, and left posterior thigh.  The pain in her back is worse than that elsewhere.  She has been unable to identify any precipitating, exacerbating, or relieving factors.  She continues to deny any changes in her strength, sensation, or bowel/bladder control. X-rays of the left hip performed on 2/26/2020 were unremarkable.  MRI of the lumbar spine performed on 11/18/2020 was reported as showing degenerative disc disease with moderate to severe right neuroforaminal narrowing at L4-5     Depression with Anxiety  She has a long history of intermittent depression, nervousness, and difficulty sleeping.  She continues to deny any change in her concentration or memory and there is no history of any suicidal ideation.  She lost a granddaughter to cancer this year.  This has been difficult but she has a supportive family and .  She is prescribed citalopram     Review of Systems   Constitutional:  Positive for fatigue. Negative for appetite change, chills, diaphoresis and fever.   HENT:  Negative for congestion, ear pain, postnasal drip, rhinorrhea, sinus pressure, sneezing, sore throat, tinnitus and  "voice change.    Eyes:  Negative for visual disturbance.   Respiratory:  Negative for cough, shortness of breath and wheezing.    Cardiovascular:  Negative for chest pain, palpitations and leg swelling.   Gastrointestinal:  Positive for constipation. Negative for abdominal pain, blood in stool, diarrhea, nausea and vomiting.        Worms in stool   Genitourinary:  Positive for frequency and urgency. Negative for dysuria and hematuria.   Musculoskeletal:  Positive for arthralgias and back pain. Negative for joint swelling and myalgias.   Skin:  Negative for rash.   Neurological:  Negative for weakness, numbness and confusion.   Psychiatric/Behavioral:  Positive for dysphoric mood (today is anniversary of her granddaughter's death) and sleep disturbance. Negative for decreased concentration and suicidal ideas. The patient is nervous/anxious.      Objective   Vital Signs:  /100   Pulse 72   Temp 98.2 °F (36.8 °C) (Temporal)   Resp 14   Ht 160 cm (63\")   Wt 62.1 kg (137 lb)   SpO2 98%   BMI 24.27 kg/m²     Physical Exam  Constitutional:       General: She is not in acute distress.     Appearance: Normal appearance. She is well-developed. She is not diaphoretic.      Comments: Alert and in fair spirits. No apparent distress. No pallor, jaundice, diaphoresis, or cyanosis.   HENT:      Head: Atraumatic.      Right Ear: Tympanic membrane, ear canal and external ear normal. Tympanic membrane is scarred.      Left Ear: Tympanic membrane, ear canal and external ear normal. Tympanic membrane is scarred.      Mouth/Throat:      Lips: No lesions.      Mouth: Mucous membranes are moist. No oral lesions.      Pharynx: No oropharyngeal exudate or posterior oropharyngeal erythema.   Eyes:      General: Lids are normal.      Extraocular Movements: Extraocular movements intact.      Conjunctiva/sclera: Conjunctivae normal.      Pupils: Pupils are equal.   Neck:      Thyroid: No thyroid mass or thyromegaly.      Vascular: " No carotid bruit or JVD.      Trachea: Trachea normal. No tracheal deviation.   Cardiovascular:      Rate and Rhythm: Normal rate and regular rhythm.      Heart sounds: Normal heart sounds, S1 normal and S2 normal. No murmur heard.     No gallop.   Pulmonary:      Effort: Pulmonary effort is normal.      Breath sounds: Normal breath sounds.   Chest:      Chest wall: No tenderness.   Abdominal:      General: Bowel sounds are normal. There is no distension or abdominal bruit.      Palpations: Abdomen is soft. There is no hepatomegaly, splenomegaly or mass.      Tenderness: There is no abdominal tenderness. There is no right CVA tenderness or left CVA tenderness.      Hernia: No hernia is present.   Musculoskeletal:      Right lower leg: No edema.      Left lower leg: No edema.   Lymphadenopathy:      Head:      Right side of head: No submental, submandibular, tonsillar, preauricular, posterior auricular or occipital adenopathy.      Left side of head: No submental, submandibular, tonsillar, preauricular, posterior auricular or occipital adenopathy.      Cervical: No cervical adenopathy.      Upper Body:      Right upper body: No supraclavicular adenopathy.      Left upper body: No supraclavicular adenopathy.   Skin:     General: Skin is warm.      Coloration: Skin is not cyanotic, jaundiced or pale.      Findings: No rash.      Nails: There is no clubbing.   Neurological:      Mental Status: She is alert and oriented to person, place, and time.      Cranial Nerves: No cranial nerve deficit, dysarthria or facial asymmetry.      Sensory: No sensory deficit.      Motor: No tremor.      Coordination: Coordination normal.      Gait: Gait normal.   Psychiatric:         Attention and Perception: Attention normal.         Mood and Affect: Mood normal.         Speech: Speech normal.         Behavior: Behavior normal.         Thought Content: Thought content normal. Thought content does not include suicidal ideation.            Assessment and Plan   Diagnoses and all orders for this visit:    1. TIA (transient ischemic attack)   Reminded regarding risk factor modification with an emphasis on tobacco cessation.  Continue low-dose ASA  -     CBC & Differential    2. ASCVD (arteriosclerotic cardiovascular disease)  As above  -     carvedilol (COREG) 12.5 MG tablet; Take 1 tablet by mouth 2 (Two) Times a Day.  Dispense: 60 tablet; Refill: 5    3. Type 2 diabetes mellitus without complication, without long-term current use of insulin  Encouraged to continue to pursue ADA diet  Encouraged aerobic exercise.  Continue current medication  Updated labs drawn  -     empagliflozin (Jardiance) 10 MG tablet tablet; Take 1 tablet by mouth Every Morning.  Dispense: 30 tablet; Refill: 5  -     CBC & Differential  -     Comprehensive Metabolic Panel  -     TSH  -     Hemoglobin A1c  -     Vitamin B12  -     MicroAlbumin, Urine, Random - Urine, Clean Catch  -     Glucose Blood (Blood Glucose Test) strip; USE TO TEST BLOOD GLUCOSE TWO TIMES A DAY  Dispense: 50 each; Refill: 5  -     Insulin Degludec (Tresiba FlexTouch) 200 UNIT/ML solution pen-injector pen injection; Inject 15 Units under the skin into the appropriate area as directed Daily.  Dispense: 3 mL; Refill: 5  -     Insulin Pen Needle (Insupen Pen Needles) 32G X 4 MM misc; Use 1 each Daily.  Dispense: 50 each; Refill: 5  -     Lancets misc; 1 twice daily  Dispense: 60 each; Refill: 5    4. Mixed hyperlipidemia  Encouraged to continue to work on her diet and exercise plan.  Continue current medication  -     Comprehensive Metabolic Panel  -     Lipid Panel  -     TSH  -     ezetimibe (ZETIA) 10 MG tablet; Take 1 tablet by mouth every night at bedtime.  Dispense: 30 tablet; Refill: 5  -     rosuvastatin (CRESTOR) 40 MG tablet; Take 1 tablet by mouth Daily.  Dispense: 30 tablet; Refill: 5    5. Essential hypertension  As above.   Continue current medication  -     CBC & Differential  -      Comprehensive Metabolic Panel  -     TSH  -     amLODIPine (NORVASC) 10 MG tablet; Take 1 tablet by mouth Every Evening.  Dispense: 30 tablet; Refill: 5  -     lisinopril (PRINIVIL,ZESTRIL) 40 MG tablet; Take 1 tablet by mouth Daily.  Dispense: 30 tablet; Refill: 5    6. Chronic venous insufficiency    7. Chronic renal failure, stage 3b  Reminded to avoid any NSAIDs prescription or OTC  Continue current medication  Will continue to monitor  -     CBC & Differential  -     Comprehensive Metabolic Panel    8. Dips tobacco  Lengthy discussion regarding the potential sequela of continued tobacco use and the options with respect to cessation.  Patient uninterested in pursuing at present but will consider.    9. Chronic seasonal allergic rhinitis  Continue current medication  Encouraged to report if any worse or if any new symptoms or concerns  -     fluticasone (FLONASE) 50 MCG/ACT nasal spray; USE 2 SPRAYS IN EACH NOSTRIL ONCE DAILY  Dispense: 16 g; Refill: 5    10. Vitamin D deficiency disease  -     Vitamin D,25-Hydroxy    11. Recurrent acute pancreatitis    12. Gastroesophageal reflux disease without esophagitis    13. Chronic constipation  Reminded regarding lifestyle modification  Continue current medicatio  -     linaclotide (Linzess) 72 MCG capsule capsule; Take 1 capsule by mouth Every Morning Before Breakfast.  Dispense: 30 capsule; Refill: 5    14. Nephrolithiasis    15. Menopausal symptom    16. Healthcare maintenance  Reminded that she is due for Shingrix, Tdap, RSV, and an updated COVID-19  She remains uninterested in undergoing colon or breast cancer screening    17. Elevated CA 19-9 level  Will continue to monitor  -     CBC & Differential  -     Cancer Antigen 19-9    18. Depression with anxiety  Significant situational component.   Supportive therapy.   Continue current medication.  Encouraged to report if any worse or if any new symptoms or concerns.  -     TSH  -     citalopram (CeleXA) 20 MG tablet;  Take 1 tablet by mouth Daily.  Dispense: 30 tablet; Refill: 5    19. Osteopenia, unspecified location  -     Vitamin D,25-Hydroxy    20. Mechanical low back pain  Reminded regarding symptomatic treatment.   Continue current medication  Encouraged to report if any worse or if any new symptoms or concerns.  -     gabapentin (NEURONTIN) 100 MG capsule; Take 2 capsules by mouth Every Night.  Dispense: 60 capsule; Refill: 2    21. COPD mixed type  -     albuterol sulfate HFA (Ventolin HFA) 108 (90 Base) MCG/ACT inhaler; Inhale 2 puffs Every 4 (Four) Hours As Needed for Wheezing.  Dispense: 18 g; Refill: 5    22. Worms in stool  Stool for O+P  Will notify patient of the results and any further action to be taken  Encouraged to report if any worse or if any new symptoms or concerns       -     Ova & Parasite Examination - Stool, Per Rectum; Future         Follow Up   Return in about 3 months (around 8/22/2024).  Patient was given instructions and counseling regarding her condition or for health maintenance advice. Please see specific information pulled into the AVS if appropriate.

## 2024-05-21 NOTE — PROGRESS NOTES
"Transitional Care Follow Up Visit  Subjective     Ethel Trotter is a 68 y.o. female who presents for a transitional care management visit.    Within 48 business hours after discharge our office contacted her via telephone to coordinate her care and needs.      I reviewed and discussed the details of that call along with the discharge summary, hospital problems, inpatient lab results, inpatient diagnostic studies, and consultation reports with Ethel.     Current outpatient and discharge medications have been reconciled for the patient.  Reviewed by: Sacha Montez MD          12/15/2023     1:10 PM   Date of TCM Phone Call   [facility]   Date of Discharge 12/15/2023   Discharge Disposition Home or Self Care     Risk for Readmission (LACE) No data recorded    History of Present Illness   Course During Hospital Stay:  ***     {Common H&P Review Areas:84276}    Review of Systems    Objective   Pulse 72   Temp 98.2 °F (36.8 °C) (Temporal)   Ht 160 cm (63\")   Wt 62.1 kg (137 lb)   SpO2 98%   BMI 24.27 kg/m²   Physical Exam    Assessment & Plan   {Assess/PlanSmartLinks:87647}               "

## 2024-05-21 NOTE — PATIENT INSTRUCTIONS
Advance Directive    Advance directives are legal documents that allow you to make decisions about your health care and medical treatment in case you become unable to communicate for yourself. Advance directives let your wishes be known to family, friends, and health care providers.  Discussing and writing advance directives should happen over time rather than all at once. Advance directives can be changed and updated at any time. There are different types of advance directives, such as:  Medical power of .  Living will.  Do not resuscitate (DNR) order or do not attempt resuscitation (DNAR) order.  Health care proxy and medical power of   A health care proxy is also called a health care agent. This person is appointed to make medical decisions for you when you are unable to make decisions for yourself. Generally, people ask a trusted friend or family member to act as their proxy and represent their preferences. Make sure you have an agreement with your trusted person to act as your proxy. A proxy may have to make a medical decision on your behalf if your wishes are not known.  A medical power of , also called a durable power of  for health care, is a legal document that names your health care proxy. Depending on the laws in your state, the document may need to be:  Signed.  Notarized.  Dated.  Copied.  Witnessed.  Incorporated into your medical record.  You may also want to appoint a trusted person to manage your money in the event you are unable to do so. This is called a durable power of  for finances. It is a separate legal document from the durable power of  for health care. You may choose your health care proxy or someone different to act as your agent in money matters.  If you do not appoint a proxy, or there is a concern that the proxy is not acting in your best interest, a court may appoint a guardian to act on your behalf.  Living will  A living will is a set  of instructions that state your wishes about medical care when you cannot express them yourself. Health care providers should keep a copy of your living will in your medical record. You may want to give a copy to family members or friends. To alert caregivers in case of an emergency, you can place a card in your wallet to let them know that you have a living will and where they can find it. A living will is used if you become:  Terminally ill.  Disabled.  Unable to communicate or make decisions.  The following decisions should be included in your living will:  To use or not to use life support equipment, such as dialysis machines and breathing machines (ventilators).  Whether you want a DNR or DNAR order. This tells health care providers not to use cardiopulmonary resuscitation (CPR) if breathing or heartbeat stops.  To use or not to use tube feeding.  To be given or not to be given food and fluids.  Whether you want comfort (palliative) care when the goal becomes comfort rather than a cure.  Whether you want to donate your organs and tissues.  A living will does not give instructions for distributing your money and property if you should pass away.  DNR or DNAR  A DNR or DNAR order is a request not to have CPR in the event that your heart stops beating or you stop breathing. If a DNR or DNAR order has not been made and shared, a health care provider will try to help any patient whose heart has stopped or who has stopped breathing. If you plan to have surgery, talk with your health care provider about how your DNR or DNAR order will be followed if problems occur.  What if I do not have an advance directive?  Some states assign family decision makers to act on your behalf if you do not have an advance directive. Each state has its own laws about advance directives. You may want to check with your health care provider, , or state representative about the laws in your state.  Summary  Advance directives are  legal documents that allow you to make decisions about your health care and medical treatment in case you become unable to communicate for yourself.  The process of discussing and writing advance directives should happen over time. You can change and update advance directives at any time.  Advance directives may include a medical power of , a living will, and a DNR or DNAR order.  This information is not intended to replace advice given to you by your health care provider. Make sure you discuss any questions you have with your health care provider.  Document Revised: 09/21/2021 Document Reviewed: 09/21/2021  [employer] Patient Education © 2024 [employer]  Heart Disease Prevention  Heart disease is the leading cause of death in the world. Coronary artery disease is the most common cause of heart disease. This condition results when cholesterol and other substances (plaque) build up inside the walls of the blood vessels that supply your heart muscle (arteries). This buildup in arteries is called atherosclerosis. You can take actions to lower your risk of heart disease.  How can heart disease affect me?  Heart disease can cause many unpleasant symptoms and complications, such as:  Chest pain (angina).  Reduced or blocked blood flow to your heart. This can cause:  Irregular heartbeats (arrhythmias).  Heart attack.  Heart failure.  What can increase my risk?  The following factors may make you more likely to develop this condition:  High blood pressure (hypertension).  High cholesterol.  A diet high in saturated fats or trans fats.  Obesity.  Diabetes.  Having a family history of heart disease.  Certain lifestyle factors, including:  Smoking.  Lack of physical activity.  Drinking too much alcohol.  What actions can I take to prevent heart disease?  Nutrition    Follow a heart-healthy eating plan as told by your health care provider. Examples include the DASH eating plan. DASH stands for Dietary Approaches to  Stop Hypertension.  Generally, it is recommended that you:  Eat less salt (sodium). Ask your health care provider how much sodium is safe for you. Most people should have less than 2,300 mg each day.  Limit unhealthy fats, such as saturated and trans fats, in your diet. You can do this by eating low-fat dairy products, eating less red meat, and avoiding processed foods.  Eat healthy fats (omega-3 fatty acids). These are found in fish, such as mackerel or salmon.  Eat more fruits and vegetables. You should try to fill one-half of your plate with fruits and vegetables at each meal.  Eat more whole grains.  Avoid foods and drinks that have added sugars. Try to limit how much added sugar you have to:  Less than 25 grams a day for women.  Less than 36 grams a day for men.  Lifestyle    Get regular exercise. This is one of the most important things you can do for your health. Generally, it is recommended that you:  Exercise for at least 30 minutes on most days of the week (150 minutes each week). This should be exercise that causes your heart to beat faster (aerobic exercise).  Add strength exercises on at least 2 days each week.  Do not use any products that contain nicotine or tobacco. These products include cigarettes, chewing tobacco, and vaping devices, such as e-cigarettes. These can damage your heart and blood vessels. If you need help quitting, ask your health care provider.  Alcohol use  Do not drink alcohol if:  Your health care provider tells you not to drink.  You are pregnant, may be pregnant, or are planning to become pregnant.  If you drink alcohol:  Limit how much you have to:  0-1 drink a day for women.  0-2 drinks a day for men.  Know how much alcohol is in your drink. In the [place] one drink equals one 12 oz bottle of beer (355 mL), one 5 oz glass of wine (148 mL), or one 1½ oz glass of hard liquor (44 mL).  Medicines  Take over-the-counter and prescription medicines only as told by your health care  provider.  Work with your health care provider to find out whether it is safe and beneficial for you to take aspirin daily. Make sure that you understand how much to take and what form to take.  Depending on your risk factors, your health care provider may prescribe medicines to lower your risk of heart disease or to control related conditions. You may take medicine to:  Lower cholesterol.  Control blood pressure.  Control diabetes.  General information  Keep your blood pressure under control, as recommended by your health care provider. For most healthy people, the upper number of their blood pressure (systolic) should be no higher than 120, and the lower number (diastolic) no higher than 80. Treatment may be needed if your blood pressure is higher than 130/80.  Have your blood pressure checked at least every 2 years. Your health care provider may check your blood pressure more often if you have high blood pressure.  After age 20, have your cholesterol checked every 4-6 years. If you have risk factors for heart disease, you may need to have it checked more often. Treatment may be needed if your cholesterol is high.  Have your body mass index (BMI) checked every year. Your health care provider can calculate your BMI from your height and weight.  Check your waist circumference. It should be:  No more than 35 inches (89 cm) for women who are not pregnant.  No more than 40 inches (102 cm) for men.  Work with your health care provider to lose weight, if needed, or to maintain a healthy weight.  Where to find more information:  Centers for Disease Control and Prevention: www.cdc.gov/heartdisease  American Heart Association: www.heart.org  Summary  Heart disease is the leading cause of death in the world.  Heart disease can cause chest pain, abnormal heart rhythms, heart attack, and heart failure.  Some of the risk factors for heart disease include high blood pressure, high cholesterol, and smoking.  You can take actions  to lower your chances of developing heart disease. Work with your health care provider to reduce your risk by following a heart-healthy diet, being physically active, and controlling your weight, blood pressure, and cholesterol level.  This information is not intended to replace advice given to you by your health care provider. Make sure you discuss any questions you have with your health care provider.  Document Revised: 08/17/2022 Document Reviewed: 08/17/2022  [employer] Patient Education © 2024 [employer]  Mammogram  A mammogram is an X-ray of the breasts. This procedure can screen for and detect any changes that may indicate breast cancer. Mammograms are regularly done beginning at age 40 for women with average risk. A man may have a mammogram if he has a lump or swelling in his breast tissue.  A mammogram can also identify other changes and variations in the breast, such as:  Inflammation of the breast tissue (mastitis).  An infected area that contains a collection of pus (abscess).  A fluid-filled sac (cyst).  Tumors that are not cancerous (benign).  Fibrocystic changes. This is when breast tissue becomes denser and can make the tissue feel rope-like or uneven under the skin.  Women at higher risk for breast cancer need earlier and more comprehensive screening for abnormal changes. Breast tomosynthesis, or three-dimensional (3D) mammography, and digital breast tomosynthesis are advanced forms of imaging that create 3D pictures of the breasts.  Tell a health care provider:  About any allergies you have.  If you have breast implants.  If you have had previous breast disease, biopsy, or surgery.  If you have a family history of breast cancer.  If you are breastfeeding.  Whether you are pregnant or may be pregnant.  What are the risks?  Generally, this is a safe procedure. However, problems may occur, including:  Exposure to radiation. Radiation levels are very low with this test.  The need for more tests.  The  mammogram fails to detect certain cancers or the results are misinterpreted.  Difficulty with detecting breast cancer in women with dense breasts.  What happens before the procedure?  Schedule your test about 1-2 weeks after your menstrual period if you are menstruating. This is usually when your breasts are the least tender.  If you have had a mammogram done at a different facility in the past, get the mammogram X-rays or have them sent to your current exam facility. The new and old images will be compared.  Wash your breasts and underarms on the day of the test.  Do not wear deodorants, perfumes, lotions, or powders anywhere on your body on the day of the test.  Remove any jewelry from your neck.  Wear clothes that you can change into and out of easily.  What happens during the procedure?    You will undress from the waist up and put on a gown that opens in the front.  You will  front of the X-ray machine.  Each breast will be placed between two plastic or glass plates. The plates will compress your breast for a few seconds. Try to stay as relaxed as possible. This procedure does not cause any harm to your breasts. Any discomfort you feel will be very brief.  X-rays will be taken from different angles of each breast.  The procedure may vary among health care providers and hospitals.  What can I expect after the procedure?  The mammogram will be examined by a specialist (radiologist).  You may need to repeat certain parts of the test, depending on the quality of the images. This is done if the radiologist needs a better view of the breast tissue.  You may resume your normal activities.  It is up to you to get the results of your procedure. Ask your health care provider, or the department that is doing the procedure, when your results will be ready.  Summary  A mammogram is an X-ray of the breasts. This procedure can screen for and detect any changes that may indicate breast cancer.  A man may have a  mammogram if he has a lump or swelling in his breast tissue.  If you have had a mammogram done at a different facility in the past, get the mammogram X-rays or have them sent to your current exam facility in order to compare them.  Schedule your test about 1-2 weeks after your menstrual period if you are menstruating.  Ask when your test results will be ready. Make sure you get your test results.  This information is not intended to replace advice given to you by your health care provider. Make sure you discuss any questions you have with your health care provider.  Document Revised: 08/31/2022 Document Reviewed: 10/18/2021  [employer] Patient Education © 2024 [employer]  Colorectal Cancer Screening    Colorectal cancer screening is a group of tests that are used to check for colorectal cancer before symptoms develop. Colorectal refers to the colon and rectum. The colon and rectum are located at the end of the digestive tract and carry stool (feces) out of the body.  Who should have screening?  All adults who are 45-75 years old should have screening. Your health care provider may recommend screening before age 45. You will have tests every 1-10 years, depending on your results and the type of screening test. Screening recommendations for adults who are 76-85 years old vary depending on a person's health. People older than age 85 should no longer get colorectal cancer screening.  You may have screening tests starting before age 45, or more often than other people, if you have any of these risk factors:  A personal or family history of colorectal cancer or abnormal growths known as polyps in your colon.  Inflammatory bowel disease, such as ulcerative colitis or Crohn's disease.  A history of having radiation treatment to the abdomen or the area between the hip bones (pelvic area) for cancer.  A type of genetic syndrome that is passed from parent to child (hereditary), such as:  Jansen syndrome.  Familial adenomatous  polyposis.  Turcot syndrome.  Peutz-Jeghers syndrome.  MUTYH-associated polyposis (MAP).  A personal history of diabetes.  Types of tests  There are several types of colorectal screening tests. You may have one or more of the following:  Guaiac-based fecal occult blood testing. For this test, a stool sample is checked for hidden (occult) blood, which could be a sign of colorectal cancer.  Fecal immunochemical test (FIT). For this test, a stool sample is checked for blood, which could be a sign of colorectal cancer.  Stool DNA test. For this test, a stool sample is checked for blood and changes in DNA that could lead to colorectal cancer.  Sigmoidoscopy. During this test, a thin, flexible tube with a camera on the end, called a sigmoidoscope, is used to examine the rectum and the lower colon.  Colonoscopy. During this test, a long, flexible tube with a camera on the end, called a colonoscope, is used to examine the entire colon and rectum. Also, sometimes a tissue sample is taken to be looked at under a microscope (biopsy) or small polyps are removed during this test.  Virtual colonoscopy. Instead of a colonoscope, this type of colonoscopy uses a CT scan to take pictures of the colon and rectum. A CT scan is a type of X-ray that is made using computers.  What are the benefits of screening?  Screening reduces your risk for colorectal cancer and can help identify cancer at an early stage, when the cancer can be removed or treated more easily. It is common for polyps to form in the lining of the colon, especially as you age. These polyps may be cancerous or become cancerous over time. Screening can identify these polyps.  What are the risks of screening?  Generally, these are safe tests. However, problems may occur, including:  The need for more tests to confirm results from a stool sample test. Stool sample tests have fewer risks than other types of screening tests.  Being exposed to low levels of radiation, if you had  a test involving X-rays. This may slightly increase your cancer risk. The benefit of detecting cancer outweighs the slight increase in risk.  Bleeding, damage to the intestine, or infection caused by a sigmoidoscopy or colonoscopy.  A reaction to medicines given during a sigmoidoscopy or colonoscopy.  Talk with your health care provider to understand your risk for colorectal cancer and to make a screening plan that is right for you.  Questions to ask your health care provider  When should I start colorectal cancer screening?  What is my risk for colorectal cancer?  How often do I need screening?  Which screening tests do I need?  How do I get my test results?  What do my results mean?  Where to find more information  Learn more about colorectal cancer screening from:  The American Cancer Society: cancer.org  National Cancer [place]: cancer.gov  Summary  Colorectal cancer screening is a group of tests used to check for colorectal cancer before symptoms develop.  All adults who are 45-75 years old should have screening. Your health care provider may recommend screening before age 45.  You may have screening tests starting before age 45, or more often than other people, if you have certain risk factors.  Screening reduces your risk for colorectal cancer and can help identify cancer at an early stage, when the cancer can be removed or treated more easily.  Talk with your health care provider to understand your risk for colorectal cancer and to make a screening plan that is right for you.  This information is not intended to replace advice given to you by your health care provider. Make sure you discuss any questions you have with your health care provider.  Document Revised: 04/07/2021 Document Reviewed: 04/07/2021  [employer] Patient Education © 2024 [employer]

## 2024-05-22 VITALS
HEART RATE: 72 BPM | SYSTOLIC BLOOD PRESSURE: 168 MMHG | BODY MASS INDEX: 24.27 KG/M2 | WEIGHT: 137 LBS | DIASTOLIC BLOOD PRESSURE: 100 MMHG | TEMPERATURE: 98.2 F | OXYGEN SATURATION: 98 % | RESPIRATION RATE: 14 BRPM | HEIGHT: 63 IN

## 2024-05-22 LAB
25(OH)D3 SERPL-MCNC: 25.7 NG/ML (ref 30–100)
ALBUMIN SERPL-MCNC: 4.2 G/DL (ref 3.5–5.2)
ALBUMIN UR-MCNC: 7.1 MG/DL
ALBUMIN/GLOB SERPL: 1.6 G/DL
ALP SERPL-CCNC: 103 U/L (ref 39–117)
ALT SERPL W P-5'-P-CCNC: 13 U/L (ref 1–33)
ANION GAP SERPL CALCULATED.3IONS-SCNC: 9.6 MMOL/L (ref 5–15)
AST SERPL-CCNC: 16 U/L (ref 1–32)
BASOPHILS # BLD AUTO: 0.11 10*3/MM3 (ref 0–0.2)
BASOPHILS NFR BLD AUTO: 1.3 % (ref 0–1.5)
BILIRUB SERPL-MCNC: 0.2 MG/DL (ref 0–1.2)
BUN SERPL-MCNC: 27 MG/DL (ref 8–23)
BUN/CREAT SERPL: 18.6 (ref 7–25)
CALCIUM SPEC-SCNC: 9.4 MG/DL (ref 8.6–10.5)
CANCER AG19-9 SERPL-ACNC: 16.2 U/ML
CHLORIDE SERPL-SCNC: 103 MMOL/L (ref 98–107)
CHOLEST SERPL-MCNC: 194 MG/DL (ref 0–200)
CO2 SERPL-SCNC: 23.4 MMOL/L (ref 22–29)
CREAT SERPL-MCNC: 1.45 MG/DL (ref 0.57–1)
DEPRECATED RDW RBC AUTO: 43.2 FL (ref 37–54)
EGFRCR SERPLBLD CKD-EPI 2021: 38.9 ML/MIN/1.73
EOSINOPHIL # BLD AUTO: 1 10*3/MM3 (ref 0–0.4)
EOSINOPHIL NFR BLD AUTO: 12.1 % (ref 0.3–6.2)
ERYTHROCYTE [DISTWIDTH] IN BLOOD BY AUTOMATED COUNT: 13.5 % (ref 12.3–15.4)
GLOBULIN UR ELPH-MCNC: 2.6 GM/DL
GLUCOSE SERPL-MCNC: 273 MG/DL (ref 65–99)
HBA1C MFR BLD: 9.3 % (ref 4.8–5.6)
HCT VFR BLD AUTO: 39.4 % (ref 34–46.6)
HDLC SERPL-MCNC: 38 MG/DL (ref 40–60)
HGB BLD-MCNC: 12.6 G/DL (ref 12–15.9)
IMM GRANULOCYTES # BLD AUTO: 0.02 10*3/MM3 (ref 0–0.05)
IMM GRANULOCYTES NFR BLD AUTO: 0.2 % (ref 0–0.5)
LDLC SERPL CALC-MCNC: 107 MG/DL (ref 0–100)
LDLC/HDLC SERPL: 2.59 {RATIO}
LYMPHOCYTES # BLD AUTO: 1.83 10*3/MM3 (ref 0.7–3.1)
LYMPHOCYTES NFR BLD AUTO: 22.2 % (ref 19.6–45.3)
MCH RBC QN AUTO: 28.3 PG (ref 26.6–33)
MCHC RBC AUTO-ENTMCNC: 32 G/DL (ref 31.5–35.7)
MCV RBC AUTO: 88.3 FL (ref 79–97)
MONOCYTES # BLD AUTO: 0.66 10*3/MM3 (ref 0.1–0.9)
MONOCYTES NFR BLD AUTO: 8 % (ref 5–12)
NEUTROPHILS NFR BLD AUTO: 4.64 10*3/MM3 (ref 1.7–7)
NEUTROPHILS NFR BLD AUTO: 56.2 % (ref 42.7–76)
NRBC BLD AUTO-RTO: 0 /100 WBC (ref 0–0.2)
PLATELET # BLD AUTO: 134 10*3/MM3 (ref 140–450)
PMV BLD AUTO: 11.2 FL (ref 6–12)
POTASSIUM SERPL-SCNC: 5.8 MMOL/L (ref 3.5–5.2)
PROT SERPL-MCNC: 6.8 G/DL (ref 6–8.5)
RBC # BLD AUTO: 4.46 10*6/MM3 (ref 3.77–5.28)
SODIUM SERPL-SCNC: 136 MMOL/L (ref 136–145)
TRIGL SERPL-MCNC: 288 MG/DL (ref 0–150)
TSH SERPL DL<=0.05 MIU/L-ACNC: 1.06 UIU/ML (ref 0.27–4.2)
VIT B12 BLD-MCNC: 891 PG/ML (ref 211–946)
VLDLC SERPL-MCNC: 49 MG/DL (ref 5–40)
WBC NRBC COR # BLD AUTO: 8.26 10*3/MM3 (ref 3.4–10.8)

## 2024-05-26 DIAGNOSIS — R94.39 ABNORMAL STRESS TEST: ICD-10-CM

## 2024-05-26 DIAGNOSIS — E78.2 MIXED HYPERLIPIDEMIA: ICD-10-CM

## 2024-05-26 DIAGNOSIS — F41.8 DEPRESSION WITH ANXIETY: ICD-10-CM

## 2024-05-26 DIAGNOSIS — I25.10 CORONARY ARTERY DISEASE INVOLVING NATIVE CORONARY ARTERY OF NATIVE HEART WITHOUT ANGINA PECTORIS: ICD-10-CM

## 2024-05-26 DIAGNOSIS — I25.10 ASCVD (ARTERIOSCLEROTIC CARDIOVASCULAR DISEASE): ICD-10-CM

## 2024-05-28 ENCOUNTER — LAB (OUTPATIENT)
Dept: FAMILY MEDICINE CLINIC | Facility: CLINIC | Age: 69
End: 2024-05-28
Payer: MEDICARE

## 2024-05-28 DIAGNOSIS — B83.9 WORMS IN STOOL: ICD-10-CM

## 2024-05-28 PROCEDURE — 87209 SMEAR COMPLEX STAIN: CPT | Performed by: GENERAL PRACTICE

## 2024-05-28 PROCEDURE — 87177 OVA AND PARASITES SMEARS: CPT | Performed by: GENERAL PRACTICE

## 2024-05-29 RX ORDER — CARVEDILOL 12.5 MG/1
12.5 TABLET ORAL 2 TIMES DAILY
Qty: 60 TABLET | Refills: 5 | Status: SHIPPED | OUTPATIENT
Start: 2024-05-29

## 2024-05-29 RX ORDER — ASPIRIN 81 MG/1
81 TABLET, COATED ORAL DAILY
Qty: 30 TABLET | Refills: 5 | Status: SHIPPED | OUTPATIENT
Start: 2024-05-29

## 2024-05-29 RX ORDER — ROSUVASTATIN CALCIUM 40 MG/1
40 TABLET, COATED ORAL DAILY
Qty: 30 TABLET | Refills: 5 | Status: SHIPPED | OUTPATIENT
Start: 2024-05-29

## 2024-05-29 RX ORDER — EZETIMIBE 10 MG/1
10 TABLET ORAL
Qty: 30 TABLET | Refills: 5 | Status: SHIPPED | OUTPATIENT
Start: 2024-05-29

## 2024-05-29 RX ORDER — CITALOPRAM 20 MG/1
20 TABLET ORAL DAILY
Qty: 30 TABLET | Refills: 5 | Status: SHIPPED | OUTPATIENT
Start: 2024-05-29

## 2024-05-29 RX ORDER — ISOSORBIDE MONONITRATE 60 MG/1
TABLET, EXTENDED RELEASE ORAL
Qty: 45 TABLET | Refills: 5 | Status: SHIPPED | OUTPATIENT
Start: 2024-05-29

## 2024-06-03 LAB
O+P SPEC MICRO: NORMAL
O+P STL CONC: NORMAL

## 2024-06-10 ENCOUNTER — SPECIALTY PHARMACY (OUTPATIENT)
Dept: PHARMACY | Facility: HOSPITAL | Age: 69
End: 2024-06-10
Payer: MEDICARE

## 2024-06-10 NOTE — PROGRESS NOTES
Specialty Pharmacy Refill Coordination Note     Ethel is a 68 y.o. female contacted today regarding refills of  Praluent 150 MG/ML specialty medication(s).    Reviewed and verified with patient:       Specialty medication(s) and dose(s) confirmed: yes    Refill Questions      Flowsheet Row Most Recent Value   Changes to allergies? No   Changes to medications? No   New conditions or infections since last clinic visit No   Unplanned office visit, urgent care, ED, or hospital admission in the last 4 weeks  No   How does patient/caregiver feel medication is working? Very good   Financial problems or insurance changes  No   Since the previous refill, were any specialty medication doses or scheduled injections missed or delayed?  No   Does this patient require a clinical escalation to a pharmacist? No            Delivery Questions      Flowsheet Row Most Recent Value   Copay verified? Yes   Copay amount 0   Copay form of payment No copayment ($0)                   Follow-up: 28 day(s)     Pratibha Latham, Pharmacy Technician  Specialty Pharmacy Technician

## 2024-07-03 ENCOUNTER — SPECIALTY PHARMACY (OUTPATIENT)
Dept: PHARMACY | Facility: HOSPITAL | Age: 69
End: 2024-07-03
Payer: MEDICARE

## 2024-07-03 NOTE — PROGRESS NOTES
Specialty Pharmacy Refill Coordination Note     Ethel is a 68 y.o. female contacted today regarding refills of  Praluent 150 MG/ML specialty medication(s).    Reviewed and verified with patient:       Specialty medication(s) and dose(s) confirmed: yes    Refill Questions      Flowsheet Row Most Recent Value   Changes to allergies? No   Changes to medications? No   New conditions or infections since last clinic visit No   Unplanned office visit, urgent care, ED, or hospital admission in the last 4 weeks  No   How does patient/caregiver feel medication is working? Very good   Financial problems or insurance changes  No   Since the previous refill, were any specialty medication doses or scheduled injections missed or delayed?  No   Does this patient require a clinical escalation to a pharmacist? No            Delivery Questions      Flowsheet Row Most Recent Value   Copay verified? Yes   Copay amount 0   Copay form of payment No copayment ($0)                   Follow-up: 84 day(s)     Pratibha Latham, Pharmacy Technician  Specialty Pharmacy Technician

## 2024-08-19 DIAGNOSIS — M54.59 MECHANICAL LOW BACK PAIN: ICD-10-CM

## 2024-08-19 RX ORDER — GABAPENTIN 100 MG/1
CAPSULE ORAL
Qty: 60 CAPSULE | Refills: 0 | Status: SHIPPED | OUTPATIENT
Start: 2024-08-19 | End: 2024-08-22 | Stop reason: SDUPTHER

## 2024-08-22 ENCOUNTER — OFFICE VISIT (OUTPATIENT)
Dept: FAMILY MEDICINE CLINIC | Facility: CLINIC | Age: 69
End: 2024-08-22
Payer: MEDICARE

## 2024-08-22 VITALS
RESPIRATION RATE: 14 BRPM | OXYGEN SATURATION: 96 % | TEMPERATURE: 98.8 F | BODY MASS INDEX: 23.92 KG/M2 | HEART RATE: 69 BPM | WEIGHT: 135 LBS | SYSTOLIC BLOOD PRESSURE: 158 MMHG | DIASTOLIC BLOOD PRESSURE: 95 MMHG | HEIGHT: 63 IN

## 2024-08-22 DIAGNOSIS — M54.59 MECHANICAL LOW BACK PAIN: ICD-10-CM

## 2024-08-22 DIAGNOSIS — J30.2 CHRONIC SEASONAL ALLERGIC RHINITIS: Primary | ICD-10-CM

## 2024-08-22 DIAGNOSIS — R97.8 ELEVATED CA 19-9 LEVEL: ICD-10-CM

## 2024-08-22 DIAGNOSIS — K59.09 CHRONIC CONSTIPATION: ICD-10-CM

## 2024-08-22 DIAGNOSIS — I25.10 ASCVD (ARTERIOSCLEROTIC CARDIOVASCULAR DISEASE): ICD-10-CM

## 2024-08-22 DIAGNOSIS — Z87.440 HISTORY OF RECURRENT URINARY TRACT INFECTION: ICD-10-CM

## 2024-08-22 DIAGNOSIS — N28.1 RENAL CYST, LEFT: ICD-10-CM

## 2024-08-22 DIAGNOSIS — I87.2 CHRONIC VENOUS INSUFFICIENCY: ICD-10-CM

## 2024-08-22 DIAGNOSIS — K85.90 RECURRENT ACUTE PANCREATITIS: ICD-10-CM

## 2024-08-22 DIAGNOSIS — J44.9 COPD MIXED TYPE: ICD-10-CM

## 2024-08-22 DIAGNOSIS — Z51.81 ENCOUNTER FOR THERAPEUTIC DRUG MONITORING: ICD-10-CM

## 2024-08-22 DIAGNOSIS — N20.0 NEPHROLITHIASIS: ICD-10-CM

## 2024-08-22 DIAGNOSIS — M85.80 OSTEOPENIA, UNSPECIFIED LOCATION: ICD-10-CM

## 2024-08-22 DIAGNOSIS — I25.10 CORONARY ARTERY DISEASE INVOLVING NATIVE CORONARY ARTERY OF NATIVE HEART WITHOUT ANGINA PECTORIS: ICD-10-CM

## 2024-08-22 DIAGNOSIS — F41.8 DEPRESSION WITH ANXIETY: ICD-10-CM

## 2024-08-22 DIAGNOSIS — N18.32 CHRONIC RENAL FAILURE, STAGE 3B: ICD-10-CM

## 2024-08-22 DIAGNOSIS — E55.9 VITAMIN D DEFICIENCY DISEASE: ICD-10-CM

## 2024-08-22 DIAGNOSIS — I10 ESSENTIAL HYPERTENSION: ICD-10-CM

## 2024-08-22 DIAGNOSIS — G45.9 TIA (TRANSIENT ISCHEMIC ATTACK): ICD-10-CM

## 2024-08-22 DIAGNOSIS — G44.89 OTHER HEADACHE SYNDROME: ICD-10-CM

## 2024-08-22 DIAGNOSIS — Z00.00 HEALTHCARE MAINTENANCE: ICD-10-CM

## 2024-08-22 DIAGNOSIS — R76.8 HEPATITIS C ANTIBODY TEST POSITIVE: ICD-10-CM

## 2024-08-22 DIAGNOSIS — Z72.0 DIPS TOBACCO: ICD-10-CM

## 2024-08-22 DIAGNOSIS — K21.9 GASTROESOPHAGEAL REFLUX DISEASE WITHOUT ESOPHAGITIS: ICD-10-CM

## 2024-08-22 DIAGNOSIS — E11.9 TYPE 2 DIABETES MELLITUS WITHOUT COMPLICATION, WITHOUT LONG-TERM CURRENT USE OF INSULIN: ICD-10-CM

## 2024-08-22 DIAGNOSIS — E78.2 MIXED HYPERLIPIDEMIA: ICD-10-CM

## 2024-08-22 DIAGNOSIS — R94.39 ABNORMAL STRESS TEST: ICD-10-CM

## 2024-08-22 DIAGNOSIS — Z79.899 ENCOUNTER FOR LONG-TERM (CURRENT) USE OF OTHER MEDICATIONS: ICD-10-CM

## 2024-08-22 PROBLEM — N39.41 URGE URINARY INCONTINENCE: Status: ACTIVE | Noted: 2024-08-22

## 2024-08-22 PROBLEM — B83.9 WORMS IN STOOL: Status: RESOLVED | Noted: 2024-05-21 | Resolved: 2024-08-22

## 2024-08-22 PROCEDURE — 87186 SC STD MICRODIL/AGAR DIL: CPT | Performed by: GENERAL PRACTICE

## 2024-08-22 PROCEDURE — 81001 URINALYSIS AUTO W/SCOPE: CPT | Performed by: GENERAL PRACTICE

## 2024-08-22 PROCEDURE — 87077 CULTURE AEROBIC IDENTIFY: CPT | Performed by: GENERAL PRACTICE

## 2024-08-22 PROCEDURE — 83036 HEMOGLOBIN GLYCOSYLATED A1C: CPT | Performed by: GENERAL PRACTICE

## 2024-08-22 PROCEDURE — 80053 COMPREHEN METABOLIC PANEL: CPT | Performed by: GENERAL PRACTICE

## 2024-08-22 PROCEDURE — 87086 URINE CULTURE/COLONY COUNT: CPT | Performed by: GENERAL PRACTICE

## 2024-08-22 PROCEDURE — 80061 LIPID PANEL: CPT | Performed by: GENERAL PRACTICE

## 2024-08-22 RX ORDER — NITROGLYCERIN 0.4 MG/1
0.4 TABLET SUBLINGUAL
Qty: 25 TABLET | Refills: 5 | Status: SHIPPED | OUTPATIENT
Start: 2024-08-22

## 2024-08-22 RX ORDER — ALBUTEROL SULFATE 90 UG/1
2 AEROSOL, METERED RESPIRATORY (INHALATION) EVERY 4 HOURS PRN
Qty: 18 G | Refills: 5 | Status: SHIPPED | OUTPATIENT
Start: 2024-08-22

## 2024-08-22 RX ORDER — ASPIRIN 81 MG/1
81 TABLET ORAL DAILY
Qty: 30 TABLET | Refills: 5 | Status: SHIPPED | OUTPATIENT
Start: 2024-08-22

## 2024-08-22 RX ORDER — LANCETS 30 GAUGE
EACH MISCELLANEOUS
Qty: 60 EACH | Refills: 5 | Status: SHIPPED | OUTPATIENT
Start: 2024-08-22

## 2024-08-22 RX ORDER — ISOSORBIDE MONONITRATE 60 MG/1
90 TABLET, EXTENDED RELEASE ORAL DAILY
Qty: 45 TABLET | Refills: 5 | Status: SHIPPED | OUTPATIENT
Start: 2024-08-22

## 2024-08-22 RX ORDER — GLUCOSAMINE HCL/CHONDROITIN SU 500-400 MG
CAPSULE ORAL
Qty: 50 EACH | Refills: 5 | Status: SHIPPED | OUTPATIENT
Start: 2024-08-22

## 2024-08-22 RX ORDER — CARVEDILOL 12.5 MG/1
12.5 TABLET ORAL 2 TIMES DAILY
Qty: 60 TABLET | Refills: 5 | Status: SHIPPED | OUTPATIENT
Start: 2024-08-22

## 2024-08-22 RX ORDER — GABAPENTIN 100 MG/1
200 CAPSULE ORAL DAILY
Qty: 60 CAPSULE | Refills: 3 | Status: SHIPPED | OUTPATIENT
Start: 2024-08-22

## 2024-08-22 RX ORDER — PEN NEEDLE, DIABETIC 32GX 5/32"
1 NEEDLE, DISPOSABLE MISCELLANEOUS DAILY
Qty: 50 EACH | Refills: 5 | Status: SHIPPED | OUTPATIENT
Start: 2024-08-22

## 2024-08-22 RX ORDER — LISINOPRIL 40 MG/1
40 TABLET ORAL DAILY
Qty: 30 TABLET | Refills: 5 | Status: SHIPPED | OUTPATIENT
Start: 2024-08-22

## 2024-08-22 RX ORDER — ROSUVASTATIN CALCIUM 40 MG/1
40 TABLET, COATED ORAL DAILY
Qty: 30 TABLET | Refills: 5 | Status: SHIPPED | OUTPATIENT
Start: 2024-08-22

## 2024-08-22 RX ORDER — AMLODIPINE BESYLATE 10 MG/1
10 TABLET ORAL EVERY EVENING
Qty: 30 TABLET | Refills: 5 | Status: SHIPPED | OUTPATIENT
Start: 2024-08-22

## 2024-08-22 RX ORDER — CITALOPRAM 20 MG/1
20 TABLET ORAL DAILY
Qty: 30 TABLET | Refills: 5 | Status: SHIPPED | OUTPATIENT
Start: 2024-08-22

## 2024-08-22 RX ORDER — EZETIMIBE 10 MG/1
10 TABLET ORAL
Qty: 30 TABLET | Refills: 5 | Status: SHIPPED | OUTPATIENT
Start: 2024-08-22

## 2024-08-22 RX ORDER — FLUTICASONE PROPIONATE 50 MCG
SPRAY, SUSPENSION (ML) NASAL
Qty: 16 G | Refills: 5 | Status: SHIPPED | OUTPATIENT
Start: 2024-08-22

## 2024-08-22 NOTE — PROGRESS NOTES
Subjective   Ethel Trotter is a 68 y.o. female.     Chief Complaint  She returns for a scheduled reassessment of multiple medical problems including type 2 diabetes mellitus, hyperlipidemia, essential hypertension, coronary artery disease, chronic renal failure, chronic low back pain, depression with anxiety, and recent headaches    History of Present Illness     Recent Headache  She gives a 3 to 4 week history of a pressure about the parietal area bilaterally.  This has been continuous but intermittently better and worse.  The pain has been associated with mild gait instability.  She denies any problems with her eye hand coordination and has had no changes in her vision, strength, or sensation.  She denies any new joint or muscle pain and there is no history of any rash, fever, or chills.  She feels her symptoms have improved significantly over the last week. She experienced a 4 to 5 second episode of complete vision loss in her right eye several years ago. This occurred during a period of anxiety and was associated with a generalized headache.      Type 2 Diabetes Mellitus  She continues to deny paresthesias of the feet, visual disturbances, polydipsia, polyuria, hypoglycemia or foot ulcerations.  The dose of insulin degludec was increased to 25 units daily following her most recent labs.  She remains on empagliflozin 10 daily  Lab Results   Component Value Date    HGBA1C 9.30 (H) 05/21/2024     Lab Results   Component Value Date    DLXNBJDT94 891 05/21/2024     Lab Results   Component Value Date    MICROALBUR 7.1 05/21/2024     Hyperlipidemia  Her compliance with treatment has been fair. She remains on praluent.  She is also prescribed rosuvastatin and ezetimibe    Lab Results   Component Value Date    CHOL 194 05/21/2024    CHLPL 247 (H) 03/10/2016    TRIG 288 (H) 05/21/2024    HDL 38 (L) 05/21/2024     (H) 05/21/2024     Essential Hypertension  She continues to deny any chest pain, palpitations,  dyspnea, orthopnea, paroxysmal nocturnal dyspnea or peripheral edema.  She remains on lisinopril, carvedilol, and amlodipine.   Lab Results   Component Value Date    GLUCOSE 273 (H) 05/21/2024    BUN 27 (H) 05/21/2024    CREATININE 1.45 (H) 05/21/2024     05/21/2024    K 5.8 (H) 05/21/2024     05/21/2024    CALCIUM 9.4 05/21/2024    PROTEINTOT 6.8 05/21/2024    ALBUMIN 4.2 05/21/2024    ALT 13 05/21/2024    AST 16 05/21/2024    ALKPHOS 103 05/21/2024    BILITOT 0.2 05/21/2024    GLOB 2.6 05/21/2024    AGRATIO 1.6 05/21/2024    BCR 18.6 05/21/2024    ANIONGAP 9.6 05/21/2024    EGFR 38.9 (L) 05/21/2024     Coronary artery disease  She has a history of previous M.I. and CABG surgery.  She continues to deny any chest pain and there is no history of any palpitations, lightheadedness, shortness of breath, calf pain, or swelling of the ankles. She remains on low-dose ASA.  She underwent an echocardiogram on 8/3/2022 with mild aortic calcification and regurgitation, mild to moderate MVR, mild TVR, and an estimated EF of 51 to 55%.  She is not followed by cardiology at present  Lab Results   Component Value Date    WBC 8.26 05/21/2024    HGB 12.6 05/21/2024    HCT 39.4 05/21/2024    MCV 88.3 05/21/2024     (L) 05/21/2024     Chronic Renal Failure  This has been contributed to hypertensive and diabetic nephropathy along with possible PCKD.  She is aware to avoid any NSAIDs.  She is not followed by nephrology at present    Elevated CA 19-9 Level  She denies any recent abdominal pain.  While records have yet to be received, she apparently underwent an updated MRI of the pancreas late last year.  She was to undergo a GI reassessment with Dr. Chapa afterward but missed this and has decided not to reschedule.  She continues to deny any nausea, vomiting, change in her bowel habits, hematochezia, or melena. Most recent  normal at 16.2    Low Back Pain   She has a long history of low back pain worse over the  last few years. The pain is described as an intermittent left lower ache.  This occasionally radiates to her left flank, left lateral hip, and left posterior thigh.  The pain in her back is worse than that elsewhere.  She has been unable to identify any precipitating, exacerbating, or relieving factors.  She continues to deny any changes in her strength, sensation, or bowel/bladder control. X-rays of the left hip performed on 2/26/2020 were unremarkable.  MRI of the lumbar spine performed on 11/18/2020 was reported as showing degenerative disc disease with moderate to severe right neuroforaminal narrowing at L4-5     Depression with Anxiety  She has a long history of intermittent depression, nervousness, and difficulty sleeping.  She continues to deny any change in her concentration or memory and there is no history of any suicidal ideation.  She lost a granddaughter to cancer within the last year.  This has been difficult but she has a supportive family and .  She is prescribed citalopram   Lab Results   Component Value Date    TSH 1.060 05/21/2024     Worms  She continues to see a worm moving under the skin overlying her left hip on and off.  She denies any further worms in her stool.  Stool for O&P performed on 5/28/2024 was negative    Labs  Most recent vitamin D 25.7    The following portions of the patient's history were reviewed and updated as appropriate: allergies, current medications, past medical history, past social history, and problem list.    Review of Systems   Constitutional:  Positive for fatigue. Negative for chills and fever.   HENT:  Negative for congestion, ear pain, rhinorrhea and sore throat.    Eyes:  Negative for visual disturbance.   Respiratory:  Negative for cough, shortness of breath and wheezing.    Cardiovascular:  Negative for chest pain, palpitations and leg swelling.   Gastrointestinal:  Negative for abdominal pain, blood in stool, constipation, diarrhea, nausea and  vomiting.   Genitourinary:  Positive for frequency. Negative for dysuria, hematuria, urgency and vaginal bleeding.   Musculoskeletal:  Positive for gait problem. Negative for arthralgias, back pain, joint swelling and myalgias.   Skin:  Negative for rash.   Neurological:  Positive for headache. Negative for dizziness, weakness and numbness.   Psychiatric/Behavioral:  Positive for decreased concentration. Negative for sleep disturbance, suicidal ideas and depressed mood. The patient is not nervous/anxious.      Objective   Physical Exam  Constitutional:       General: She is not in acute distress.     Appearance: Normal appearance. She is well-developed. She is not diaphoretic.      Comments: Alert and in fair spirits. No apparent distress. No pallor, jaundice, diaphoresis, or cyanosis.   HENT:      Head: Atraumatic.      Right Ear: Ear canal and external ear normal. Tympanic membrane is scarred.      Left Ear: Ear canal and external ear normal. Tympanic membrane is scarred.      Mouth/Throat:      Lips: No lesions.      Mouth: Mucous membranes are moist. No oral lesions.      Pharynx: No oropharyngeal exudate or posterior oropharyngeal erythema.   Eyes:      General: Lids are normal.      Extraocular Movements: Extraocular movements intact.      Conjunctiva/sclera: Conjunctivae normal.      Pupils: Pupils are equal.   Neck:      Thyroid: No thyroid mass or thyromegaly.      Vascular: No carotid bruit or JVD.      Trachea: Trachea normal. No tracheal deviation.   Cardiovascular:      Rate and Rhythm: Normal rate and regular rhythm.      Heart sounds: Normal heart sounds, S1 normal and S2 normal. No murmur heard.     No gallop.   Pulmonary:      Effort: Pulmonary effort is normal.      Breath sounds: Normal breath sounds.   Chest:      Chest wall: No tenderness.   Abdominal:      General: Bowel sounds are normal. There is no distension.   Musculoskeletal:      Right lower leg: No edema.      Left lower leg: No edema.    Lymphadenopathy:      Head:      Right side of head: No submental, submandibular, tonsillar, preauricular, posterior auricular or occipital adenopathy.      Left side of head: No submental, submandibular, tonsillar, preauricular, posterior auricular or occipital adenopathy.      Cervical: No cervical adenopathy.      Upper Body:      Right upper body: No supraclavicular adenopathy.      Left upper body: No supraclavicular adenopathy.   Skin:     General: Skin is warm.      Coloration: Skin is not cyanotic, jaundiced or pale.      Findings: No rash.      Nails: There is no clubbing.   Neurological:      Mental Status: She is alert and oriented to person, place, and time.      Cranial Nerves: No cranial nerve deficit, dysarthria or facial asymmetry.      Sensory: No sensory deficit.      Motor: No tremor.      Coordination: Coordination normal. Finger-Nose-Finger Test normal.      Gait: Gait normal.   Psychiatric:         Attention and Perception: Attention normal.         Mood and Affect: Mood normal.         Speech: Speech normal.         Behavior: Behavior normal.         Thought Content: Thought content normal. Thought content does not include suicidal ideation.       Assessment & Plan   Problems Addressed this Visit          Allergies and Adverse Reactions    Chronic seasonal allergic rhinitis   Continue current medication  Encouraged to report if any worse or if any new symptoms or concerns.    Relevant Medications    fluticasone (FLONASE) 50 MCG/ACT nasal spray       Cardiac and Vasculature    ASCVD (arteriosclerotic cardiovascular disease)  Reminded regarding the importance of risk factor modification.  Continue low-dose ASA.    Relevant Medications    aspirin (Aspirin Low Dose) 81 MG EC tablet    carvedilol (COREG) 12.5 MG tablet    isosorbide mononitrate (IMDUR) 60 MG 24 hr tablet    nitroglycerin (NITROSTAT) 0.4 MG SL tablet    Chronic venous insufficiency    Essential hypertension   Hypertension:  elevated today. Evidence of target organ damage: coronary artery disease, chronic kidney disease, and possible TIA .  Encouraged to continue to work on diet and exercise plan.   Continue current medication    Relevant Medications    amLODIPine (NORVASC) 10 MG tablet    carvedilol (COREG) 12.5 MG tablet    lisinopril (PRINIVIL,ZESTRIL) 40 MG tablet    Other Relevant Orders    Comprehensive Metabolic Panel    Mixed hyperlipidemia  As above.   Continue current medication.    Relevant Medications    ezetimibe (ZETIA) 10 MG tablet    rosuvastatin (CRESTOR) 40 MG tablet    Other Relevant Orders    Comprehensive Metabolic Panel    Lipid Panel       Endocrine and Metabolic    Type 2 diabetes mellitus without complication, without long-term current use of insulin  Diabetes mellitus Type II, under inadequate control.   Encouraged to continue to pursue ADA diet  Encouraged aerobic exercise.  Continue current medication  Updated labs drawn.    Relevant Medications    empagliflozin (Jardiance) 10 MG tablet tablet    Glucose Blood (Blood Glucose Test) strip    Insulin Pen Needle (Insupen Pen Needles) 32G X 4 MM misc    Lancets misc    Other Relevant Orders    Comprehensive Metabolic Panel    Hemoglobin A1c    Ambulatory Referral to Ophthalmology    Vitamin D deficiency disease       Gastrointestinal Abdominal     Chronic constipation    Gastroesophageal reflux disease without esophagitis    Hepatitis C antibody test positive    Recurrent acute pancreatitis       Genitourinary and Reproductive     History of recurrent urinary tract infection    Nephrolithiasis    Renal cyst, left       Health Encounters    Healthcare maintenance  Recommended RSV, COVID and influenza vaccinations this fall.  Will arrange an updated diabetic eye exam    Relevant Orders    Ambulatory Referral to Ophthalmology       Hematology and Neoplasia    Elevated CA 19-9 level  Most recent level normal       Mental Health    Depression with anxiety  Significant  situational component.   Supportive therapy.   Continue current medication.  Encouraged to report if any worse or if any new symptoms or concerns.    Relevant Medications    citalopram (CeleXA) 20 MG tablet       Musculoskeletal and Injuries    Mechanical low back pain  Reminded regarding symptomatic treatment.   Continue current medication  Encouraged to report if any worse or if any new symptoms or concerns.    Relevant Medications    gabapentin (NEURONTIN) 100 MG capsule    Osteopenia       Neuro    Other headache syndrome      Atypical headache    Reviewed options going forward and agreed on continued monitoring provided her headache continues to  improve and resolve over the next few weeks     Encouraged to report if any worse, any new symptoms, or if not resolving over the next two weeks                    TIA (transient ischemic attack)  As above.   Continue current medication.    Relevant Orders    Comprehensive Metabolic Panel       Pulmonary and Pneumonias    COPD mixed type    Relevant Medications    albuterol sulfate HFA (Ventolin HFA) 108 (90 Base) MCG/ACT inhaler    fluticasone (FLONASE) 50 MCG/ACT nasal spray       Tobacco    Dips tobacco       Other    Chronic renal failure, stage 3b  Reminded to avoid any NSAIDs prescription or OTC  Continue current medication  Will continue to monitor    Relevant Orders    Comprehensive Metabolic Panel    Urinalysis With Culture If Indicated - Urine, Clean Catch    Crete Urine Culture Tube - Urine, Clean Catch     Diagnoses         Codes Comments    Chronic seasonal allergic rhinitis    -  Primary ICD-10-CM: J30.2  ICD-9-CM: 477.8     Mixed hyperlipidemia     ICD-10-CM: E78.2  ICD-9-CM: 272.2     Essential hypertension     ICD-10-CM: I10  ICD-9-CM: 401.9     Chronic venous insufficiency     ICD-10-CM: I87.2  ICD-9-CM: 459.81     ASCVD (arteriosclerotic cardiovascular disease)     ICD-10-CM: I25.10  ICD-9-CM: 429.2, 440.9     Vitamin D deficiency disease      ICD-10-CM: E55.9  ICD-9-CM: 268.9     Type 2 diabetes mellitus without complication, without long-term current use of insulin     ICD-10-CM: E11.9  ICD-9-CM: 250.00     Recurrent acute pancreatitis     ICD-10-CM: K85.90  ICD-9-CM: 577.0     Hepatitis C antibody test positive     ICD-10-CM: R76.8  ICD-9-CM: 795.79     Gastroesophageal reflux disease without esophagitis     ICD-10-CM: K21.9  ICD-9-CM: 530.81     Chronic constipation     ICD-10-CM: K59.09  ICD-9-CM: 564.00     Renal cyst, left     ICD-10-CM: N28.1  ICD-9-CM: 753.10     Nephrolithiasis     ICD-10-CM: N20.0  ICD-9-CM: 592.0     History of recurrent urinary tract infection     ICD-10-CM: Z87.440  ICD-9-CM: V13.02     Healthcare maintenance     ICD-10-CM: Z00.00  ICD-9-CM: V70.0     Depression with anxiety     ICD-10-CM: F41.8  ICD-9-CM: 300.4     Osteopenia, unspecified location     ICD-10-CM: M85.80  ICD-9-CM: 733.90     Mechanical low back pain     ICD-10-CM: M54.59  ICD-9-CM: 724.2     TIA (transient ischemic attack)     ICD-10-CM: G45.9  ICD-9-CM: 435.9     COPD mixed type     ICD-10-CM: J44.9  ICD-9-CM: 496     Dips tobacco     ICD-10-CM: Z72.0  ICD-9-CM: 305.1     Chronic renal failure, stage 3b     ICD-10-CM: N18.32  ICD-9-CM: 585.3     Encounter for therapeutic drug monitoring     ICD-10-CM: Z51.81  ICD-9-CM: V58.83     Encounter for long-term (current) use of other medications     ICD-10-CM: Z79.899  ICD-9-CM: V58.69     Coronary artery disease involving native coronary artery of native heart without angina pectoris     ICD-10-CM: I25.10  ICD-9-CM: 414.01     ASCVD (arteriosclerotic cardiovascular disease), status post previous MI, status post CABG (three-vessel) about 9 years ago.     ICD-10-CM: I25.10  ICD-9-CM: 429.2, 440.9     Abnormal stress test     ICD-10-CM: R94.39  ICD-9-CM: 794.39     Other headache syndrome     ICD-10-CM: G44.89  ICD-9-CM: 339.89     Elevated CA 19-9 level     ICD-10-CM: R97.8  ICD-9-CM: 795.89

## 2024-08-23 LAB
ALBUMIN SERPL-MCNC: 4.2 G/DL (ref 3.5–5.2)
ALBUMIN/GLOB SERPL: 1.2 G/DL
ALP SERPL-CCNC: 100 U/L (ref 39–117)
ALT SERPL W P-5'-P-CCNC: 8 U/L (ref 1–33)
ANION GAP SERPL CALCULATED.3IONS-SCNC: 9 MMOL/L (ref 5–15)
AST SERPL-CCNC: 13 U/L (ref 1–32)
BACTERIA UR QL AUTO: ABNORMAL /HPF
BILIRUB SERPL-MCNC: 0.4 MG/DL (ref 0–1.2)
BILIRUB UR QL STRIP: NEGATIVE
BUN SERPL-MCNC: 32 MG/DL (ref 8–23)
BUN/CREAT SERPL: 18.2 (ref 7–25)
CALCIUM SPEC-SCNC: 9.7 MG/DL (ref 8.6–10.5)
CHLORIDE SERPL-SCNC: 105 MMOL/L (ref 98–107)
CHOLEST SERPL-MCNC: 71 MG/DL (ref 0–200)
CLARITY UR: CLEAR
CO2 SERPL-SCNC: 24 MMOL/L (ref 22–29)
COLOR UR: YELLOW
CREAT SERPL-MCNC: 1.76 MG/DL (ref 0.57–1)
EGFRCR SERPLBLD CKD-EPI 2021: 30.8 ML/MIN/1.73
GLOBULIN UR ELPH-MCNC: 3.4 GM/DL
GLUCOSE SERPL-MCNC: 183 MG/DL (ref 65–99)
GLUCOSE UR STRIP-MCNC: NEGATIVE MG/DL
HBA1C MFR BLD: 9.7 % (ref 4.8–5.6)
HDLC SERPL-MCNC: 46 MG/DL (ref 40–60)
HGB UR QL STRIP.AUTO: ABNORMAL
HOLD SPECIMEN: NORMAL
HYALINE CASTS UR QL AUTO: ABNORMAL /LPF
KETONES UR QL STRIP: NEGATIVE
LDLC SERPL CALC-MCNC: 9 MG/DL (ref 0–100)
LDLC/HDLC SERPL: 0.23 {RATIO}
LEUKOCYTE ESTERASE UR QL STRIP.AUTO: ABNORMAL
NITRITE UR QL STRIP: POSITIVE
PH UR STRIP.AUTO: 5.5 [PH] (ref 5–8)
POTASSIUM SERPL-SCNC: 5.4 MMOL/L (ref 3.5–5.2)
PROT SERPL-MCNC: 7.6 G/DL (ref 6–8.5)
PROT UR QL STRIP: ABNORMAL
RBC # UR STRIP: ABNORMAL /HPF
REF LAB TEST METHOD: ABNORMAL
SODIUM SERPL-SCNC: 138 MMOL/L (ref 136–145)
SP GR UR STRIP: 1.02 (ref 1–1.03)
SQUAMOUS #/AREA URNS HPF: ABNORMAL /HPF
TRIGL SERPL-MCNC: 72 MG/DL (ref 0–150)
UROBILINOGEN UR QL STRIP: ABNORMAL
VLDLC SERPL-MCNC: 16 MG/DL (ref 5–40)
WBC # UR STRIP: ABNORMAL /HPF

## 2024-08-23 RX ORDER — SULFAMETHOXAZOLE AND TRIMETHOPRIM 800; 160 MG/1; MG/1
1 TABLET ORAL 2 TIMES DAILY
Qty: 14 TABLET | Refills: 0 | Status: SHIPPED | OUTPATIENT
Start: 2024-08-23 | End: 2024-08-30

## 2024-08-25 LAB — BACTERIA SPEC AEROBE CULT: ABNORMAL

## 2024-08-26 NOTE — PROGRESS NOTES
Spoke with pt about the following per Dr. Montez. VH    -- Please let patient know that her urine suggests a UTI I have emailed a prescription for bactrim to [facility] She also needs to increase her insulin to 30 units daily

## 2024-09-16 ENCOUNTER — PATIENT OUTREACH (OUTPATIENT)
Dept: FAMILY MEDICINE CLINIC | Facility: CLINIC | Age: 69
End: 2024-09-16
Payer: MEDICARE

## 2024-09-16 DIAGNOSIS — J30.2 CHRONIC SEASONAL ALLERGIC RHINITIS: ICD-10-CM

## 2024-09-16 RX ORDER — FLUTICASONE PROPIONATE 50 MCG
SPRAY, SUSPENSION (ML) NASAL
Qty: 16 G | Refills: 5 | Status: SHIPPED | OUTPATIENT
Start: 2024-09-16

## 2024-09-30 ENCOUNTER — SPECIALTY PHARMACY (OUTPATIENT)
Dept: PHARMACY | Facility: HOSPITAL | Age: 69
End: 2024-09-30
Payer: MEDICARE

## 2024-09-30 NOTE — PROGRESS NOTES
Specialty Pharmacy Refill Coordination Note     Ethel is a 69 y.o. female contacted today regarding refills of  Praluent 150 mg specialty medication(s).    Reviewed and verified with patient:       Specialty medication(s) and dose(s) confirmed: yes    Refill Questions      Flowsheet Row Most Recent Value   Changes to allergies? No   Changes to medications? No   New conditions or infections since last clinic visit No   Unplanned office visit, urgent care, ED, or hospital admission in the last 4 weeks  No   How does patient/caregiver feel medication is working? Very good   Financial problems or insurance changes  No   Since the previous refill, were any specialty medication doses or scheduled injections missed or delayed?  No   Does this patient require a clinical escalation to a pharmacist? No            Delivery Questions      Flowsheet Row Most Recent Value   Delivery method [employer]   Delivery address verified with patient/caregiver? Yes   Delivery address Home   Number of medications in delivery 1   Medication(s) being filled and delivered Alirocumab   Doses left of specialty medications 0   Copay verified? Yes   Copay amount 0   Copay form of payment No copayment ($0)   Ship Date ship 10/01 deliver 10/02   Delivery Date ship 10/01 deliver 10/02   Signature Required No                   Follow-up: 56 day(s)     Pratibha Latham, Pharmacy Technician  Specialty Pharmacy Technician

## 2024-10-07 ENCOUNTER — SPECIALTY PHARMACY (OUTPATIENT)
Dept: PHARMACY | Facility: HOSPITAL | Age: 69
End: 2024-10-07
Payer: MEDICARE

## 2024-10-07 RX ORDER — SULFAMETHOXAZOLE/TRIMETHOPRIM 800-160 MG
1 TABLET ORAL EVERY 12 HOURS SCHEDULED
COMMUNITY
Start: 2024-09-25

## 2024-10-07 RX ORDER — ALIROCUMAB 150 MG/ML
300 INJECTION, SOLUTION SUBCUTANEOUS
Qty: 2 ML | Refills: 5 | Status: SHIPPED | OUTPATIENT
Start: 2024-10-07

## 2024-10-07 NOTE — PROGRESS NOTES
Medication Management Clinic/ Specialty Pharmacy Patient Management Program  Lipid Management Program - PCSK9i Reassessment     Ethel Trotter is a 69 y.o. female referred by their provider, Dr. Montez, to the Hyperlipidemia Patient Management program offered by [facility] for Lipid Management. She is not currently followed by a cardiologist.  A follow-up outreach was conducted, including assessment of therapy appropriateness and specialty medication education for Praluent. The patient was introduced to services offered by [facility], including: regular assessments, refill coordination, curbside pick-up or mail order delivery options, prior authorization maintenance, and financial assistance programs as applicable. The patient was also provided with contact information for the pharmacy team.     Ethel Trotter is  treated for clinical ASCVD and currently takes Crestor, Zetia, and Praluent. In the past, Pt has tried other statins, as well as Repatha but reports that her pharmacy could not get it any longer. The patient denies any allergies to latex.        Insurance Coverage & Financial Support  [facility]  Praluent [facility]    Relevant Past Medical History and Comorbidities  Relevant medical history and concomitant health conditions were discussed with the patient. The patient's chart has been reviewed for relevant past medical history and comorbid conditions and updated as necessary.  Past Medical History:   Diagnosis Date    Arthritis     CAD (coronary artery disease)     COPD (chronic obstructive pulmonary disease)     Diabetes mellitus     Elevated cholesterol     Fracture of wrist     GERD (gastroesophageal reflux disease)     Hepatitis C     History of EKG 03/25/2016    ABNORMAL    History of transfusion     Hyperlipidemia     Hypertension     Myocardial infarction     x2 last one 5 years ago    Osteopenia     Pancreatitis      Stroke      Social History     Socioeconomic History    Marital status:    Tobacco Use    Smoking status: Never     Passive exposure: Never    Smokeless tobacco: Current     Types: Chew   Vaping Use    Vaping status: Never Used   Substance and Sexual Activity    Alcohol use: No    Drug use: Yes     Types: Marijuana     Comment: OCCASIONAL    Sexual activity: Defer       Problem list reviewed by Isabel Lemus PharmD on 10/7/2024 at 11:56 AM    Allergies  Known allergies and reactions were discussed with the patient. The patient's chart has been reviewed for  allergy information and updated as necessary.   No Known Allergies    Allergies reviewed by Isabel Lemus PharmD on 10/7/2024 at 11:50 AM    Relevant Laboratory Values  Relevant laboratory values were discussed with the patient. The following specialty medication dose adjustment(s) are recommended: See plan, if applicable   Lab Results   Component Value Date    CHOL 71 08/22/2024    CHLPL 247 (H) 03/10/2016    TRIG 72 08/22/2024    HDL 46 08/22/2024    LDL 9 08/22/2024     LDL at goal    Current Medication List  This medication list has been reviewed with the patient and evaluated for any interactions or necessary modifications/recommendations, and updated to include all prescription medications, OTC medications, and supplements the patient is currently taking.  This list reflects what is contained in the patient's profile, which has also been marked as reviewed to communicate to other providers it is the most up to date version of the patient's current medication therapy.     Current Outpatient Medications:     albuterol sulfate HFA (Ventolin HFA) 108 (90 Base) MCG/ACT inhaler, Inhale 2 puffs Every 4 (Four) Hours As Needed for Wheezing., Disp: 18 g, Rfl: 5    Alirocumab (Praluent) 150 MG/ML injection pen, Inject 2 mL under the skin into the appropriate area as directed Every 28 (Twenty-Eight) Days., Disp: 2 mL, Rfl: 5    amLODIPine (NORVASC) 10  MG tablet, Take 1 tablet by mouth Every Evening., Disp: 30 tablet, Rfl: 5    aspirin (Aspirin Low Dose) 81 MG EC tablet, Take 1 tablet by mouth Daily., Disp: 30 tablet, Rfl: 5    carvedilol (COREG) 12.5 MG tablet, Take 1 tablet by mouth 2 (Two) Times a Day., Disp: 60 tablet, Rfl: 5    citalopram (CeleXA) 20 MG tablet, Take 1 tablet by mouth Daily., Disp: 30 tablet, Rfl: 5    empagliflozin (Jardiance) 10 MG tablet tablet, Take 1 tablet by mouth Every Morning., Disp: 30 tablet, Rfl: 5    ezetimibe (ZETIA) 10 MG tablet, Take 1 tablet by mouth every night at bedtime., Disp: 30 tablet, Rfl: 5    fluticasone (FLONASE) 50 MCG/ACT nasal spray, USE 2 SPRAYS IN EACH NOSTRIL ONCE DAILY, Disp: 16 g, Rfl: 5    gabapentin (NEURONTIN) 100 MG capsule, Take 2 capsules by mouth Daily. (Patient taking differently: Take 2 capsules by mouth Daily As Needed (pain).), Disp: 60 capsule, Rfl: 3    Glucose Blood (Blood Glucose Test) strip, USE TO TEST BLOOD GLUCOSE TWO TIMES A DAY, Disp: 50 each, Rfl: 5    Insulin Degludec (Tresiba FlexTouch) 200 UNIT/ML solution pen-injector pen injection, Inject 15 Units under the skin into the appropriate area as directed Daily. (Patient taking differently: Inject 25 Units under the skin into the appropriate area as directed Daily.), Disp: 3 mL, Rfl: 5    Insulin Pen Needle (Insupen Pen Needles) 32G X 4 MM misc, Use 1 each Daily., Disp: 50 each, Rfl: 5    isosorbide mononitrate (IMDUR) 60 MG 24 hr tablet, Take 1.5 tablets by mouth Daily., Disp: 45 tablet, Rfl: 5    Lancets misc, 1 twice daily, Disp: 60 each, Rfl: 5    linaclotide (Linzess) 72 MCG capsule capsule, Take 1 capsule by mouth Every Morning Before Breakfast., Disp: 30 capsule, Rfl: 5    lisinopril (PRINIVIL,ZESTRIL) 40 MG tablet, Take 1 tablet by mouth Daily., Disp: 30 tablet, Rfl: 5    nitroglycerin (NITROSTAT) 0.4 MG SL tablet, Place 1 tablet under the tongue Every 5 (Five) Minutes As Needed for Chest Pain. Take no more than 3 doses in 15  minutes., Disp: 25 tablet, Rfl: 5    rosuvastatin (CRESTOR) 40 MG tablet, Take 1 tablet by mouth Daily., Disp: 30 tablet, Rfl: 5    sulfamethoxazole-trimethoprim (BACTRIM DS,SEPTRA DS) 800-160 MG per tablet, Take 1 tablet by mouth Every 12 (Twelve) Hours., Disp: , Rfl:     Medicines reviewed by Isabel Lemus, PharmD on 10/7/2024 at 11:55 AM  Medicines reviewed by Isabel Lemus, PharmD on 10/7/2024 at 11:55 AM    Drug Interactions  No significant interactions with Praluent      Goals of Therapy  Goals related to the patient's specialty therapy were discussed with the patient. The Patient Goals segment of this outreach has been reviewed and updated.   Goals Addressed Today    None       LDL reduction was discussed with patient. Her LDL is at goal at 9 mg/dl    Adverse Drug Reactions  Medication tolerability: Tolerating with no to minimal ADRs  Medication plan: Continue therapy with normal follow-up  Plan for ADR Management: Addressed in Plan, if applicable    Adherence, Self-Administration, and Current Therapy Problems  Adherence related to the patient's specialty therapy was discussed with the patient. The Adherence segment of this outreach has been reviewed and updated.     Adherence Questions  Linked Medication(s) Assessed: Alirocumab  On average, how many doses/injections does the patient miss per month?: 0  What are the identified reasons for non-adherence or missed doses? : no problems identified  What is the estimated medication adherence level?: %  Based on the patient/caregiver response and refill history, does this patient require an MTP to track adherence improvements?: no    Additional Barriers to Patient Self-Administration: Addressed in Plan, if applicable  Methods for Supporting Patient Self-Administration: Addressed in Plan, if applicable    Open Medication Therapy Problems  No medication therapy recommendations to display    Quality of Life Assessment   Quality of Life related to the  patient's enrollment in the patient management program and services provided was discussed with the patient. The QOL segment of this outreach has been reviewed and updated.  Quality of Life Improvement Scale: 10-Significantly better    Medication Assessment & Plan  Medication Therapy Changes: Patient continued on Praluent 300 mg SC every 28 days.   Injection training and medication education provided at initial appointment.   Welcome information and patient satisfaction survey to be sent by specialty pharmacy team with patient's initial fill.  Related Plans, Therapy Recommendations, or Therapy Problems to Be Addressed: none  Patient will need lipid panel in 6 weeks, order placed.   Patient will continue regular follow-up with referring provider. Next appointment 12/10/2024  Patient will follow up with specialty pharmacy for next injection. Care Coordinator to set up future refill outreaches, coordinate prescription delivery, and escalate clinical questions to pharmacist.  Pharmacist to perform regular assessments no more than (6) months from the previous assessment. Will follow-up in 6 months, or sooner if needed.    Attestation  Therapeutic appropriateness: Appropriate   I attest the patient was actively involved in and has agreed to the above plan of care. If the prescribed therapy is at any point deemed not appropriate based on the current or future assessments, a consultation will be initiated with the patient's specialty care provider to determine the best course of action. The revised plan of therapy will be documented along with any required assessments and/or additional patient education provided.     Isabel Lemus, PharmD  10/7/2024  12:01 EDT

## 2024-10-14 DIAGNOSIS — I10 ESSENTIAL HYPERTENSION: ICD-10-CM

## 2024-10-14 RX ORDER — LISINOPRIL 40 MG/1
40 TABLET ORAL DAILY
Qty: 30 TABLET | Refills: 5 | Status: SHIPPED | OUTPATIENT
Start: 2024-10-14

## 2024-10-29 DIAGNOSIS — J44.9 COPD MIXED TYPE: ICD-10-CM

## 2024-10-29 RX ORDER — ALBUTEROL SULFATE 90 UG/1
2 AEROSOL, METERED RESPIRATORY (INHALATION) EVERY 4 HOURS PRN
Qty: 18 G | Refills: 5 | Status: SHIPPED | OUTPATIENT
Start: 2024-10-29

## 2024-11-11 DIAGNOSIS — I25.10 ASCVD (ARTERIOSCLEROTIC CARDIOVASCULAR DISEASE): ICD-10-CM

## 2024-11-11 DIAGNOSIS — M54.59 MECHANICAL LOW BACK PAIN: ICD-10-CM

## 2024-11-11 DIAGNOSIS — E78.2 MIXED HYPERLIPIDEMIA: ICD-10-CM

## 2024-11-11 DIAGNOSIS — F41.8 DEPRESSION WITH ANXIETY: ICD-10-CM

## 2024-11-11 DIAGNOSIS — R94.39 ABNORMAL STRESS TEST: ICD-10-CM

## 2024-11-11 RX ORDER — ROSUVASTATIN CALCIUM 40 MG/1
40 TABLET, COATED ORAL DAILY
Qty: 30 TABLET | Refills: 5 | Status: SHIPPED | OUTPATIENT
Start: 2024-11-11

## 2024-11-11 RX ORDER — GABAPENTIN 100 MG/1
200 CAPSULE ORAL NIGHTLY
Qty: 60 CAPSULE | Refills: 2 | Status: SHIPPED | OUTPATIENT
Start: 2024-11-11

## 2024-11-11 RX ORDER — CARVEDILOL 12.5 MG/1
12.5 TABLET ORAL 2 TIMES DAILY
Qty: 60 TABLET | Refills: 5 | Status: SHIPPED | OUTPATIENT
Start: 2024-11-11

## 2024-11-11 RX ORDER — CITALOPRAM HYDROBROMIDE 20 MG/1
20 TABLET ORAL DAILY
Qty: 30 TABLET | Refills: 5 | Status: SHIPPED | OUTPATIENT
Start: 2024-11-11

## 2024-11-11 RX ORDER — EZETIMIBE 10 MG/1
10 TABLET ORAL
Qty: 30 TABLET | Refills: 5 | Status: SHIPPED | OUTPATIENT
Start: 2024-11-11

## 2024-11-18 ENCOUNTER — SPECIALTY PHARMACY (OUTPATIENT)
Dept: PHARMACY | Facility: HOSPITAL | Age: 69
End: 2024-11-18
Payer: MEDICARE

## 2024-11-18 RX ORDER — ALIROCUMAB 150 MG/ML
300 INJECTION, SOLUTION SUBCUTANEOUS
Qty: 6 ML | Refills: 2 | Status: SHIPPED | OUTPATIENT
Start: 2024-11-18

## 2024-11-18 NOTE — PROGRESS NOTES
Medication Management Clinic/ Specialty Pharmacy Patient Management Program  Lipid Management Program - PCSK9i Initial Assessment     Ethel Trotter is a 69 y.o. female referred by their provider, Leonie Montez, to the Hyperlipidemia Patient Management program offered by [facility] for Lipid Management.  An initial outreach was conducted, including assessment of therapy appropriateness and specialty medication education for Praluent. The patient was introduced to services offered by [facility], including: regular assessments, refill coordination, curbside pick-up or mail order delivery options, prior authorization maintenance, and financial assistance programs as applicable. The patient was also provided with contact information for the pharmacy team.     Ethel Trotter is treated for clinical ASCVD and currently takes Crestor, Zetia, and Praluent. In the past, Pt has tried other statins, as well as Repatha but reports that her pharmacy could not get it any longer. The patient denies any allergies to latex.      Patient reports tolerating Praluent well without any issues. She denies any side effects, missed doses, or difficulty administering medication.    Insurance Coverage & Financial Support  [employer]     Relevant Past Medical History and Comorbidities  Relevant medical history and concomitant health conditions were discussed with the patient. The patient's chart has been reviewed for relevant past medical history and comorbid conditions and updated as necessary.  Past Medical History:   Diagnosis Date    Arthritis     CAD (coronary artery disease)     COPD (chronic obstructive pulmonary disease)     Diabetes mellitus     Elevated cholesterol     Fracture of wrist     GERD (gastroesophageal reflux disease)     Hepatitis C     History of EKG 03/25/2016    ABNORMAL    History of transfusion     Hyperlipidemia      Hypertension     Myocardial infarction     x2 last one 5 years ago    Osteopenia     Pancreatitis     Stroke      Social History     Socioeconomic History    Marital status:    Tobacco Use    Smoking status: Never     Passive exposure: Never    Smokeless tobacco: Current     Types: Chew   Vaping Use    Vaping status: Never Used   Substance and Sexual Activity    Alcohol use: No    Drug use: Yes     Types: Marijuana     Comment: OCCASIONAL    Sexual activity: Defer       Problem list reviewed by Reema Vazquez PharmD on 11/18/2024 at 10:20 AM    Allergies  Known allergies and reactions were discussed with the patient. The patient's chart has been reviewed for  allergy information and updated as necessary.   No Known Allergies    Allergies reviewed by Reema Vazquez PharmD on 11/18/2024 at 10:19 AM    Relevant Laboratory Values  Relevant laboratory values were discussed with the patient. The following specialty medication dose adjustment(s) are recommended: See plan, if applicable   Lab Results   Component Value Date    CHOL 71 08/22/2024    CHLPL 247 (H) 03/10/2016    TRIG 72 08/22/2024    HDL 46 08/22/2024    LDL 9 08/22/2024       Current Medication List  This medication list has been reviewed with the patient and evaluated for any interactions or necessary modifications/recommendations, and updated to include all prescription medications, OTC medications, and supplements the patient is currently taking.  This list reflects what is contained in the patient's profile, which has also been marked as reviewed to communicate to other providers it is the most up to date version of the patient's current medication therapy.     Current Outpatient Medications:     Alirocumab (Praluent) 150 MG/ML injection pen, Inject 2 mL under the skin into the appropriate area as directed Every 28 (Twenty-Eight) Days., Disp: 2 mL, Rfl: 5    amLODIPine (NORVASC) 10 MG tablet, Take 1 tablet by mouth Every Evening., Disp: 30 tablet,  Rfl: 5    aspirin (Aspirin Low Dose) 81 MG EC tablet, Take 1 tablet by mouth Daily., Disp: 30 tablet, Rfl: 5    carvedilol (COREG) 12.5 MG tablet, TAKE ONE TABLET BY MOUTH TWICE DAILY, Disp: 60 tablet, Rfl: 5    citalopram (CeleXA) 20 MG tablet, TAKE ONE TABLET BY MOUTH EVERY DAY, Disp: 30 tablet, Rfl: 5    empagliflozin (Jardiance) 10 MG tablet tablet, Take 1 tablet by mouth Every Morning., Disp: 30 tablet, Rfl: 5    ezetimibe (ZETIA) 10 MG tablet, TAKE ONE TABLET BY MOUTH EVERY NIGHT, Disp: 30 tablet, Rfl: 5    fluticasone (FLONASE) 50 MCG/ACT nasal spray, USE 2 SPRAYS IN EACH NOSTRIL ONCE DAILY, Disp: 16 g, Rfl: 5    gabapentin (NEURONTIN) 100 MG capsule, TAKE TWO CAPSULES BY MOUTH EVERY night, Disp: 60 capsule, Rfl: 2    Glucose Blood (Blood Glucose Test) strip, USE TO TEST BLOOD GLUCOSE TWO TIMES A DAY, Disp: 50 each, Rfl: 5    Insulin Degludec (Tresiba FlexTouch) 200 UNIT/ML solution pen-injector pen injection, Inject 15 Units under the skin into the appropriate area as directed Daily., Disp: 3 mL, Rfl: 5    Insulin Pen Needle (Insupen Pen Needles) 32G X 4 MM misc, Use 1 each Daily., Disp: 50 each, Rfl: 5    isosorbide mononitrate (IMDUR) 60 MG 24 hr tablet, Take 1.5 tablets by mouth Daily., Disp: 45 tablet, Rfl: 5    Lancets misc, 1 twice daily, Disp: 60 each, Rfl: 5    linaclotide (Linzess) 72 MCG capsule capsule, Take 1 capsule by mouth Every Morning Before Breakfast., Disp: 30 capsule, Rfl: 5    lisinopril (PRINIVIL,ZESTRIL) 40 MG tablet, TAKE ONE TABLET BY MOUTH EVERY DAY, Disp: 30 tablet, Rfl: 5    nitroglycerin (NITROSTAT) 0.4 MG SL tablet, Place 1 tablet under the tongue Every 5 (Five) Minutes As Needed for Chest Pain. Take no more than 3 doses in 15 minutes., Disp: 25 tablet, Rfl: 5    rosuvastatin (CRESTOR) 40 MG tablet, TAKE ONE TABLET BY MOUTH EVERY DAY, Disp: 30 tablet, Rfl: 5    Ventolin  (90 Base) MCG/ACT inhaler, INHALE TWO PUFFS BY MOUTH EVERY 4 HOURS AS NEEDED for wheezing, Disp: 18 g,  Rfl: 5    Medicines reviewed by Reema Vazquez PharmD on 11/18/2024 at 10:19 AM    Drug Interactions  None with Praluent    Adherence and Self-Administration  Adherence related to the patient's specialty therapy was discussed with the patient. The Adherence segment of this outreach has been reviewed and updated.     Is there a concern with patient's ability to self administer the medication correctly and without issue?: No  Were any potential barriers to adherence identified during the initial assessment or patient education?: No  Are there any concerns regarding the patient's understanding of the importance of medication adherence?: No  Methods for Supporting Patient Adherence and/or Self-Administration: see plan, if applicable     Open Medication Therapy Problems  No medication therapy recommendations to display    Goals of Therapy  Goals related to the patient's specialty therapy were discussed with the patient. The Patient Goals segment of this outreach has been reviewed and updated.   Goals Addressed Today        Specialty Pharmacy General Goal      LDL less than 55    11/18/24 DB: LDL 9 on 8/22/24 (at goal)              Medication Assessment & Plan  Medication Therapy Changes: Patient will continue Praluent 300 mg every 28 days.   Injection training and medication education provided.   Welcome information and patient satisfaction survey to be sent by specialty pharmacy team with patient's initial fill.  Related Plans, Therapy Recommendations, or Therapy Problems to Be Addressed: None  Most recent LDL was 9 on 8/22/24.  Patient will continue regular follow-up with cardiology.   Patient will follow up with specialty pharmacy mail-out services for next injection. Care Coordinator to set up future refill outreaches, coordinate prescription delivery, and escalate clinical questions to pharmacist.  Pharmacist to perform regular assessments no more than (6) months from the previous assessment. Will follow-up in 6  months, or sooner if needed.    Initial Education Provided for Specialty Medication  The patient has been provided with the following education and any applicable administration techniques (i.e. self-injection) have been demonstrated for the therapies indicated. All questions and concerns have been addressed prior to the patient receiving the medication, and the patient has verbalized comprehension of the education and any materials provided. Additional patient education shall be provided and documented upon request by the patient, provider, or payer.    PRALUENT® (alirocumab)  Medication Expectations   Why am I taking this medication? You are taking Praluent to lower your “bad” cholesterol (LDL-C). This medication can be used in adults with high blood cholesterol including primary hyperlipidemia and familial hypercholesterolemia. It can help reduce the risk of heart attack and stroke.      What should I expect while on this medication? You should expect to see your cholesterol improve over time. Specifically, you should see your LDL-C decrease.    How does the medication work? Praluent works by blocking a protein called PCSK9 that contributes to high levels of bad cholesterol and it helps increase your liver's ability to remove bad cholesterol from your blood.     How long will I be on this medication for? The amount of time you will be on this medication will be determined by your doctor based on your cholesterol and/or your risk of having a cardiac event. You will most likely be on this medication or another cholesterol medication throughout your lifetime. Do not abruptly stop this medication without talking to your doctor first.    How do I take this medication? Take as directed on your prescription label. Praluent is injected under the skin (subcutaneously) of your stomach, thigh, or upper arm. This medication is usually given one or twice a month.     What are some possible side effects? The most common side  effects of Praluent include redness, itching, swelling, or pain/tenderness at the injection site, symptoms of the common cold, and flu or flu-like symptoms. Praluent can also cause diarrhea and muscle spasms.    What happens if I miss a dose? If you miss a dose, take it as soon as you remember if it is within 7 days from the usual day of administration then resume your original schedule. If it is beyond 7 days, skip the missed dose and resume your normal dosing schedule.      Medication Safety   What are things I should warn my doctor immediately about? Talk to your doctor if you are pregnant, planning to become pregnant, or breastfeeding. Stop the medication and tell your doctor or seek emergency medical help if you notice any signs/symptoms of an allergic reaction (severe rash, redness, hives, severe itching, trouble breathing, or swelling of the face, lips, or tongue). Do not take Praluent if you have an allergy to alirocumab or any of the ingredients in Praluent.    What are things that I should be cautious of? Be cautious of any side effects from this medication. Talk to your doctor if any new ones develop or aren't getting better.   What are some medications that can interact with this one? There are no known significant drug interactions with Praluent. Always tell your doctor or pharmacist immediately if you start taking any new medications, including over-the-counter medications, vitamins, and herbal supplements.      Medication Storage/Handling   How should I handle this medication? Do not shake or expose the pen to extreme heat or direct sunlight. Keep this medication out of reach of pets/children.    How does this medication need to be stored? Store unused pens in the refrigerator in the original carton to protect from light. Allow medication to warm at room temperature prior to administration. If needed, Praluent may be kept at room temperature in the original carton for up to 30 days. Do not freeze.     How should I dispose of this medication? The device is a single-dose disposable pen and should be discarded in a sharps container after use. The blue caps should also be placed in a sharps container. If you do not own a sharps container, you may use a household container made of heavy-duty plastic with a tight-fitting lid that is leak resistant (e.g., heavty-duty plastic laundry detergent bottle).  If your doctor decides to stop this medication, take to your local police station for proper disposal. Some pharmacies also have take-back bins for medication drop-off.      Resources/Support   How can I remind myself to take this medication? You can download reminder apps to help you manage your refills. You may also set an alarm on your phone to remind you to take your dose.    Is financial support available?  [employer] can provide co-pay cards if you have commercial insurance or patient assistance if you have Medicare or no insurance.    Which vaccines are recommended for me? Talk to your doctor about these vaccines: Flu, Coronavirus (COVID-19), Pneumococcal (pneumonia), Tdap, Hepatitis B, Zoster (shingles)        Attestation  Therapeutic appropriateness: Appropriate   I attest the patient was actively involved in and has agreed to the above plan of care. If the prescribed therapy is at any point deemed not appropriate based on the current or future assessments, a consultation will be initiated with the patient's specialty care provider to determine the best course of action. The revised plan of therapy will be documented along with any required assessments and/or additional patient education provided.     Reema Vazquez, PharmD  11/18/2024  10:22 EST

## 2024-11-25 ENCOUNTER — FLU SHOT (OUTPATIENT)
Dept: FAMILY MEDICINE CLINIC | Facility: CLINIC | Age: 69
End: 2024-11-25
Payer: MEDICARE

## 2024-11-25 DIAGNOSIS — Z23 NEED FOR INFLUENZA VACCINATION: Primary | ICD-10-CM

## 2024-11-25 PROCEDURE — G0008 ADMIN INFLUENZA VIRUS VAC: HCPCS | Performed by: GENERAL PRACTICE

## 2024-11-25 PROCEDURE — 90662 IIV NO PRSV INCREASED AG IM: CPT | Performed by: GENERAL PRACTICE

## 2024-11-25 RX ORDER — ERYTHROMYCIN 5 MG/G
OINTMENT OPHTHALMIC 2 TIMES DAILY
Qty: 3.5 G | Refills: 0 | Status: SHIPPED | OUTPATIENT
Start: 2024-11-25

## 2025-01-24 ENCOUNTER — SPECIALTY PHARMACY (OUTPATIENT)
Dept: PHARMACY | Facility: HOSPITAL | Age: 70
End: 2025-01-24
Payer: MEDICARE

## 2025-01-24 NOTE — PROGRESS NOTES
Specialty Pharmacy Refill Coordination Note     Ethel is a 69 y.o. female contacted today regarding refills of  Praluent 150mg specialty medication(s).    Reviewed and verified with patient:       Specialty medication(s) and dose(s) confirmed: yes    Refill Questions      Flowsheet Row Most Recent Value   Changes to allergies? No   Changes to medications? No   New conditions or infections since last clinic visit No   Unplanned office visit, urgent care, ED, or hospital admission in the last 4 weeks  No   How does patient/caregiver feel medication is working? Good   Financial problems or insurance changes  No   Since the previous refill, were any specialty medication doses or scheduled injections missed or delayed?  No   Does this patient require a clinical escalation to a pharmacist? No            Delivery Questions      Flowsheet Row Most Recent Value   Delivery method [employer]   Delivery address verified with patient/caregiver? Yes   Delivery address Home   Medication(s) being filled and delivered Alirocumab (Praluent)   Copay verified? Yes   Copay amount $0   Copay form of payment No copayment ($0)   Ship Date 1/27   Delivery Date Selection 01/28/25   Signature Required No                   Follow-up: 84 day(s)     Pratibha Latham, Pharmacy Technician  Specialty Pharmacy Technician

## 2025-02-09 DIAGNOSIS — K59.09 CHRONIC CONSTIPATION: ICD-10-CM

## 2025-02-10 RX ORDER — LINACLOTIDE 72 UG/1
72 CAPSULE, GELATIN COATED ORAL
Qty: 30 CAPSULE | Refills: 5 | Status: SHIPPED | OUTPATIENT
Start: 2025-02-10

## 2025-03-18 DIAGNOSIS — J30.2 CHRONIC SEASONAL ALLERGIC RHINITIS: ICD-10-CM

## 2025-03-18 RX ORDER — FLUTICASONE PROPIONATE 50 MCG
2 SPRAY, SUSPENSION (ML) NASAL DAILY
Qty: 16 G | Refills: 5 | Status: SHIPPED | OUTPATIENT
Start: 2025-03-18

## 2025-04-01 ENCOUNTER — OFFICE VISIT (OUTPATIENT)
Dept: FAMILY MEDICINE CLINIC | Facility: CLINIC | Age: 70
End: 2025-04-01
Payer: MEDICARE

## 2025-04-01 ENCOUNTER — SPECIALTY PHARMACY (OUTPATIENT)
Dept: PHARMACY | Facility: HOSPITAL | Age: 70
End: 2025-04-01
Payer: MEDICARE

## 2025-04-01 DIAGNOSIS — M85.80 OSTEOPENIA, UNSPECIFIED LOCATION: ICD-10-CM

## 2025-04-01 DIAGNOSIS — E11.9 TYPE 2 DIABETES MELLITUS WITHOUT COMPLICATION, WITHOUT LONG-TERM CURRENT USE OF INSULIN: ICD-10-CM

## 2025-04-01 DIAGNOSIS — N20.0 NEPHROLITHIASIS: ICD-10-CM

## 2025-04-01 DIAGNOSIS — Z00.00 HEALTHCARE MAINTENANCE: ICD-10-CM

## 2025-04-01 DIAGNOSIS — G45.9 TIA (TRANSIENT ISCHEMIC ATTACK): ICD-10-CM

## 2025-04-01 DIAGNOSIS — I25.10 CORONARY ARTERY DISEASE INVOLVING NATIVE CORONARY ARTERY OF NATIVE HEART WITHOUT ANGINA PECTORIS: ICD-10-CM

## 2025-04-01 DIAGNOSIS — I87.2 CHRONIC VENOUS INSUFFICIENCY: ICD-10-CM

## 2025-04-01 DIAGNOSIS — E78.2 MIXED HYPERLIPIDEMIA: ICD-10-CM

## 2025-04-01 DIAGNOSIS — Z72.0 DIPS TOBACCO: ICD-10-CM

## 2025-04-01 DIAGNOSIS — K85.90 RECURRENT ACUTE PANCREATITIS: ICD-10-CM

## 2025-04-01 DIAGNOSIS — F41.8 DEPRESSION WITH ANXIETY: ICD-10-CM

## 2025-04-01 DIAGNOSIS — N28.1 RENAL CYST, LEFT: ICD-10-CM

## 2025-04-01 DIAGNOSIS — G44.89 OTHER HEADACHE SYNDROME: ICD-10-CM

## 2025-04-01 DIAGNOSIS — K59.09 CHRONIC CONSTIPATION: ICD-10-CM

## 2025-04-01 DIAGNOSIS — I25.10 ASCVD (ARTERIOSCLEROTIC CARDIOVASCULAR DISEASE): ICD-10-CM

## 2025-04-01 DIAGNOSIS — K21.9 GASTROESOPHAGEAL REFLUX DISEASE WITHOUT ESOPHAGITIS: ICD-10-CM

## 2025-04-01 DIAGNOSIS — E55.9 VITAMIN D DEFICIENCY DISEASE: ICD-10-CM

## 2025-04-01 DIAGNOSIS — N95.1 MENOPAUSAL SYMPTOM: ICD-10-CM

## 2025-04-01 DIAGNOSIS — N18.32 CHRONIC RENAL FAILURE, STAGE 3B: ICD-10-CM

## 2025-04-01 DIAGNOSIS — J44.9 COPD MIXED TYPE: ICD-10-CM

## 2025-04-01 DIAGNOSIS — M54.59 MECHANICAL LOW BACK PAIN: ICD-10-CM

## 2025-04-01 DIAGNOSIS — N39.41 URGE URINARY INCONTINENCE: ICD-10-CM

## 2025-04-01 DIAGNOSIS — R97.8 ELEVATED CA 19-9 LEVEL: ICD-10-CM

## 2025-04-01 DIAGNOSIS — Z87.440 HISTORY OF RECURRENT URINARY TRACT INFECTION: ICD-10-CM

## 2025-04-01 DIAGNOSIS — R94.39 ABNORMAL STRESS TEST: ICD-10-CM

## 2025-04-01 DIAGNOSIS — J30.2 CHRONIC SEASONAL ALLERGIC RHINITIS: Primary | ICD-10-CM

## 2025-04-01 DIAGNOSIS — I10 ESSENTIAL HYPERTENSION: ICD-10-CM

## 2025-04-01 PROCEDURE — 82570 ASSAY OF URINE CREATININE: CPT | Performed by: GENERAL PRACTICE

## 2025-04-01 PROCEDURE — 85025 COMPLETE CBC W/AUTO DIFF WBC: CPT | Performed by: GENERAL PRACTICE

## 2025-04-01 PROCEDURE — 84156 ASSAY OF PROTEIN URINE: CPT | Performed by: GENERAL PRACTICE

## 2025-04-01 PROCEDURE — 82607 VITAMIN B-12: CPT | Performed by: GENERAL PRACTICE

## 2025-04-01 PROCEDURE — 82043 UR ALBUMIN QUANTITATIVE: CPT | Performed by: GENERAL PRACTICE

## 2025-04-01 PROCEDURE — 80061 LIPID PANEL: CPT | Performed by: GENERAL PRACTICE

## 2025-04-01 PROCEDURE — 80053 COMPREHEN METABOLIC PANEL: CPT | Performed by: GENERAL PRACTICE

## 2025-04-01 PROCEDURE — 84443 ASSAY THYROID STIM HORMONE: CPT | Performed by: GENERAL PRACTICE

## 2025-04-01 PROCEDURE — 83036 HEMOGLOBIN GLYCOSYLATED A1C: CPT | Performed by: GENERAL PRACTICE

## 2025-04-01 PROCEDURE — 82306 VITAMIN D 25 HYDROXY: CPT | Performed by: GENERAL PRACTICE

## 2025-04-01 NOTE — PROGRESS NOTES
Subjective   Ethel Trotter is a 69 y.o. female.     Chief Complaint  She returns for a scheduled reassessment of multiple medical problems including chronic allergic rhinitis, type 2 diabetes mellitus, hyperlipidemia, essential hypertension, coronary artery disease, chronic renal failure, chronic low back pain, depression with anxiety, and headaches    History of Present Illness     Chronic Allergic Rhinitis  She returns with a several week history of rhinorrhea, nasal congestion, postnasal drip, and intermittent cough.  She denies any other upper respiratory tract symptoms, there is no history of any chest pain, shortness of breath, hemoptysis, fever, or chills.  She has not started back on her nasal fluticasone    Type 2 Diabetes Mellitus  She continues to deny paresthesias of the feet, visual disturbances, polydipsia, polyuria, hypoglycemia or foot ulcerations.  She remains on insulin degludec and empagliflozin     Hyperlipidemia  Her compliance with treatment has been fair. She remains on praluent.  She is also prescribed rosuvastatin and ezetimibe    Lab Results   Component Value Date    CHOL 74 04/01/2025    CHLPL 247 (H) 03/10/2016    TRIG 142 04/01/2025    HDL 35 (L) 04/01/2025    LDL 15 04/01/2025     Essential Hypertension  She continues to deny any chest pain, palpitations, dyspnea, orthopnea, paroxysmal nocturnal dyspnea or peripheral edema.  She remains on lisinopril, carvedilol, and amlodipine.     Coronary artery disease  She has a history of previous M.I. and CABG surgery.  She continues to deny any chest pain and there is no history of any palpitations, lightheadedness, shortness of breath, calf pain, or swelling of the ankles. She remains on low-dose ASA.  She underwent an echocardiogram on 8/3/2022 with mild aortic calcification and regurgitation, mild to moderate MVR, mild TVR, and an estimated EF of 51 to 55%.  She is not followed by cardiology at present  Lab Results   Component Value Date     WBC 8.26 05/21/2024    HGB 12.6 05/21/2024    HCT 39.4 05/21/2024    MCV 88.3 05/21/2024     (L) 05/21/2024     Chronic Renal Failure  This has been contributed to hypertensive and diabetic nephropathy along with possible PCKD.  She is aware to avoid any NSAIDs.  She is not followed by nephrology at present    Elevated CA 19-9 Level  She denies any abdominal pain. She continues to deny any nausea, vomiting, change in her bowel habits, hematochezia, or melena.  She is not followed by GI at present    Low Back Pain   She has a long history of low back pain worse over the last few years. The pain is described as an intermittent left lower ache.  This occasionally radiates to her left flank, left lateral hip, and left posterior thigh.  The pain in her back is worse than that elsewhere.  She has been unable to identify any precipitating, exacerbating, or relieving factors.  She continues to deny any changes in her strength, sensation, or bowel/bladder control. X-rays of the left hip performed on 2/26/2020 were unremarkable.  MRI of the lumbar spine performed on 11/18/2020 was reported as showing degenerative disc disease with moderate to severe right neuroforaminal narrowing at L4-5     Depression with Anxiety  She has a long history of intermittent depression, nervousness, and difficulty sleeping.  She continues to deny any change in her concentration or memory and there is no history of any suicidal ideation.  She lost a granddaughter to cancer just over a year ago.  This has been difficult but she has a supportive family and .  She is prescribed citalopram    Headache  She continues to experience an intermittent pressure about the parietal area bilaterally.  She denies any associated symptoms at present.  She denies any further problems with her balance.  She continues to deny any problems with her eye hand coordination and has had no changes in her vision, strength, or sensation.       The following  portions of the patient's history were reviewed and updated as appropriate: allergies, current medications, past medical history, past social history, and problem list.    Review of Systems   Constitutional:  Positive for fatigue. Negative for chills and fever.   HENT:  Negative for congestion, ear pain, rhinorrhea and sore throat.    Eyes:  Negative for visual disturbance.   Respiratory:  Negative for cough, shortness of breath and wheezing.    Cardiovascular:  Negative for chest pain, palpitations and leg swelling.   Gastrointestinal:  Negative for abdominal pain, blood in stool, constipation, diarrhea, nausea and vomiting.   Genitourinary:  Positive for frequency. Negative for dysuria, hematuria, urgency and vaginal bleeding.   Musculoskeletal:  Positive for gait problem. Negative for arthralgias, back pain, joint swelling and myalgias.   Skin:  Negative for rash.   Neurological:  Positive for headache. Negative for dizziness, weakness and numbness.   Psychiatric/Behavioral:  Positive for decreased concentration. Negative for sleep disturbance, suicidal ideas and depressed mood. The patient is not nervous/anxious.      Objective   Physical Exam  Constitutional:       General: She is not in acute distress.     Appearance: Normal appearance. She is well-developed. She is not diaphoretic.      Comments: Alert and in fair spirits. No apparent distress. No pallor, jaundice, diaphoresis, or cyanosis.   HENT:      Head: Atraumatic.      Right Ear: Ear canal and external ear normal. Tympanic membrane is scarred.      Left Ear: Ear canal and external ear normal. Tympanic membrane is scarred.      Mouth/Throat:      Lips: No lesions.      Mouth: Mucous membranes are moist. No oral lesions.      Pharynx: No oropharyngeal exudate or posterior oropharyngeal erythema.   Eyes:      General: Lids are normal.      Extraocular Movements: Extraocular movements intact.      Conjunctiva/sclera: Conjunctivae normal.      Pupils:  Pupils are equal.   Neck:      Thyroid: No thyroid mass or thyromegaly.      Vascular: No carotid bruit or JVD.      Trachea: Trachea normal. No tracheal deviation.   Cardiovascular:      Rate and Rhythm: Normal rate and regular rhythm.      Heart sounds: Normal heart sounds, S1 normal and S2 normal. No murmur heard.     No gallop.   Pulmonary:      Effort: Pulmonary effort is normal.      Breath sounds: Normal breath sounds.   Chest:      Chest wall: No tenderness.   Abdominal:      General: Bowel sounds are normal. There is no distension.   Musculoskeletal:      Right lower leg: No edema.      Left lower leg: No edema.   Lymphadenopathy:      Head:      Right side of head: No submental, submandibular, tonsillar, preauricular, posterior auricular or occipital adenopathy.      Left side of head: No submental, submandibular, tonsillar, preauricular, posterior auricular or occipital adenopathy.      Cervical: No cervical adenopathy.      Upper Body:      Right upper body: No supraclavicular adenopathy.      Left upper body: No supraclavicular adenopathy.   Skin:     General: Skin is warm.      Coloration: Skin is not cyanotic, jaundiced or pale.      Findings: No rash.      Nails: There is no clubbing.   Neurological:      Mental Status: She is alert and oriented to person, place, and time.      Cranial Nerves: No cranial nerve deficit, dysarthria or facial asymmetry.      Sensory: No sensory deficit.      Motor: No tremor.      Coordination: Coordination normal. Finger-Nose-Finger Test normal.      Gait: Gait normal.   Psychiatric:         Attention and Perception: Attention normal.         Mood and Affect: Mood normal.         Speech: Speech normal.         Behavior: Behavior normal.         Thought Content: Thought content normal. Thought content does not include suicidal ideation.       Assessment & Plan   Problems Addressed this Visit          Allergies and Adverse Reactions    Chronic seasonal allergic rhinitis    Reminded regarding allergen avoidance.  Encouraged to resume nasal fluticasone    Relevant Medications    fluticasone (FLONASE) 50 MCG/ACT nasal spray       Cardiac and Vasculature    ASCVD (arteriosclerotic cardiovascular disease)  Reminded regarding risk factor modification with an emphasis on tobacco cessation.  Isosorbide mononitrate will be reduced to 60 daily as it may be contributing to her headaches.  Provided she remains chest pain-free, this may be reduced further  Continue low-dose ASA.    Relevant Medications    isosorbide mononitrate (IMDUR) 60 MG 24 hr tablet    carvedilol (COREG) 12.5 MG tablet    aspirin (Aspirin Low Dose) 81 MG EC tablet    Other Relevant Orders    CBC & Differential (Completed)    Chronic venous insufficiency    Essential hypertension   Hypertension: elevated today. Evidence of target organ damage: coronary artery disease, chronic kidney disease, and transient ischemic attack.  Encouraged to continue to work on diet and exercise plan.   Continue current medication.  Reminded of the importance of taking her medications as prescribed    Relevant Medications    lisinopril (PRINIVIL,ZESTRIL) 40 MG tablet    carvedilol (COREG) 12.5 MG tablet    amLODIPine (NORVASC) 10 MG tablet    Other Relevant Orders    CBC & Differential (Completed)    Comprehensive Metabolic Panel (Completed)    TSH (Completed)    Mixed hyperlipidemia  As above.   Continue current medication.    Relevant Medications    rosuvastatin (CRESTOR) 40 MG tablet    ezetimibe (ZETIA) 10 MG tablet    Other Relevant Orders    Comprehensive Metabolic Panel (Completed)    Lipid Panel (Completed)    TSH (Completed)       Endocrine and Metabolic    Type 2 diabetes mellitus without complication, without long-term current use of insulin  Diabetes mellitus Type II, under unknown control.   Encouraged to continue to pursue ADA diet  Encouraged aerobic exercise.  Continue current medication  Updated labs drawn.    Relevant  Medications    Lancets misc    Glucose Blood (Blood Glucose Test) strip    empagliflozin (Jardiance) 10 MG tablet tablet    Other Relevant Orders    Comprehensive Metabolic Panel (Completed)    TSH (Completed)    Hemoglobin A1c (Completed)    Vitamin B12 (Completed)    Microalbumin / Creatinine Urine Ratio - Urine, Clean Catch (Completed)    Protein / Creatinine Ratio, Urine - Urine, Clean Catch (Completed)    Vitamin D deficiency disease    Relevant Orders    Vitamin D,25-Hydroxy (Completed)       Gastrointestinal Abdominal     Chronic constipation    Relevant Medications    linaclotide (Linzess) 72 MCG capsule capsule    Other Relevant Orders    CBC & Differential (Completed)    Gastroesophageal reflux disease without esophagitis    Relevant Orders    CBC & Differential (Completed)    Recurrent acute pancreatitis  Patient remains uninterested in returning to GI but will report if any further abdominal pain       Genitourinary and Reproductive     History of recurrent urinary tract infection    Menopausal symptom    Nephrolithiasis    Renal cyst, left    Urge urinary incontinence       Health Encounters    Healthcare maintenance  Recommended Shingrix, RSV, and an updated Tdap  She remains uninterested in undergoing any cancer screening       Hematology and Neoplasia    Elevated CA 19-9 level       Mental Health    Depression with anxiety  Significant situational component.   Supportive therapy.   Continue current medication.  Encouraged to report if any worse or if any new symptoms or concerns.    Relevant Medications    citalopram (CeleXA) 20 MG tablet       Musculoskeletal and Injuries    Mechanical low back pain  Reminded regarding symptomatic treatment.   Continue current medication  Encouraged to report if any worse or if any new symptoms or concerns.    Relevant Medications    gabapentin (NEURONTIN) 100 MG capsule    Osteopenia       Neuro    Other headache syndrome     As above.   Encouraged to report if any  worse, any new symptoms, or if no better over the next few weeks                        TIA (transient ischemic attack)  As above.   Continue current medication.       Pulmonary and Pneumonias    COPD mixed type   COPD is stable.  Encouraged to remain as active as symptoms allow for  Continue current medication    Relevant Medications    albuterol sulfate HFA (Ventolin HFA) 108 (90 Base) MCG/ACT inhaler    fluticasone (FLONASE) 50 MCG/ACT nasal spray    Other Relevant Orders    CBC & Differential (Completed)       Tobacco    Dips tobacco       Other    Chronic renal failure, stage 3b    Relevant Orders    CBC & Differential (Completed)    Comprehensive Metabolic Panel (Completed)    Microalbumin / Creatinine Urine Ratio - Urine, Clean Catch (Completed)    Protein / Creatinine Ratio, Urine - Urine, Clean Catch (Completed)     Diagnoses         Codes Comments      Chronic seasonal allergic rhinitis    -  Primary ICD-10-CM: J30.2  ICD-9-CM: 477.8       Mixed hyperlipidemia     ICD-10-CM: E78.2  ICD-9-CM: 272.2       Essential hypertension     ICD-10-CM: I10  ICD-9-CM: 401.9       Chronic venous insufficiency     ICD-10-CM: I87.2  ICD-9-CM: 459.81       ASCVD (arteriosclerotic cardiovascular disease)     ICD-10-CM: I25.10  ICD-9-CM: 429.2, 440.9       Vitamin D deficiency disease     ICD-10-CM: E55.9  ICD-9-CM: 268.9       Type 2 diabetes mellitus without complication, without long-term current use of insulin     ICD-10-CM: E11.9  ICD-9-CM: 250.00       Recurrent acute pancreatitis     ICD-10-CM: K85.90  ICD-9-CM: 577.0       Gastroesophageal reflux disease without esophagitis     ICD-10-CM: K21.9  ICD-9-CM: 530.81       Chronic constipation     ICD-10-CM: K59.09  ICD-9-CM: 564.00       Urge urinary incontinence     ICD-10-CM: N39.41  ICD-9-CM: 788.31       Renal cyst, left     ICD-10-CM: N28.1  ICD-9-CM: 753.10       Nephrolithiasis     ICD-10-CM: N20.0  ICD-9-CM: 592.0       Menopausal symptom     ICD-10-CM:  N95.1  ICD-9-CM: 627.2       History of recurrent urinary tract infection     ICD-10-CM: Z87.440  ICD-9-CM: V13.02       Healthcare maintenance     ICD-10-CM: Z00.00  ICD-9-CM: V70.0       Elevated CA 19-9 level     ICD-10-CM: R97.8  ICD-9-CM: 795.89       Depression with anxiety     ICD-10-CM: F41.8  ICD-9-CM: 300.4       Osteopenia, unspecified location     ICD-10-CM: M85.80  ICD-9-CM: 733.90       Mechanical low back pain     ICD-10-CM: M54.59  ICD-9-CM: 724.2       TIA (transient ischemic attack)     ICD-10-CM: G45.9  ICD-9-CM: 435.9       Other headache syndrome     ICD-10-CM: G44.89  ICD-9-CM: 339.89       COPD mixed type     ICD-10-CM: J44.9  ICD-9-CM: 496       Dips tobacco     ICD-10-CM: Z72.0  ICD-9-CM: 305.1       Chronic renal failure, stage 3b     ICD-10-CM: N18.32  ICD-9-CM: 585.3       Coronary artery disease involving native coronary artery of native heart without angina pectoris     ICD-10-CM: I25.10  ICD-9-CM: 414.01       ASCVD (arteriosclerotic cardiovascular disease), status post previous MI, status post CABG (three-vessel) about 9 years ago.     ICD-10-CM: I25.10  ICD-9-CM: 429.2, 440.9       Abnormal stress test     ICD-10-CM: R94.39  ICD-9-CM: 794.39

## 2025-04-01 NOTE — PROGRESS NOTES
Specialty Pharmacy Patient Management Program  Medication Management Clinic Refill Outreach      Ethel was contacted today regarding refills of her medication(s).    Specialty medication(s) and dose(s) confirmed: praluent 150 mg    Refill Questions      Flowsheet Row Most Recent Value   Changes to allergies? No   Changes to medications? No   New conditions or infections since last clinic visit No   Unplanned office visit, urgent care, ED, or hospital admission in the last 4 weeks  No   How does patient/caregiver feel medication is working? Good   Financial problems or insurance changes  No   Since the previous refill, were any specialty medication doses or scheduled injections missed or delayed?  No   Does this patient require a clinical escalation to a pharmacist? No          Delivery Questions      Flowsheet Row Most Recent Value   Delivery method [employer]   Delivery address verified with patient/caregiver? Yes   Delivery address Temporary   Number of medications in delivery 1   Medication(s) being filled and delivered Alirocumab (Praluent)   Doses left of specialty medications 0   Copay verified? Yes   Copay amount $0.00   Copay form of payment No copayment ($0)   Delivery Date Selection 04/03/25   Signature Required No            Follow-Up: 84 days    Isabel Lemus, PharmD  4/1/2025  15:50 EDT

## 2025-04-02 ENCOUNTER — TELEPHONE (OUTPATIENT)
Dept: FAMILY MEDICINE CLINIC | Facility: CLINIC | Age: 70
End: 2025-04-02
Payer: MEDICARE

## 2025-04-02 VITALS
RESPIRATION RATE: 14 BRPM | HEART RATE: 66 BPM | TEMPERATURE: 98.6 F | BODY MASS INDEX: 24.1 KG/M2 | WEIGHT: 136 LBS | HEIGHT: 63 IN | OXYGEN SATURATION: 97 % | SYSTOLIC BLOOD PRESSURE: 162 MMHG | DIASTOLIC BLOOD PRESSURE: 80 MMHG

## 2025-04-02 DIAGNOSIS — N18.32 CHRONIC RENAL FAILURE, STAGE 3B: Primary | ICD-10-CM

## 2025-04-02 LAB
25(OH)D3 SERPL-MCNC: 27.7 NG/ML (ref 30–100)
ALBUMIN SERPL-MCNC: 4 G/DL (ref 3.5–5.2)
ALBUMIN UR-MCNC: 5.8 MG/DL
ALBUMIN/GLOB SERPL: 1.4 G/DL
ALP SERPL-CCNC: 81 U/L (ref 39–117)
ALT SERPL W P-5'-P-CCNC: 21 U/L (ref 1–33)
ANION GAP SERPL CALCULATED.3IONS-SCNC: 10.1 MMOL/L (ref 5–15)
AST SERPL-CCNC: 29 U/L (ref 1–32)
BASOPHILS # BLD AUTO: 0.03 10*3/MM3 (ref 0–0.2)
BASOPHILS NFR BLD AUTO: 0.4 % (ref 0–1.5)
BILIRUB SERPL-MCNC: 0.3 MG/DL (ref 0–1.2)
BUN SERPL-MCNC: 28 MG/DL (ref 8–23)
BUN/CREAT SERPL: 15 (ref 7–25)
CALCIUM SPEC-SCNC: 9.4 MG/DL (ref 8.6–10.5)
CHLORIDE SERPL-SCNC: 103 MMOL/L (ref 98–107)
CHOLEST SERPL-MCNC: 74 MG/DL (ref 0–200)
CO2 SERPL-SCNC: 24.9 MMOL/L (ref 22–29)
CREAT SERPL-MCNC: 1.87 MG/DL (ref 0.57–1)
CREAT UR-MCNC: 62.1 MG/DL
CREAT UR-MCNC: 62.1 MG/DL
DEPRECATED RDW RBC AUTO: 42.5 FL (ref 37–54)
EGFRCR SERPLBLD CKD-EPI 2021: 28.5 ML/MIN/1.73
EOSINOPHIL # BLD AUTO: 0.4 10*3/MM3 (ref 0–0.4)
EOSINOPHIL NFR BLD AUTO: 4.8 % (ref 0.3–6.2)
ERYTHROCYTE [DISTWIDTH] IN BLOOD BY AUTOMATED COUNT: 13.7 % (ref 12.3–15.4)
GLOBULIN UR ELPH-MCNC: 2.9 GM/DL
GLUCOSE SERPL-MCNC: 178 MG/DL (ref 65–99)
HBA1C MFR BLD: 9 % (ref 4.8–5.6)
HCT VFR BLD AUTO: 37.8 % (ref 34–46.6)
HDLC SERPL-MCNC: 35 MG/DL (ref 40–60)
HGB BLD-MCNC: 12.3 G/DL (ref 12–15.9)
IMM GRANULOCYTES # BLD AUTO: 0.02 10*3/MM3 (ref 0–0.05)
IMM GRANULOCYTES NFR BLD AUTO: 0.2 % (ref 0–0.5)
LDLC SERPL CALC-MCNC: 15 MG/DL (ref 0–100)
LDLC/HDLC SERPL: 0.3 {RATIO}
LYMPHOCYTES # BLD AUTO: 1.39 10*3/MM3 (ref 0.7–3.1)
LYMPHOCYTES NFR BLD AUTO: 16.5 % (ref 19.6–45.3)
MCH RBC QN AUTO: 27.9 PG (ref 26.6–33)
MCHC RBC AUTO-ENTMCNC: 32.5 G/DL (ref 31.5–35.7)
MCV RBC AUTO: 85.7 FL (ref 79–97)
MICROALBUMIN/CREAT UR: 93.4 MG/G (ref 0–29)
MONOCYTES # BLD AUTO: 0.52 10*3/MM3 (ref 0.1–0.9)
MONOCYTES NFR BLD AUTO: 6.2 % (ref 5–12)
NEUTROPHILS NFR BLD AUTO: 6.05 10*3/MM3 (ref 1.7–7)
NEUTROPHILS NFR BLD AUTO: 71.9 % (ref 42.7–76)
NRBC BLD AUTO-RTO: 0 /100 WBC (ref 0–0.2)
PLATELET # BLD AUTO: 142 10*3/MM3 (ref 140–450)
PMV BLD AUTO: 11.3 FL (ref 6–12)
POTASSIUM SERPL-SCNC: 4.8 MMOL/L (ref 3.5–5.2)
PROT ?TM UR-MCNC: 25.3 MG/DL
PROT SERPL-MCNC: 6.9 G/DL (ref 6–8.5)
PROT/CREAT UR: 407.4 MG/G CREA (ref 0–200)
RBC # BLD AUTO: 4.41 10*6/MM3 (ref 3.77–5.28)
SODIUM SERPL-SCNC: 138 MMOL/L (ref 136–145)
TRIGL SERPL-MCNC: 142 MG/DL (ref 0–150)
TSH SERPL DL<=0.05 MIU/L-ACNC: 2.04 UIU/ML (ref 0.27–4.2)
VIT B12 BLD-MCNC: 943 PG/ML (ref 211–946)
VLDLC SERPL-MCNC: 24 MG/DL (ref 5–40)
WBC NRBC COR # BLD AUTO: 8.41 10*3/MM3 (ref 3.4–10.8)

## 2025-04-02 RX ORDER — ROSUVASTATIN CALCIUM 40 MG/1
40 TABLET, COATED ORAL DAILY
Qty: 30 TABLET | Refills: 5 | Status: SHIPPED | OUTPATIENT
Start: 2025-04-02

## 2025-04-02 RX ORDER — AMLODIPINE BESYLATE 10 MG/1
10 TABLET ORAL EVERY EVENING
Qty: 30 TABLET | Refills: 5 | Status: SHIPPED | OUTPATIENT
Start: 2025-04-02

## 2025-04-02 RX ORDER — LISINOPRIL 40 MG/1
40 TABLET ORAL DAILY
Qty: 30 TABLET | Refills: 5 | Status: SHIPPED | OUTPATIENT
Start: 2025-04-02

## 2025-04-02 RX ORDER — ALBUTEROL SULFATE 90 UG/1
2 INHALANT RESPIRATORY (INHALATION) EVERY 4 HOURS PRN
Qty: 18 G | Refills: 5 | Status: SHIPPED | OUTPATIENT
Start: 2025-04-02

## 2025-04-02 RX ORDER — ASPIRIN 81 MG/1
81 TABLET ORAL DAILY
Qty: 30 TABLET | Refills: 5 | Status: SHIPPED | OUTPATIENT
Start: 2025-04-02

## 2025-04-02 RX ORDER — ISOSORBIDE MONONITRATE 60 MG/1
60 TABLET, EXTENDED RELEASE ORAL DAILY
Qty: 30 TABLET | Refills: 5 | Status: SHIPPED | OUTPATIENT
Start: 2025-04-02

## 2025-04-02 RX ORDER — CARVEDILOL 12.5 MG/1
12.5 TABLET ORAL 2 TIMES DAILY
Qty: 60 TABLET | Refills: 5 | Status: SHIPPED | OUTPATIENT
Start: 2025-04-02

## 2025-04-02 RX ORDER — CITALOPRAM HYDROBROMIDE 20 MG/1
20 TABLET ORAL DAILY
Qty: 30 TABLET | Refills: 5 | Status: SHIPPED | OUTPATIENT
Start: 2025-04-02

## 2025-04-02 RX ORDER — AVOBENZONE, HOMOSALATE, OCTISALATE, OCTOCRYLENE 30; 40; 45; 26 MG/ML; MG/ML; MG/ML; MG/ML
CREAM TOPICAL
Qty: 60 EACH | Refills: 5 | Status: SHIPPED | OUTPATIENT
Start: 2025-04-02

## 2025-04-02 RX ORDER — FLUTICASONE PROPIONATE 50 MCG
2 SPRAY, SUSPENSION (ML) NASAL DAILY
Qty: 16 G | Refills: 5 | Status: SHIPPED | OUTPATIENT
Start: 2025-04-02

## 2025-04-02 RX ORDER — GABAPENTIN 100 MG/1
200 CAPSULE ORAL NIGHTLY
Qty: 60 CAPSULE | Refills: 2 | Status: SHIPPED | OUTPATIENT
Start: 2025-04-02

## 2025-04-02 RX ORDER — GLUCOSAMINE HCL/CHONDROITIN SU 500-400 MG
CAPSULE ORAL
Qty: 50 EACH | Refills: 5 | Status: SHIPPED | OUTPATIENT
Start: 2025-04-02

## 2025-04-02 RX ORDER — EZETIMIBE 10 MG/1
10 TABLET ORAL
Qty: 30 TABLET | Refills: 5 | Status: SHIPPED | OUTPATIENT
Start: 2025-04-02

## 2025-04-02 NOTE — TELEPHONE ENCOUNTER
Pt aware      ----- Message from Sacha Montez sent at 4/2/2025 12:03 PM EDT -----  She needs to increase to 30 daily for one week, then 35 daily afterwardThanks  ----- Message -----  From: Nadia Wagoner MA  Sent: 4/2/2025  11:53 AM EDT  To: Sacha Montez MD    25 units  ----- Message -----  From: Sacha Montez MD  Sent: 4/2/2025  10:39 AM EDT  To: Nadia Wagoner MA    How many units of insulin is she doing daily at present?

## 2025-04-18 ENCOUNTER — OFFICE VISIT (OUTPATIENT)
Dept: FAMILY MEDICINE CLINIC | Facility: CLINIC | Age: 70
End: 2025-04-18
Payer: MEDICARE

## 2025-04-18 VITALS
DIASTOLIC BLOOD PRESSURE: 110 MMHG | TEMPERATURE: 98 F | OXYGEN SATURATION: 98 % | HEART RATE: 66 BPM | BODY MASS INDEX: 22.71 KG/M2 | SYSTOLIC BLOOD PRESSURE: 168 MMHG | WEIGHT: 128.2 LBS | HEIGHT: 63 IN

## 2025-04-18 DIAGNOSIS — I10 ESSENTIAL HYPERTENSION: Primary | Chronic | ICD-10-CM

## 2025-04-18 DIAGNOSIS — E11.9 TYPE 2 DIABETES MELLITUS WITHOUT COMPLICATION, WITHOUT LONG-TERM CURRENT USE OF INSULIN: Chronic | ICD-10-CM

## 2025-04-18 DIAGNOSIS — I25.10 ASCVD (ARTERIOSCLEROTIC CARDIOVASCULAR DISEASE): Chronic | ICD-10-CM

## 2025-04-18 RX ORDER — INSULIN DEGLUDEC 200 U/ML
15 INJECTION, SOLUTION SUBCUTANEOUS DAILY
Qty: 3 ML | Refills: 5 | Status: SHIPPED | OUTPATIENT
Start: 2025-04-18 | End: 2025-04-18

## 2025-04-18 RX ORDER — GLUCOSAMINE HCL/CHONDROITIN SU 500-400 MG
CAPSULE ORAL
Qty: 100 EACH | Refills: 5 | Status: SHIPPED | OUTPATIENT
Start: 2025-04-18

## 2025-04-18 RX ORDER — (INSULIN DEGLUDEC AND LIRAGLUTIDE) 100; 3.6 [IU]/ML; MG/ML
25 INJECTION, SOLUTION SUBCUTANEOUS
Qty: 750 ML | Refills: 3 | Status: SHIPPED | OUTPATIENT
Start: 2025-04-18

## 2025-04-18 RX ORDER — CARVEDILOL 12.5 MG/1
12.5 TABLET ORAL 2 TIMES DAILY
Qty: 60 TABLET | Refills: 5 | Status: SHIPPED | OUTPATIENT
Start: 2025-04-18

## 2025-04-18 RX ORDER — INSULIN GLARGINE AND LIXISENATIDE 100; 33 U/ML; UG/ML
25 INJECTION, SOLUTION SUBCUTANEOUS DAILY
Qty: 750 ML | Refills: 3 | Status: SHIPPED | OUTPATIENT
Start: 2025-04-18 | End: 2025-04-18

## 2025-04-18 RX ORDER — AVOBENZONE, HOMOSALATE, OCTISALATE, OCTOCRYLENE 30; 40; 45; 26 MG/ML; MG/ML; MG/ML; MG/ML
CREAM TOPICAL
Qty: 60 EACH | Refills: 5 | Status: SHIPPED | OUTPATIENT
Start: 2025-04-18

## 2025-04-18 NOTE — PATIENT INSTRUCTIONS
Carbohydrate Counting for Diabetes Mellitus, Adult  Carbohydrate counting is a method of keeping track of how many carbohydrates you eat. Eating carbohydrates increases the amount of sugar (glucose) in the blood. Counting how many carbohydrates you eat improves how well you manage your blood glucose. This, in turn, helps you manage your diabetes.  Carbohydrates are measured in grams (g) per serving. It is important to know how many carbohydrates (in grams or by serving size) you can have in each meal. This is different for every person. A dietitian can help you make a meal plan and calculate how many carbohydrates you should have at each meal and snack.  What foods contain carbohydrates?  Carbohydrates are found in the following foods:  Grains, such as breads and cereals.  Dried beans and soy products.  Starchy vegetables, such as potatoes, peas, and corn.  Fruit and fruit juices.  Milk and yogurt.  Sweets and snack foods, such as cake, cookies, candy, chips, and soft drinks.  How do I count carbohydrates in foods?  There are two ways to count carbohydrates in food. You can read food labels or learn standard serving sizes of foods. You can use either of these methods or a combination of both.  Using the Nutrition Facts label  The Nutrition Facts list is included on the labels of almost all packaged foods and beverages in the [place]. It includes:  The serving size.  Information about nutrients in each serving, including the grams of carbohydrate per serving.  To use the Nutrition Facts, decide how many servings you will have. Then, multiply the number of servings by the number of carbohydrates per serving. The resulting number is the total grams of carbohydrates that you will be having.  Learning the standard serving sizes of foods  When you eat carbohydrate foods that are not packaged or do not include Nutrition Facts on the label, you need to measure the servings in order to count the grams of  carbohydrates.  Measure the foods that you will eat with a food scale or measuring cup, if needed.  Decide how many standard-size servings you will eat.  Multiply the number of servings by 15. For foods that contain carbohydrates, one serving equals 15 g of carbohydrates.  For example, if you eat 2 cups or 10 oz (300 g) of strawberries, you will have eaten 2 servings and 30 g of carbohydrates (2 servings x 15 g = 30 g).  For foods that have more than one food mixed, such as soups and casseroles, you must count the carbohydrates in each food that is included.  The following list contains standard serving sizes of common carbohydrate-rich foods. Each of these servings has about 15 g of carbohydrates:  1 slice of bread.  1 six-inch (15 cm) tortilla.  ? cup or 2 oz (53 g) cooked rice or pasta.  ½ cup or 3 oz (85 g) cooked or canned, drained and rinsed beans or lentils.  ½ cup or 3 oz (85 g) starchy vegetable, such as peas, corn, or squash.  ½ cup or 4 oz (120 g) hot cereal.  ½ cup or 3 oz (85 g) boiled or mashed potatoes, or ¼ or 3 oz (85 g) of a large baked potato.  ½ cup or 4 fl oz (118 mL) fruit juice.  1 cup or 8 fl oz (237 mL) milk.  1 small or 4 oz (106 g) apple.  ½ or 2 oz (63 g) of a medium banana.  1 cup or 5 oz (150 g) strawberries.  3 cups or 1 oz (28.3 g) popped popcorn.  What is an example of carbohydrate counting?  To calculate the grams of carbohydrates in this sample meal, follow the steps shown below.  Sample meal  3 oz (85 g) chicken breast.  ? cup or 4 oz (106 g) brown rice.  ½ cup or 3 oz (85 g) corn.  1 cup or 8 fl oz (237 mL) milk.  1 cup or 5 oz (150 g) strawberries with sugar-free whipped topping.  Carbohydrate calculation  Identify the foods that contain carbohydrates:  Rice.  Corn.  Milk.  Strawberries.  Calculate how many servings you have of each food:  2 servings rice.  1 serving corn.  1 serving milk.  1 serving strawberries.  Multiply each number of servings by 15  servings rice x 15  g = 30 g.  1 serving corn x 15 g = 15 g.  1 serving milk x 15 g = 15 g.  1 serving strawberries x 15 g = 15 g.  Add together all of the amounts to find the total grams of carbohydrates eaten:  30 g + 15 g + 15 g + 15 g = 75 g of carbohydrates total.  What are tips for following this plan?  Shopping  Develop a meal plan and then make a shopping list.  Buy fresh and frozen vegetables, fresh and frozen fruit, dairy, eggs, beans, lentils, and whole grains.  Look at food labels. Choose foods that have more fiber and less sugar.  Avoid processed foods and foods with added sugars.  Meal planning  Aim to have the same number of grams of carbohydrates at each meal and for each snack time.  Plan to have regular, balanced meals and snacks.  Where to find more information  American Diabetes Association: diabetes.org  Centers for Disease Control and Prevention: cdc.gov  Academy of Nutrition and Dietetics: eatright.org  Association of Diabetes Care & Education Specialists: diabeteseducator.org  Summary  Carbohydrate counting is a method of keeping track of how many carbohydrates you eat.  Eating carbohydrates increases the amount of sugar (glucose) in your blood.  Counting how many carbohydrates you eat improves how well you manage your blood glucose. This helps you manage your diabetes.  A dietitian can help you make a meal plan and calculate how many carbohydrates you should have at each meal and snack.  This information is not intended to replace advice given to you by your health care provider. Make sure you discuss any questions you have with your health care provider.  Document Revised: 07/20/2021 Document Reviewed: 07/21/2021  [employer] Patient Education © 2024 [employer]Fat and Cholesterol Restricted Eating Plan  Getting too much fat and cholesterol in your diet may cause health problems. Choosing the right foods helps keep your fat and cholesterol at normal levels. This can keep you from getting certain diseases.  Your  "doctor may recommend an eating plan that includes:  Total fat: ______% or less of total calories a day. This is ______g of fat a day.  Saturated fat: ______% or less of total calories a day. This is ______g of saturated fat a day.  Cholesterol: less than _________mg a day.  Fiber: ______g a day.  What are tips for following this plan?  General tips  Work with your doctor to lose weight if you need to.  Avoid:  Foods with added sugar.  Fried foods.  Foods with trans fat or partially hydrogenated oils. This includes some margarines and baked goods.  If you drink alcohol:  Limit how much you have to:  0-1 drink a day for women who are not pregnant.  0-2 drinks a day for men.  Know how much alcohol is in a drink. In the [place] one drink equals one 12 oz bottle of beer (355 mL), one 5 oz glass of wine (148 mL), or one 1½ oz glass of hard liquor (44 mL).  Reading food labels  Check food labels for:  Trans fats.  Partially hydrogenated oils.  Saturated fat (g) in each serving.  Cholesterol (mg) in each serving.  Fiber (g) in each serving.  Choose foods with healthy fats, such as:  Monounsaturated fats and polyunsaturated fats. These include olive and canola oil, flaxseeds, walnuts, almonds, and seeds.  Omega-3 fats. These are found in certain fish, flaxseed oil, and ground flaxseeds.  Choose grain products that have whole grains. Look for the word \"whole\" as the first word in the ingredient list.  Cooking  Cook foods using low-fat methods. These include baking, boiling, grilling, and broiling.  Eat more home-cooked foods. Eat at restaurants and buffets less often. Eat less fast food.  Avoid cooking using saturated fats, such as butter, cream, palm oil, palm kernel oil, and coconut oil.  Meal planning    At meals, divide your plate into four equal parts:  Fill one-half of your plate with vegetables, green salads, and fruit.  Fill one-fourth of your plate with whole grains.  Fill one-fourth of your plate with low-fat (lean) " protein foods.  Eat fish that is high in omega-3 fats at least two times a week. This includes mackerel, tuna, sardines, and salmon.  Eat foods that are high in fiber, such as whole grains, beans, apples, pears, berries, broccoli, carrots, peas, and barley.  What foods should I eat?  Fruits  All fresh, canned (in natural juice), or frozen fruits.  Vegetables  Fresh or frozen vegetables (raw, steamed, roasted, or grilled). Green salads.  Grains  Whole grains, such as whole wheat or whole grain breads, crackers, cereals, and pasta. Unsweetened oatmeal, bulgur, barley, quinoa, or brown rice. Corn or whole wheat flour tortillas.  Meats and other protein foods  Ground beef (85% or leaner), grass-fed beef, or beef trimmed of fat. Skinless chicken or turkey. Ground chicken or turkey. Pork trimmed of fat. All fish and seafood. Egg whites. Dried beans, peas, or lentils. Unsalted nuts or seeds. Unsalted canned beans. Nut butters without added sugar or oil.  Dairy  Low-fat or nonfat dairy products, such as skim or 1% milk, 2% or reduced-fat cheeses, low-fat and fat-free ricotta or cottage cheese, or plain low-fat and nonfat yogurt.  Fats and oils  Tub margarine without trans fats. Light or reduced-fat mayonnaise and salad dressings. Avocado. Olive, canola, sesame, or safflower oils.  The items listed above may not be a complete list of foods and beverages you can eat. Contact a dietitian for more information.  What foods should I avoid?  Fruits  Canned fruit in heavy syrup. Fruit in cream or butter sauce. Fried fruit.  Vegetables  Vegetables cooked in cheese, cream, or butter sauce. Fried vegetables.  Grains  White bread. White pasta. White rice. Cornbread. Bagels, pastries, and croissants. Crackers and snack foods that contain trans fat and hydrogenated oils.  Meats and other protein foods  Fatty cuts of meat. Ribs, chicken wings, dejesus, sausage, bologna, salami, chitterlings, fatback, hot dogs, bratwurst, and packaged lunch  meats. Liver and organ meats. Whole eggs and egg yolks. Chicken and turkey with skin. Fried meat.  Dairy  Whole or 2% milk, cream, half-and-half, and cream cheese. Whole milk cheeses. Whole-fat or sweetened yogurt. Full-fat cheeses. Nondairy creamers and whipped toppings. Processed cheese, cheese spreads, and cheese curds.  Fats and oils  Butter, stick margarine, lard, shortening, ghee, or dejesus fat. Coconut, palm kernel, and palm oils.  Beverages  Alcohol. Sugar-sweetened drinks such as sodas, lemonade, and fruit drinks.  Sweets and desserts  Corn syrup, sugars, honey, and molasses. Candy. Jam and jelly. Syrup. Sweetened cereals. Cookies, pies, cakes, donuts, muffins, and ice cream.  The items listed above may not be a complete list of foods and beverages you should avoid. Contact a dietitian for more information.  Summary  Choosing the right foods helps keep your fat and cholesterol at normal levels. This can keep you from getting certain diseases.  At meals, fill one-half of your plate with vegetables, green salads, and fruits.  Eat high fiber foods, like whole grains, beans, apples, pears, berries, carrots, peas, and barley.  Limit added sugar, saturated fats, alcohol, and fried foods.  This information is not intended to replace advice given to you by your health care provider. Make sure you discuss any questions you have with your health care provider.  Document Revised: 04/29/2022 Document Reviewed: 04/29/2022  [employer] Patient Education © 2024 [employer]

## 2025-04-18 NOTE — PROGRESS NOTES
"Chief Complaint -diabetes    History of Present Illness -     Ethel Trotter is a 69 y.o. female.     Diabetes-  Uncontrolled with most recent hemoglobin A1c of 9.0.  Patient has been using Jardiance 10 mg daily and Tresiba 25 units daily.  Patient was advised to increase Tresiba to 35 units but only did this for 1 day stating that she felt funny and her whole body went numb.  She went back to 25 units of the Tresiba daily and glucose is still uncontrolled    Hypertension-  Uncontrolled.  Patient complains of a headache which is likely due to the hypertension.  She has been using amlodipine 10 mg daily and lisinopril 40 mg daily.  She states she has not been taking the Coreg at all.    Arteriosclerotic cardiovascular disease-  Stable.  Status post AMI. status post three-vessel CABG.  She denies any chest pain shortness of breath or diaphoresis today.      The following portions of the patient's history were reviewed and updated as appropriate: allergies, current medications, past family history, past medical history, past social history, past surgical history and problem list.        Objective  Vital signs:  BP (!) 168/110 (BP Location: Left arm, Patient Position: Sitting, Cuff Size: Adult)   Pulse 66   Temp 98 °F (36.7 °C) (Temporal)   Ht 160 cm (63\")   Wt 58.2 kg (128 lb 3.2 oz)   LMP  (LMP Unknown)   SpO2 98%   BMI 22.71 kg/m²     Physical Exam  Vitals and nursing note reviewed.   Constitutional:       Appearance: Normal appearance. She is well-developed.   Eyes:      Extraocular Movements: Extraocular movements intact.      Conjunctiva/sclera: Conjunctivae normal.   Cardiovascular:      Rate and Rhythm: Normal rate and regular rhythm.      Heart sounds: Normal heart sounds. No murmur heard.  Pulmonary:      Effort: Pulmonary effort is normal. No respiratory distress.      Breath sounds: Normal breath sounds. No wheezing.   Musculoskeletal:         General: No tenderness.   Skin:     General: Skin is warm " and dry.      Findings: No rash.   Neurological:      Mental Status: She is alert and oriented to person, place, and time.   Psychiatric:         Mood and Affect: Mood normal.         Behavior: Behavior normal.         Thought Content: Thought content normal.         The following data was reviewed by Marcelle Ansari PA-C:         Lab Results   Component Value Date    BUN 28 (H) 04/01/2025    CREATININE 1.87 (H) 04/01/2025    EGFR 28.5 (L) 04/01/2025    ALT 21 04/01/2025    AST 29 04/01/2025    WBC 8.41 04/01/2025    HGB 12.3 04/01/2025    HCT 37.8 04/01/2025     04/01/2025    CHOL 74 04/01/2025    TRIG 142 04/01/2025    HDL 35 (L) 04/01/2025    LDL 15 04/01/2025    TSH 2.040 04/01/2025    HGBA1C 9.00 (H) 04/01/2025           Assessment & Plan     Diagnoses and all orders for this visit:    1. Essential hypertension (Primary)  Comments:  Strongly advised compliance with taking medication as prescribed  Start Coreg 12.5 mg twice daily  Continue amlodipine 10 mg and lisinopril 40 mg daily  Advised to get Coreg and take ASAP then check blood pressure and pulse.  If blood pressure remains greater than 160/100 then I advised her to go to the ER for immediate evaluation    2. Type 2 diabetes mellitus without complication, without long-term current use of insulin  Comments:  Discontinue Tresiba since not efficacious  Start Xultophy 25 units with supper  Continue Jardiance 10 mg daily  Orders:  -     Insulin Degludec-Liraglutide (Xultophy) 100-3.6 UNIT-MG/ML solution pen-injector subcutaneous pen; Inject 25 Units under the skin into the appropriate area as directed Daily With Dinner.  Dispense: 750 mL; Refill: 3  -     Blood Glucose Monitoring Suppl misc; Use 1 each Daily.  Dispense: 1 each; Refill: 0  -     Glucose Blood (Blood Glucose Test) strip; USE TO TEST BLOOD GLUCOSE TWO TIMES A DAY  Dispense: 100 each; Refill: 5  -     Lancets misc; 1 twice daily  Dispense: 60 each; Refill: 5    3. ASCVD (arteriosclerotic  cardiovascular disease), status post previous MI, status post CABG (three-vessel) about 9 years ago.  Comments:  Advised risk factor modification with continuation of rosuvastatin, lisinopril and Imdur  Orders:  -     carvedilol (COREG) 12.5 MG tablet; Take 1 tablet by mouth 2 (Two) Times a Day.  Dispense: 60 tablet; Refill: 5        BMI is within normal parameters. No other follow-up for BMI required.          Patient was given instructions and counseling regarding his condition or for health maintenance advice. Please see specific information pulled into the AVS if appropriate      This document has been electronically signed by:  Marcelle Ansari PA-C

## 2025-04-21 ENCOUNTER — PRIOR AUTHORIZATION (OUTPATIENT)
Dept: FAMILY MEDICINE CLINIC | Facility: CLINIC | Age: 70
End: 2025-04-21
Payer: MEDICARE

## 2025-04-30 DIAGNOSIS — J44.9 COPD MIXED TYPE: ICD-10-CM

## 2025-04-30 RX ORDER — ALBUTEROL SULFATE 90 UG/1
2 AEROSOL, METERED RESPIRATORY (INHALATION) EVERY 4 HOURS PRN
Qty: 18 G | Refills: 5 | Status: SHIPPED | OUTPATIENT
Start: 2025-04-30

## 2025-05-02 ENCOUNTER — OFFICE VISIT (OUTPATIENT)
Dept: FAMILY MEDICINE CLINIC | Facility: CLINIC | Age: 70
End: 2025-05-02
Payer: MEDICARE

## 2025-05-02 VITALS
HEIGHT: 63 IN | TEMPERATURE: 98 F | OXYGEN SATURATION: 98 % | HEART RATE: 71 BPM | DIASTOLIC BLOOD PRESSURE: 112 MMHG | WEIGHT: 131.6 LBS | SYSTOLIC BLOOD PRESSURE: 182 MMHG | BODY MASS INDEX: 23.32 KG/M2

## 2025-05-02 DIAGNOSIS — E11.65 TYPE 2 DIABETES MELLITUS WITH HYPERGLYCEMIA, WITHOUT LONG-TERM CURRENT USE OF INSULIN: Chronic | ICD-10-CM

## 2025-05-02 DIAGNOSIS — E78.2 MIXED HYPERLIPIDEMIA: Chronic | ICD-10-CM

## 2025-05-02 DIAGNOSIS — I10 ESSENTIAL HYPERTENSION: Primary | Chronic | ICD-10-CM

## 2025-05-02 LAB
EXPIRATION DATE: ABNORMAL
HBA1C MFR BLD: 8.6 % (ref 4.5–5.7)
Lab: ABNORMAL

## 2025-05-02 NOTE — PATIENT INSTRUCTIONS
Carbohydrate Counting for Diabetes Mellitus, Adult  Carbohydrate counting is a method of keeping track of how many carbohydrates you eat. Eating carbohydrates increases the amount of sugar (glucose) in the blood. Counting how many carbohydrates you eat improves how well you manage your blood glucose. This, in turn, helps you manage your diabetes.  Carbohydrates are measured in grams (g) per serving. It is important to know how many carbohydrates (in grams or by serving size) you can have in each meal. This is different for every person. A dietitian can help you make a meal plan and calculate how many carbohydrates you should have at each meal and snack.  What foods contain carbohydrates?  Carbohydrates are found in the following foods:  Grains, such as breads and cereals.  Dried beans and soy products.  Starchy vegetables, such as potatoes, peas, and corn.  Fruit and fruit juices.  Milk and yogurt.  Sweets and snack foods, such as cake, cookies, candy, chips, and soft drinks.  How do I count carbohydrates in foods?  There are two ways to count carbohydrates in food. You can read food labels or learn standard serving sizes of foods. You can use either of these methods or a combination of both.  Using the Nutrition Facts label  The Nutrition Facts list is included on the labels of almost all packaged foods and beverages in the [place]. It includes:  The serving size.  Information about nutrients in each serving, including the grams of carbohydrate per serving.  To use the Nutrition Facts, decide how many servings you will have. Then, multiply the number of servings by the number of carbohydrates per serving. The resulting number is the total grams of carbohydrates that you will be having.  Learning the standard serving sizes of foods  When you eat carbohydrate foods that are not packaged or do not include Nutrition Facts on the label, you need to measure the servings in order to count the grams of  carbohydrates.  Measure the foods that you will eat with a food scale or measuring cup, if needed.  Decide how many standard-size servings you will eat.  Multiply the number of servings by 15. For foods that contain carbohydrates, one serving equals 15 g of carbohydrates.  For example, if you eat 2 cups or 10 oz (300 g) of strawberries, you will have eaten 2 servings and 30 g of carbohydrates (2 servings x 15 g = 30 g).  For foods that have more than one food mixed, such as soups and casseroles, you must count the carbohydrates in each food that is included.  The following list contains standard serving sizes of common carbohydrate-rich foods. Each of these servings has about 15 g of carbohydrates:  1 slice of bread.  1 six-inch (15 cm) tortilla.  ? cup or 2 oz (53 g) cooked rice or pasta.  ½ cup or 3 oz (85 g) cooked or canned, drained and rinsed beans or lentils.  ½ cup or 3 oz (85 g) starchy vegetable, such as peas, corn, or squash.  ½ cup or 4 oz (120 g) hot cereal.  ½ cup or 3 oz (85 g) boiled or mashed potatoes, or ¼ or 3 oz (85 g) of a large baked potato.  ½ cup or 4 fl oz (118 mL) fruit juice.  1 cup or 8 fl oz (237 mL) milk.  1 small or 4 oz (106 g) apple.  ½ or 2 oz (63 g) of a medium banana.  1 cup or 5 oz (150 g) strawberries.  3 cups or 1 oz (28.3 g) popped popcorn.  What is an example of carbohydrate counting?  To calculate the grams of carbohydrates in this sample meal, follow the steps shown below.  Sample meal  3 oz (85 g) chicken breast.  ? cup or 4 oz (106 g) brown rice.  ½ cup or 3 oz (85 g) corn.  1 cup or 8 fl oz (237 mL) milk.  1 cup or 5 oz (150 g) strawberries with sugar-free whipped topping.  Carbohydrate calculation  Identify the foods that contain carbohydrates:  Rice.  Corn.  Milk.  Strawberries.  Calculate how many servings you have of each food:  2 servings rice.  1 serving corn.  1 serving milk.  1 serving strawberries.  Multiply each number of servings by 15  servings rice x 15  g = 30 g.  1 serving corn x 15 g = 15 g.  1 serving milk x 15 g = 15 g.  1 serving strawberries x 15 g = 15 g.  Add together all of the amounts to find the total grams of carbohydrates eaten:  30 g + 15 g + 15 g + 15 g = 75 g of carbohydrates total.  What are tips for following this plan?  Shopping  Develop a meal plan and then make a shopping list.  Buy fresh and frozen vegetables, fresh and frozen fruit, dairy, eggs, beans, lentils, and whole grains.  Look at food labels. Choose foods that have more fiber and less sugar.  Avoid processed foods and foods with added sugars.  Meal planning  Aim to have the same number of grams of carbohydrates at each meal and for each snack time.  Plan to have regular, balanced meals and snacks.  Where to find more information  American Diabetes Association: diabetes.org  Centers for Disease Control and Prevention: cdc.gov  Academy of Nutrition and Dietetics: eatright.org  Association of Diabetes Care & Education Specialists: diabeteseducator.org  Summary  Carbohydrate counting is a method of keeping track of how many carbohydrates you eat.  Eating carbohydrates increases the amount of sugar (glucose) in your blood.  Counting how many carbohydrates you eat improves how well you manage your blood glucose. This helps you manage your diabetes.  A dietitian can help you make a meal plan and calculate how many carbohydrates you should have at each meal and snack.  This information is not intended to replace advice given to you by your health care provider. Make sure you discuss any questions you have with your health care provider.  Document Revised: 07/20/2021 Document Reviewed: 07/21/2021  [employer] [place] Patient Education © 2024 [employer]Fat and Cholesterol Restricted Eating Plan  Getting too much fat and cholesterol in your diet may cause health problems. Choosing the right foods helps keep your fat and cholesterol at normal levels. This can keep you from getting certain diseases.  Your  "doctor may recommend an eating plan that includes:  Total fat: ______% or less of total calories a day. This is ______g of fat a day.  Saturated fat: ______% or less of total calories a day. This is ______g of saturated fat a day.  Cholesterol: less than _________mg a day.  Fiber: ______g a day.  What are tips for following this plan?  General tips  Work with your doctor to lose weight if you need to.  Avoid:  Foods with added sugar.  Fried foods.  Foods with trans fat or partially hydrogenated oils. This includes some margarines and baked goods.  If you drink alcohol:  Limit how much you have to:  0-1 drink a day for women who are not pregnant.  0-2 drinks a day for men.  Know how much alcohol is in a drink. In the [place] one drink equals one 12 oz bottle of beer (355 mL), one 5 oz glass of wine (148 mL), or one 1½ oz glass of hard liquor (44 mL).  Reading food labels  Check food labels for:  Trans fats.  Partially hydrogenated oils.  Saturated fat (g) in each serving.  Cholesterol (mg) in each serving.  Fiber (g) in each serving.  Choose foods with healthy fats, such as:  Monounsaturated fats and polyunsaturated fats. These include olive and canola oil, flaxseeds, walnuts, almonds, and seeds.  Omega-3 fats. These are found in certain fish, flaxseed oil, and ground flaxseeds.  Choose grain products that have whole grains. Look for the word \"whole\" as the first word in the ingredient list.  Cooking  Cook foods using low-fat methods. These include baking, boiling, grilling, and broiling.  Eat more home-cooked foods. Eat at restaurants and buffets less often. Eat less fast food.  Avoid cooking using saturated fats, such as butter, cream, palm oil, palm kernel oil, and coconut oil.  Meal planning    At meals, divide your plate into four equal parts:  Fill one-half of your plate with vegetables, green salads, and fruit.  Fill one-fourth of your plate with whole grains.  Fill one-fourth of your plate with low-fat (lean) " protein foods.  Eat fish that is high in omega-3 fats at least two times a week. This includes mackerel, tuna, sardines, and salmon.  Eat foods that are high in fiber, such as whole grains, beans, apples, pears, berries, broccoli, carrots, peas, and barley.  What foods should I eat?  Fruits  All fresh, canned (in natural juice), or frozen fruits.  Vegetables  Fresh or frozen vegetables (raw, steamed, roasted, or grilled). Green salads.  Grains  Whole grains, such as whole wheat or whole grain breads, crackers, cereals, and pasta. Unsweetened oatmeal, bulgur, barley, quinoa, or brown rice. Corn or whole wheat flour tortillas.  Meats and other protein foods  Ground beef (85% or leaner), grass-fed beef, or beef trimmed of fat. Skinless chicken or turkey. Ground chicken or turkey. Pork trimmed of fat. All fish and seafood. Egg whites. Dried beans, peas, or lentils. Unsalted nuts or seeds. Unsalted canned beans. Nut butters without added sugar or oil.  Dairy  Low-fat or nonfat dairy products, such as skim or 1% milk, 2% or reduced-fat cheeses, low-fat and fat-free ricotta or cottage cheese, or plain low-fat and nonfat yogurt.  Fats and oils  Tub margarine without trans fats. Light or reduced-fat mayonnaise and salad dressings. Avocado. Olive, canola, sesame, or safflower oils.  The items listed above may not be a complete list of foods and beverages you can eat. Contact a dietitian for more information.  What foods should I avoid?  Fruits  Canned fruit in heavy syrup. Fruit in cream or butter sauce. Fried fruit.  Vegetables  Vegetables cooked in cheese, cream, or butter sauce. Fried vegetables.  Grains  White bread. White pasta. White rice. Cornbread. Bagels, pastries, and croissants. Crackers and snack foods that contain trans fat and hydrogenated oils.  Meats and other protein foods  Fatty cuts of meat. Ribs, chicken wings, dejesus, sausage, bologna, salami, chitterlings, fatback, hot dogs, bratwurst, and packaged lunch  meats. Liver and organ meats. Whole eggs and egg yolks. Chicken and turkey with skin. Fried meat.  Dairy  Whole or 2% milk, cream, half-and-half, and cream cheese. Whole milk cheeses. Whole-fat or sweetened yogurt. Full-fat cheeses. Nondairy creamers and whipped toppings. Processed cheese, cheese spreads, and cheese curds.  Fats and oils  Butter, stick margarine, lard, shortening, ghee, or dejesus fat. Coconut, palm kernel, and palm oils.  Beverages  Alcohol. Sugar-sweetened drinks such as sodas, lemonade, and fruit drinks.  Sweets and desserts  Corn syrup, sugars, honey, and molasses. Candy. Jam and jelly. Syrup. Sweetened cereals. Cookies, pies, cakes, donuts, muffins, and ice cream.  The items listed above may not be a complete list of foods and beverages you should avoid. Contact a dietitian for more information.  Summary  Choosing the right foods helps keep your fat and cholesterol at normal levels. This can keep you from getting certain diseases.  At meals, fill one-half of your plate with vegetables, green salads, and fruits.  Eat high fiber foods, like whole grains, beans, apples, pears, berries, carrots, peas, and barley.  Limit added sugar, saturated fats, alcohol, and fried foods.  This information is not intended to replace advice given to you by your health care provider. Make sure you discuss any questions you have with your health care provider.  Document Revised: 04/29/2022 Document Reviewed: 04/29/2022  [employer] Patient Education © 2024 [employer]

## 2025-05-02 NOTE — PROGRESS NOTES
"Chief Complaint -hypertension    History of Present Illness -     Ethel Trotter is a 69 y.o. female.     Hypertension-  Uncontrolled.  Patient states that she has not taken her amlodipine, Coreg, Imdur or lisinopril yet this morning.  She states that she is out in plans to leave here and go to the pharmacy to get her medication.  She states that her blood pressure always runs high.  She denies any chest pain, shortness of breath, dizziness or syncope.    Diabetes mellitus type 2-  Not at goal with most recent A1c of 8.6.  She states that she still has to get her Xultophy from the pharmacy.  She has been taking her Jardiance.  Knoop    Hyperlipidemia-  Stable with rosuvastatin and low-cholesterol diet      The following portions of the patient's history were reviewed and updated as appropriate: allergies, current medications, past family history, past medical history, past social history, past surgical history and problem list.        Objective  Vital signs:  BP (!) 182/112   Pulse 71   Temp 98 °F (36.7 °C) (Temporal)   Ht 160 cm (63\")   Wt 59.7 kg (131 lb 9.6 oz)   LMP  (LMP Unknown)   SpO2 98%   BMI 23.31 kg/m²     Physical Exam  Vitals and nursing note reviewed.   Constitutional:       Appearance: Normal appearance. She is well-developed.   Eyes:      Extraocular Movements: Extraocular movements intact.      Conjunctiva/sclera: Conjunctivae normal.   Cardiovascular:      Rate and Rhythm: Normal rate and regular rhythm.      Heart sounds: Normal heart sounds. No murmur heard.  Pulmonary:      Effort: Pulmonary effort is normal. No respiratory distress.      Breath sounds: Normal breath sounds. No wheezing.   Musculoskeletal:         General: No tenderness.   Skin:     General: Skin is warm and dry.      Findings: No rash.   Neurological:      Mental Status: She is alert and oriented to person, place, and time.   Psychiatric:         Mood and Affect: Mood normal.         Behavior: Behavior normal.         " Thought Content: Thought content normal.         The following data was reviewed by Marcelle Ansari PA-C:         Lab Results   Component Value Date    BUN 28 (H) 04/01/2025    CREATININE 1.87 (H) 04/01/2025    EGFR 28.5 (L) 04/01/2025    ALT 21 04/01/2025    AST 29 04/01/2025    WBC 8.41 04/01/2025    HGB 12.3 04/01/2025    HCT 37.8 04/01/2025     04/01/2025    CHOL 74 04/01/2025    TRIG 142 04/01/2025    HDL 35 (L) 04/01/2025    LDL 15 04/01/2025    TSH 2.040 04/01/2025    HGBA1C 8.6 (A) 05/02/2025           Assessment & Plan     Diagnoses and all orders for this visit:    1. Essential hypertension (Primary)  Comments:  Advised compliance with taking medication as directed consistently to reduce CV risk.  Advised patient that she is at increased cardiovascular risk including heart attack, stroke or even death.  Patient verbalizes understanding of this risk.  Patient refuses ambulance services.  Patient declines going to the emergency department.  Patient states she will go to the pharmacy and take her medications.  Advised patient that if blood pressure does not go down less than 160/90 then she needs to go to the emergency department for immediate evaluation.    2. Type 2 diabetes mellitus with hyperglycemia, without long-term current use of insulin  Comments:  Advised patient to get Xultophy from pharmacy and take 25 units with dinner.  Continue Jardiance  Advised low carbohydrate diabetic diet  Orders:  -     POC Glycosylated Hemoglobin (Hb A1C)    3. Mixed hyperlipidemia  Comments:  Continue rosuvastatin  Advised low-cholesterol diet        BMI is within normal parameters. No other follow-up for BMI required.          Patient was given instructions and counseling regarding his condition or for health maintenance advice. Please see specific information pulled into the AVS if appropriate      This document has been electronically signed by:  Marcelle Ansari PA-C

## 2025-05-05 ENCOUNTER — SPECIALTY PHARMACY (OUTPATIENT)
Dept: PHARMACY | Facility: HOSPITAL | Age: 70
End: 2025-05-05
Payer: MEDICARE

## 2025-05-05 RX ORDER — ALIROCUMAB 150 MG/ML
300 INJECTION, SOLUTION SUBCUTANEOUS
Qty: 6 ML | Refills: 2 | Status: SHIPPED | OUTPATIENT
Start: 2025-05-05

## 2025-05-05 NOTE — PROGRESS NOTES
Medication Management Clinic/ Specialty Pharmacy Patient Management Program  Lipid Management Program - PCSK9i Initial Assessment     Ethel Trotter is a 69 y.o. female referred by their provider, Dr. Montez, to the Hyperlipidemia Patient Management program offered by [facility] for Lipid Management.  Ethel Trotter is  treated for clinical ASCVD and currently takes Crestor, Zetia, and Praluent. In the past, Pt has tried other statins, as well as Repatha but reports that her pharmacy could not get it any longer. The patient denies any allergies to latex.       A follow-up outreach was conducted, including assessment of continued therapy appropriateness, medication adherence, and side effect incidence and management for Praluent 300 mg every 28 days. The patient denies any trouble giving themself the injection.  They deny missing doses or adverse effects.     Initial Start Date of PCSK9i: 7/22/2022  Initial LDL: 163 mg/dl 4/28/2022    Changes to Insurance Coverage or Financial Support  [facility] [employer] and lipid [employer]- no changes    Relevant Past Medical History and Comorbidities  Relevant medical history and concomitant health conditions were discussed with the patient. The patient's chart has been reviewed for relevant past medical history and comorbid health conditions and updated as necessary.   Past Medical History:   Diagnosis Date    Arthritis     CAD (coronary artery disease)     COPD (chronic obstructive pulmonary disease)     Diabetes mellitus     Elevated cholesterol     Fracture of wrist     GERD (gastroesophageal reflux disease)     Hepatitis C     History of EKG 03/25/2016    ABNORMAL    History of transfusion     Hyperlipidemia     Hypertension     Myocardial infarction     x2 last one 5 years ago    Osteopenia     Pancreatitis     Stroke      Social History     Socioeconomic History    Marital status:    Tobacco Use    Smoking status:  Never     Passive exposure: Never    Smokeless tobacco: Current     Types: Chew   Vaping Use    Vaping status: Never Used   Substance and Sexual Activity    Alcohol use: No    Drug use: Yes     Types: Marijuana     Comment: OCCASIONAL    Sexual activity: Defer     Problem list reviewed by Isabel Lemus PharmD on 5/5/2025 at  3:36 PM    Hospitalizations and Urgent Care Since Last Assessment  ED Visits, Admissions, or Hospitalizations: none reported  Urgent Office Visits: none reported    Allergies  Known allergies and reactions were discussed with the patient. The patient's chart has been reviewed for allergy information and updated as necessary.   No Known Allergies  Allergies reviewed by Isabel Lemus PharmD on 5/5/2025 at  3:35 PM    Relevant Laboratory Values  Relevant laboratory values were discussed with the patient. The following specialty medication dose adjustment(s) are recommended: none    Lab Results   Component Value Date    GLUCOSE 178 (H) 04/01/2025    CALCIUM 9.4 04/01/2025     04/01/2025    K 4.8 04/01/2025    CO2 24.9 04/01/2025     04/01/2025    BUN 28 (H) 04/01/2025    CREATININE 1.87 (H) 04/01/2025    EGFRIFNONA 33 (L) 10/26/2021    BCR 15.0 04/01/2025    ANIONGAP 10.1 04/01/2025     Lab Results   Component Value Date    CHOL 74 04/01/2025    CHLPL 247 (H) 03/10/2016    TRIG 142 04/01/2025    HDL 35 (L) 04/01/2025    LDL 15 04/01/2025       Current Medication List  This medication list has been reviewed with the patient and evaluated for any interactions or necessary modifications/recommendations, and updated to include all prescription medications, OTC medications, and supplements the patient is currently taking.  This list reflects what is contained in the patient's profile, which has also been marked as reviewed to communicate to other providers it is the most up to date version of the patient's current medication therapy.     Current Outpatient Medications:      Alirocumab (Praluent) 150 MG/ML injection pen, Inject 2 mL under the skin into the appropriate area as directed Every 28 (Twenty-Eight) Days., Disp: 6 mL, Rfl: 2    amLODIPine (NORVASC) 10 MG tablet, Take 1 tablet by mouth Every Evening., Disp: 30 tablet, Rfl: 5    aspirin (Aspirin Low Dose) 81 MG EC tablet, Take 1 tablet by mouth Daily., Disp: 30 tablet, Rfl: 5    Blood Glucose Monitoring Suppl misc, Use 1 each Daily., Disp: 1 each, Rfl: 0    carvedilol (COREG) 12.5 MG tablet, Take 1 tablet by mouth 2 (Two) Times a Day., Disp: 60 tablet, Rfl: 5    citalopram (CeleXA) 20 MG tablet, Take 1 tablet by mouth Daily., Disp: 30 tablet, Rfl: 5    empagliflozin (Jardiance) 10 MG tablet tablet, Take 1 tablet by mouth Every Morning., Disp: 30 tablet, Rfl: 5    ezetimibe (ZETIA) 10 MG tablet, Take 1 tablet by mouth every night at bedtime., Disp: 30 tablet, Rfl: 5    fluticasone (FLONASE) 50 MCG/ACT nasal spray, 2 sprays by Each Nare route Daily., Disp: 16 g, Rfl: 5    Glucose Blood (Blood Glucose Test) strip, USE TO TEST BLOOD GLUCOSE TWO TIMES A DAY, Disp: 100 each, Rfl: 5    Insulin Degludec-Liraglutide (Xultophy) 100-3.6 UNIT-MG/ML solution pen-injector subcutaneous pen, Inject 25 Units under the skin into the appropriate area as directed Daily With Dinner., Disp: 750 mL, Rfl: 3    Insulin Pen Needle (Insupen Pen Needles) 32G X 4 MM misc, Use 1 each Daily., Disp: 50 each, Rfl: 5    isosorbide mononitrate (IMDUR) 60 MG 24 hr tablet, Take 1 tablet by mouth Daily., Disp: 30 tablet, Rfl: 5    Lancets misc, 1 twice daily, Disp: 60 each, Rfl: 5    linaclotide (Linzess) 72 MCG capsule capsule, Take 1 capsule by mouth Every Morning Before Breakfast., Disp: 30 capsule, Rfl: 5    lisinopril (PRINIVIL,ZESTRIL) 40 MG tablet, Take 1 tablet by mouth Daily., Disp: 30 tablet, Rfl: 5    nitroglycerin (NITROSTAT) 0.4 MG SL tablet, Place 1 tablet under the tongue Every 5 (Five) Minutes As Needed for Chest Pain. Take no more than 3 doses in 15  minutes., Disp: 25 tablet, Rfl: 5    rosuvastatin (CRESTOR) 40 MG tablet, Take 1 tablet by mouth Daily., Disp: 30 tablet, Rfl: 5    Ventolin  (90 Base) MCG/ACT inhaler, INHALE TWO PUFFS BY MOUTH EVERY 4 HOURS AS NEEDED for wheezing, Disp: 18 g, Rfl: 5    Medicines reviewed by Isabel Lemus, PharmD on 5/5/2025 at  3:35 PM    Drug Interactions  None with Praluent    Adverse Drug Reactions  Medication tolerability: Tolerating with no to minimal ADRs  Medication plan: Continue therapy with normal follow-up  Plan for ADR Management: n/a    Adherence, Self-Administration, and Current Therapy Problems  Adherence related to the patient's specialty therapy was discussed with the patient. The Adherence segment of this outreach has been reviewed and updated.     Adherence Questions  Linked Medication(s) Assessed: Alirocumab (Praluent)  On average, how many doses/injections does the patient miss per month?: 0  What are the identified reasons for non-adherence or missed doses? : no problems identified  What is the estimated medication adherence level?: %  Based on the patient/caregiver response and refill history, does this patient require an MTP to track adherence improvements?: no    Additional Barriers to Patient Self-Administration: none  Methods for Supporting Patient Self-Administration: calendar    Open Medication Therapy Problems  No medication therapy recommendations to display    Goals of Therapy  Goals related to the patient's specialty therapy were discussed with the patient. The Patient Goals segment of this outreach has been reviewed and updated.   Goals Addressed Today        Specialty Pharmacy General Goal      LDL less than 55    5/5/25 [place]: LDL 15 mg/dl (at goal) as of 4/1/25              Quality of Life Assessment   Quality of Life related to the patient's enrollment in the patient management program and services provided was discussed with the patient. The QOL segment of this outreach has been  reviewed and updated.  Quality of Life Improvement Scale: 10-Significantly better    Reassessment Plan & Follow-Up  1. Medication Therapy Changes: nonr reported  2. Related Plans, Therapy Recommendations, or Issues to Be Addressed:   LDL at goal   Refill shipment not needed at this time- refills profiled for patient  3. Pharmacist to perform regular assessments no more than (6) months from the previous assessment.  Patient will continue regular follow-up with referring provider.   4. Care Coordinator to set up future refill outreaches, coordinate prescription delivery, and escalate clinical questions to pharmacist.    Attestation  Therapeutic appropriateness: Appropriate   I attest the patient was actively involved in and has agreed to the above plan of care.  If the prescribed therapy is at any point deemed not appropriate based on the current or future assessments, a consultation will be initiated with the patient's specialty care provider to determine the best course of action. The revised plan of therapy will be documented along with any required assessments and/or additional patient education provided.     Isabel Lemus, PharmD  5/5/2025  15:36 EDT

## 2025-05-13 DIAGNOSIS — F41.8 DEPRESSION WITH ANXIETY: ICD-10-CM

## 2025-05-13 DIAGNOSIS — E78.2 MIXED HYPERLIPIDEMIA: ICD-10-CM

## 2025-05-13 DIAGNOSIS — I25.10 CORONARY ARTERY DISEASE INVOLVING NATIVE CORONARY ARTERY OF NATIVE HEART WITHOUT ANGINA PECTORIS: ICD-10-CM

## 2025-05-13 DIAGNOSIS — I25.10 ASCVD (ARTERIOSCLEROTIC CARDIOVASCULAR DISEASE): Chronic | ICD-10-CM

## 2025-05-13 DIAGNOSIS — M54.59 MECHANICAL LOW BACK PAIN: ICD-10-CM

## 2025-05-13 RX ORDER — ISOSORBIDE MONONITRATE 60 MG/1
90 TABLET, EXTENDED RELEASE ORAL DAILY
Qty: 45 TABLET | Refills: 5 | Status: SHIPPED | OUTPATIENT
Start: 2025-05-13

## 2025-05-13 RX ORDER — GABAPENTIN 100 MG/1
CAPSULE ORAL
Qty: 60 CAPSULE | Refills: 2 | Status: SHIPPED | OUTPATIENT
Start: 2025-05-13

## 2025-05-13 RX ORDER — CITALOPRAM HYDROBROMIDE 20 MG/1
20 TABLET ORAL DAILY
Qty: 30 TABLET | Refills: 5 | Status: SHIPPED | OUTPATIENT
Start: 2025-05-13

## 2025-05-13 RX ORDER — ASPIRIN 81 MG/1
81 TABLET, COATED ORAL DAILY
Qty: 30 TABLET | Refills: 5 | Status: SHIPPED | OUTPATIENT
Start: 2025-05-13

## 2025-05-13 RX ORDER — ROSUVASTATIN CALCIUM 40 MG/1
40 TABLET, COATED ORAL DAILY
Qty: 30 TABLET | Refills: 5 | Status: SHIPPED | OUTPATIENT
Start: 2025-05-13

## 2025-05-13 RX ORDER — CARVEDILOL 12.5 MG/1
12.5 TABLET ORAL 2 TIMES DAILY
Qty: 60 TABLET | Refills: 5 | Status: SHIPPED | OUTPATIENT
Start: 2025-05-13

## 2025-05-13 RX ORDER — EZETIMIBE 10 MG/1
10 TABLET ORAL
Qty: 30 TABLET | Refills: 5 | Status: SHIPPED | OUTPATIENT
Start: 2025-05-13

## 2025-05-19 ENCOUNTER — TELEPHONE (OUTPATIENT)
Dept: FAMILY MEDICINE CLINIC | Facility: CLINIC | Age: 70
End: 2025-05-19

## 2025-05-19 NOTE — TELEPHONE ENCOUNTER
Left message for the patient to give us a call back.   Hub to read/relay!  Wondering what questions she has.

## 2025-05-19 NOTE — TELEPHONE ENCOUNTER
Caller: Ethel Trotter    Relationship: Self    Best call back number: [phone]     What is the best time to reach you: ANYTIME     Who are you requesting to speak with (clinical staff, provider,  specific staff member): PROVIDER OR NURSE    Do you know the name of the person who called: NA    What was the call regarding: PATIENT WOULD LIKE A CALL BACK REGARDING A STINT SHE IS NEEDING PLACED. PATIENT IS NERVOUS TO HAVE IT DONE.     Is it okay if the provider responds through MyChart: NO

## 2025-05-20 NOTE — TELEPHONE ENCOUNTER
Spoke to the patient and she is having to have some blockages fixed. She said she is just a little worried about having the procedure done. She talked through what they were doing and said she felt a little better about it. But wanted to let you know what was going on.

## 2025-05-23 ENCOUNTER — OFFICE VISIT (OUTPATIENT)
Dept: FAMILY MEDICINE CLINIC | Facility: CLINIC | Age: 70
End: 2025-05-23
Payer: MEDICARE

## 2025-05-23 VITALS
TEMPERATURE: 97.6 F | HEIGHT: 63 IN | SYSTOLIC BLOOD PRESSURE: 168 MMHG | RESPIRATION RATE: 14 BRPM | WEIGHT: 128 LBS | OXYGEN SATURATION: 98 % | DIASTOLIC BLOOD PRESSURE: 100 MMHG | HEART RATE: 67 BPM | BODY MASS INDEX: 22.68 KG/M2

## 2025-05-23 DIAGNOSIS — G44.89 OTHER HEADACHE SYNDROME: ICD-10-CM

## 2025-05-23 DIAGNOSIS — Z72.0 DIPS TOBACCO: ICD-10-CM

## 2025-05-23 DIAGNOSIS — M54.59 MECHANICAL LOW BACK PAIN: ICD-10-CM

## 2025-05-23 DIAGNOSIS — E11.9 TYPE 2 DIABETES MELLITUS WITHOUT COMPLICATION, WITHOUT LONG-TERM CURRENT USE OF INSULIN: ICD-10-CM

## 2025-05-23 DIAGNOSIS — K59.09 CHRONIC CONSTIPATION: ICD-10-CM

## 2025-05-23 DIAGNOSIS — J30.2 CHRONIC SEASONAL ALLERGIC RHINITIS: Primary | ICD-10-CM

## 2025-05-23 DIAGNOSIS — I10 ESSENTIAL HYPERTENSION: ICD-10-CM

## 2025-05-23 DIAGNOSIS — N28.1 BILATERAL RENAL CYSTS: ICD-10-CM

## 2025-05-23 DIAGNOSIS — K21.9 GASTROESOPHAGEAL REFLUX DISEASE WITHOUT ESOPHAGITIS: ICD-10-CM

## 2025-05-23 DIAGNOSIS — Z00.00 HEALTHCARE MAINTENANCE: ICD-10-CM

## 2025-05-23 DIAGNOSIS — M85.80 OSTEOPENIA, UNSPECIFIED LOCATION: ICD-10-CM

## 2025-05-23 DIAGNOSIS — E55.9 VITAMIN D DEFICIENCY DISEASE: ICD-10-CM

## 2025-05-23 DIAGNOSIS — I87.2 CHRONIC VENOUS INSUFFICIENCY: ICD-10-CM

## 2025-05-23 DIAGNOSIS — N20.0 NEPHROLITHIASIS: ICD-10-CM

## 2025-05-23 DIAGNOSIS — I25.10 ASCVD (ARTERIOSCLEROTIC CARDIOVASCULAR DISEASE): ICD-10-CM

## 2025-05-23 DIAGNOSIS — J44.9 COPD MIXED TYPE: ICD-10-CM

## 2025-05-23 DIAGNOSIS — F41.8 DEPRESSION WITH ANXIETY: ICD-10-CM

## 2025-05-23 DIAGNOSIS — N39.41 URGE URINARY INCONTINENCE: ICD-10-CM

## 2025-05-23 DIAGNOSIS — G45.9 TIA (TRANSIENT ISCHEMIC ATTACK): ICD-10-CM

## 2025-05-23 DIAGNOSIS — E78.2 MIXED HYPERLIPIDEMIA: ICD-10-CM

## 2025-05-23 DIAGNOSIS — N18.32 CHRONIC RENAL FAILURE, STAGE 3B: ICD-10-CM

## 2025-05-23 DIAGNOSIS — K85.90 RECURRENT ACUTE PANCREATITIS: ICD-10-CM

## 2025-05-23 RX ORDER — CHLORHEXIDINE GLUCONATE 500 MG/1
1 CLOTH TOPICAL TAKE AS DIRECTED
Qty: 6 EACH | Refills: 0 | Status: SHIPPED | OUTPATIENT
Start: 2025-05-23

## 2025-05-23 RX ORDER — CLOPIDOGREL BISULFATE 75 MG/1
75 TABLET ORAL DAILY
Qty: 30 TABLET | Refills: 5 | Status: SHIPPED | OUTPATIENT
Start: 2025-05-23

## 2025-05-23 NOTE — PROGRESS NOTES
Subjective   Ethel Trotter is a 69 y.o. female.     Chief Complaint  Hospital follow-up    History of Present Illness     Hospital Follow-Up  She was discharged from [facility] on 5/16/2025 following a 3-day admission for chest pain, shortness of breath, and lightheadedness.  She underwent a coronary angiogram with evidence of a 95% stenosis of the distal left main extending into the proximal circumflex, a patent LIMA graft to the LAD, patent SVG graft to the diagonal, a 100% occlusion of the proximal dominant RCA, and a patent SVG graft to the PDA.  She is scheduled to undergo PCI of the left main into the left circumflex by Dr. Blank at [facility] on 6/20/2025.  She continues to have intermittent chest pain described as a sharp stabbing discomfort about the left lateral chest.  This generally lasts a few seconds at a time and she has been unable to identify any consistent precipitating or exacerbating factors.  She admits to more shortness of breath with exertion but denies any further lightheadedness.  Lab Results   Component Value Date    WBC 8.41 04/01/2025    HGB 12.3 04/01/2025    HCT 37.8 04/01/2025    MCV 85.7 04/01/2025     04/01/2025     Coronary artery disease  She has a history of previous M.I. and CABG surgery.  She remains on low-dose ASA alone.  Echocardiogram performed on 5/14/2025 revealed moderate MR, mild AR, moderate pulmonary hypertension, and normal left ventricular function    Type 2 Diabetes Mellitus  She continues to deny paresthesias of the feet, visual disturbances, polydipsia, polyuria, hypoglycemia or foot ulcerations.  She remains on insulin degludec and empagliflozin   Lab Results   Component Value Date    HGBA1C 8.6 (A) 05/02/2025     Lab Results   Component Value Date    MICROALBUR 5.8 04/01/2025     Lab Results   Component Value Date    JLIQGHID84 943 04/01/2025     Hyperlipidemia  Her compliance with treatment has been fair. She remains on praluent,  rosuvastatin and  ezetimibe    Lab Results   Component Value Date    CHOL 74 04/01/2025    CHLPL 247 (H) 03/10/2016    TRIG 142 04/01/2025    HDL 35 (L) 04/01/2025    LDL 15 04/01/2025     Essential Hypertension  She remains on lisinopril, carvedilol, and amlodipine.   Lab Results   Component Value Date    GLUCOSE 178 (H) 04/01/2025    BUN 28 (H) 04/01/2025    CREATININE 1.87 (H) 04/01/2025     04/01/2025    K 4.8 04/01/2025     04/01/2025    CALCIUM 9.4 04/01/2025    PROTEINTOT 6.9 04/01/2025    ALBUMIN 4.0 04/01/2025    ALT 21 04/01/2025    AST 29 04/01/2025    ALKPHOS 81 04/01/2025    BILITOT 0.3 04/01/2025    GLOB 2.9 04/01/2025    AGRATIO 1.4 04/01/2025    BCR 15.0 04/01/2025    ANIONGAP 10.1 04/01/2025    EGFR 28.5 (L) 04/01/2025     Chronic Renal Failure  This has been contributed to hypertensive and diabetic nephropathy along with possible PCKD.  She is aware to avoid any NSAIDs.  Noncontrast CT of the abdomen and pelvis performed on 5/15/2025 revealed multiple renal cysts 1 of which on the right appeared somewhat complex consistent with a possible Bosniak class II, moderate to severe vascular calcifications, and moderate stool throughout the colon.    Elevated CA 19-9 Level  She denies any abdominal pain. She continues to deny any nausea, vomiting, change in her bowel habits, hematochezia, or melena.  She is not followed by GI at present    Low Back Pain   She has a long history of low back pain worse over the last few years. The pain is described as an intermittent left lower ache.  This occasionally radiates to her left flank, left lateral hip, and left posterior thigh.  The pain in her back is worse than that elsewhere.  She has been unable to identify any precipitating, exacerbating, or relieving factors.  She continues to deny any changes in her strength, sensation, or bowel/bladder control. X-rays of the left hip performed on 2/26/2020 were unremarkable.  MRI of the lumbar spine performed on 11/18/2020 was  reported as showing degenerative disc disease with moderate to severe right neuroforaminal narrowing at L4-5     Depression with Anxiety  She has a long history of intermittent depression, nervousness, and difficulty sleeping.  She continues to deny any change in her concentration or memory and there is no history of any suicidal ideation.  She lost a granddaughter to cancer just over a year ago.  This has been difficult but she has a supportive family and .  She is prescribed citalopram    Headache  She continues to experience occasional pressure about the parietal area bilaterally.  She denies any associated symptoms at present.  She denies any further problems with her balance.  She continues to deny any problems with her eye hand coordination and has had no changes in her vision, strength, or sensation.   She stopped gabapentin since last here and feels this has helped significantly    Chronic Allergic Rhinitis  She returns with a several week history of rhinorrhea, nasal congestion, postnasal drip, and intermittent cough.  She denies any other upper respiratory tract symptoms, there is no history of any chest pain, shortness of breath, hemoptysis, fever, or chills.  She has not started back on her nasal fluticasone    Labs  Most recent vitamin D 27.7    The following portions of the patient's history were reviewed and updated as appropriate: allergies, current medications, past medical history, past social history, and problem list.    Review of Systems   Constitutional:  Positive for fatigue. Negative for chills and fever.   HENT:  Negative for congestion, ear pain, rhinorrhea and sore throat.    Eyes:  Negative for visual disturbance.   Respiratory:  Positive for shortness of breath. Negative for cough and wheezing.    Cardiovascular:  Positive for chest pain. Negative for palpitations and leg swelling.   Gastrointestinal:  Negative for abdominal pain, blood in stool, constipation, diarrhea, nausea and  vomiting.   Genitourinary:  Positive for frequency. Negative for dysuria, hematuria, urgency and vaginal bleeding.   Musculoskeletal:  Positive for gait problem. Negative for arthralgias, back pain, joint swelling and myalgias.   Skin:  Negative for rash.   Neurological:  Positive for light-headedness and headache. Negative for dizziness, weakness and numbness.   Psychiatric/Behavioral:  Positive for decreased concentration. Negative for sleep disturbance, suicidal ideas and depressed mood. The patient is not nervous/anxious.      Objective   Physical Exam  Constitutional:       General: She is not in acute distress.     Appearance: Normal appearance. She is well-developed. She is not diaphoretic.      Comments: Alert and in fair spirits. No apparent distress. No pallor, jaundice, diaphoresis, or cyanosis.   HENT:      Head: Atraumatic.      Right Ear: Ear canal and external ear normal. Tympanic membrane is scarred.      Left Ear: Ear canal and external ear normal. Tympanic membrane is scarred.      Mouth/Throat:      Lips: No lesions.      Mouth: Mucous membranes are moist. No oral lesions.      Pharynx: No oropharyngeal exudate or posterior oropharyngeal erythema.   Eyes:      General: Lids are normal.      Extraocular Movements: Extraocular movements intact.      Conjunctiva/sclera: Conjunctivae normal.      Pupils: Pupils are equal.   Neck:      Thyroid: No thyroid mass or thyromegaly.      Vascular: No carotid bruit or JVD.      Trachea: Trachea normal. No tracheal deviation.   Cardiovascular:      Rate and Rhythm: Normal rate and regular rhythm.      Heart sounds: Normal heart sounds, S1 normal and S2 normal. No murmur heard.     No gallop.   Pulmonary:      Effort: Pulmonary effort is normal.      Breath sounds: Normal breath sounds.   Chest:      Chest wall: No tenderness.   Abdominal:      General: Bowel sounds are normal. There is no distension.   Musculoskeletal:      Right lower leg: No edema.      Left  lower leg: No edema.   Lymphadenopathy:      Head:      Right side of head: No submental, submandibular, tonsillar, preauricular, posterior auricular or occipital adenopathy.      Left side of head: No submental, submandibular, tonsillar, preauricular, posterior auricular or occipital adenopathy.      Cervical: No cervical adenopathy.      Upper Body:      Right upper body: No supraclavicular adenopathy.      Left upper body: No supraclavicular adenopathy.   Skin:     General: Skin is warm.      Coloration: Skin is not cyanotic, jaundiced or pale.      Findings: No rash.      Nails: There is no clubbing.   Neurological:      Mental Status: She is alert and oriented to person, place, and time.      Cranial Nerves: No cranial nerve deficit, dysarthria or facial asymmetry.      Sensory: No sensory deficit.      Motor: No tremor.      Coordination: Coordination normal. Finger-Nose-Finger Test normal.      Gait: Gait normal.   Psychiatric:         Attention and Perception: Attention normal.         Mood and Affect: Mood normal.         Speech: Speech normal.         Behavior: Behavior normal.         Thought Content: Thought content normal. Thought content does not include suicidal ideation.       Assessment & Plan   Problems Addressed this Visit          Allergies and Adverse Reactions    Chronic seasonal allergic rhinitis        Cardiac and Vasculature    ASCVD (arteriosclerotic cardiovascular disease)  Reminded regarding risk factor modification with an emphasis on tobacco cessation.  Continue low-dose ASA  Clopidogrel will be resumed  Follow up with cardiology     Relevant Medications    clopidogrel (PLAVIX) 75 MG tablet    Chlorhexidine Gluconate Cloth 2 % pads    Chronic venous insufficiency    Essential hypertension   Hypertension: elevated today.   Encouraged to continue to work on diet and exercise plan.   Continue current medication for now    Mixed hyperlipidemia  As above.   Continue current medication.        Endocrine and Metabolic    Type 2 diabetes mellitus without complication, without long-term current use of insulin  Diabetes mellitus Type II, under improving  control.   Encouraged to continue to pursue ADA diet  Encouraged aerobic exercise.  Continue current medication    Vitamin D deficiency disease       Gastrointestinal Abdominal     Chronic constipation  Reminded regarding lifestyle modification  Continue current medication    Gastroesophageal reflux disease without esophagitis  As above.   Continue current medication.    Recurrent acute pancreatitis       Genitourinary and Reproductive     Bilateral renal cysts  With a possible Bosniak class II cyst on the right  Will arrange urology follow-up at her return    Nephrolithiasis    Urge urinary incontinence       Health Encounters    Healthcare maintenance  We will discuss Shingrix, RSV, and an updated Tdap again at her return       Mental Health    Depression with anxiety  Significant situational component.   Supportive therapy.   Continue current medication.  Encouraged to report if any worse or if any new symptoms or concerns.       Musculoskeletal and Injuries    Mechanical low back pain    Osteopenia       Neuro    Other headache syndrome  Improved  Encouraged to report if this should change.    TIA (transient ischemic attack)       Pulmonary and Pneumonias    COPD mixed type   COPD is stable.       Tobacco    Dips tobacco  Lengthy discussion regarding importance of smoking cessation and the options with respect to this       Other    Chronic renal failure, stage 3b  Reminded to avoid any NSAIDs prescription or OTC  Follow up with nephrology      Diagnoses         Codes Comments      Chronic seasonal allergic rhinitis    -  Primary ICD-10-CM: J30.2  ICD-9-CM: 477.8       Mixed hyperlipidemia     ICD-10-CM: E78.2  ICD-9-CM: 272.2       Essential hypertension     ICD-10-CM: I10  ICD-9-CM: 401.9       Chronic venous insufficiency     ICD-10-CM:  I87.2  ICD-9-CM: 459.81       ASCVD (arteriosclerotic cardiovascular disease)     ICD-10-CM: I25.10  ICD-9-CM: 429.2, 440.9       Vitamin D deficiency disease     ICD-10-CM: E55.9  ICD-9-CM: 268.9       Type 2 diabetes mellitus without complication, without long-term current use of insulin     ICD-10-CM: E11.9  ICD-9-CM: 250.00       Gastroesophageal reflux disease without esophagitis     ICD-10-CM: K21.9  ICD-9-CM: 530.81       Chronic constipation     ICD-10-CM: K59.09  ICD-9-CM: 564.00       Recurrent acute pancreatitis     ICD-10-CM: K85.90  ICD-9-CM: 577.0       Urge urinary incontinence     ICD-10-CM: N39.41  ICD-9-CM: 788.31       Nephrolithiasis     ICD-10-CM: N20.0  ICD-9-CM: 592.0       Healthcare maintenance     ICD-10-CM: Z00.00  ICD-9-CM: V70.0       Depression with anxiety     ICD-10-CM: F41.8  ICD-9-CM: 300.4       Osteopenia, unspecified location     ICD-10-CM: M85.80  ICD-9-CM: 733.90       Mechanical low back pain     ICD-10-CM: M54.59  ICD-9-CM: 724.2       TIA (transient ischemic attack)     ICD-10-CM: G45.9  ICD-9-CM: 435.9       COPD mixed type     ICD-10-CM: J44.9  ICD-9-CM: 496       Dips tobacco     ICD-10-CM: Z72.0  ICD-9-CM: 305.1       Chronic renal failure, stage 3b     ICD-10-CM: N18.32  ICD-9-CM: 585.3       Bilateral renal cysts     ICD-10-CM: N28.1  ICD-9-CM: 753.10       Other headache syndrome     ICD-10-CM: G44.89  ICD-9-CM: 339.89             I spent 44 minutes caring for Ethel Trotter on this date of service. This time includes time spent by me in the following activities:reviewing tests, performing a medically appropriate examination and/or evaluation , counseling and educating the patient/family/caregiver, ordering medications, tests, or procedures and documenting information in the medical record

## 2025-06-24 ENCOUNTER — SPECIALTY PHARMACY (OUTPATIENT)
Dept: PHARMACY | Facility: HOSPITAL | Age: 70
End: 2025-06-24
Payer: MEDICARE

## 2025-06-24 ENCOUNTER — OFFICE VISIT (OUTPATIENT)
Dept: FAMILY MEDICINE CLINIC | Facility: CLINIC | Age: 70
End: 2025-06-24
Payer: MEDICARE

## 2025-06-24 VITALS
WEIGHT: 128 LBS | HEART RATE: 59 BPM | TEMPERATURE: 97.3 F | SYSTOLIC BLOOD PRESSURE: 180 MMHG | HEIGHT: 63 IN | OXYGEN SATURATION: 97 % | BODY MASS INDEX: 22.68 KG/M2 | DIASTOLIC BLOOD PRESSURE: 94 MMHG

## 2025-06-24 DIAGNOSIS — I1A.0 RESISTANT HYPERTENSION: ICD-10-CM

## 2025-06-24 DIAGNOSIS — Z51.89 VISIT FOR WOUND CHECK: Primary | ICD-10-CM

## 2025-06-24 PROCEDURE — 1159F MED LIST DOCD IN RCRD: CPT | Performed by: PHYSICIAN ASSISTANT

## 2025-06-24 PROCEDURE — 3077F SYST BP >= 140 MM HG: CPT | Performed by: PHYSICIAN ASSISTANT

## 2025-06-24 PROCEDURE — 1126F AMNT PAIN NOTED NONE PRSNT: CPT | Performed by: PHYSICIAN ASSISTANT

## 2025-06-24 PROCEDURE — 99214 OFFICE O/P EST MOD 30 MIN: CPT | Performed by: PHYSICIAN ASSISTANT

## 2025-06-24 PROCEDURE — 1160F RVW MEDS BY RX/DR IN RCRD: CPT | Performed by: PHYSICIAN ASSISTANT

## 2025-06-24 PROCEDURE — 3052F HG A1C>EQUAL 8.0%<EQUAL 9.0%: CPT | Performed by: PHYSICIAN ASSISTANT

## 2025-06-24 PROCEDURE — 3080F DIAST BP >= 90 MM HG: CPT | Performed by: PHYSICIAN ASSISTANT

## 2025-06-24 NOTE — PROGRESS NOTES
Subjective        Chief Complaint  Wound Check (Surgery site)    Subjective      Ethel Trotter is a 69 y.o. female who presents today to [facility] for Wound Check (Surgery site). She has a past medical history of CAD, COPD, Diabetes mellitus, Elevated cholesterol, Fracture of wrist, GERD, Hepatitis C, Hyperlipidemia, Hypertension, Pancreatitis, and Stroke.    Wound Check (Surgery site)  CAD s/p PCI/stenting  Hx of CABG 2014  She recently underwent high risk PCI at [facility] on 6/20/2025.  She had successful PCI to the distal left main into proximal left circumflex severe 85% stenosis.  She was continued on aspirin, Plavix, rosuvastatin, pantoprazole.  It was recommended that she follow-up with the [facility] clinic in 1 to 2 weeks and also get set up for cardiac rehab.    She reports she recently went to the [facility] due to some bleeding of her cath site in the right groin.  She reports they showed her how to clean the area and to apply pressure.  She had some oozing throughout the night, but the bleeding is currently stopped.  Denies any abdominal swelling, dizziness, generalized fatigue.  Denies any pain in the groin.      Hypertension   BP in the office today is elevated at 180/94.  Patient reports her blood pressure is chronically elevated despite multiple medication adjustments.  She reports she has not taken her antihypertensive medications this morning.  Denies any acute symptoms with the hypertension.        Current Outpatient Medications:     Alirocumab (Praluent) 150 MG/ML injection pen, Inject 2 mL under the skin into the appropriate area as directed Every 28 (Twenty-Eight) Days., Disp: 6 mL, Rfl: 2    amLODIPine (NORVASC) 10 MG tablet, Take 1 tablet by mouth Every Evening., Disp: 30 tablet, Rfl: 5    Aspirin Low Dose 81 MG EC tablet, TAKE ONE TABLET BY MOUTH EVERY DAY, Disp: 30 tablet, Rfl: 5    Blood Glucose  Monitoring Suppl misc, Use 1 each Daily., Disp: 1 each, Rfl: 0    carvedilol (COREG) 12.5 MG tablet, TAKE ONE TABLET BY MOUTH TWICE DAILY, Disp: 60 tablet, Rfl: 5    Chlorhexidine Gluconate Cloth 2 % pads, Apply 1 Application topically Take As Directed., Disp: 6 each, Rfl: 0    citalopram (CeleXA) 20 MG tablet, TAKE ONE TABLET BY MOUTH EVERY DAY, Disp: 30 tablet, Rfl: 5    clopidogrel (PLAVIX) 75 MG tablet, Take 1 tablet by mouth Daily., Disp: 30 tablet, Rfl: 5    empagliflozin (Jardiance) 10 MG tablet tablet, Take 1 tablet by mouth Every Morning., Disp: 30 tablet, Rfl: 5    ezetimibe (ZETIA) 10 MG tablet, TAKE ONE TABLET BY MOUTH AT BEDTIME, Disp: 30 tablet, Rfl: 5    fluticasone (FLONASE) 50 MCG/ACT nasal spray, 2 sprays by Each Nare route Daily., Disp: 16 g, Rfl: 5    Glucose Blood (Blood Glucose Test) strip, USE TO TEST BLOOD GLUCOSE TWO TIMES A DAY, Disp: 100 each, Rfl: 5    Insulin Degludec-Liraglutide (Xultophy) 100-3.6 UNIT-MG/ML solution pen-injector subcutaneous pen, Inject 25 Units under the skin into the appropriate area as directed Daily With Dinner., Disp: 750 mL, Rfl: 3    Insulin Pen Needle (Insupen Pen Needles) 32G X 4 MM misc, Use 1 each Daily., Disp: 50 each, Rfl: 5    isosorbide mononitrate (IMDUR) 60 MG 24 hr tablet, TAKE 1 AND 1/2 TABLETS BY MOUTH EVERY DAY, Disp: 45 tablet, Rfl: 5    Lancets misc, 1 twice daily, Disp: 60 each, Rfl: 5    linaclotide (Linzess) 72 MCG capsule capsule, Take 1 capsule by mouth Every Morning Before Breakfast., Disp: 30 capsule, Rfl: 5    lisinopril (PRINIVIL,ZESTRIL) 40 MG tablet, Take 1 tablet by mouth Daily., Disp: 30 tablet, Rfl: 5    nitroglycerin (NITROSTAT) 0.4 MG SL tablet, Place 1 tablet under the tongue Every 5 (Five) Minutes As Needed for Chest Pain. Take no more than 3 doses in 15 minutes., Disp: 25 tablet, Rfl: 5    rosuvastatin (CRESTOR) 40 MG tablet, TAKE ONE TABLET BY MOUTH EVERY DAY, Disp: 30 tablet, Rfl: 5    Ventolin  (90 Base) MCG/ACT  "inhaler, INHALE TWO PUFFS BY MOUTH EVERY 4 HOURS AS NEEDED for wheezing, Disp: 18 g, Rfl: 5      No Known Allergies    Objective     Objective   Vital Signs:  /94   Pulse 59   Temp 97.3 °F (36.3 °C) (Temporal)   Ht 160 cm (63\")   Wt 58.1 kg (128 lb)   SpO2 97%   BMI 22.67 kg/m²   Estimated body mass index is 22.67 kg/m² as calculated from the following:    Height as of this encounter: 160 cm (63\").    Weight as of this encounter: 58.1 kg (128 lb).    BMI is within normal parameters. No other follow-up for BMI required.    Past Medical History:   Diagnosis Date    Arthritis     CAD (coronary artery disease)     COPD (chronic obstructive pulmonary disease)     Diabetes mellitus     Elevated cholesterol     Fracture of wrist     GERD (gastroesophageal reflux disease)     Hepatitis C     History of EKG 03/25/2016    ABNORMAL    History of transfusion     Hyperlipidemia     Hypertension     Myocardial infarction     x2 last one 5 years ago    Osteopenia     Pancreatitis     Stroke      Past Surgical History:   Procedure Laterality Date    COLONOSCOPY N/A 5/13/2019    Procedure: COLONOSCOPY;  Surgeon: Arnav Reyes MD;  Location: [facility];  Service: Gastroenterology    CORONARY ARTERY BYPASS GRAFT      HYSTERECTOMY      1998    TONSILLECTOMY       Social History     Socioeconomic History    Marital status:    Tobacco Use    Smoking status: Never     Passive exposure: Never    Smokeless tobacco: Current     Types: Chew   Vaping Use    Vaping status: Never Used   Substance and Sexual Activity    Alcohol use: No    Drug use: Yes     Types: Marijuana     Comment: OCCASIONAL    Sexual activity: Defer      Physical Exam  Vitals and nursing note reviewed.   Constitutional:       General: She is not in acute distress.     Appearance: She is well-developed. She is not diaphoretic.   HENT:      Head: Normocephalic and atraumatic.   Eyes:      General: No scleral icterus.        Right eye: No discharge.   "       Left eye: No discharge.      Conjunctiva/sclera: Conjunctivae normal.   Cardiovascular:      Rate and Rhythm: Normal rate and regular rhythm.      Heart sounds: Normal heart sounds. No murmur heard.     No friction rub. No gallop.   Pulmonary:      Effort: Pulmonary effort is normal. No respiratory distress.      Breath sounds: Normal breath sounds. No wheezing or rales.   Chest:      Chest wall: No tenderness.   Musculoskeletal:         General: Normal range of motion.      Cervical back: Normal range of motion and neck supple.   Skin:     General: Skin is warm and dry.      Coloration: Skin is not pale.      Findings: No erythema or rash.      Comments: Mild ecchymosis right groin. No active bleeding from cath site. No abdominal or flank tenderness.   Neurological:      Mental Status: She is alert and oriented to person, place, and time.   Psychiatric:         Behavior: Behavior normal.        Result Review :  The following data was reviewed by: RODRIGUE Gonzalez on 06/24/2025:  Hemoglobin A1C   Date Value Ref Range Status   05/02/2025 8.6 (A) 4.5 - 5.7 % Final   04/01/2025 9.00 (H) 4.80 - 5.60 % Final     TSH   Date Value Ref Range Status   04/01/2025 2.040 0.270 - 4.200 uIU/mL Final     HDL Cholesterol   Date Value Ref Range Status   04/01/2025 35 (L) 40 - 60 mg/dL Final     LDL Cholesterol    Date Value Ref Range Status   04/01/2025 15 0 - 100 mg/dL Final     Triglycerides   Date Value Ref Range Status   04/01/2025 142 0 - 150 mg/dL Final     Total Cholesterol   Date Value Ref Range Status   03/10/2016 247 (H) 0 - 200 mg/dL Final     Comment:       Cholesterol Reference Range:      Desirable                 < 200mg/dl      Borderline High        200-239mg/dl      High Risk                 > 239mg/dl           Assessment / Plan         Assessment   Diagnoses and all orders for this visit:    1. Visit for wound check (Primary)  - No active bleeding appreciated from her right groin cath site at this  time.  - 2 Steri-Strips were placed over the cath site as well as a pressure dressing to help prevent recurrent bleeding.  Encouraged her to leave this in place for today and tomorrow, then she was sent with more 2 x 2 gauze and a border dressing to change the dressing after that or if saturated.   - Discussed concerning signs and symptoms to monitor for.  Return to clinic or go the ER should and these develop.  - She is to follow-up with [facility] 1 to 2 weeks after her recent discharge.  I asked her if she needed assistance with scheduling this appointment.  She plans to do this herself and is to let us know if she has any issues.  - Discussed cardiac rehab which she does not plan to do at this time.  -Return to clinic if no improvement noted or if symptoms are worsening.     2. Resistant hypertension  - BP elevated at 180/94 in the office.  She reports she has not had her antihypertensive medications yet this morning.  Discussed option of adjusting medications which she declined.  Encouraged her to go home and take her morning medications as soon as she gets there and recheck blood pressure in a few hours.  Limit sodium.  - Keep a log of blood pressure and bring to the Cardiology follow-up as well as follow-up with her PCP in a couple weeks.         No orders of the defined types were placed in this encounter.    Follow Up   Return if symptoms worsen or fail to improve, for Follow up with PCP as scheduled.    Patient was given instructions and counseling regarding her condition or for health maintenance advice. Please see specific information pulled into the AVS if appropriate.       This document has been electronically signed by RODRIGUE Gonzalez   June 24, 2025 11:49 EDT    Dictated Utilizing Dragon Dictation: Part of this note may be an electronic transcription/translation of spoken language to printed text using the Dragon Dictation System.

## 2025-06-24 NOTE — PROGRESS NOTES
Specialty Pharmacy Patient Management Program  Refill Outreach     Ethel was contacted today regarding refills of their medication(s).    Refill Questions      Flowsheet Row Most Recent Value   Changes to allergies? No   Changes to medications? No   New conditions or infections since last clinic visit No   Unplanned office visit, urgent care, ED, or hospital admission in the last 4 weeks  No   How does patient/caregiver feel medication is working? Good   Financial problems or insurance changes  No   Since the previous refill, were any specialty medication doses or scheduled injections missed or delayed?  No   Does this patient require a clinical escalation to a pharmacist? No            Delivery Questions      Flowsheet Row Most Recent Value   Delivery method [employer]   Delivery address verified with patient/caregiver? Yes   Delivery address Home   Medication(s) being filled and delivered Alirocumab (Praluent)   Copay verified? Yes   Copay amount $0   Copay form of payment No copayment ($0)   Delivery Date Selection 06/26/25   Signature Required No                 Follow-up: 84 day(s)     Pratibha Latham, Pharmacy Technician  6/24/2025  15:47 EDT

## 2025-07-10 ENCOUNTER — OFFICE VISIT (OUTPATIENT)
Dept: FAMILY MEDICINE CLINIC | Facility: CLINIC | Age: 70
End: 2025-07-10
Payer: MEDICARE

## 2025-07-10 VITALS
TEMPERATURE: 98.2 F | WEIGHT: 129 LBS | RESPIRATION RATE: 14 BRPM | BODY MASS INDEX: 22.86 KG/M2 | HEART RATE: 61 BPM | DIASTOLIC BLOOD PRESSURE: 92 MMHG | HEIGHT: 63 IN | OXYGEN SATURATION: 98 % | SYSTOLIC BLOOD PRESSURE: 158 MMHG

## 2025-07-10 DIAGNOSIS — F41.8 DEPRESSION WITH ANXIETY: ICD-10-CM

## 2025-07-10 DIAGNOSIS — Z00.00 HEALTHCARE MAINTENANCE: ICD-10-CM

## 2025-07-10 DIAGNOSIS — K21.9 GASTROESOPHAGEAL REFLUX DISEASE WITHOUT ESOPHAGITIS: ICD-10-CM

## 2025-07-10 DIAGNOSIS — J30.2 CHRONIC SEASONAL ALLERGIC RHINITIS: Primary | ICD-10-CM

## 2025-07-10 DIAGNOSIS — E78.2 MIXED HYPERLIPIDEMIA: ICD-10-CM

## 2025-07-10 DIAGNOSIS — N95.1 MENOPAUSAL SYMPTOM: ICD-10-CM

## 2025-07-10 DIAGNOSIS — I87.2 CHRONIC VENOUS INSUFFICIENCY: ICD-10-CM

## 2025-07-10 DIAGNOSIS — N18.32 TYPE 2 DIABETES MELLITUS WITH STAGE 3B CHRONIC KIDNEY DISEASE, WITHOUT LONG-TERM CURRENT USE OF INSULIN: ICD-10-CM

## 2025-07-10 DIAGNOSIS — I10 ESSENTIAL HYPERTENSION: ICD-10-CM

## 2025-07-10 DIAGNOSIS — M85.80 OSTEOPENIA, UNSPECIFIED LOCATION: ICD-10-CM

## 2025-07-10 DIAGNOSIS — E55.9 VITAMIN D DEFICIENCY DISEASE: ICD-10-CM

## 2025-07-10 DIAGNOSIS — N28.1 BILATERAL RENAL CYSTS: ICD-10-CM

## 2025-07-10 DIAGNOSIS — N18.32 CHRONIC RENAL FAILURE, STAGE 3B: ICD-10-CM

## 2025-07-10 DIAGNOSIS — Z72.0 DIPS TOBACCO: ICD-10-CM

## 2025-07-10 DIAGNOSIS — N20.0 NEPHROLITHIASIS: ICD-10-CM

## 2025-07-10 DIAGNOSIS — M54.59 MECHANICAL LOW BACK PAIN: ICD-10-CM

## 2025-07-10 DIAGNOSIS — G45.9 TIA (TRANSIENT ISCHEMIC ATTACK): ICD-10-CM

## 2025-07-10 DIAGNOSIS — N39.41 URGE URINARY INCONTINENCE: ICD-10-CM

## 2025-07-10 DIAGNOSIS — J44.9 COPD MIXED TYPE: ICD-10-CM

## 2025-07-10 DIAGNOSIS — R76.8 HEPATITIS C ANTIBODY TEST POSITIVE: ICD-10-CM

## 2025-07-10 DIAGNOSIS — I25.10 ASCVD (ARTERIOSCLEROTIC CARDIOVASCULAR DISEASE): ICD-10-CM

## 2025-07-10 DIAGNOSIS — K85.90 RECURRENT ACUTE PANCREATITIS: ICD-10-CM

## 2025-07-10 DIAGNOSIS — K59.09 CHRONIC CONSTIPATION: ICD-10-CM

## 2025-07-10 DIAGNOSIS — E11.22 TYPE 2 DIABETES MELLITUS WITH STAGE 3B CHRONIC KIDNEY DISEASE, WITHOUT LONG-TERM CURRENT USE OF INSULIN: ICD-10-CM

## 2025-07-10 NOTE — ASSESSMENT & PLAN NOTE
Reminded to avoid any NSAIDs prescription or OTC  Continue current medication  Follow up with nephrology   Orders:    CBC & Differential; Future    Comprehensive Metabolic Panel; Future    Microalbumin / Creatinine Urine Ratio - Urine, Clean Catch; Future

## 2025-07-10 NOTE — ASSESSMENT & PLAN NOTE
Symptoms are currently stable.  Continue current medication.  Orders:    CBC & Differential; Future

## 2025-07-10 NOTE — PROGRESS NOTES
Subjective   The ABCs of the Annual Wellness Visit  Medicare Wellness Visit    Ethel Trotter is a 69 y.o. patient who presents for a Medicare Wellness Visit.    The following portions of the patient's history were reviewed and   updated as appropriate: allergies, current medications, past family history, past medical history, past social history, past surgical history, and problem list.    Compared to one year ago, the patient's physical   health is the same.  Compared to one year ago, the patient's mental   health is the same.    Recent Hospitalizations:  She was admitted within the past 365 days at [facility].     Current Medical Providers:  Patient Care Team:  Sacha Montez MD as PCP - General  Sacha Montez MD as PCP - Family Medicine    Outpatient Medications Prior to Visit   Medication Sig Dispense Refill    Alirocumab (Praluent) 150 MG/ML injection pen Inject 2 mL under the skin into the appropriate area as directed Every 28 (Twenty-Eight) Days. 6 mL 2    amLODIPine (NORVASC) 10 MG tablet Take 1 tablet by mouth Every Evening. 30 tablet 5    Aspirin Low Dose 81 MG EC tablet TAKE ONE TABLET BY MOUTH EVERY DAY 30 tablet 5    Blood Glucose Monitoring Suppl misc Use 1 each Daily. 1 each 0    carvedilol (COREG) 12.5 MG tablet TAKE ONE TABLET BY MOUTH TWICE DAILY 60 tablet 5    Chlorhexidine Gluconate Cloth 2 % pads Apply 1 Application topically Take As Directed. 6 each 0    citalopram (CeleXA) 20 MG tablet TAKE ONE TABLET BY MOUTH EVERY DAY 30 tablet 5    clopidogrel (PLAVIX) 75 MG tablet Take 1 tablet by mouth Daily. 30 tablet 5    empagliflozin (Jardiance) 10 MG tablet tablet Take 1 tablet by mouth Every Morning. 30 tablet 5    ezetimibe (ZETIA) 10 MG tablet TAKE ONE TABLET BY MOUTH AT BEDTIME 30 tablet 5    fluticasone (FLONASE) 50 MCG/ACT nasal spray 2 sprays by Each Nare route Daily. 16 g 5    Glucose Blood (Blood Glucose Test) strip USE TO TEST BLOOD GLUCOSE TWO TIMES A  each  5    Insulin Degludec-Liraglutide (Xultophy) 100-3.6 UNIT-MG/ML solution pen-injector subcutaneous pen Inject 25 Units under the skin into the appropriate area as directed Daily With Dinner. 750 mL 3    Insulin Pen Needle (Insupen Pen Needles) 32G X 4 MM misc Use 1 each Daily. 50 each 5    isosorbide mononitrate (IMDUR) 60 MG 24 hr tablet TAKE 1 AND 1/2 TABLETS BY MOUTH EVERY DAY 45 tablet 5    Lancets misc 1 twice daily 60 each 5    linaclotide (Linzess) 72 MCG capsule capsule Take 1 capsule by mouth Every Morning Before Breakfast. 30 capsule 5    lisinopril (PRINIVIL,ZESTRIL) 40 MG tablet Take 1 tablet by mouth Daily. 30 tablet 5    nitroglycerin (NITROSTAT) 0.4 MG SL tablet Place 1 tablet under the tongue Every 5 (Five) Minutes As Needed for Chest Pain. Take no more than 3 doses in 15 minutes. 25 tablet 5    rosuvastatin (CRESTOR) 40 MG tablet TAKE ONE TABLET BY MOUTH EVERY DAY 30 tablet 5    Ventolin  (90 Base) MCG/ACT inhaler INHALE TWO PUFFS BY MOUTH EVERY 4 HOURS AS NEEDED for wheezing 18 g 5     No facility-administered medications prior to visit.     No opioid medication identified on active medication list. I have reviewed chart for other potential  high risk medication/s and harmful drug interactions in the elderly.      Aspirin is on active medication list. Aspirin use is indicated based on review of current medical condition/s. Pros and cons of this therapy have been discussed today. Benefits of this medication outweigh potential harm.  Patient has been encouraged to continue taking this medication.  .    Patient Active Problem List   Diagnosis    Depression with anxiety    COPD mixed type    Essential hypertension    Mixed hyperlipidemia    Type 2 diabetes mellitus with kidney complication, without long-term current use of insulin    ASCVD (arteriosclerotic cardiovascular disease)    Vitamin D deficiency disease    Osteopenia    Hepatitis C antibody test positive    Gastroesophageal reflux  "disease without esophagitis    Chronic venous insufficiency    Chronic seasonal allergic rhinitis    Dips tobacco    Encounter for immunization    Healthcare maintenance    Mechanical low back pain    Encounter for screening mammogram for breast cancer    Chronic constipation    TIA (transient ischemic attack)    Chronic renal failure, stage 3b    Bilateral renal cysts    Nephrolithiasis    Menopausal symptom    History of recurrent urinary tract infection    Recurrent acute pancreatitis    Elevated CA 19-9 level    Urge urinary incontinence    Other headache syndrome     Advance Care Planning Advance Directive is not on file.  ACP discussion was held with the patient during this visit. Patient does not have an advance directive, information provided.      Objective   Vitals:    07/10/25 1528   BP: 158/92   Pulse: 61   Resp: 14   Temp: 98.2 °F (36.8 °C)   TempSrc: Temporal   SpO2: 98%   Weight: 58.5 kg (129 lb)   Height: 160 cm (63\")   PainSc: 0-No pain     Estimated body mass index is 22.85 kg/m² as calculated from the following:    Height as of this encounter: 160 cm (63\").    Weight as of this encounter: 58.5 kg (129 lb).    BMI is within normal parameters. No other follow-up for BMI required.    Does the patient have evidence of cognitive impairment? No                                                                                           Health  Risk Assessment    Smoking Status:  Social History     Tobacco Use   Smoking Status Never    Passive exposure: Never   Smokeless Tobacco Current    Types: Chew     Alcohol Consumption:  Social History     Substance and Sexual Activity   Alcohol Use No     Fall Risk Screen  STEADI Fall Risk Assessment was completed, and patient is at HIGH risk for falls. Assessment completed on:7/10/2025    Depression Screening   Little interest or pleasure in doing things? Not at all   Feeling down, depressed, or hopeless? Not at all   PHQ-2 Total Score 0      Health Habits and " Functional and Cognitive Screenin/10/2025     3:29 PM   Functional & Cognitive Status   Do you have difficulty preparing food and eating? No   Do you have difficulty bathing yourself, getting dressed or grooming yourself? No   Do you have difficulty using the toilet? No   Do you have difficulty moving around from place to place? No   Do you have trouble with steps or getting out of a bed or a chair? No   Current Diet Well Balanced Diet   Dental Exam Not up to date   Eye Exam Not up to date   Exercise (times per week) 7 times per week   Current Exercises Include Walking   Do you need help using the phone?  No   Are you deaf or do you have serious difficulty hearing?  No   Do you need help to go to places out of walking distance? No   Do you need help shopping? No   Do you need help preparing meals?  No   Do you need help with housework?  No   Do you need help with laundry? No   Do you need help taking your medications? No   Do you need help managing money? No   Do you ever drive or ride in a car without wearing a seat belt? No   Have you felt unusual fatigue (could be tiredness), stress, anger or loneliness in the last month? No   Who do you live with? Spouse   If you need help, do you have trouble finding someone available to you? No   Have you been bothered in the last four weeks by sexual problems? No   Do you have difficulty concentrating, remembering or making decisions? No           Age-appropriate Screening Schedule:  Refer to the list below for future screening recommendations based on patient's age, sex and/or medical conditions. Orders for these recommended tests are listed in the plan section. The patient has been provided with a written plan.    Health Maintenance List  Health Maintenance   Topic Date Due    ZOSTER VACCINE (1 of 2) Never done    DXA SCAN  2020    DIABETIC FOOT EXAM  2020    DIABETIC EYE EXAM  03/15/2023    COVID-19 Vaccine (1 - 2024-25 season) Never done    TDAP/TD  VACCINES (4 - Td or Tdap) 10/22/2024    INFLUENZA VACCINE  10/01/2025    HEMOGLOBIN A1C  11/02/2025    LIPID PANEL  04/01/2026    URINE MICROALBUMIN-CREATININE RATIO (uACR)  04/01/2026    ANNUAL WELLNESS VISIT  07/10/2026    HEPATITIS C SCREENING  Completed    Pneumococcal Vaccine 50+  Completed    MAMMOGRAM  Discontinued    COLORECTAL CANCER SCREENING  Discontinued                                                                                                                                              CMS Preventative Services Quick Reference  Risk Factors Identified During Encounter  Chronic Pain: Natural history and expected course discussed. Questions answered.  Depression/Dysphoria: Current medication is effective, no change recommended  Fall Risk-High or Moderate: Discussed Fall Prevention in the home  Immunizations Discussed/Encouraged: Tdap, Influenza, Shingrix, and RSV (Respiratory Syncytial Virus)  Tobacco Use/Dependance Risk (use dotphrase .tobaccocessation for documentation)    The above risks/problems have been discussed with the patient.  Pertinent information has been shared with the patient in the After Visit Summary.  An After Visit Summary and PPPS were made available to the patient.    Follow Up:   Next Medicare Wellness visit to be scheduled in 1 year.     Additional E&M Note during same encounter follows:  Patient has additional, significant, and separately identifiable condition(s)/problem(s) that require work above and beyond the Medicare Wellness Visit     Chief Complaint  She returns for a scheduled reassessment of multiple medical problems including coronary artery disease, type 2 diabetes mellitus, hyperlipidemia, essential hypertension, coronary artery disease, chronic renal failure, chronic low back pain, depression with anxiety, and headaches    Subjective   HPI  Ethel is also being seen today for additional medical problem/s.    Coronary Artery Disease  She was discharged from [facility]  [place] on 5/16/2025 following a 3-day admission for chest pain, shortness of breath, and lightheadedness.  She underwent a coronary angiogram with evidence of a 95% stenosis of the distal left main extending into the proximal circumflex, a patent LIMA graft to the LAD, patent SVG graft to the diagonal, a 100% occlusion of the proximal dominant RCA, and a patent SVG graft to the PDA.  She underwent PCI of the left main into the left circumflex by Dr. Blank at [facility] on 6/20/2025.  She denies any further chest pain, and has noted a significant improvement in her shortness of breath with exertion.  She denies any palpitations or lightheadedness, and there is no history of any calf pain or some of the ankles.  She states that she has only chewed tobacco once since her procedure.    Type 2 Diabetes Mellitus  She continues to deny paresthesias of the feet, visual disturbances, polydipsia, polyuria, hypoglycemia or foot ulcerations.  She remains on insulin degludec and empagliflozin     Hyperlipidemia  Her compliance with treatment has been fair. She remains on praluent,  rosuvastatin and ezetimibe      Essential Hypertension  She was started on hydralazine at her most recent nephrology appointment.  She remains on lisinopril, carvedilol, and amlodipine.     Chronic Renal Failure  This has been contributed to hypertensive and diabetic nephropathy along with possible PCKD.  She is aware to avoid any NSAIDs.  She is scheduled to undergo a nephrology reassessment with Dr. Manriquez on 8/27/2025.  Noncontrast CT of the abdomen and pelvis performed on 5/15/2025 revealed multiple renal cysts 1 of which on the right appeared somewhat complex consistent with a possible Bosniak class II, moderate to severe vascular calcifications, and moderate stool throughout the colon.    Elevated CA 19-9 Level  She denies any abdominal pain. She continues to deny any nausea, vomiting, change in her bowel habits, hematochezia, or melena.  She  is not followed by GI at present    Low Back Pain   She has a long history of low back pain worse over the last few years. The pain is described as an intermittent left lower ache.  This occasionally radiates to her left flank, left lateral hip, and left posterior thigh.  The pain in her back is worse than that elsewhere.  She has been unable to identify any precipitating, exacerbating, or relieving factors.  She continues to deny any changes in her strength, sensation, or bowel/bladder control. X-rays of the left hip performed on 2/26/2020 were unremarkable.  MRI of the lumbar spine performed on 11/18/2020 was reported as showing degenerative disc disease with moderate to severe right neuroforaminal narrowing at L4-5     Depression with Anxiety  She has a long history of intermittent depression, nervousness, and difficulty sleeping.  She continues to deny any change in her concentration or memory and there is no history of any suicidal ideation.  She lost a granddaughter to cancer just over a year ago.  This has been difficult but she has a supportive family and .  She is prescribed citalopram    Headache  She continues to experience occasional pressure about the parietal area bilaterally.  She denies any associated symptoms at present.  She denies any further problems with her balance.  She continues to deny any problems with her eye hand coordination and has had no changes in her vision, strength, or sensation.  She has noted a continued improvement since discontinuing gabapentin    Chronic Allergic Rhinitis  She continues to have intermittent rhinorrhea, nasal congestion, postnasal drip, and intermittent cough.  She continues to deny any other upper respiratory tract symptoms, there is no history of any chest pain, shortness of breath, hemoptysis, fever, or chills.      Review of Systems   Constitutional:  Positive for fatigue. Negative for appetite change, chills, diaphoresis and fever.   HENT:  Negative  "for congestion, ear pain, postnasal drip, rhinorrhea, sinus pressure, sneezing, sore throat, tinnitus and voice change.    Eyes:  Negative for visual disturbance.   Respiratory:  Positive for shortness of breath. Negative for cough and wheezing.    Cardiovascular:  Negative for chest pain, palpitations and leg swelling.   Gastrointestinal:  Negative for abdominal pain, blood in stool, constipation, diarrhea, nausea and vomiting.   Genitourinary:  Positive for frequency. Negative for dysuria, hematuria and urgency.   Musculoskeletal:  Positive for arthralgias and back pain. Negative for joint swelling and myalgias.   Skin:  Negative for rash.   Neurological:  Positive for light-headedness. Negative for weakness, numbness and confusion.   Psychiatric/Behavioral:  Positive for sleep disturbance. Negative for decreased concentration, dysphoric mood and suicidal ideas. The patient is nervous/anxious.         Objective   Vital Signs:  /92   Pulse 61   Temp 98.2 °F (36.8 °C) (Temporal)   Resp 14   Ht 160 cm (63\")   Wt 58.5 kg (129 lb)   SpO2 98%   BMI 22.85 kg/m²     Physical Exam  Constitutional:       General: She is not in acute distress.     Appearance: Normal appearance. She is well-developed. She is not diaphoretic.      Comments: Alert and in fair spirits. No apparent distress. No pallor, jaundice, diaphoresis, or cyanosis.   HENT:      Head: Atraumatic.      Right Ear: Ear canal and external ear normal. Tympanic membrane is scarred.      Left Ear: Ear canal and external ear normal. Tympanic membrane is scarred.      Mouth/Throat:      Lips: No lesions.      Mouth: Mucous membranes are moist. No oral lesions.      Pharynx: No oropharyngeal exudate or posterior oropharyngeal erythema.      Comments: Tongue tobacco stained  Eyes:      General: Lids are normal.      Extraocular Movements: Extraocular movements intact.      Conjunctiva/sclera: Conjunctivae normal.      Pupils: Pupils are equal.   Neck:      " Thyroid: No thyroid mass or thyromegaly.      Vascular: No carotid bruit or JVD.      Trachea: Trachea normal. No tracheal deviation.   Cardiovascular:      Rate and Rhythm: Normal rate and regular rhythm.      Heart sounds: Normal heart sounds, S1 normal and S2 normal. No murmur heard.     No gallop.   Pulmonary:      Effort: Pulmonary effort is normal.      Breath sounds: Normal breath sounds.   Chest:      Chest wall: No tenderness.   Abdominal:      General: Bowel sounds are normal. There is no distension or abdominal bruit.      Palpations: Abdomen is soft. There is no hepatomegaly, splenomegaly or mass.      Tenderness: There is no abdominal tenderness.      Hernia: No hernia is present.   Musculoskeletal:      Right lower leg: No edema.      Left lower leg: No edema.   Lymphadenopathy:      Head:      Right side of head: No submental, submandibular, tonsillar, preauricular, posterior auricular or occipital adenopathy.      Left side of head: No submental, submandibular, tonsillar, preauricular, posterior auricular or occipital adenopathy.      Cervical: No cervical adenopathy.      Upper Body:      Right upper body: No supraclavicular adenopathy.      Left upper body: No supraclavicular adenopathy.   Skin:     General: Skin is warm.      Coloration: Skin is not cyanotic, jaundiced or pale.      Findings: No rash.      Nails: There is no clubbing.   Neurological:      Mental Status: She is alert and oriented to person, place, and time.      Cranial Nerves: No cranial nerve deficit, dysarthria or facial asymmetry.      Sensory: No sensory deficit.      Motor: No tremor.      Coordination: Coordination normal. Finger-Nose-Finger Test normal.      Gait: Gait normal.   Psychiatric:         Attention and Perception: Attention normal.         Mood and Affect: Mood normal.         Speech: Speech normal.         Behavior: Behavior normal.         Thought Content: Thought content normal. Thought content does not  include suicidal ideation.        Assessment and Plan   Additional age appropriate preventative wellness advice topics were discussed during today's preventative wellness exam(some topics already addressed during AWV portion of the note above):    Physical Activity: Advised cardiovascular activity 150 minutes per week as tolerated. (example brisk walk for 30 minutes, 5 days a week).     Nutrition: Discussed nutrition plan with patient. Information shared in after visit summary. Goal is for a well balanced diet to enhance overall health.     Tobacco Misuse Discussion: Information shared in after visit summary.     Chronic seasonal allergic rhinitis  Continue current medication  Encouraged to report if any worse or if any new symptoms or concerns.       Mixed hyperlipidemia   Encouraged to continue to work on her diet and exercise plan.  Continue current medication  Orders:    Comprehensive Metabolic Panel; Future    Lipid Panel; Future    TSH; Future    Essential hypertension   Hypertension: at goal. Evidence of target organ damage: coronary artery disease, chronic kidney disease, and transient ischemic attack.   As above  Continue current medication  Orders:    CBC & Differential; Future    Comprehensive Metabolic Panel; Future    TSH; Future    Chronic venous insufficiency       ASCVD (arteriosclerotic cardiovascular disease)  Reminded regarding risk factor modification with an emphasis on tobacco cessation.  Continue dual antiplatelet therapy.  Encouraged to follow-up with cardiology  Orders:    CBC & Differential; Future    Vitamin D deficiency disease  Continue supplementation with monitoring.  Orders:    Vitamin D,25-Hydroxy; Future    Type 2 diabetes mellitus with stage 3b chronic kidney disease, without long-term current use of insulin  Diabetes mellitus Type II, under better control.   Encouraged to continue to pursue ADA diet  Encouraged aerobic exercise.  Continue current medication  Labs will be updated  with those arranged by nephrology next month  Orders:    Comprehensive Metabolic Panel; Future    TSH; Future    Hemoglobin A1c; Future    Vitamin B12; Future    Microalbumin / Creatinine Urine Ratio - Urine, Clean Catch; Future    Recurrent acute pancreatitis       Hepatitis C antibody test positive       Gastroesophageal reflux disease without esophagitis   Symptoms are currently stable.  Continue current medication.  Orders:    CBC & Differential; Future    Chronic constipation  As above.   Continue current medication  Orders:    CBC & Differential; Future    TSH; Future    Urge urinary incontinence       Nephrolithiasis       Menopausal symptom       Bilateral renal cysts  Will arrange a urology reassessment  Orders:    Ambulatory Referral to Urology    Healthcare maintenance  Patient remains uninterested in cancer screening  Recommended Shingrix, RSV, and an updated Tdap  Reminded to get a flu shot when available       Depression with anxiety  Significant situational component.   Supportive therapy.   Continue current medication.  Encouraged to report if any worse or if any new symptoms or concerns.  Orders:    TSH; Future    Osteopenia, unspecified location  Encouraged to continue to pursue weight bearing activities while exercising joint protection.  Patient remains uninterested in an updated DEXA scan  Orders:    Vitamin D,25-Hydroxy; Future    Mechanical low back pain  Reminded regarding symptomatic treatment.   Encouraged to report if any worse or if any new symptoms or concerns.       TIA (transient ischemic attack)  As above.       COPD mixed type   COPD is stable.  Encouraged to remain off cigarettes  Encouraged to remain as active as symptoms allow for  Orders:    CBC & Differential; Future    Dips tobacco  Encouraged to remain off smokeless tobacco       Chronic renal failure, stage 3b  Reminded to avoid any NSAIDs prescription or OTC  Continue current medication  Follow up with nephrology   Orders:     CBC & Differential; Future    Comprehensive Metabolic Panel; Future    Microalbumin / Creatinine Urine Ratio - Urine, Clean Catch; Future            Follow Up   Return in about 3 months (around 10/10/2025).  Patient was given instructions and counseling regarding her condition or for health maintenance advice. Please see specific information pulled into the AVS if appropriate.

## 2025-07-10 NOTE — ASSESSMENT & PLAN NOTE
Reminded regarding risk factor modification with an emphasis on tobacco cessation.  Continue dual antiplatelet therapy.  Encouraged to follow-up with cardiology  Orders:    CBC & Differential; Future

## 2025-07-10 NOTE — ASSESSMENT & PLAN NOTE
COPD is stable.  Encouraged to remain off cigarettes  Encouraged to remain as active as symptoms allow for  Orders:    CBC & Differential; Future

## 2025-07-10 NOTE — ASSESSMENT & PLAN NOTE
Patient remains uninterested in cancer screening  Recommended Shingrix, RSV, and an updated Tdap  Reminded to get a flu shot when available

## 2025-07-10 NOTE — ASSESSMENT & PLAN NOTE
Diabetes mellitus Type II, under better control.   Encouraged to continue to pursue ADA diet  Encouraged aerobic exercise.  Continue current medication  Labs will be updated with those arranged by nephrology next month  Orders:    Comprehensive Metabolic Panel; Future    TSH; Future    Hemoglobin A1c; Future    Vitamin B12; Future    Microalbumin / Creatinine Urine Ratio - Urine, Clean Catch; Future

## 2025-07-10 NOTE — ASSESSMENT & PLAN NOTE
Encouraged to continue to pursue weight bearing activities while exercising joint protection.  Patient remains uninterested in an updated DEXA scan  Orders:    Vitamin D,25-Hydroxy; Future

## 2025-07-10 NOTE — ASSESSMENT & PLAN NOTE
Significant situational component.   Supportive therapy.   Continue current medication.  Encouraged to report if any worse or if any new symptoms or concerns.  Orders:    TSH; Future

## 2025-07-10 NOTE — ASSESSMENT & PLAN NOTE
Hypertension: at goal. Evidence of target organ damage: coronary artery disease, chronic kidney disease, and transient ischemic attack.   As above  Continue current medication  Orders:    CBC & Differential; Future    Comprehensive Metabolic Panel; Future    TSH; Future

## 2025-07-24 ENCOUNTER — LAB (OUTPATIENT)
Dept: FAMILY MEDICINE CLINIC | Facility: CLINIC | Age: 70
End: 2025-07-24
Payer: MEDICARE

## 2025-07-24 DIAGNOSIS — K21.9 GASTROESOPHAGEAL REFLUX DISEASE WITHOUT ESOPHAGITIS: ICD-10-CM

## 2025-07-24 DIAGNOSIS — I10 ESSENTIAL HYPERTENSION: ICD-10-CM

## 2025-07-24 DIAGNOSIS — E55.9 VITAMIN D DEFICIENCY DISEASE: ICD-10-CM

## 2025-07-24 DIAGNOSIS — J44.9 COPD MIXED TYPE: ICD-10-CM

## 2025-07-24 DIAGNOSIS — N18.32 CHRONIC RENAL FAILURE, STAGE 3B: ICD-10-CM

## 2025-07-24 DIAGNOSIS — N18.4 CHRONIC KIDNEY DISEASE, STAGE IV (SEVERE): Primary | ICD-10-CM

## 2025-07-24 DIAGNOSIS — K59.09 CHRONIC CONSTIPATION: ICD-10-CM

## 2025-07-24 DIAGNOSIS — N18.32 TYPE 2 DIABETES MELLITUS WITH STAGE 3B CHRONIC KIDNEY DISEASE, WITHOUT LONG-TERM CURRENT USE OF INSULIN: ICD-10-CM

## 2025-07-24 DIAGNOSIS — I25.10 ASCVD (ARTERIOSCLEROTIC CARDIOVASCULAR DISEASE): ICD-10-CM

## 2025-07-24 DIAGNOSIS — E11.22 TYPE 2 DIABETES MELLITUS WITH STAGE 3B CHRONIC KIDNEY DISEASE, WITHOUT LONG-TERM CURRENT USE OF INSULIN: ICD-10-CM

## 2025-07-24 DIAGNOSIS — F41.8 DEPRESSION WITH ANXIETY: ICD-10-CM

## 2025-07-24 DIAGNOSIS — E78.2 MIXED HYPERLIPIDEMIA: ICD-10-CM

## 2025-07-24 DIAGNOSIS — M85.80 OSTEOPENIA, UNSPECIFIED LOCATION: ICD-10-CM

## 2025-07-24 LAB
25(OH)D3 SERPL-MCNC: 35.9 NG/ML (ref 30–100)
ALBUMIN SERPL-MCNC: 4.3 G/DL (ref 3.5–5.2)
ALBUMIN UR-MCNC: 2.7 MG/DL
ALBUMIN/GLOB SERPL: 1.5 G/DL
ALP SERPL-CCNC: 80 U/L (ref 39–117)
ALT SERPL W P-5'-P-CCNC: 29 U/L (ref 1–33)
ANION GAP SERPL CALCULATED.3IONS-SCNC: 9.3 MMOL/L (ref 5–15)
AST SERPL-CCNC: 23 U/L (ref 1–32)
BACTERIA UR QL AUTO: ABNORMAL /HPF
BASOPHILS # BLD AUTO: 0.07 10*3/MM3 (ref 0–0.2)
BASOPHILS NFR BLD AUTO: 1 % (ref 0–1.5)
BILIRUB SERPL-MCNC: 0.3 MG/DL (ref 0–1.2)
BILIRUB UR QL STRIP: NEGATIVE
BUN SERPL-MCNC: 24.5 MG/DL (ref 8–23)
BUN/CREAT SERPL: 15.2 (ref 7–25)
CALCIUM SPEC-SCNC: 9.7 MG/DL (ref 8.6–10.5)
CHLORIDE SERPL-SCNC: 106 MMOL/L (ref 98–107)
CHOLEST SERPL-MCNC: 63 MG/DL (ref 0–200)
CLARITY UR: ABNORMAL
CO2 SERPL-SCNC: 26.7 MMOL/L (ref 22–29)
COLOR UR: YELLOW
CREAT SERPL-MCNC: 1.61 MG/DL (ref 0.57–1)
CREAT UR-MCNC: 51.5 MG/DL
CREAT UR-MCNC: 51.5 MG/DL
DEPRECATED RDW RBC AUTO: 44.1 FL (ref 37–54)
EGFRCR SERPLBLD CKD-EPI 2021: 34.1 ML/MIN/1.73
EOSINOPHIL # BLD AUTO: 0.51 10*3/MM3 (ref 0–0.4)
EOSINOPHIL NFR BLD AUTO: 7.5 % (ref 0.3–6.2)
ERYTHROCYTE [DISTWIDTH] IN BLOOD BY AUTOMATED COUNT: 13.7 % (ref 12.3–15.4)
GLOBULIN UR ELPH-MCNC: 2.8 GM/DL
GLUCOSE SERPL-MCNC: 148 MG/DL (ref 65–99)
GLUCOSE UR STRIP-MCNC: NEGATIVE MG/DL
HBA1C MFR BLD: 8.81 % (ref 4.8–5.6)
HCT VFR BLD AUTO: 38.2 % (ref 34–46.6)
HDLC SERPL-MCNC: 46 MG/DL (ref 40–60)
HGB BLD-MCNC: 11.9 G/DL (ref 12–15.9)
HGB UR QL STRIP.AUTO: ABNORMAL
HYALINE CASTS UR QL AUTO: ABNORMAL /LPF
IMM GRANULOCYTES # BLD AUTO: 0.02 10*3/MM3 (ref 0–0.05)
IMM GRANULOCYTES NFR BLD AUTO: 0.3 % (ref 0–0.5)
KETONES UR QL STRIP: NEGATIVE
LDLC SERPL CALC-MCNC: <5 MG/DL (ref 0–100)
LDLC/HDLC SERPL: 0.06 {RATIO}
LEUKOCYTE ESTERASE UR QL STRIP.AUTO: ABNORMAL
LYMPHOCYTES # BLD AUTO: 2.1 10*3/MM3 (ref 0.7–3.1)
LYMPHOCYTES NFR BLD AUTO: 31.1 % (ref 19.6–45.3)
MCH RBC QN AUTO: 27.3 PG (ref 26.6–33)
MCHC RBC AUTO-ENTMCNC: 31.2 G/DL (ref 31.5–35.7)
MCV RBC AUTO: 87.6 FL (ref 79–97)
MICROALBUMIN/CREAT UR: 52.4 MG/G (ref 0–29)
MONOCYTES # BLD AUTO: 0.57 10*3/MM3 (ref 0.1–0.9)
MONOCYTES NFR BLD AUTO: 8.4 % (ref 5–12)
NEUTROPHILS NFR BLD AUTO: 3.49 10*3/MM3 (ref 1.7–7)
NEUTROPHILS NFR BLD AUTO: 51.7 % (ref 42.7–76)
NITRITE UR QL STRIP: POSITIVE
NRBC BLD AUTO-RTO: 0 /100 WBC (ref 0–0.2)
PH UR STRIP.AUTO: 6.5 [PH] (ref 5–8)
PLATELET # BLD AUTO: 123 10*3/MM3 (ref 140–450)
PMV BLD AUTO: 10.9 FL (ref 6–12)
POTASSIUM SERPL-SCNC: 5 MMOL/L (ref 3.5–5.2)
PROT ?TM UR-MCNC: 14.5 MG/DL
PROT SERPL-MCNC: 7.1 G/DL (ref 6–8.5)
PROT UR QL STRIP: ABNORMAL
PROT/CREAT UR: 281.6 MG/G CREA (ref 0–200)
RBC # BLD AUTO: 4.36 10*6/MM3 (ref 3.77–5.28)
RBC # UR STRIP: ABNORMAL /HPF
REF LAB TEST METHOD: ABNORMAL
SODIUM SERPL-SCNC: 142 MMOL/L (ref 136–145)
SP GR UR STRIP: 1.01 (ref 1–1.03)
SQUAMOUS #/AREA URNS HPF: ABNORMAL /HPF
TRIGL SERPL-MCNC: 72 MG/DL (ref 0–150)
TSH SERPL DL<=0.05 MIU/L-ACNC: 1.44 UIU/ML (ref 0.27–4.2)
UROBILINOGEN UR QL STRIP: ABNORMAL
VIT B12 BLD-MCNC: 949 PG/ML (ref 211–946)
VLDLC SERPL-MCNC: NORMAL MG/DL
WBC # UR STRIP: ABNORMAL /HPF
WBC NRBC COR # BLD AUTO: 6.76 10*3/MM3 (ref 3.4–10.8)

## 2025-07-24 PROCEDURE — 80053 COMPREHEN METABOLIC PANEL: CPT | Performed by: GENERAL PRACTICE

## 2025-07-24 PROCEDURE — 84443 ASSAY THYROID STIM HORMONE: CPT | Performed by: GENERAL PRACTICE

## 2025-07-24 PROCEDURE — 84156 ASSAY OF PROTEIN URINE: CPT | Performed by: GENERAL PRACTICE

## 2025-07-24 PROCEDURE — 82607 VITAMIN B-12: CPT | Performed by: GENERAL PRACTICE

## 2025-07-24 PROCEDURE — 80061 LIPID PANEL: CPT | Performed by: GENERAL PRACTICE

## 2025-07-24 PROCEDURE — 82570 ASSAY OF URINE CREATININE: CPT | Performed by: GENERAL PRACTICE

## 2025-07-24 PROCEDURE — 82306 VITAMIN D 25 HYDROXY: CPT | Performed by: GENERAL PRACTICE

## 2025-07-24 PROCEDURE — 83036 HEMOGLOBIN GLYCOSYLATED A1C: CPT | Performed by: GENERAL PRACTICE

## 2025-07-24 PROCEDURE — 85025 COMPLETE CBC W/AUTO DIFF WBC: CPT | Performed by: GENERAL PRACTICE

## 2025-07-24 PROCEDURE — 81001 URINALYSIS AUTO W/SCOPE: CPT | Performed by: GENERAL PRACTICE

## 2025-07-24 PROCEDURE — 36415 COLL VENOUS BLD VENIPUNCTURE: CPT

## 2025-07-24 PROCEDURE — 82043 UR ALBUMIN QUANTITATIVE: CPT | Performed by: GENERAL PRACTICE

## 2025-08-19 DIAGNOSIS — K59.09 CHRONIC CONSTIPATION: ICD-10-CM

## 2025-08-19 DIAGNOSIS — M54.59 MECHANICAL LOW BACK PAIN: ICD-10-CM

## 2025-08-19 RX ORDER — LINACLOTIDE 72 UG/1
72 CAPSULE, GELATIN COATED ORAL
Qty: 30 CAPSULE | Refills: 5 | Status: SHIPPED | OUTPATIENT
Start: 2025-08-19

## 2025-08-19 RX ORDER — GABAPENTIN 100 MG/1
200 CAPSULE ORAL NIGHTLY
Qty: 60 CAPSULE | Refills: 2 | Status: SHIPPED | OUTPATIENT
Start: 2025-08-19

## (undated) DEVICE — SYR LUERLOK 30CC

## (undated) DEVICE — Device: Brand: DEFENDO AIR/WATER/SUCTION AND BIOPSY VALVE

## (undated) DEVICE — ENDOGATOR AUXILIARY WATER JET CONNECTOR: Brand: ENDOGATOR

## (undated) DEVICE — GOWN,REINF,POLY,ECL,PP SLV,XL: Brand: MEDLINE

## (undated) DEVICE — CONN Y IRR DISP 1P/U

## (undated) DEVICE — TUBING, SUCTION, 1/4" X 20', STRAIGHT: Brand: MEDLINE INDUSTRIES, INC.

## (undated) DEVICE — Device